# Patient Record
Sex: FEMALE | Race: BLACK OR AFRICAN AMERICAN | NOT HISPANIC OR LATINO | Employment: FULL TIME | URBAN - METROPOLITAN AREA
[De-identification: names, ages, dates, MRNs, and addresses within clinical notes are randomized per-mention and may not be internally consistent; named-entity substitution may affect disease eponyms.]

---

## 2017-01-04 ENCOUNTER — PATIENT MESSAGE (OUTPATIENT)
Dept: FAMILY MEDICINE | Facility: CLINIC | Age: 47
End: 2017-01-04

## 2017-01-09 ENCOUNTER — OFFICE VISIT (OUTPATIENT)
Dept: FAMILY MEDICINE | Facility: CLINIC | Age: 47
End: 2017-01-09
Payer: MEDICAID

## 2017-01-09 VITALS
HEART RATE: 73 BPM | WEIGHT: 192 LBS | SYSTOLIC BLOOD PRESSURE: 126 MMHG | HEIGHT: 63 IN | BODY MASS INDEX: 34.02 KG/M2 | DIASTOLIC BLOOD PRESSURE: 77 MMHG

## 2017-01-09 DIAGNOSIS — Z23 FLU VACCINE NEED: ICD-10-CM

## 2017-01-09 DIAGNOSIS — I10 GOOD HYPERTENSION CONTROL: ICD-10-CM

## 2017-01-09 DIAGNOSIS — E78.5 HYPERLIPIDEMIA, UNSPECIFIED HYPERLIPIDEMIA TYPE: ICD-10-CM

## 2017-01-09 DIAGNOSIS — Z76.89 ESTABLISHING CARE WITH NEW DOCTOR, ENCOUNTER FOR: Primary | ICD-10-CM

## 2017-01-09 PROCEDURE — 99213 OFFICE O/P EST LOW 20 MIN: CPT | Mod: PBBFAC,PO,25 | Performed by: FAMILY MEDICINE

## 2017-01-09 PROCEDURE — 99214 OFFICE O/P EST MOD 30 MIN: CPT | Mod: S$PBB,,, | Performed by: FAMILY MEDICINE

## 2017-01-09 PROCEDURE — 90471 IMMUNIZATION ADMIN: CPT | Mod: PBBFAC,PO | Performed by: FAMILY MEDICINE

## 2017-01-09 PROCEDURE — 99999 PR PBB SHADOW E&M-EST. PATIENT-LVL III: CPT | Mod: PBBFAC,,, | Performed by: FAMILY MEDICINE

## 2017-01-09 NOTE — PROGRESS NOTES
Subjective:       Patient ID: Kylah Mohan is a 46 y.o. female.    Chief Complaint: Establish Care (discuss meds)    HPI     Establish Care  Pt reports to the clinic to establish care.   The pt has a medical history which includes Prediabetes, HTN, depression and anxiety.   As far as smoking is concerned, the pt denies.   The pt has not attempted to maintain a healthy diet.   Consistent seatbelt usage reported.   Health maintenance addressed and updated in chart.    Review of Systems   Constitutional: Negative for unexpected weight change.   HENT: Negative for hearing loss, rhinorrhea and trouble swallowing.    Eyes: Negative for discharge and visual disturbance.   Respiratory: Negative for chest tightness and wheezing.    Cardiovascular: Negative for chest pain and palpitations.   Gastrointestinal: Negative for blood in stool, constipation, diarrhea and vomiting.   Endocrine: Negative for polydipsia and polyuria.   Genitourinary: Negative for difficulty urinating, dysuria, hematuria and menstrual problem.   Musculoskeletal: Positive for arthralgias and joint swelling.   Neurological: Negative for headaches.       Objective:      Physical Exam   Constitutional: She appears well-developed and well-nourished. No distress.   HENT:   Head: Normocephalic and atraumatic.   Mouth/Throat: Oropharynx is clear and moist. No oropharyngeal exudate.   Eyes: EOM are normal. Pupils are equal, round, and reactive to light.   Neck: Normal range of motion. Neck supple. No thyromegaly present.   Cardiovascular: Normal rate, regular rhythm, normal heart sounds and intact distal pulses.    Pulmonary/Chest: Effort normal and breath sounds normal. No respiratory distress. She has no wheezes.   Abdominal: Soft. Bowel sounds are normal. She exhibits no distension and no mass. There is no tenderness.   Musculoskeletal: She exhibits no edema.   Lymphadenopathy:     She has no cervical adenopathy.   Neurological: She is alert.   Skin:  Skin is warm. No rash noted. No erythema.   Psychiatric: She has a normal mood and affect. Her behavior is normal.   Vitals reviewed.      Assessment:       1. Establishing care with new doctor, encounter for    2. Good hypertension control    3. Hyperlipidemia, unspecified hyperlipidemia type    4. Obesity, Class II, BMI 35-39.9, with comorbidity        Plan:         1. Establishing care with new doctor, encounter for    2. Good hypertension control  Patient's blood pressure 126/77 on today.  No changes to medication regimen at this time.    3. Hyperlipidemia, unspecified hyperlipidemia type  Patient will like to employee dietary and lifestyle measures in order to help with this.    4. Obesity, Class II, BMI 35-39.9, with comorbidity  Patient has been advised to continue to maintain a healthy lifestyle, including regular exercise and consuming a well balanced diet.     5. Flu vaccine need  - Influenza - Quadrivalent (3 years & older) (PF)    Patient readiness: acceptance and barriers:none    During the course of the visit the patient was educated and counseled about the following:     Hypertension:   Dietary sodium restriction.  Regular aerobic exercise.  Obesity:   Informal exercise measures discussed, e.g. taking stairs instead of elevator.  Regular aerobic exercise program discussed.    Goals: Hypertension: Reduce Blood Pressure and Obesity: Reduce calorie intake and BMI    Did patient meet goals/outcomes: No    The following self management tools provided: blood pressure log  excercise log    Patient Instructions (the written plan) was given to the patient/family.     Time spent with patient: 15 minutes    Portions of this note were created using Dragon voice recognition software. There may be voice recognition errors found in the text, and attempts were made to correct these errors prior to signature    Enmanuel Nowak MD    Family Medicine  1/9/2017

## 2017-01-09 NOTE — PATIENT INSTRUCTIONS
Diabetes (General Information)  Diabetes is a long-term health problem. It means your body does not make enough insulin. Or it may mean that your body cannot use the insulin it makes. Insulin is a hormone in your body. It lets blood sugar (glucose) reach the cells in your body. All of your cells need glucose for fuel.  When you have diabetes, the glucose in your blood builds up because it cannot get into the cells. This buildup is called high blood sugar (hyperglycemia).  Your blood sugar level depends on several things. It depends on what kind of food you eat and how much of it you eat. It also depends on how much exercise you get, and how much insulin you have in your body. Eating too much of the wrong kinds of food or not taking diabetes medicine on time can cause high blood sugar. Infections can cause high blood sugar even if you are taking medicines correctly.  These things can also cause low blood sugar:  · Missing meals  · Not eating enough food  · Taking too much diabetes medicine  Diabetes can cause serious problems over time if you do not get treated. These problems include heart disease, stroke, kidney failure, and blindness. They also include nerve pain or loss of feeling in your legs and feet, and gangrene of the feet. By keeping your blood sugar under control you can prevent or delay these problems.  Normal blood sugar levels are 80 to 100 before a meal and less than 180 in the 1 to 2 hours after a meal.  Home care  Follow these guidelines when caring for yourself at home:  · Follow the diet your healthcare provider gives you. Take insulin or other diabetes medicine exactly as told to.  · Watch your blood sugar as you are told to. Keep a log of your results. This will help your provider change your medicines to keep your blood sugar under control.  · Try to reach your ideal weight. You may be able to cut back on or not have to take diabetes medicine if you eat the right foods and get exercise.  · Do  not smoke. Smoking worsens the effects of diabetes on your circulation. You are much more likely to have a heart attack if you have diabetes and you smoke.  · Take good care of your feet. If you have lost feeling in your feet, you may not see an injury or infection. Check your feet and between your toes at least once a week.  · Wear a medical alert bracelet or necklace, or carry a card in your wallet that says you have diabetes. This will help healthcare providers give you the right care if you get very ill and cannot tell them that you have diabetes.  Sick day plan  If you get a cold, the flu, or a bacterial or viral infection, take these steps:  · Look at your diabetes sick plan and call your healthcare provider as you were told to. You may need to call your provider right away if:  ¨ Your blood sugar is above 240 while taking your diabetes medicine  ¨ Your urine ketone levels are above normal or high  ¨ You have been vomiting more than 6 hours  ¨ You have trouble breathing or your breath ha s a fruity smell  ¨ You have a high fever  ¨ You have a fever for several days and you are not getting better  ¨ You get light-headed and are sleepier than usual  · Keep taking your diabetes pills (oral medicine) even if you have been vomiting and are feeling sick. Call your provider right away because you may need insulin to lower your blood sugar until you recover from your illness.  · Keep taking your insulin even if you have been vomiting and are feeling sick. Call your provider right away to ask if you need to change your insulin dose. This will depend on your blood sugar results.  · Check your blood sugar every 2 to 4 hours, or at least 4 times a day.  · Check your ketones often. If you are vomiting and having diarrhea, watch them more often.  · Do not skip meals. Try to eat small meals on a regular schedule. Do this even if you do not feel like eating.  · Drink water or other liquids that do not have caffeine or  calories. This will keep you from getting dehydrated. If you are nauseated or vomiting, takes small sips every 5 minutes. To prevent dehydration try to drink a cup (8 ounces) of fluids every hour while you are awake.  General care  Always bring a source of fast-acting sugar with you in case you have symptoms of low blood sugar (below 70). At the first sign of low blood sugar, eat or drink 15 to 20 grams of fast-acting sugar to raise your blood sugar. Examples are:  · 3 to 4 glucose tablets. You can buy these at most [employer].  · 4 ounces (1/2 cup) of regular (not diet) soft drinks  · 4 ounces (1/2 cup) of any fruit juice  · 8 ounces (1 cup) of milk  · 5 to 6 pieces of hard candy  · 1 tablespoon of honey  Check your blood sugar 15 minutes after treating yourself. If it is still below 70, take 15 to 20 more grams of fast-acting sugar. Test again in 15 minutes. If it returns to normal (70 or above), eat a snack or meal to keep your blood sugar in a safe range. If it stays low, call your doctor or go to an emergency room.  Follow-up care  Follow-up with your healthcare provider, or as advised. For more information about diabetes, visit the American Diabetes Association website at www.diabetes.org or call 279-471-9967.  When to seek medical advice  Call your healthcare provider right away if you have any of these symptoms of high blood sugar:  · Frequent urination  · Dizziness  · Drowsiness  · Thirst  · Headache  · Nausea or vomiting  · Abdominal pain  · Eyesight changes  · Fast breathing  · Confusion or loss of consciousness  Also call your provider right away if you have any of these signs of low blood sugar:  · Fatigue  · Headache  · Shakes  · Excess sweating  · Hunger  · Feeling anxious or restless  · Eyesight changes  · Drowsiness  · Weakness  · Confusion or loss of consciousness  Call 911  Call for emergency help right away if any of these occur:  · Chest pain or shortness of breath  · Dizziness or  fainting  · Weakness of an arm or leg or one side of the face  · Trouble speaking or seeing   © 6511-4381 [employer] [address] [place] [address], [place] [address]. All rights reserved. This information is not intended as a substitute for professional medical care. Always follow your healthcare professional's instructions.        Weight Management: Getting Started  Healthy bodies come in all shapes and sizes. Not all bodies are made to be thin. For some people, a healthy weight is higher than the average weight listed on weight charts. Your healthcare provider can help you decide on a healthy weight for you.    Reasons to lose weight  Losing weight can help with some health problems, such as high blood pressure, heart disease, diabetes, sleep apnea, and arthritis. You may also feel more energy.  Set your long-term goal  Your goal doesn't even have to be a specific weight. You may decide on a fitness goal (such as being able to walk 10 miles a week), or a health goal (such as lowering your blood pressure). Choose a goal that is measurable and reasonable, so you know when you've reached it. A goal of reaching a BMI of less than 25 is not always reasonable (or possible).   Make an action plan  Habits dont change overnight. Setting your goals too high can leave you feeling discouraged if you cant reach them. Be realistic. Choose one or two small changes you can make now. Set an action plan for how you are going to make these changes. When you can stick to this plan, keep making a few more small changes. Taking small steps will help you stay on the path to success.  Track your progress  Write down your goals. Then, keep a daily record of your progress. Write down what you eat and how active you are. This record lets you look back on how much youve done. It may also help when youre feeling frustrated. Reward yourself for success. Even if you dont reach every goal, give yourself credit for what you do get  done.  Get support  Encouragement from others can help make losing weight easier. Ask your family members and friends for support. They may even want to join you. Also look to your healthcare provider, registered dietitian, and  for help. Your local hospital can give you more information about nutrition, exercise, and weight loss.  © 0036-0120 [employer]. [address] [place] [address], [place] [address]. All rights reserved. This information is not intended as a substitute for professional medical care. Always follow your healthcare professional's instructions.

## 2017-01-11 RX ORDER — OXYBUTYNIN CHLORIDE 5 MG/1
TABLET ORAL
Qty: 60 TABLET | Refills: 0 | Status: SHIPPED | OUTPATIENT
Start: 2017-01-11 | End: 2017-01-19 | Stop reason: SDUPTHER

## 2017-01-17 ENCOUNTER — PATIENT MESSAGE (OUTPATIENT)
Dept: ENDOCRINOLOGY | Facility: CLINIC | Age: 47
End: 2017-01-17

## 2017-01-18 ENCOUNTER — PATIENT MESSAGE (OUTPATIENT)
Dept: ENDOCRINOLOGY | Facility: CLINIC | Age: 47
End: 2017-01-18

## 2017-01-19 RX ORDER — OXYBUTYNIN CHLORIDE 5 MG/1
5 TABLET ORAL 2 TIMES DAILY
Qty: 60 TABLET | Refills: 0 | Status: SHIPPED | OUTPATIENT
Start: 2017-01-19 | End: 2017-02-17 | Stop reason: SDUPTHER

## 2017-02-15 ENCOUNTER — PATIENT MESSAGE (OUTPATIENT)
Dept: FAMILY MEDICINE | Facility: CLINIC | Age: 47
End: 2017-02-15

## 2017-02-17 RX ORDER — OXYBUTYNIN CHLORIDE 5 MG/1
5 TABLET ORAL 2 TIMES DAILY
Qty: 60 TABLET | Refills: 0 | Status: SHIPPED | OUTPATIENT
Start: 2017-02-17 | End: 2017-04-12 | Stop reason: SDUPTHER

## 2017-02-23 DIAGNOSIS — I10 ESSENTIAL HYPERTENSION: ICD-10-CM

## 2017-02-23 RX ORDER — LOSARTAN POTASSIUM 25 MG/1
25 TABLET ORAL DAILY
Qty: 30 TABLET | Refills: 11 | Status: SHIPPED | OUTPATIENT
Start: 2017-02-23 | End: 2017-02-23 | Stop reason: SDUPTHER

## 2017-02-23 RX ORDER — LOSARTAN POTASSIUM 25 MG/1
TABLET ORAL
Qty: 30 TABLET | Refills: 11 | Status: SHIPPED | OUTPATIENT
Start: 2017-02-23 | End: 2018-03-13 | Stop reason: SDUPTHER

## 2017-03-01 ENCOUNTER — LAB VISIT (OUTPATIENT)
Dept: LAB | Facility: HOSPITAL | Age: 47
End: 2017-03-01
Attending: FAMILY MEDICINE
Payer: MEDICAID

## 2017-03-01 ENCOUNTER — OFFICE VISIT (OUTPATIENT)
Dept: FAMILY MEDICINE | Facility: CLINIC | Age: 47
End: 2017-03-01
Payer: MEDICAID

## 2017-03-01 VITALS
BODY MASS INDEX: 34.68 KG/M2 | WEIGHT: 195.75 LBS | DIASTOLIC BLOOD PRESSURE: 81 MMHG | SYSTOLIC BLOOD PRESSURE: 128 MMHG | HEART RATE: 68 BPM | HEIGHT: 63 IN

## 2017-03-01 DIAGNOSIS — R53.83 FATIGUE, UNSPECIFIED TYPE: Primary | ICD-10-CM

## 2017-03-01 DIAGNOSIS — R53.83 FATIGUE, UNSPECIFIED TYPE: ICD-10-CM

## 2017-03-01 LAB
CRP SERPL-MCNC: 8.9 MG/L
ERYTHROCYTE [SEDIMENTATION RATE] IN BLOOD BY WESTERGREN METHOD: 32 MM/HR

## 2017-03-01 PROCEDURE — 86225 DNA ANTIBODY NATIVE: CPT

## 2017-03-01 PROCEDURE — 36415 COLL VENOUS BLD VENIPUNCTURE: CPT | Mod: PO

## 2017-03-01 PROCEDURE — 99213 OFFICE O/P EST LOW 20 MIN: CPT | Mod: S$PBB,,, | Performed by: FAMILY MEDICINE

## 2017-03-01 PROCEDURE — 86038 ANTINUCLEAR ANTIBODIES: CPT

## 2017-03-01 PROCEDURE — 85651 RBC SED RATE NONAUTOMATED: CPT | Mod: PO

## 2017-03-01 PROCEDURE — 86140 C-REACTIVE PROTEIN: CPT

## 2017-03-01 PROCEDURE — 99999 PR PBB SHADOW E&M-EST. PATIENT-LVL III: CPT | Mod: PBBFAC,,, | Performed by: FAMILY MEDICINE

## 2017-03-01 RX ORDER — GABAPENTIN 300 MG/1
300 CAPSULE ORAL 3 TIMES DAILY
Qty: 90 CAPSULE | Refills: 11 | Status: SHIPPED | OUTPATIENT
Start: 2017-03-01 | End: 2018-03-13 | Stop reason: SDUPTHER

## 2017-03-01 NOTE — MR AVS SNAPSHOT
[facility]  [address] [place] [address]  Phone: [phone]  Fax: [phone]                  Kylah Mohan   3/1/2017 10:40 AM   Office Visit    Description:  Female : 1970   Provider:  Enmanuel Nowak MD   Department:  [facility]           Reason for Visit     Numbness           Diagnoses this Visit        Comments    Fatigue, unspecified type    -  Primary            To Do List           Future Appointments        Provider Department Dept Phone    2017 8:00 AM Barrington Suarez MD [facility] Endo/Diabetes [phone]      Goals (5 Years of Data)     None       These Medications        Disp Refills Start End    gabapentin (NEURONTIN) 300 MG capsule 90 capsule 11 3/1/2017 3/1/2018    Take 1 capsule (300 mg total) by mouth 3 (three) times daily. - Oral    Pharmacy: [facility] [address] [place] [facility] [address] #: [phone]         [facility] On Call     [facility] On Call Nurse Care Line - 7 Assistance  Registered nurses in the [facility] provide clinical advisement, health education, appointment booking, and other advisory services.  Call for this free service at [phone].             Medications           Message regarding Medications     Verify the changes and/or additions to your medication regime listed below are the same as discussed with your clinician today.  If any of these changes or additions are incorrect, please notify your healthcare provider.        START taking these NEW medications        Refills    gabapentin (NEURONTIN) 300 MG capsule 11    Sig: Take 1 capsule (300 mg total) by mouth 3 (three) times daily.    Class: Normal    Route: Oral           Verify that the below list of medications is an accurate representation of the medications you are currently taking.  If none reported, the list may be blank. If incorrect, please contact your healthcare provider. Carry this list with you in case  of emergency.           Current Medications     atenolol (TENORMIN) 25 MG tablet Take 1 tablet (25 mg total) by mouth once daily.    hydrochlorothiazide (HYDRODIURIL) 25 MG tablet Take 1 tablet (25 mg total) by mouth once daily.    losartan (COZAAR) 25 MG tablet TAKE ONE TABLET BY MOUTH ONE TIME DAILY    metformin (GLUCOPHAGE) 500 MG tablet Take 1 tablet (500 mg total) by mouth 2 (two) times daily with meals.    methocarbamol (ROBAXIN) 500 MG Tab Take 1 tablet (500 mg total) by mouth 4 (four) times daily.    multivitamin (THERAGRAN) per tablet Every day    naproxen (NAPROSYN) 500 MG tablet Take 1 tablet (500 mg total) by mouth 2 (two) times daily with meals.    oxybutynin (DITROPAN) 5 MG Tab Take 1 tablet (5 mg total) by mouth 2 (two) times daily.    topiramate (TOPAMAX) 50 MG tablet Take 1 tablet (50 mg total) by mouth once daily.    trazodone (DESYREL) 50 MG tablet TAKE ONE TABLET BY MOUTH ONCE EVERY EVENING    gabapentin (NEURONTIN) 300 MG capsule Take 1 capsule (300 mg total) by mouth 3 (three) times daily.           Clinical Reference Information           Your Vitals Were     BP                   128/81           Blood Pressure          Most Recent Value    BP  128/81      Allergies as of 3/1/2017     Latex    Shellfish Containing Products      Immunizations Administered on Date of Encounter - 3/1/2017     None      Orders Placed During Today's Visit     Future Labs/Procedures Expected by Expires    RAINER  3/1/2017 4/30/2018    ANTI-DNA ANTIBODY, DOUBLE-STRANDED  3/1/2017 4/30/2018    C-REACTIVE PROTEIN  3/1/2017 4/30/2018    SEDIMENTATION RATE, MANUAL  3/1/2017 4/30/2018      Language Assistance Services     ATTENTION: Language assistance services are available, free of charge. Please call [phone].      ATENCIÓN: Si habla chriss, tiene a lange disposición servicios gratuitos de asistencia lingüística. Llame al [phone].     CHÚ Ý: N?u b?n nói Ti?ng Vi?t, có các d?ch v? h? tr? ngôn ng? mi?n [facility]  donn forde?nKelsie ARNDT?i s? [phone].         [facility] [facility] complies with applicable Federal civil rights laws and does not discriminate on the basis of race, color, national origin, age, disability, or sex.

## 2017-03-01 NOTE — PROGRESS NOTES
Subjective:       Patient ID: Kylah Mohan is a 46 y.o. female.    Chief Complaint: Numbness (in hand and feet, fatigue, sensitivity in mouth)    HPI     Numbness and tingling  Pt has had symptoms for the past year.   She has a diagnosis of DM2.   She has also had some issues with leg pain that has been associated.   Pt has had some dry mouth and has been told that this was due to her DM.   She reports that she has some occasional rash to her face that last approx 1 week at a time, recurring after about 3 months.   Pt states that she has a sister that was diagnosed with Lupus and is concerned that she may.   Pt reports cold intolerance.     Review of Systems   Constitutional: Positive for activity change. Negative for unexpected weight change.   HENT: Negative for hearing loss, rhinorrhea and trouble swallowing.    Eyes: Positive for visual disturbance. Negative for discharge.   Respiratory: Negative for chest tightness and wheezing.    Cardiovascular: Negative for chest pain and palpitations.   Gastrointestinal: Negative for blood in stool, constipation, diarrhea and vomiting.   Endocrine: Negative for polydipsia and polyuria.   Genitourinary: Negative for difficulty urinating, dysuria, hematuria and menstrual problem.   Musculoskeletal: Positive for arthralgias and joint swelling.   Neurological: Positive for weakness. Negative for headaches.   Psychiatric/Behavioral: Negative for confusion and dysphoric mood.       Objective:      Physical Exam   Constitutional: She appears well-developed and well-nourished. No distress.   HENT:   Head: Normocephalic and atraumatic.   Mouth/Throat: Oropharynx is clear and moist. No oropharyngeal exudate.   Eyes: EOM are normal. Pupils are equal, round, and reactive to light.   Neck: Normal range of motion. Neck supple. No thyromegaly present.   Cardiovascular: Normal rate, regular rhythm, normal heart sounds and intact distal pulses.    Pulmonary/Chest: Effort normal and  breath sounds normal. No respiratory distress. She has no wheezes.   Abdominal: Soft. Bowel sounds are normal. She exhibits no distension and no mass. There is no tenderness.   Musculoskeletal: She exhibits no edema.   Lymphadenopathy:     She has no cervical adenopathy.   Neurological: She is alert.   Skin: Skin is warm. No rash noted. No erythema.   Psychiatric: She has a normal mood and affect. Her behavior is normal.   Vitals reviewed.      Assessment:       1. Fatigue, unspecified type        Plan:       1. Fatigue, unspecified type  Due to patient's family history of lupus, we will evaluate for this.  She has been advised that her symptoms are most likely secondary to diabetes.  Will give Neurontin for her neuropathy.  Otherwise exam findings were without concerns.  - ANTI-DNA ANTIBODY, DOUBLE-STRANDED; Future  - RAINER; Future  - SEDIMENTATION RATE, MANUAL; Future  - C-REACTIVE PROTEIN; Future    Portions of this note were created using Dragon voice recognition software. There may be voice recognition errors found in the text, and attempts were made to correct these errors prior to signature    Enmanuel Nowak MD    Family Medicine  3/1/2017

## 2017-03-02 LAB
ANA SER QL IF: NORMAL
DSDNA AB SER-ACNC: NORMAL [IU]/ML

## 2017-03-07 ENCOUNTER — PATIENT MESSAGE (OUTPATIENT)
Dept: FAMILY MEDICINE | Facility: CLINIC | Age: 47
End: 2017-03-07

## 2017-03-07 ENCOUNTER — TELEPHONE (OUTPATIENT)
Dept: FAMILY MEDICINE | Facility: CLINIC | Age: 47
End: 2017-03-07

## 2017-03-07 NOTE — TELEPHONE ENCOUNTER
Patient informed her recent lab results.    Portions of this note were created using Dragon voice recognition software. There may be voice recognition errors found in the text, and attempts were made to correct these errors prior to signature    Enmanuel Nowak MD    Family Medicine  3/7/2017

## 2017-03-08 NOTE — TELEPHONE ENCOUNTER
Patient had lab done 3/1/17.  RAINER and Anti-DNA antibody were negative but the CRP and Sed Rate were elevated. Patient inquiring about what this means.'Please advise.

## 2017-03-21 DIAGNOSIS — E88.810 DYSMETABOLIC SYNDROME: ICD-10-CM

## 2017-03-21 DIAGNOSIS — R73.03 PREDIABETES: ICD-10-CM

## 2017-03-21 RX ORDER — METFORMIN HYDROCHLORIDE 500 MG/1
TABLET ORAL
Qty: 180 TABLET | Refills: 3 | Status: SHIPPED | OUTPATIENT
Start: 2017-03-21 | End: 2018-04-10 | Stop reason: SDUPTHER

## 2017-03-27 RX ORDER — TRAZODONE HYDROCHLORIDE 50 MG/1
50 TABLET ORAL NIGHTLY
Qty: 30 TABLET | Refills: 6 | Status: SHIPPED | OUTPATIENT
Start: 2017-03-27 | End: 2017-10-21 | Stop reason: SDUPTHER

## 2017-04-12 RX ORDER — OXYBUTYNIN CHLORIDE 5 MG/1
TABLET ORAL
Qty: 60 TABLET | Refills: 0 | Status: SHIPPED | OUTPATIENT
Start: 2017-04-12 | End: 2017-05-29 | Stop reason: SDUPTHER

## 2017-05-25 RX ORDER — HYDROCHLOROTHIAZIDE 25 MG/1
25 TABLET ORAL DAILY
Qty: 90 TABLET | Refills: 6 | Status: SHIPPED | OUTPATIENT
Start: 2017-05-25 | End: 2018-06-13 | Stop reason: SDUPTHER

## 2017-05-29 ENCOUNTER — LAB VISIT (OUTPATIENT)
Dept: LAB | Facility: HOSPITAL | Age: 47
End: 2017-05-29
Attending: INTERNAL MEDICINE
Payer: MEDICAID

## 2017-05-29 ENCOUNTER — OFFICE VISIT (OUTPATIENT)
Dept: ENDOCRINOLOGY | Facility: CLINIC | Age: 47
End: 2017-05-29
Payer: MEDICAID

## 2017-05-29 VITALS
RESPIRATION RATE: 18 BRPM | HEART RATE: 68 BPM | DIASTOLIC BLOOD PRESSURE: 71 MMHG | WEIGHT: 197.75 LBS | HEIGHT: 63 IN | SYSTOLIC BLOOD PRESSURE: 123 MMHG | BODY MASS INDEX: 35.04 KG/M2

## 2017-05-29 DIAGNOSIS — R73.03 PREDIABETES: ICD-10-CM

## 2017-05-29 DIAGNOSIS — E05.90 THYROTOXICOSIS WITHOUT THYROID STORM, UNSPECIFIED THYROTOXICOSIS TYPE: ICD-10-CM

## 2017-05-29 DIAGNOSIS — E05.90 HYPERTHYROIDISM: Primary | ICD-10-CM

## 2017-05-29 DIAGNOSIS — E04.9 GOITER: ICD-10-CM

## 2017-05-29 DIAGNOSIS — E04.1 NODULAR THYROID DISEASE: ICD-10-CM

## 2017-05-29 DIAGNOSIS — E66.9 NON MORBID OBESITY, UNSPECIFIED OBESITY TYPE: ICD-10-CM

## 2017-05-29 DIAGNOSIS — E78.5 HYPERLIPIDEMIA, UNSPECIFIED HYPERLIPIDEMIA TYPE: ICD-10-CM

## 2017-05-29 DIAGNOSIS — E05.90 HYPERTHYROIDISM: ICD-10-CM

## 2017-05-29 LAB
CA-I BLDV-SCNC: 1.26 MMOL/L
CHOLEST/HDLC SERPL: 5.7 {RATIO}
HDL/CHOLESTEROL RATIO: 17.5 %
HDLC SERPL-MCNC: 194 MG/DL
HDLC SERPL-MCNC: 34 MG/DL
LDLC SERPL CALC-MCNC: 135.2 MG/DL
NONHDLC SERPL-MCNC: 160 MG/DL
PTH-INTACT SERPL-MCNC: 28 PG/ML
T3 SERPL-MCNC: 134 NG/DL
T4 FREE SERPL-MCNC: 1.18 NG/DL
TRIGL SERPL-MCNC: 124 MG/DL
TSH SERPL DL<=0.005 MIU/L-ACNC: 0.52 UIU/ML

## 2017-05-29 PROCEDURE — 99999 PR PBB SHADOW E&M-EST. PATIENT-LVL IV: CPT | Mod: PBBFAC,,, | Performed by: INTERNAL MEDICINE

## 2017-05-29 PROCEDURE — 84443 ASSAY THYROID STIM HORMONE: CPT

## 2017-05-29 PROCEDURE — 82330 ASSAY OF CALCIUM: CPT

## 2017-05-29 PROCEDURE — 84439 ASSAY OF FREE THYROXINE: CPT

## 2017-05-29 PROCEDURE — 99214 OFFICE O/P EST MOD 30 MIN: CPT | Mod: S$PBB,,, | Performed by: INTERNAL MEDICINE

## 2017-05-29 PROCEDURE — 36415 COLL VENOUS BLD VENIPUNCTURE: CPT | Mod: PO

## 2017-05-29 PROCEDURE — 80061 LIPID PANEL: CPT

## 2017-05-29 PROCEDURE — 86800 THYROGLOBULIN ANTIBODY: CPT

## 2017-05-29 PROCEDURE — 86316 IMMUNOASSAY TUMOR OTHER: CPT

## 2017-05-29 PROCEDURE — 82308 ASSAY OF CALCITONIN: CPT

## 2017-05-29 PROCEDURE — 83036 HEMOGLOBIN GLYCOSYLATED A1C: CPT

## 2017-05-29 PROCEDURE — 84480 ASSAY TRIIODOTHYRONINE (T3): CPT

## 2017-05-29 PROCEDURE — 83970 ASSAY OF PARATHORMONE: CPT

## 2017-05-29 RX ORDER — TOPIRAMATE 50 MG/1
50 TABLET, FILM COATED ORAL DAILY
Qty: 90 TABLET | Refills: 3 | Status: SHIPPED | OUTPATIENT
Start: 2017-05-29 | End: 2017-11-14 | Stop reason: SDUPTHER

## 2017-05-29 RX ORDER — OXYBUTYNIN CHLORIDE 5 MG/1
5 TABLET ORAL 2 TIMES DAILY
Qty: 60 TABLET | Refills: 0 | Status: SHIPPED | OUTPATIENT
Start: 2017-05-29 | End: 2017-06-26 | Stop reason: SDUPTHER

## 2017-05-29 NOTE — PROGRESS NOTES
Subjective:      Patient ID: Kylah Mohan is a 46 y.o. female.    Chief Complaint:     46 yr old lady with subclinical hyperthyroidism, thyroid nodular disease and hypertension seen in [facility] today.    History of Present Illness    46 yr old lady with thyroid functional anomalies consistent with subclinical hyperthyroidism and thyroid nodular disease seen in [facility] today. She in addition has essential hypertension on treatment, prediabetes, hyperlipidemia, depression and obesity with hypermenorrhea.  Her initial work showed her TFTs then were consistent with subclinical hyperthyroidism with no evidence of Graves disease based on ab profile.  Patient does have intermittent palpitations which appear related to caffiene use.  She sleeps well when she uses trazadone.  She continues to have lower neck tension and stiffness  She has no dysphagia, dysphonia. She has activity related shortness of breath but not at rest.  Her screening thyroid USS does show evidence of thyroid nodular disease of which there is a dominant right upper lobe complex lesion for which she had  a thyroid USS guided FNAB done (12/15) which shows features consistent with benign colloid nodule. Her next scheduled thyroid ultrasound from 12/16 showed stable thyroid nodular disease and her next scheduled test should be for ~ 12/17..  Patient has been taking low dose metformin 500mg BID for prediabetes.   Her thyroid nuclear medicine scan was normal  She has no new complaints today but continues to complain of weakness and fatigue.  Her weight has remained essentially static since last visit despite all her lifestyle and behaivoral changes.  Patient has been having recurrent chronic headaches. These appear to tension in nature.  Patient is s/p GENESIS and unilateral oophorectomy with uterine ablation     Review of Systems   Constitutional: Negative for fatigue.   HENT: Negative for facial swelling, sore throat and trouble swallowing.    Eyes: Negative  "for visual disturbance.   Respiratory: Negative for cough and shortness of breath.    Cardiovascular: Positive for palpitations (intermittent). Negative for chest pain.   Gastrointestinal: Negative for abdominal distention, nausea and vomiting.   Endocrine: Negative for polyuria.   Genitourinary: Negative for dysuria and frequency.   Musculoskeletal: Negative for arthralgias, back pain and gait problem.   Skin: Negative for color change, pallor and rash.   Neurological: Positive for dizziness (occasional) and headaches (These are much better now on low dose Topiramate).   Psychiatric/Behavioral: Negative for decreased concentration (But has been having mental fogginess and short term memory deficits over the last ~ 2mths) and sleep disturbance.       Objective:  /71   Pulse 68   Resp 18   Ht 5' 3" (1.6 m)   Wt 89.7 kg (197 lb 12 oz)   BMI 35.03 kg/m²      Physical Exam   Constitutional: She is oriented to person, place, and time. She appears well-developed and well-nourished. No distress.   Pleasant middle aged lady. Not pale, anicteric and afebrile. Well hydrated. Mood much improved. Not in any apparent acute distress.   HENT:   Head: Normocephalic and atraumatic.   Eyes: Conjunctivae and EOM are normal. Pupils are equal, round, and reactive to light. No scleral icterus.   Neck: Normal range of motion. Neck supple. No JVD present.   Mild nuchal AN   Cardiovascular: Normal rate, regular rhythm and normal heart sounds.    Pulmonary/Chest: Effort normal and breath sounds normal. No respiratory distress. She has no wheezes.   Abdominal: There is no tenderness.   Obese anterior abdominal wall.   Musculoskeletal: Normal range of motion.   Neurological: She is alert and oriented to person, place, and time. No cranial nerve deficit.   Skin: Skin is warm and dry. No rash noted. She is not diaphoretic. No erythema. No pallor.   Psychiatric: She has a normal mood and affect. Her behavior is normal. Judgment and " thought content normal.   Vitals reviewed.      Lab Review:     Results for CHARLY CASTILLO (MRN [id number]) as of 5/29/2017 11:32   Ref. Range 12/30/2016 08:52 12/30/2016 09:01 12/30/2016 14:18 3/1/2017 11:18   Sed Rate Latest Ref Range: 0 - 20 mm/Hr    32 (H)   Sodium Latest Ref Range: 136 - 145 mmol/L  142     Potassium Latest Ref Range: 3.5 - 5.1 mmol/L  3.4 (L)     Chloride Latest Ref Range: 95 - 110 mmol/L  105     CO2 Latest Ref Range: 23 - 29 mmol/L  29     Anion Gap Latest Ref Range: 8 - 16 mmol/L  8     BUN, Bld Latest Ref Range: 6 - 20 mg/dL  14     Creatinine Latest Ref Range: 0.5 - 1.4 mg/dL  0.7     eGFR if non African American Latest Ref Range: >60 mL/min/1.73 m^2  >60.0     eGFR if African American Latest Ref Range: >60 mL/min/1.73 m^2  >60.0     Glucose Latest Ref Range: 70 - 110 mg/dL  103     Calcium Latest Ref Range: 8.7 - 10.5 mg/dL  9.3     Alkaline Phosphatase Latest Ref Range: 55 - 135 U/L  77     Total Protein Latest Ref Range: 6.0 - 8.4 g/dL  7.2     Albumin Latest Ref Range: 3.5 - 5.2 g/dL  3.2 (L)     Uric Acid Latest Ref Range: 2.4 - 5.7 mg/dL  7.5 (H)     Total Bilirubin Latest Ref Range: 0.1 - 1.0 mg/dL  0.5     AST Latest Ref Range: 10 - 40 U/L  14     ALT Latest Ref Range: 10 - 44 U/L  12     CRP Latest Ref Range: 0.0 - 8.2 mg/L    8.9 (H)   Triglycerides Latest Ref Range: 30 - 150 mg/dL  117     Cholesterol Latest Ref Range: 120 - 199 mg/dL  215 (H)     HDL Latest Ref Range: 40 - 75 mg/dL  37 (L)     LDL Cholesterol Latest Ref Range: 63.0 - 159.0 mg/dL  154.6     Total Cholesterol/HDL Ratio Latest Ref Range: 2.0 - 5.0   5.8 (H)     Vit D, 25-Hydroxy Latest Ref Range: 30 - 96 ng/mL  76     Hemoglobin A1C Latest Ref Range: 4.5 - 6.2 %  5.9     Estimated Avg Glucose Latest Ref Range: 68 - 131 mg/dL  123     TSH Latest Ref Range: 0.400 - 4.000 uIU/mL  0.662     T3, Total Latest Ref Range: 60 - 180 ng/dL  134     Free T4 Latest Ref Range: 0.71 - 1.51 ng/dL  1.08     Thyroglobulin  Interpretation Unknown  SEE BELOW     Thyroglobulin Antibody Screen Latest Ref Range: <4.0 IU/mL  <1.8     Thyroglobulin, Tumor Marker Latest Units: ng/mL  70 (H)     ds DNA Ab Latest Ref Range: Negative 1:10     Negative 1:10   Specimen UA Unknown Urine, Clean Catch      Color, UA Latest Ref Range: Yellow, Straw, Marilyn  Yellow      pH, UA Latest Ref Range: 5.0 - 8.0  6.0      Specific Gravity, UA Latest Ref Range: 1.005 - 1.030  1.020      Appearance, UA Latest Ref Range: Clear  Hazy (A)      Protein, UA Latest Ref Range: Negative  Negative      Glucose, UA Latest Ref Range: Negative  Negative      Ketones, UA Latest Ref Range: Negative  Negative      Occult Blood UA Latest Ref Range: Negative  Negative      Nitrite, UA Latest Ref Range: Negative  Negative      Urobilinogen, UA Latest Ref Range: <2.0 EU/dL Negative      Bilirubin (UA) Latest Ref Range: Negative  Negative      Leukocytes, UA Latest Ref Range: Negative  1+ (A)      RBC, UA Latest Ref Range: 0 - 4 /hpf 3      WBC, UA Latest Ref Range: 0 - 5 /hpf 10 (H)      Bacteria, UA Latest Ref Range: None-Occ /hpf Few (A)      Squam Epithel, UA Latest Units: /hpf 12      Ca Oxalate Kylah, UA Latest Ref Range: None-Moderate  Occasional      Non-Squam Epith Latest Ref Range: <1/hpf /hpf <1      Microscopic Comment Unknown SEE COMMENT      Microalbum.,U,Random Latest Units: ug/mL 12.0      Microalb Creat Ratio Latest Ref Range: 0.0 - 30.0 ug/mg 5.1      US SOFT TISSUE HEAD NECK THYROID Unknown   Rpt    RAINER Screen Latest Ref Range: Negative <1:160     Negative <1:160   Creatinine, Random Ur Latest Ref Range: 15.0 - 325.0 mg/dL 237.0      HDL/Chol Ratio Latest Ref Range: 20.0 - 50.0 %  17.2 (L)     Non-HDL Cholesterol Latest Units: mg/dL  178         Assessment:     1. Hyperthyroidism  T3    T4, free    Thyroglobulin    TSH   2. Nodular thyroid disease  Chromogranin A    Calcitonin    Calcium, ionized    PTH, intact   3. Thyrotoxicosis without thyroid storm, unspecified  thyrotoxicosis type     4. Goiter     5. Non morbid obesity, unspecified obesity type     6. Prediabetes  Hemoglobin A1c   7. Hyperlipidemia, unspecified hyperlipidemia type  Lipid panel        Regarding hyperthyroidism; To continue serial surviellance. Will repeat TFTs today. To continue low dose beta blockade as before.  Regarding thyroid nodular disease; for ffup thyroid USS   Regarding hypertension;Well controlled. To continue present antihypertensive regimen. To continue serial BP tracking.  Regarding prediabetes; to continue efforts at weight loss and reduced caloric intake. To recheck HBA1c today.  Regarding hypercholesterolemia; Most recent lipid profile at goal even without antilipidemic use  Regarding possible perimenopause;stable. To continue clinical survielance  Regarding chronic headaches to continue topiramate 50mg QD.      Plan:     FFup in ~ 6mths.

## 2017-05-30 LAB
ESTIMATED AVG GLUCOSE: 120 MG/DL
HBA1C MFR BLD HPLC: 5.8 %

## 2017-05-31 LAB
CALCIT SERPL-MCNC: <5 PG/ML
THRYOGLOBULIN INTERPRETATION: ABNORMAL
THYROGLOB AB SERPL-ACNC: <1.8 IU/ML
THYROGLOB SERPL-MCNC: 34 NG/ML

## 2017-06-02 LAB — CGA SERPL-MCNC: 6 NG/ML

## 2017-06-26 RX ORDER — OXYBUTYNIN CHLORIDE 5 MG/1
5 TABLET ORAL 2 TIMES DAILY
Qty: 60 TABLET | Refills: 0 | Status: SHIPPED | OUTPATIENT
Start: 2017-06-26 | End: 2017-12-22 | Stop reason: SDUPTHER

## 2017-10-23 RX ORDER — TRAZODONE HYDROCHLORIDE 50 MG/1
50 TABLET ORAL NIGHTLY
Qty: 30 TABLET | Refills: 6 | Status: SHIPPED | OUTPATIENT
Start: 2017-10-23 | End: 2018-04-30

## 2017-11-14 ENCOUNTER — OFFICE VISIT (OUTPATIENT)
Dept: ENDOCRINOLOGY | Facility: CLINIC | Age: 47
End: 2017-11-14
Payer: MEDICAID

## 2017-11-14 ENCOUNTER — LAB VISIT (OUTPATIENT)
Dept: LAB | Facility: HOSPITAL | Age: 47
End: 2017-11-14
Attending: INTERNAL MEDICINE
Payer: MEDICAID

## 2017-11-14 VITALS
HEIGHT: 69 IN | HEART RATE: 65 BPM | SYSTOLIC BLOOD PRESSURE: 103 MMHG | DIASTOLIC BLOOD PRESSURE: 65 MMHG | BODY MASS INDEX: 26.22 KG/M2 | WEIGHT: 177 LBS

## 2017-11-14 DIAGNOSIS — R73.03 PREDIABETES: ICD-10-CM

## 2017-11-14 DIAGNOSIS — R94.6 ABNORMAL THYROID FUNCTION TEST: ICD-10-CM

## 2017-11-14 DIAGNOSIS — R51.9 CHRONIC NONINTRACTABLE HEADACHE, UNSPECIFIED HEADACHE TYPE: ICD-10-CM

## 2017-11-14 DIAGNOSIS — N95.1 PERIMENOPAUSE: ICD-10-CM

## 2017-11-14 DIAGNOSIS — E06.9 THYROIDITIS: ICD-10-CM

## 2017-11-14 DIAGNOSIS — F41.8 DEPRESSION WITH ANXIETY: ICD-10-CM

## 2017-11-14 DIAGNOSIS — G89.29 CHRONIC NONINTRACTABLE HEADACHE, UNSPECIFIED HEADACHE TYPE: ICD-10-CM

## 2017-11-14 DIAGNOSIS — E04.1 NODULAR THYROID DISEASE: ICD-10-CM

## 2017-11-14 DIAGNOSIS — E05.90 HYPERTHYROIDISM: ICD-10-CM

## 2017-11-14 DIAGNOSIS — E78.5 HYPERLIPIDEMIA, UNSPECIFIED HYPERLIPIDEMIA TYPE: ICD-10-CM

## 2017-11-14 DIAGNOSIS — E06.9 THYROIDITIS: Primary | ICD-10-CM

## 2017-11-14 LAB
ALBUMIN SERPL BCP-MCNC: 3.4 G/DL
ALP SERPL-CCNC: 64 U/L
ALT SERPL W/O P-5'-P-CCNC: 9 U/L
ANION GAP SERPL CALC-SCNC: 10 MMOL/L
AST SERPL-CCNC: 15 U/L
BILIRUB SERPL-MCNC: 0.4 MG/DL
BUN SERPL-MCNC: 13 MG/DL
CALCIUM SERPL-MCNC: 9.5 MG/DL
CHLORIDE SERPL-SCNC: 105 MMOL/L
CO2 SERPL-SCNC: 25 MMOL/L
CREAT SERPL-MCNC: 0.7 MG/DL
EST. GFR  (AFRICAN AMERICAN): >60 ML/MIN/1.73 M^2
EST. GFR  (NON AFRICAN AMERICAN): >60 ML/MIN/1.73 M^2
ESTIMATED AVG GLUCOSE: 108 MG/DL
FSH SERPL-ACNC: 7.5 MIU/ML
GLUCOSE SERPL-MCNC: 85 MG/DL
HBA1C MFR BLD HPLC: 5.4 %
LH SERPL-ACNC: 7 MIU/ML
POTASSIUM SERPL-SCNC: 3.3 MMOL/L
PROT SERPL-MCNC: 7.5 G/DL
SODIUM SERPL-SCNC: 140 MMOL/L
T3 SERPL-MCNC: 123 NG/DL
T4 FREE SERPL-MCNC: 1.18 NG/DL
TSH SERPL DL<=0.005 MIU/L-ACNC: 0.42 UIU/ML

## 2017-11-14 PROCEDURE — 84439 ASSAY OF FREE THYROXINE: CPT

## 2017-11-14 PROCEDURE — 84432 ASSAY OF THYROGLOBULIN: CPT

## 2017-11-14 PROCEDURE — 99999 PR PBB SHADOW E&M-EST. PATIENT-LVL III: CPT | Mod: PBBFAC,,, | Performed by: INTERNAL MEDICINE

## 2017-11-14 PROCEDURE — 83018 HEAVY METAL QUAN EACH NES: CPT

## 2017-11-14 PROCEDURE — 99214 OFFICE O/P EST MOD 30 MIN: CPT | Mod: S$PBB,,, | Performed by: INTERNAL MEDICINE

## 2017-11-14 PROCEDURE — 83036 HEMOGLOBIN GLYCOSYLATED A1C: CPT

## 2017-11-14 PROCEDURE — 86316 IMMUNOASSAY TUMOR OTHER: CPT

## 2017-11-14 PROCEDURE — 84443 ASSAY THYROID STIM HORMONE: CPT

## 2017-11-14 PROCEDURE — 99213 OFFICE O/P EST LOW 20 MIN: CPT | Mod: PBBFAC,PO | Performed by: INTERNAL MEDICINE

## 2017-11-14 PROCEDURE — 80053 COMPREHEN METABOLIC PANEL: CPT

## 2017-11-14 PROCEDURE — 84480 ASSAY TRIIODOTHYRONINE (T3): CPT

## 2017-11-14 PROCEDURE — 83002 ASSAY OF GONADOTROPIN (LH): CPT

## 2017-11-14 PROCEDURE — 83001 ASSAY OF GONADOTROPIN (FSH): CPT

## 2017-11-14 RX ORDER — TOPIRAMATE 50 MG/1
50 TABLET, FILM COATED ORAL DAILY
Qty: 90 TABLET | Refills: 3 | Status: SHIPPED | OUTPATIENT
Start: 2017-11-14 | End: 2018-06-12 | Stop reason: SDUPTHER

## 2017-11-14 NOTE — PROGRESS NOTES
Subjective:      Patient ID: Kylah Mohan is a 47 y.o. female.    Chief Complaint:      46 yr old lady with subclinical hyperthyroidism, thyroid nodular disease and hypertension seen in [facility] today.    History of Present Illness    46 yr old lady with thyroid functional anomalies consistent with subclinical hyperthyroidism and thyroid nodular disease seen in [facility] today. She in addition has essential hypertension on treatment, prediabetes, hyperlipidemia, depression and obesity with hypermenorrhea.  Her initial work showed her TFTs then were consistent with subclinical hyperthyroidism with no evidence of Graves disease based on ab profile.  Her screening thyroid USS does show evidence of thyroid nodular disease of which there is a dominant right upper lobe complex lesion for which she had  a thyroid USS guided FNAB done (12/15) which shows features consistent with benign colloid nodule. Her next scheduled thyroid ultrasound should be for ~ 11/06.  Patient has been taking low dose metformin 500mg BID but she feels she may have been having low blood sugar reactions though she has measured her values mainly in the 80s.  Her thyroid nuclear medicine scan was normal.  She has only occasional palpitations but this is not excessive. She does drink some caffienated coffeee.  Patient has stopped taking trazadone. She has been of the topamax for ~ 3 months now.  She has no new complaints today but continues to complain of weakness and fatigue.  Her weight has remained essentially static since last visit despite all her lifestyle and behaivoral changes.  Patient has been having recurrent chronic headaches. These appear to tension in nature.  Patient is s/p GENESIS and unilateral oophorectomy with uterine ablation. She has been having some vasomotor symptoms with episodic flushing and excess diaphoresis.    Vitals - 1 value per visit 1/9/2017 3/1/2017 5/29/2017 11/14/2017   SYSTOLIC 126 128 123 103   DIASTOLIC 77 81 71 65  "  PULSE 73 68 68 65   TEMPERATURE       RESPIRATIONS   18    SPO2       Weight (lb) 192.02 195.77 197.75 177.03   Weight (kg) 87.1 88.8 89.7 80.3   HEIGHT 5' 3" 5' 3" 5' 3" 5' 9"   BODY MASS INDEX 34.01 34.68 35.03 26.14     Patient has lost ~ 15lbs since the beginning of the year.   Review of Systems   Constitutional: Negative for fatigue.   HENT: Negative for facial swelling, sore throat and trouble swallowing.    Eyes: Negative for visual disturbance.   Respiratory: Negative for cough and shortness of breath.    Cardiovascular: Positive for palpitations (intermittent). Negative for chest pain.   Gastrointestinal: Negative for abdominal distention, nausea and vomiting.   Endocrine: Negative for polyuria.   Genitourinary: Negative for dysuria and frequency.   Musculoskeletal: Negative for arthralgias, back pain and gait problem.   Skin: Negative for color change, pallor and rash.   Neurological: Positive for headaches (These are much better now on low dose Topiramate). Negative for dizziness and numbness.   Hematological: Does not bruise/bleed easily.   Psychiatric/Behavioral: Positive for dysphoric mood (mild; much improved). Negative for decreased concentration and sleep disturbance.       Objective:   /65   Pulse 65   Ht 5' 9" (1.753 m)   Wt 80.3 kg (177 lb 0.5 oz)   BMI 26.14 kg/m²          Physical Exam   Constitutional: She is oriented to person, place, and time. She appears well-developed and well-nourished. No distress.   Pleasant middle aged lady. Not pale, anicteric and afebrile. Well hydrated. Mood much improved. Not in any apparent acute distress.   HENT:   Head: Normocephalic and atraumatic.   Eyes: Conjunctivae and EOM are normal. Pupils are equal, round, and reactive to light. No scleral icterus.   Neck: Normal range of motion. Neck supple. No JVD present.   Mild nuchal AN   Cardiovascular: Normal rate, regular rhythm and normal heart sounds.    No murmur heard.  Pulmonary/Chest: Effort " normal and breath sounds normal. No respiratory distress. She has no wheezes. She has no rales.   Abdominal: She exhibits no distension. There is no tenderness.   Obese anterior abdominal wall.   Musculoskeletal: Normal range of motion. She exhibits no edema.   Neurological: She is alert and oriented to person, place, and time. No cranial nerve deficit.   Skin: Skin is warm and dry. No rash noted. She is not diaphoretic. No erythema. No pallor.   Psychiatric: She has a normal mood and affect. Her behavior is normal. Judgment and thought content normal.   Vitals reviewed.      Lab Review:     Results for CHARLY CASTILLO (MRN [id number]) as of 11/14/2017 10:26   Ref. Range 3/1/2017 11:18 5/29/2017 12:01   Sed Rate Latest Ref Range: 0 - 20 mm/Hr 32 (H)    Calcium, Ion Latest Ref Range: 1.06 - 1.42 mmol/L  1.26   CRP Latest Ref Range: 0.0 - 8.2 mg/L 8.9 (H)    Triglycerides Latest Ref Range: 30 - 150 mg/dL  124   Cholesterol Latest Ref Range: 120 - 199 mg/dL  194   HDL Latest Ref Range: 40 - 75 mg/dL  34 (L)   LDL Cholesterol Latest Ref Range: 63.0 - 159.0 mg/dL  135.2   Total Cholesterol/HDL Ratio Latest Ref Range: 2.0 - 5.0   5.7 (H)   Hemoglobin A1C Latest Ref Range: 4.5 - 6.2 %  5.8   Estimated Avg Glucose Latest Ref Range: 68 - 131 mg/dL  120   TSH Latest Ref Range: 0.400 - 4.000 uIU/mL  0.515   T3, Total Latest Ref Range: 60 - 180 ng/dL  134   Free T4 Latest Ref Range: 0.71 - 1.51 ng/dL  1.18   Thyroglobulin Interpretation Unknown  SEE BELOW   Thyroglobulin Antibody Screen Latest Ref Range: <4.0 IU/mL  <1.8   Thyroglobulin, Tumor Marker Latest Units: ng/mL  34 (H)   PTH Latest Ref Range: 9.0 - 77.0 pg/mL  28.0   Calcitonin Latest Ref Range: <=7.6 pg/mL  <5.0   Chromogranin A Latest Ref Range: < OR = 15 ng/mL  6   ds DNA Ab Latest Ref Range: Negative 1:10  Negative 1:10    RAINER Screen Latest Ref Range: Negative <1:160  Negative <1:160    HDL/Chol Ratio Latest Ref Range: 20.0 - 50.0 %  17.5 (L)   Non-HDL  Cholesterol Latest Units: mg/dL  160       Assessment:     1. Thyroiditis  Iodine, Serum   2. Hyperthyroidism  US Soft Tissue Head Neck Thyroid    T4, free    T3    TSH    Iodine, Serum   3. Nodular thyroid disease  US Soft Tissue Head Neck Thyroid    Thyroglobulin    Chromogranin A   4. Abnormal thyroid function test     5. Hyperlipidemia, unspecified hyperlipidemia type  Comprehensive metabolic panel   6. Obesity, Class II, BMI 35-39.9, with comorbidity     7. Prediabetes  Hemoglobin A1c    topiramate (TOPAMAX) 50 MG tablet   8. Depression with anxiety     9. Chronic nonintractable headache, unspecified headache type  topiramate (TOPAMAX) 50 MG tablet   10. Perimenopause  Luteinizing hormone    Follicle stimulating hormone        Regarding hyperthyroidism; To continue serial surviellance. Will repeat TFTs today. To continue low dose beta blockade as before.  Regarding thyroid nodular disease; for ffup thyroid USS   Regarding hypertension;Well controlled. To continue present antihypertensive regimen. To continue serial BP tracking.  Regarding prediabetes; to continue efforts at weight loss and reduced caloric intake. To recheck HBA1c today.  Regarding hypercholesterolemia; Most recent lipid profile at goal even without antilipidemic use  Regarding possible perimenopause; to check FSH and LH  Regarding chronic headaches to continue topiramate 50mg QD.  Regarding recent mental fogginesss; Much better now since stopping trazadone. Will also consider weaning of neurontin. To discuss with PCP in this regard.    Plan:     FFup in ~ 4mths

## 2017-11-15 DIAGNOSIS — E87.6 HYPOKALEMIA: Primary | ICD-10-CM

## 2017-11-15 RX ORDER — POTASSIUM CHLORIDE 20 MEQ/1
20 TABLET, EXTENDED RELEASE ORAL DAILY
Qty: 90 TABLET | Refills: 3 | Status: SHIPPED | OUTPATIENT
Start: 2017-11-15 | End: 2018-05-08

## 2017-11-16 LAB
IODINE SERPL-MCNC: 96 NG/ML (ref 40–92)
THRYOGLOBULIN INTERPRETATION: ABNORMAL
THYROGLOB AB SERPL-ACNC: <1.8 IU/ML
THYROGLOB SERPL-MCNC: 29 NG/ML

## 2017-11-20 LAB — CGA SERPL-MCNC: 100 NG/ML (ref 0–95)

## 2017-12-13 ENCOUNTER — HOSPITAL ENCOUNTER (OUTPATIENT)
Dept: RADIOLOGY | Facility: HOSPITAL | Age: 47
Discharge: HOME | End: 2017-12-13
Attending: SPECIALIST
Payer: MEDICAID

## 2017-12-13 ENCOUNTER — OFFICE VISIT (OUTPATIENT)
Dept: OBSTETRICS AND GYNECOLOGY | Facility: CLINIC | Age: 47
End: 2017-12-13
Payer: MEDICAID

## 2017-12-13 ENCOUNTER — TELEPHONE (OUTPATIENT)
Dept: OBSTETRICS AND GYNECOLOGY | Facility: CLINIC | Age: 47
End: 2017-12-13

## 2017-12-13 VITALS — WEIGHT: 176 LBS | HEIGHT: 61 IN | BODY MASS INDEX: 33.23 KG/M2

## 2017-12-13 VITALS
BODY MASS INDEX: 33.25 KG/M2 | SYSTOLIC BLOOD PRESSURE: 124 MMHG | DIASTOLIC BLOOD PRESSURE: 74 MMHG | WEIGHT: 176.13 LBS | HEIGHT: 61 IN

## 2017-12-13 DIAGNOSIS — Z12.31 VISIT FOR SCREENING MAMMOGRAM: ICD-10-CM

## 2017-12-13 DIAGNOSIS — Z01.419 GYNECOLOGIC EXAM NORMAL: Primary | ICD-10-CM

## 2017-12-13 DIAGNOSIS — Z12.31 VISIT FOR SCREENING MAMMOGRAM: Primary | ICD-10-CM

## 2017-12-13 PROCEDURE — 99396 PREV VISIT EST AGE 40-64: CPT | Mod: S$PBB,,, | Performed by: SPECIALIST

## 2017-12-13 PROCEDURE — 88175 CYTOPATH C/V AUTO FLUID REDO: CPT

## 2017-12-13 PROCEDURE — 99214 OFFICE O/P EST MOD 30 MIN: CPT | Mod: PBBFAC,PN | Performed by: SPECIALIST

## 2017-12-13 PROCEDURE — 87624 HPV HI-RISK TYP POOLED RSLT: CPT

## 2017-12-13 PROCEDURE — 77067 SCR MAMMO BI INCL CAD: CPT | Mod: TC,PN

## 2017-12-13 PROCEDURE — 77067 SCR MAMMO BI INCL CAD: CPT | Mod: 26,,, | Performed by: RADIOLOGY

## 2017-12-13 PROCEDURE — 99999 PR PBB SHADOW E&M-EST. PATIENT-LVL IV: CPT | Mod: PBBFAC,,, | Performed by: SPECIALIST

## 2017-12-13 PROCEDURE — 77063 BREAST TOMOSYNTHESIS BI: CPT | Mod: 26,,, | Performed by: RADIOLOGY

## 2017-12-13 NOTE — PROGRESS NOTES
48 yo female presents for annual gyn evaluation.  Past Medical History:   Diagnosis Date    Benign hypertension     Bladder instability     Depression     DJD (degenerative joint disease), lumbar     History of viral meningitis 1996    Hyperlipidemia     Insomnia     Microscopic hematuria     negative work-up    Pulmonary nodule     minute    Tendonitis        Past Surgical History:   Procedure Laterality Date    ANTERIOR VAGINAL REPAIR      BREAST BIOPSY  6/5/2007    left breast benign    ENDOMETRIAL ABLATION  5/28/2009    HYSTERECTOMY      LAPAROSCOPIC HYSTERECTOMY  2/2013    TUBAL LIGATION  2002       Family History   Problem Relation Age of Onset    Osteoarthritis Mother     Hyperlipidemia Mother     Hypertension Mother     Hyperlipidemia Father     Diabetes Father     Hypertension Father     Cancer Paternal Grandmother      Breast    Diabetes Paternal Grandmother     Breast cancer Paternal Grandmother     Heart disease Sister      Congential       Social History     Social History    Marital status:      Spouse name: N/A    Number of children: N/A    Years of education: N/A     Occupational History     [employer] [address]     Social History Main Topics    Smoking status: Former Smoker     Packs/day: 0.25     Years: 10.00     Types: Cigarettes     Quit date: 10/20/2013    Smokeless tobacco: Never Used    Alcohol use 0.6 oz/week     1 Glasses of wine per week    Drug use: No    Sexual activity: Not Currently     Partners: Male     Birth control/ protection: Surgical     Other Topics Concern    None     Social History Narrative    The patient does exercise regularly (walking daily, 35-40min).Rates diet as fair.She is not satisfied with weight.       Current Outpatient Prescriptions   Medication Sig Dispense Refill    atenolol (TENORMIN) 25 MG tablet Take 1 tablet (25 mg total) by mouth once daily. 30 tablet 11    FLUARIX QUAD [id number], PF, 60 mcg (15 mcg x  4)/0.5 mL vaccine TO BE ADMINISTERED BY PHARMACIST FOR IMMUNIZATION  0    hydrochlorothiazide (HYDRODIURIL) 25 MG tablet Take 1 tablet (25 mg total) by mouth once daily. 90 tablet 6    losartan (COZAAR) 25 MG tablet TAKE ONE TABLET BY MOUTH ONE TIME DAILY 30 tablet 11    metformin (GLUCOPHAGE) 500 MG tablet TAKE ONE TABLET BY MOUTH TWICE DAILY WITH MEALS 180 tablet 3    methocarbamol (ROBAXIN) 500 MG Tab Take 1 tablet (500 mg total) by mouth 4 (four) times daily. 60 tablet 11    multivitamin (THERAGRAN) per tablet Every day      naproxen (NAPROSYN) 500 MG tablet Take 1 tablet (500 mg total) by mouth 2 (two) times daily with meals. 60 tablet 11    oxybutynin (DITROPAN) 5 MG Tab TAKE 1 TABLET (5 MG TOTAL) BY MOUTH 2 (TWO) TIMES DAILY. 60 tablet 0    potassium chloride SA (K-DUR,KLOR-CON) 20 MEQ tablet Take 1 tablet (20 mEq total) by mouth once daily. 90 tablet 3    topiramate (TOPAMAX) 50 MG tablet Take 1 tablet (50 mg total) by mouth once daily. 90 tablet 3    gabapentin (NEURONTIN) 300 MG capsule Take 1 capsule (300 mg total) by mouth 3 (three) times daily. 90 capsule 11    trazodone (DESYREL) 50 MG tablet TAKE 1 TABLET (50 MG TOTAL) BY MOUTH EVERY EVENING. 30 tablet 6     No current facility-administered medications for this visit.        Review of patient's allergies indicates:   Allergen Reactions    Latex      Other reaction(s): Rash    Shellfish containing products      Other reaction(s): Rash       Review of System:   General: no chills, fever, night sweats, weight gain or weight loss  Psychological: no depression or suicidal ideation  Breasts: no new or changing breast lumps, nipple discharge or masses.  Respiratory: no cough, shortness of breath, or wheezing  Cardiovascular: no chest pain or dyspnea on exertion  Gastrointestinal: no abdominal pain, change in bowel habits, or black or bloody stools  Genito-Urinary: no incontinence, urinary frequency/urgency or vulvar/vaginal symptoms, pelvic pain  or abnormal vaginal bleeding.  Musculoskeletal: no gait disturbance or muscular weakness    PE:   APPEARANCE: Well nourished, well developed, in no acute distress.  NECK: Neck symmetric without masses or thyromegaly.  NODES: No inguinal lymph node enlargement.  ABDOMEN: Soft. No tenderness or masses. No hepatosplenomegaly. No hernias.  BREASTS: Symmetrical, no skin changes or visible lesions. No palpable masses, nipple discharge or adenopathy bilaterally.  PELVIC: Normal external female genitalia without lesions. Normal hair distribution. Adequate perineal body, normal urethral meatus. Vagina moist and well rugated without lesions or discharge. No significant cystocele or rectocele. Uterus and cervix surgically absent. Bimanual exam revealed no masses, tenderness or abnormality.    COUNSELING:  The patient was counseled today on osteoporosis prevention, calcium supplementation, and regular weight bearing exercise. The patient was also counseled today on ACS PAP guidelines, with recommendations for yearly pelvic exams unless their uterus, cervix, and ovaries were removed for benign reasons; in that case, examinations every 3-5 years are recommended. The patient was also counseled regarding monthly breast self-examination, routine STD screening for at-risk populations, prophylactic immunizations for transmitted infections such as HPV, Pertussis, or Influenza as appropriate, and yearly mammograms when indicated by ACS guidelines. She was advised to see her primary care physician for all other health maintenance.   FOLLOW-UP with me for next routine visit.

## 2017-12-13 NOTE — TELEPHONE ENCOUNTER
----- Message from Tricia Wu sent at 12/13/2017 10:13 AM CST -----  Contact: patient  Patient returning a missed call to reschedule her wwe/mammogram appt today. She wants to come in at 3 pm. Please advise. Call to pod. Call connected to pod. Warm transferred.  Thanks!

## 2017-12-18 ENCOUNTER — HOSPITAL ENCOUNTER (OUTPATIENT)
Dept: RADIOLOGY | Facility: CLINIC | Age: 47
Discharge: HOME | End: 2017-12-18
Attending: INTERNAL MEDICINE
Payer: MEDICAID

## 2017-12-18 DIAGNOSIS — E04.1 NODULAR THYROID DISEASE: ICD-10-CM

## 2017-12-18 DIAGNOSIS — E05.90 HYPERTHYROIDISM: ICD-10-CM

## 2017-12-18 PROCEDURE — 76536 US EXAM OF HEAD AND NECK: CPT | Mod: 26,,, | Performed by: RADIOLOGY

## 2017-12-18 PROCEDURE — 76536 US EXAM OF HEAD AND NECK: CPT | Mod: TC,PO

## 2017-12-19 ENCOUNTER — TELEPHONE (OUTPATIENT)
Dept: OBSTETRICS AND GYNECOLOGY | Facility: CLINIC | Age: 47
End: 2017-12-19

## 2017-12-19 ENCOUNTER — PATIENT MESSAGE (OUTPATIENT)
Dept: OBSTETRICS AND GYNECOLOGY | Facility: CLINIC | Age: 47
End: 2017-12-19

## 2017-12-19 LAB
HPV16 AG SPEC QL: NEGATIVE
HPV16+18+H RISK 12 DNA CVX-IMP: NEGATIVE
HPV18 DNA SPEC QL NAA+PROBE: NEGATIVE

## 2017-12-19 NOTE — TELEPHONE ENCOUNTER
----- Message from Carolann Luna sent at 12/19/2017 11:21 AM CST -----  Contact: Patient  Patient is calling to request a doctors note for her employer for her appointment that she had last week on 12/13.  Please fax it to .  Call Back#[phone]  Thanks

## 2017-12-22 DIAGNOSIS — E05.90 HYPERTHYROIDISM: ICD-10-CM

## 2017-12-22 DIAGNOSIS — E05.90 THYROTOXICOSIS WITHOUT THYROID STORM, UNSPECIFIED THYROTOXICOSIS TYPE: ICD-10-CM

## 2017-12-22 RX ORDER — OXYBUTYNIN CHLORIDE 5 MG/1
5 TABLET ORAL 2 TIMES DAILY
Qty: 60 TABLET | Refills: 0 | Status: SHIPPED | OUTPATIENT
Start: 2017-12-22 | End: 2020-09-11

## 2017-12-22 RX ORDER — ATENOLOL 25 MG/1
25 TABLET ORAL DAILY
Qty: 90 TABLET | Refills: 3 | Status: SHIPPED | OUTPATIENT
Start: 2017-12-22 | End: 2018-01-12 | Stop reason: SDUPTHER

## 2018-01-12 DIAGNOSIS — E05.90 HYPERTHYROIDISM: ICD-10-CM

## 2018-01-12 DIAGNOSIS — E05.90 THYROTOXICOSIS WITHOUT THYROID STORM, UNSPECIFIED THYROTOXICOSIS TYPE: ICD-10-CM

## 2018-01-12 RX ORDER — ATENOLOL 25 MG/1
25 TABLET ORAL DAILY
Qty: 90 TABLET | Refills: 3 | Status: SHIPPED | OUTPATIENT
Start: 2018-01-12 | End: 2018-01-26 | Stop reason: SDUPTHER

## 2018-01-26 DIAGNOSIS — E05.90 THYROTOXICOSIS WITHOUT THYROID STORM, UNSPECIFIED THYROTOXICOSIS TYPE: ICD-10-CM

## 2018-01-26 DIAGNOSIS — E05.90 HYPERTHYROIDISM: ICD-10-CM

## 2018-01-26 RX ORDER — ATENOLOL 25 MG/1
25 TABLET ORAL DAILY
Qty: 90 TABLET | Refills: 3 | Status: SHIPPED | OUTPATIENT
Start: 2018-01-26 | End: 2018-01-29 | Stop reason: SDUPTHER

## 2018-01-29 DIAGNOSIS — E05.90 HYPERTHYROIDISM: ICD-10-CM

## 2018-01-29 DIAGNOSIS — E05.90 THYROTOXICOSIS WITHOUT THYROID STORM, UNSPECIFIED THYROTOXICOSIS TYPE: ICD-10-CM

## 2018-01-29 RX ORDER — ATENOLOL 25 MG/1
25 TABLET ORAL DAILY
Qty: 90 TABLET | Refills: 3 | Status: SHIPPED | OUTPATIENT
Start: 2018-01-29 | End: 2018-04-29

## 2018-03-13 DIAGNOSIS — I10 ESSENTIAL HYPERTENSION: ICD-10-CM

## 2018-03-13 RX ORDER — LOSARTAN POTASSIUM 25 MG/1
TABLET ORAL
Qty: 30 TABLET | Refills: 1 | Status: SHIPPED | OUTPATIENT
Start: 2018-03-13 | End: 2018-05-12 | Stop reason: SDUPTHER

## 2018-03-13 RX ORDER — GABAPENTIN 300 MG/1
CAPSULE ORAL
Qty: 90 CAPSULE | Refills: 1 | Status: SHIPPED | OUTPATIENT
Start: 2018-03-13 | End: 2018-05-12 | Stop reason: SDUPTHER

## 2018-04-10 DIAGNOSIS — E88.810 DYSMETABOLIC SYNDROME: ICD-10-CM

## 2018-04-10 DIAGNOSIS — R73.03 PREDIABETES: ICD-10-CM

## 2018-04-10 RX ORDER — METFORMIN HYDROCHLORIDE 500 MG/1
TABLET ORAL
Qty: 180 TABLET | Refills: 3 | Status: SHIPPED | OUTPATIENT
Start: 2018-04-10 | End: 2020-01-02 | Stop reason: SDUPTHER

## 2018-04-12 ENCOUNTER — TELEPHONE (OUTPATIENT)
Dept: FAMILY MEDICINE | Facility: CLINIC | Age: 48
End: 2018-04-12

## 2018-04-12 NOTE — TELEPHONE ENCOUNTER
----- Message from Concepcion Martinez sent at 4/12/2018 11:25 AM CDT -----  Contact: Pt  Pt is calling states upcoming appointment had to be rescheduled due to non approval from work. Pt states she still needs to be seen. If possible on 04/27, or 04/30. Pt can be reached at [phone] ([email or web address])

## 2018-04-30 ENCOUNTER — OFFICE VISIT (OUTPATIENT)
Dept: ENDOCRINOLOGY | Facility: CLINIC | Age: 48
End: 2018-04-30
Payer: MEDICAID

## 2018-04-30 VITALS
WEIGHT: 179 LBS | HEART RATE: 69 BPM | SYSTOLIC BLOOD PRESSURE: 121 MMHG | HEIGHT: 61 IN | DIASTOLIC BLOOD PRESSURE: 64 MMHG | RESPIRATION RATE: 18 BRPM | BODY MASS INDEX: 33.79 KG/M2

## 2018-04-30 DIAGNOSIS — E88.810 DYSMETABOLIC SYNDROME: ICD-10-CM

## 2018-04-30 DIAGNOSIS — R51.9 CHRONIC NONINTRACTABLE HEADACHE, UNSPECIFIED HEADACHE TYPE: ICD-10-CM

## 2018-04-30 DIAGNOSIS — E04.1 NODULAR THYROID DISEASE: ICD-10-CM

## 2018-04-30 DIAGNOSIS — R73.03 PREDIABETES: Primary | ICD-10-CM

## 2018-04-30 DIAGNOSIS — E78.5 HYPERLIPIDEMIA, UNSPECIFIED HYPERLIPIDEMIA TYPE: ICD-10-CM

## 2018-04-30 DIAGNOSIS — G89.29 CHRONIC NONINTRACTABLE HEADACHE, UNSPECIFIED HEADACHE TYPE: ICD-10-CM

## 2018-04-30 DIAGNOSIS — F41.8 DEPRESSION WITH ANXIETY: ICD-10-CM

## 2018-04-30 DIAGNOSIS — E66.9 CLASS 1 OBESITY WITH BODY MASS INDEX (BMI) OF 33.0 TO 33.9 IN ADULT, UNSPECIFIED OBESITY TYPE, UNSPECIFIED WHETHER SERIOUS COMORBIDITY PRESENT: ICD-10-CM

## 2018-04-30 PROCEDURE — 99499 UNLISTED E&M SERVICE: CPT | Mod: S$PBB,,, | Performed by: INTERNAL MEDICINE

## 2018-04-30 PROCEDURE — 99999 PR PBB SHADOW E&M-EST. PATIENT-LVL III: CPT | Mod: PBBFAC,,, | Performed by: INTERNAL MEDICINE

## 2018-04-30 PROCEDURE — 99213 OFFICE O/P EST LOW 20 MIN: CPT | Mod: PBBFAC,PO | Performed by: INTERNAL MEDICINE

## 2018-04-30 NOTE — PROGRESS NOTES
Subjective:      Patient ID: Kylah Mohan is a 47 y.o. female.    Chief Complaint:      47 yr old lady with subclinical hyperthyroidism, thyroid nodular disease and hypertension seen in [facility] today.    History of Present Illness    46 yr old lady with thyroid functional anomalies consistent with subclinical hyperthyroidism and thyroid nodular disease seen in [facility] today. She in addition has essential hypertension on treatment, prediabetes, hyperlipidemia, depression and obesity with hypermenorrhea.  Her initial work showed her TFTs then were consistent with subclinical hyperthyroidism with no evidence of Graves disease based on ab profile.  Her screening thyroid USS does show evidence of thyroid nodular disease of which there is a dominant right upper lobe complex lesion for which she had  a thyroid USS guided FNAB done (12/15) which shows features consistent with benign colloid nodule. Her next scheduled thyroid ultrasound  Was from 12/17 and showed sonogrpahic stability. Her next scheduled thyroid USS thus should be for ~ 12/18.  Patient has been taking low dose metformin 500mg BID but she feels she may have been having low blood sugar reactions though she has measured her values mainly in the 80s.  Her thyroid nuclear medicine scan was normal.  She has only occasional palpitations but this is not excessive. She does drink some caffienated coffeee.  Patient has stopped taking trazadone. She has been of the topamax for ~ 3 months now.  She has no new complaints today but continues to complain of weakness and fatigue.  Her weight has remained essentially static since last visit despite all her lifestyle and behaivoral changes.  Patient has been having recurrent chronic headaches. These appear to tension in nature.  Patient is s/p GENESIS and unilateral oophorectomy with uterine ablation.    Patient had to leave prior to being seen to get back to work.     Review of Systems    Objective: /64   Pulse 69    "Resp 18   Ht 5' 1" (1.549 m)   Wt 81.2 kg (179 lb 0.2 oz)   BMI 33.82 kg/m²  Body surface area is 1.87 meters squared.         Physical Exam    Lab Review:     Results for CHARLY CASTILLO (MRN [id number]) as of 4/30/2018 11:50   Ref. Range 11/14/2017 12:05   Sodium Latest Ref Range: 136 - 145 mmol/L 140   Potassium Latest Ref Range: 3.5 - 5.1 mmol/L 3.3 (L)   Chloride Latest Ref Range: 95 - 110 mmol/L 105   CO2 Latest Ref Range: 23 - 29 mmol/L 25   Anion Gap Latest Ref Range: 8 - 16 mmol/L 10   BUN, Bld Latest Ref Range: 6 - 20 mg/dL 13   Creatinine Latest Ref Range: 0.5 - 1.4 mg/dL 0.7   eGFR if non African American Latest Ref Range: >60 mL/min/1.73 m^2 >60.0   eGFR if African American Latest Ref Range: >60 mL/min/1.73 m^2 >60.0   Glucose Latest Ref Range: 70 - 110 mg/dL 85   Calcium Latest Ref Range: 8.7 - 10.5 mg/dL 9.5   Alkaline Phosphatase Latest Ref Range: 55 - 135 U/L 64   Total Protein Latest Ref Range: 6.0 - 8.4 g/dL 7.5   Albumin Latest Ref Range: 3.5 - 5.2 g/dL 3.4 (L)   Total Bilirubin Latest Ref Range: 0.1 - 1.0 mg/dL 0.4   AST Latest Ref Range: 10 - 40 U/L 15   ALT Latest Ref Range: 10 - 44 U/L 9 (L)   Hemoglobin A1C Latest Ref Range: 4.0 - 5.6 % 5.4   Estimated Avg Glucose Latest Ref Range: 68 - 131 mg/dL 108   TSH Latest Ref Range: 0.400 - 4.000 uIU/mL 0.423   T3, Total Latest Ref Range: 60 - 180 ng/dL 123   Free T4 Latest Ref Range: 0.71 - 1.51 ng/dL 1.18   Thyroglobulin Interpretation Unknown SEE BELOW   Thyroglobulin Antibody Screen Latest Ref Range: <4.0 IU/mL <1.8   Thyroglobulin, Tumor Marker Latest Units: ng/mL 29 (H)   Chromogranin A Latest Ref Range: 0 - 95 ng/mL 100 (H)   FSH Latest Ref Range: See Text mIU/mL 7.50   LH Latest Ref Range: See Text mIU/mL 7.0   Iodine, Serum Latest Ref Range: 40 - 92 ng/mL 96 (H)       Assessment:     1. Prediabetes  Hemoglobin A1c   2. Hyperlipidemia, unspecified hyperlipidemia type  Lipid panel    Comprehensive metabolic panel   3. Depression with " anxiety     4. Chronic nonintractable headache, unspecified headache type     5. Class 1 obesity with body mass index (BMI) of 33.0 to 33.9 in adult, unspecified obesity type, unspecified whether serious comorbidity present     6. Nodular thyroid disease  T4, free    T3    Thyroglobulin    TSH    Calcitonin    Chromogranin A    Iodine, Serum    CBC auto differential   7. Dysmetabolic syndrome  Comprehensive metabolic panel    CBC auto differential        Regarding hyperthyroidism; To continue serial surviellance. Will repeat TFTs today. To continue low dose beta blockade as before.  Regarding thyroid nodular disease; for ffup thyroid USS   Regarding hypertension;Well controlled. To continue present antihypertensive regimen. To continue serial BP tracking.  Regarding prediabetes; to continue efforts at weight loss and reduced caloric intake. To recheck HBA1c today.  Regarding hypercholesterolemia; Most recent lipid profile at goal even without antilipidemic use  Regarding possible perimenopause; to check FSH and LH  Regarding chronic headaches to continue topiramate 50mg QD.  Regarding recent mental fogginesss; Much better now since stopping trazadone. Will also consider weaning of neurontin. To discuss with PCP in this regard.    Plan:     Will reschedule to have planned formal visit.

## 2018-05-03 ENCOUNTER — PATIENT MESSAGE (OUTPATIENT)
Dept: ENDOCRINOLOGY | Facility: CLINIC | Age: 48
End: 2018-05-03

## 2018-05-08 ENCOUNTER — LAB VISIT (OUTPATIENT)
Dept: LAB | Facility: HOSPITAL | Age: 48
End: 2018-05-08
Attending: INTERNAL MEDICINE
Payer: MEDICAID

## 2018-05-08 ENCOUNTER — OFFICE VISIT (OUTPATIENT)
Dept: FAMILY MEDICINE | Facility: CLINIC | Age: 48
End: 2018-05-08
Payer: MEDICAID

## 2018-05-08 ENCOUNTER — PATIENT MESSAGE (OUTPATIENT)
Dept: FAMILY MEDICINE | Facility: CLINIC | Age: 48
End: 2018-05-08

## 2018-05-08 ENCOUNTER — PATIENT MESSAGE (OUTPATIENT)
Dept: ENDOCRINOLOGY | Facility: CLINIC | Age: 48
End: 2018-05-08

## 2018-05-08 ENCOUNTER — OFFICE VISIT (OUTPATIENT)
Dept: ENDOCRINOLOGY | Facility: CLINIC | Age: 48
End: 2018-05-08
Payer: MEDICAID

## 2018-05-08 VITALS
BODY MASS INDEX: 33.34 KG/M2 | SYSTOLIC BLOOD PRESSURE: 120 MMHG | DIASTOLIC BLOOD PRESSURE: 68 MMHG | WEIGHT: 176.56 LBS | HEIGHT: 61 IN

## 2018-05-08 VITALS
WEIGHT: 176.81 LBS | BODY MASS INDEX: 33.38 KG/M2 | DIASTOLIC BLOOD PRESSURE: 72 MMHG | SYSTOLIC BLOOD PRESSURE: 118 MMHG | HEART RATE: 59 BPM | HEIGHT: 61 IN

## 2018-05-08 DIAGNOSIS — I10 GOOD HYPERTENSION CONTROL: ICD-10-CM

## 2018-05-08 DIAGNOSIS — M62.838 TRAPEZIUS MUSCLE SPASM: ICD-10-CM

## 2018-05-08 DIAGNOSIS — E88.810 DYSMETABOLIC SYNDROME: ICD-10-CM

## 2018-05-08 DIAGNOSIS — R73.03 PREDIABETES: ICD-10-CM

## 2018-05-08 DIAGNOSIS — J30.9 ALLERGIC RHINITIS, UNSPECIFIED SEASONALITY, UNSPECIFIED TRIGGER: Primary | ICD-10-CM

## 2018-05-08 DIAGNOSIS — F32.A DEPRESSION, UNSPECIFIED DEPRESSION TYPE: ICD-10-CM

## 2018-05-08 DIAGNOSIS — R41.3 MEMORY PROBLEM: ICD-10-CM

## 2018-05-08 DIAGNOSIS — R51.9 CHRONIC NONINTRACTABLE HEADACHE, UNSPECIFIED HEADACHE TYPE: ICD-10-CM

## 2018-05-08 DIAGNOSIS — G89.29 CHRONIC NONINTRACTABLE HEADACHE, UNSPECIFIED HEADACHE TYPE: ICD-10-CM

## 2018-05-08 DIAGNOSIS — E78.5 HYPERLIPIDEMIA, UNSPECIFIED HYPERLIPIDEMIA TYPE: ICD-10-CM

## 2018-05-08 DIAGNOSIS — E04.1 NODULAR THYROID DISEASE: ICD-10-CM

## 2018-05-08 DIAGNOSIS — E05.90 HYPERTHYROIDISM: Primary | ICD-10-CM

## 2018-05-08 LAB
ALBUMIN SERPL BCP-MCNC: 3.5 G/DL
ALP SERPL-CCNC: 67 U/L
ALT SERPL W/O P-5'-P-CCNC: 11 U/L
ANION GAP SERPL CALC-SCNC: 11 MMOL/L
AST SERPL-CCNC: 15 U/L
BASOPHILS # BLD AUTO: 0.07 K/UL
BASOPHILS NFR BLD: 0.9 %
BILIRUB SERPL-MCNC: 0.7 MG/DL
BUN SERPL-MCNC: 12 MG/DL
CALCIUM SERPL-MCNC: 9.7 MG/DL
CHLORIDE SERPL-SCNC: 104 MMOL/L
CHOLEST SERPL-MCNC: 200 MG/DL
CHOLEST/HDLC SERPL: 4.9 {RATIO}
CO2 SERPL-SCNC: 27 MMOL/L
CREAT SERPL-MCNC: 0.7 MG/DL
DIFFERENTIAL METHOD: ABNORMAL
EOSINOPHIL # BLD AUTO: 0.2 K/UL
EOSINOPHIL NFR BLD: 2.3 %
ERYTHROCYTE [DISTWIDTH] IN BLOOD BY AUTOMATED COUNT: 12.9 %
EST. GFR  (AFRICAN AMERICAN): >60 ML/MIN/1.73 M^2
EST. GFR  (NON AFRICAN AMERICAN): >60 ML/MIN/1.73 M^2
ESTIMATED AVG GLUCOSE: 108 MG/DL
GLUCOSE SERPL-MCNC: 97 MG/DL
HBA1C MFR BLD HPLC: 5.4 %
HCT VFR BLD AUTO: 41.8 %
HDLC SERPL-MCNC: 41 MG/DL
HDLC SERPL: 20.5 %
HGB BLD-MCNC: 13.2 G/DL
IMM GRANULOCYTES # BLD AUTO: 0.02 K/UL
IMM GRANULOCYTES NFR BLD AUTO: 0.3 %
LDLC SERPL CALC-MCNC: 144.2 MG/DL
LYMPHOCYTES # BLD AUTO: 2.6 K/UL
LYMPHOCYTES NFR BLD: 32.8 %
MCH RBC QN AUTO: 29.5 PG
MCHC RBC AUTO-ENTMCNC: 31.6 G/DL
MCV RBC AUTO: 94 FL
MONOCYTES # BLD AUTO: 0.6 K/UL
MONOCYTES NFR BLD: 7.2 %
NEUTROPHILS # BLD AUTO: 4.5 K/UL
NEUTROPHILS NFR BLD: 56.5 %
NONHDLC SERPL-MCNC: 159 MG/DL
NRBC BLD-RTO: 0 /100 WBC
PLATELET # BLD AUTO: 342 K/UL
PMV BLD AUTO: 10.2 FL
POTASSIUM SERPL-SCNC: 3.6 MMOL/L
PROT SERPL-MCNC: 7.5 G/DL
RBC # BLD AUTO: 4.47 M/UL
SODIUM SERPL-SCNC: 142 MMOL/L
T3 SERPL-MCNC: 134 NG/DL
T4 FREE SERPL-MCNC: 1.06 NG/DL
TRIGL SERPL-MCNC: 74 MG/DL
TSH SERPL DL<=0.005 MIU/L-ACNC: 0.3 UIU/ML
WBC # BLD AUTO: 7.95 K/UL

## 2018-05-08 PROCEDURE — 84480 ASSAY TRIIODOTHYRONINE (T3): CPT

## 2018-05-08 PROCEDURE — 80053 COMPREHEN METABOLIC PANEL: CPT

## 2018-05-08 PROCEDURE — 99213 OFFICE O/P EST LOW 20 MIN: CPT | Mod: PBBFAC,27,PO | Performed by: INTERNAL MEDICINE

## 2018-05-08 PROCEDURE — 85025 COMPLETE CBC W/AUTO DIFF WBC: CPT

## 2018-05-08 PROCEDURE — 99214 OFFICE O/P EST MOD 30 MIN: CPT | Mod: S$PBB,,, | Performed by: FAMILY MEDICINE

## 2018-05-08 PROCEDURE — 86316 IMMUNOASSAY TUMOR OTHER: CPT

## 2018-05-08 PROCEDURE — 99213 OFFICE O/P EST LOW 20 MIN: CPT | Mod: PBBFAC,PO | Performed by: FAMILY MEDICINE

## 2018-05-08 PROCEDURE — 83018 HEAVY METAL QUAN EACH NES: CPT

## 2018-05-08 PROCEDURE — 84443 ASSAY THYROID STIM HORMONE: CPT

## 2018-05-08 PROCEDURE — 80061 LIPID PANEL: CPT

## 2018-05-08 PROCEDURE — 99214 OFFICE O/P EST MOD 30 MIN: CPT | Mod: S$PBB,,, | Performed by: INTERNAL MEDICINE

## 2018-05-08 PROCEDURE — 82308 ASSAY OF CALCITONIN: CPT

## 2018-05-08 PROCEDURE — 99999 PR PBB SHADOW E&M-EST. PATIENT-LVL III: CPT | Mod: PBBFAC,,, | Performed by: FAMILY MEDICINE

## 2018-05-08 PROCEDURE — 83036 HEMOGLOBIN GLYCOSYLATED A1C: CPT

## 2018-05-08 PROCEDURE — 84432 ASSAY OF THYROGLOBULIN: CPT

## 2018-05-08 PROCEDURE — 99999 PR PBB SHADOW E&M-EST. PATIENT-LVL III: CPT | Mod: PBBFAC,,, | Performed by: INTERNAL MEDICINE

## 2018-05-08 PROCEDURE — 84439 ASSAY OF FREE THYROXINE: CPT

## 2018-05-08 RX ORDER — METHOCARBAMOL 500 MG/1
500 TABLET, FILM COATED ORAL 4 TIMES DAILY
Qty: 60 TABLET | Refills: 11 | Status: SHIPPED | OUTPATIENT
Start: 2018-05-08 | End: 2018-05-08

## 2018-05-08 RX ORDER — ATENOLOL 25 MG/1
TABLET ORAL
COMMUNITY
Start: 2018-05-01 | End: 2019-02-21 | Stop reason: SDUPTHER

## 2018-05-08 RX ORDER — PREDNISONE 10 MG/1
10 TABLET ORAL DAILY
Qty: 10 TABLET | Refills: 0 | Status: SHIPPED | OUTPATIENT
Start: 2018-05-08 | End: 2018-05-18

## 2018-05-08 RX ORDER — CYCLOBENZAPRINE HCL 5 MG
5 TABLET ORAL 3 TIMES DAILY PRN
Qty: 30 TABLET | Refills: 5 | Status: SHIPPED | OUTPATIENT
Start: 2018-05-08 | End: 2018-05-18

## 2018-05-08 RX ORDER — BUPROPION HYDROCHLORIDE 75 MG/1
75 TABLET ORAL DAILY
Qty: 90 TABLET | Refills: 3 | Status: SHIPPED | OUTPATIENT
Start: 2018-05-08 | End: 2018-08-06

## 2018-05-08 NOTE — PROGRESS NOTES
Subjective:      Patient ID: Kylah Mohan is a 47 y.o. female.    Chief Complaint:      47 y.o.  lady with subclinical hyperthyroidism, thyroid nodular disease and hypertension seen in [facility] today.    History of Present Illness    Kylah Mohan is a 47 y.o. female here for  thyroid functional anomalies consistent with subclinical hyperthyroidism and thyroid nodular disease seen in [facility] today. She in addition has essential hypertension on treatment, prediabetes, hyperlipidemia, depression and obesity with hypermenorrhea. Her parents have type 2 DM. Her aunt has thyroid disease.    PMHx, PSHx: reviewed in epic.    Social: no ETOH use. She did smoke 0.5 packs per day for ten years but has quit for ten years.    Her initial work showed her TFTs then were consistent with subclinical hyperthyroidism with no evidence of Graves disease based on ab profile.    Her thyroid USS did show evidence of thyroid nodular disease of which there is a dominant right upper lobe complex lesion for which she had  a thyroid USS guided FNAB done (12/15) which shows features consistent with benign colloid nodule. Her last thyroid u/s was from 12/17 and showed sonogrpahic stability. Next scheduled thyroid USS thus should be for ~ 12/18.    NM uptake and scan was normal.    She is taking metformin 500 mg BID but she feels she may have been having low blood sugar reactions though she has measured her values mainly in the 80s.    She has only occasional palpitations but this is not excessive. Reports brittle nails. No diarrhea, sweating, hair loss. She does drink some caffienated coffeee. Patient has stopped taking trazadone. She has been of the topamax for ~ 3 months now.    Her weight has remained essentially static since last visit despite all her lifestyle and behaivoral changes.    Patient has been having recurrent chronic headaches. These appear to tension in nature.    Patient is s/p GENESIS and unilateral oophorectomy with  "uterine ablation.    Review of Systems   Constitutional: Positive for fatigue. Negative for unexpected weight change.   HENT: Positive for congestion. Negative for trouble swallowing and voice change.    Eyes: Negative for visual disturbance.   Respiratory: Negative for cough and shortness of breath.    Cardiovascular: Positive for leg swelling. Negative for chest pain (Occasionally on the left side of her chest) and palpitations.   Gastrointestinal: Negative for diarrhea, nausea and vomiting.   Endocrine: Negative for polydipsia, polyphagia and polyuria.   Genitourinary: Negative for difficulty urinating and flank pain.   Musculoskeletal: Positive for arthralgias and myalgias (Toes hurt when bending).   Skin: Positive for pallor. Negative for color change and rash.   Neurological: Positive for numbness (Tingling in feet and arms) and headaches.   Psychiatric/Behavioral: Positive for dysphoric mood. The patient is nervous/anxious.      Objective: /72   Pulse (!) 59   Ht 5' 1" (1.549 m)   Wt 80.2 kg (176 lb 12.9 oz)   BMI 33.41 kg/m²         Physical Exam   Constitutional: She is oriented to person, place, and time. She appears well-developed and well-nourished. She appears distressed.   Middle aged female, slightly tearful. Well hydrated.   HENT:   Head: Normocephalic and atraumatic.   Nose: Nose normal.   Eyes: EOM are normal. Pupils are equal, round, and reactive to light. Right eye exhibits no discharge.   Neck: Normal range of motion. Neck supple. No JVD present. No thyromegaly present.   Cardiovascular: Normal rate, regular rhythm, normal heart sounds and intact distal pulses.  Exam reveals no friction rub.    No murmur heard.  Pulmonary/Chest: Effort normal and breath sounds normal. No respiratory distress. She has no wheezes.   Abdominal: Soft. Bowel sounds are normal. She exhibits no distension.   Musculoskeletal: Normal range of motion. She exhibits no edema.   Neurological: She is alert and " oriented to person, place, and time. She displays normal reflexes.   Skin: Skin is warm and dry. No rash noted. She is not diaphoretic. No erythema.   Psychiatric: Her behavior is normal. Judgment and thought content normal.        Lab Review:   Results for CHARLY CASTILLO (MRN [id number]) as of 5/8/2018 11:04   Ref. Range 11/14/2017 12:05   Sodium Latest Ref Range: 136 - 145 mmol/L 140   Potassium Latest Ref Range: 3.5 - 5.1 mmol/L 3.3 (L)   Chloride Latest Ref Range: 95 - 110 mmol/L 105   CO2 Latest Ref Range: 23 - 29 mmol/L 25   Anion Gap Latest Ref Range: 8 - 16 mmol/L 10   BUN, Bld Latest Ref Range: 6 - 20 mg/dL 13   Creatinine Latest Ref Range: 0.5 - 1.4 mg/dL 0.7   eGFR if non African American Latest Ref Range: >60 mL/min/1.73 m^2 >60.0   eGFR if African American Latest Ref Range: >60 mL/min/1.73 m^2 >60.0   Glucose Latest Ref Range: 70 - 110 mg/dL 85   Calcium Latest Ref Range: 8.7 - 10.5 mg/dL 9.5   Alkaline Phosphatase Latest Ref Range: 55 - 135 U/L 64   Total Protein Latest Ref Range: 6.0 - 8.4 g/dL 7.5   Albumin Latest Ref Range: 3.5 - 5.2 g/dL 3.4 (L)   Total Bilirubin Latest Ref Range: 0.1 - 1.0 mg/dL 0.4   AST Latest Ref Range: 10 - 40 U/L 15   ALT Latest Ref Range: 10 - 44 U/L 9 (L)   Hemoglobin A1C Latest Ref Range: 4.0 - 5.6 % 5.4   Estimated Avg Glucose Latest Ref Range: 68 - 131 mg/dL 108   TSH Latest Ref Range: 0.400 - 4.000 uIU/mL 0.423   T3, Total Latest Ref Range: 60 - 180 ng/dL 123   Free T4 Latest Ref Range: 0.71 - 1.51 ng/dL 1.18   Thyroglobulin Interpretation Unknown SEE BELOW   Thyroglobulin Antibody Screen Latest Ref Range: <4.0 IU/mL <1.8   Thyroglobulin, Tumor Marker Latest Units: ng/mL 29 (H)   Chromogranin A Latest Ref Range: 0 - 95 ng/mL 100 (H)   FSH Latest Ref Range: See Text mIU/mL 7.50   LH Latest Ref Range: See Text mIU/mL 7.0     Thyroid u/s 12/17  Comparison: 12/30/16    Technique: Transverse and longitudinal gray scale ultrasound images of the thyroid gland with limited  color Doppler analysis.    Findings:The right thyroid lobe measures approximately 5 x 2.3 x 1.9 cm. It contains an approximately 2.5 cm dominant, slightly hypovascular hypoechoic solid nodule with small echogenic foci consistent with colloid or microcalcifications. This appears stable in size relative to the prior study. There is also an approximately 7 mm cystic nodule in the lower right thyroid lobe. Small, reactive appearing lymph nodes in the right side of the neck are also noted.    The thyroid isthmus measures 0.3 cm in AP dimension. There is a 4 mm, hypoechoic nodule within the isthmus.    The left thyroid lobe measures approximately 4.5 x 1.1 x 1.5 cm. A hypoechoic nodule within the mid left thyroid lobe measuring 7 mm is noted. No additional left lobe nodules are noted.   Impression       1. Stable examination when compared to the 12/30/16 study. Dominant solid nodule in the right upper thyroid lobe appears stable and has reportedly undergone prior biopsy.      Electronically signed by: Srini Garcia MD  Date: 12/18/17  Time: 08:17      Assessment:     1. Hyperthyroidism     2. Nodular thyroid disease     3. Good hypertension control     4. Prediabetes     5. Chronic nonintractable headache, unspecified headache type     6. Memory problem     7. Depression, unspecified depression type  buPROPion (WELLBUTRIN) 75 MG tablet      Hyperthyroidism-check TFTs today. Continue atenolol.    Thyroid nodular disease-repeat thyroid u/s 12/18    Hypertension-controlled, continue meds and serial tracking of blood pressure readings daily.    Prediabetes-continue diet and exercise. Check a1c.    Hypercholesterolemia-chronic-stable-monitor    Chronic headaches-continue topiramate 50 mg QD.    Mental fogginesss-better now since stopping trazadone. Consider weaning of neurontin. To discuss with PCP in this regard.    Depression-Start Wellbutrin 75 mg daily.  Plan:   F/u in 4 mths

## 2018-05-08 NOTE — PROGRESS NOTES
Subjective:       Patient ID: Kylah Mohan is a 47 y.o. female.    Chief Complaint: Sinusitis (neck and shoulder pain)    HPI     Sinus issues   Pt reports that she continues to have sinus related issues.   Zytec and claritin have not been helpful.   She has associated HA and shoulder/neck pain.   Pt denies. Fever/chills.   Pt has increased symptoms in the evenings.   She drinks a couple cups of coffee, daily and feels that this may cause worsening of her symptoms.   Pt also has had issues related to sinus infections in the past.     Review of Systems   Constitutional: Negative for chills and fever.   HENT: Positive for congestion and rhinorrhea. Negative for hearing loss.    Respiratory: Negative for cough and wheezing.    Cardiovascular: Positive for palpitations. Negative for leg swelling.   Gastrointestinal: Negative for blood in stool and vomiting.   Endocrine: Negative for polyuria.   Genitourinary: Positive for dysuria. Negative for difficulty urinating, hematuria and menstrual problem.   Musculoskeletal: Positive for arthralgias, joint swelling and neck pain.   Neurological: Positive for weakness and headaches.       Objective:      Physical Exam   Constitutional: She appears well-developed and well-nourished. No distress.   Obese female in no acute distress     HENT:   Head: Normocephalic and atraumatic.   Mouth/Throat: Oropharynx is clear and moist. No oropharyngeal exudate.   Mild erythema to posterior oropharynx.  No exudates.  Bilateral nasal turbinates with mild edema/erythema.     Eyes: Conjunctivae and EOM are normal.   Neck: Normal range of motion. Neck supple. No thyromegaly present.   Cardiovascular: Normal rate, regular rhythm and intact distal pulses.    Pulmonary/Chest: Effort normal. No respiratory distress.   Abdominal: Soft. She exhibits no distension and no mass. There is no tenderness.   Musculoskeletal: She exhibits no edema.   Lymphadenopathy:     She has no cervical adenopathy.    Neurological: She is alert.   Skin: Skin is warm. No rash noted. No erythema.   Psychiatric: She has a normal mood and affect. Her behavior is normal.   Vitals reviewed.      Assessment:       1. Allergic rhinitis, unspecified seasonality, unspecified trigger        Plan:       1. Allergic rhinitis, unspecified seasonality, unspecified trigger  - Ambulatory referral to Allergy  - sodium chloride-aloe vera (AYR SALINE) Gel; 1 application by Nasal route every 3 (three) hours as needed.  Dispense: 14.1 g; Refill: 6  - predniSONE (DELTASONE) 10 MG tablet; Take 1 tablet (10 mg total) by mouth once daily.  Dispense: 10 tablet; Refill: 0    Portions of this note were created using Dragon voice recognition software. There may be voice recognition errors found in the text, and attempts were made to correct these errors prior to signature    Enmanuel Nowak MD    Family Medicine  5/8/2018

## 2018-05-10 LAB
CALCIT SERPL-MCNC: <5 PG/ML
IODINE SERPL-MCNC: 97 NG/ML (ref 40–92)
THRYOGLOBULIN INTERPRETATION: ABNORMAL
THYROGLOB AB SERPL-ACNC: <1.8 IU/ML
THYROGLOB SERPL-MCNC: 44 NG/ML

## 2018-05-11 LAB — CGA SERPL-MCNC: 64 NG/ML (ref 0–95)

## 2018-05-12 DIAGNOSIS — I10 ESSENTIAL HYPERTENSION: ICD-10-CM

## 2018-05-14 RX ORDER — LOSARTAN POTASSIUM 25 MG/1
TABLET ORAL
Qty: 30 TABLET | Refills: 1 | Status: SHIPPED | OUTPATIENT
Start: 2018-05-14 | End: 2018-07-10 | Stop reason: SDUPTHER

## 2018-05-14 RX ORDER — GABAPENTIN 300 MG/1
CAPSULE ORAL
Qty: 90 CAPSULE | Refills: 1 | Status: SHIPPED | OUTPATIENT
Start: 2018-05-14 | End: 2018-05-22 | Stop reason: SINTOL

## 2018-05-22 ENCOUNTER — OFFICE VISIT (OUTPATIENT)
Dept: ALLERGY | Facility: CLINIC | Age: 48
End: 2018-05-22
Payer: MEDICAID

## 2018-05-22 VITALS — BODY MASS INDEX: 33.59 KG/M2 | OXYGEN SATURATION: 99 % | HEIGHT: 61 IN | HEART RATE: 77 BPM | WEIGHT: 177.94 LBS

## 2018-05-22 DIAGNOSIS — H10.423 SIMPLE CHRONIC CONJUNCTIVITIS OF BOTH EYES: ICD-10-CM

## 2018-05-22 DIAGNOSIS — J31.0 CHRONIC RHINITIS: Primary | ICD-10-CM

## 2018-05-22 PROCEDURE — 99204 OFFICE O/P NEW MOD 45 MIN: CPT | Mod: S$PBB,,, | Performed by: ALLERGY & IMMUNOLOGY

## 2018-05-22 PROCEDURE — 99999 PR PBB SHADOW E&M-EST. PATIENT-LVL III: CPT | Mod: PBBFAC,,, | Performed by: ALLERGY & IMMUNOLOGY

## 2018-05-22 PROCEDURE — 99213 OFFICE O/P EST LOW 20 MIN: CPT | Mod: PBBFAC,PO | Performed by: ALLERGY & IMMUNOLOGY

## 2018-05-22 RX ORDER — AZELASTINE HYDROCHLORIDE 0.5 MG/ML
1 SOLUTION/ DROPS OPHTHALMIC 2 TIMES DAILY PRN
Qty: 6 ML | Refills: 12 | Status: SHIPPED | OUTPATIENT
Start: 2018-05-22 | End: 2019-05-22

## 2018-05-22 RX ORDER — METHOCARBAMOL 500 MG/1
500 TABLET, FILM COATED ORAL 4 TIMES DAILY
COMMUNITY
End: 2020-03-04

## 2018-05-22 RX ORDER — LORATADINE 10 MG/1
10 TABLET ORAL DAILY
COMMUNITY
End: 2020-03-04

## 2018-05-22 RX ORDER — CYCLOBENZAPRINE HCL 10 MG
10 TABLET ORAL 3 TIMES DAILY PRN
COMMUNITY
End: 2020-03-04

## 2018-05-22 RX ORDER — FLUTICASONE PROPIONATE 50 MCG
1 SPRAY, SUSPENSION (ML) NASAL DAILY
COMMUNITY

## 2018-05-22 RX ORDER — AZELASTINE 1 MG/ML
2 SPRAY, METERED NASAL 2 TIMES DAILY
Qty: 30 ML | Refills: 12 | Status: SHIPPED | OUTPATIENT
Start: 2018-05-22 | End: 2020-09-11 | Stop reason: CLARIF

## 2018-05-22 NOTE — PROGRESS NOTES
Subjective:       Patient ID: Kylah Mohan is a 47 y.o. female.    Chief Complaint:  Allergies (sinus issues, allergies, prednisone did nothing for her)      46 yo woman presents for new patient evaluation of congestion. She states her head feels like a balloon. She has pressure around eyes and cheeks. She also has neck pain from it. She has decreased concentration and decreased sleep. She rarely has sneeze but has lots of runny nose and PND> she has itchy watery eyes and itchy nose. No cough but does get SOB at times. She uses Neti pot BID and gets a lot out and feels drainage after. She has had this for about 5 years not prior. Has all year but worse February to June. Night is worse. No difference inside or out. Cut grass is trigger. she hd allergy test 10 years ago with positives to cat, grass, cockroach and dust mites. She thinks was told shellfish allergic but she eats anyway and test reports shows negative shellfish. She has no dust mite covers. She has a dog but not cat. She his on Flonase daily, loratadine daily and not much help. Uses saline gel. She is on beta blocker and not sure could fit shots in schedule. She has no asthma or eczema.         Environmental History: see history section for home environment  Review of Systems   Constitutional: Positive for fatigue. Negative for appetite change, chills and fever.   HENT: Positive for congestion, postnasal drip, rhinorrhea, sinus pain and sinus pressure. Negative for ear discharge, ear pain, facial swelling, nosebleeds, sneezing, sore throat, trouble swallowing and voice change.    Eyes: Positive for discharge, redness and itching. Negative for visual disturbance.   Respiratory: Positive for shortness of breath. Negative for cough, choking, chest tightness and wheezing.    Cardiovascular: Negative for chest pain, palpitations and leg swelling.   Gastrointestinal: Negative for abdominal distention, abdominal pain, constipation, diarrhea, nausea and  vomiting.   Genitourinary: Negative for difficulty urinating.   Musculoskeletal: Positive for arthralgias and myalgias. Negative for gait problem and joint swelling.   Skin: Negative for color change and rash.   Neurological: Negative for dizziness, syncope, weakness, light-headedness and headaches.   Hematological: Negative for adenopathy. Does not bruise/bleed easily.   Psychiatric/Behavioral: Positive for decreased concentration and sleep disturbance. Negative for agitation, behavioral problems and confusion. The patient is not nervous/anxious.         Objective:    Physical Exam   Constitutional: She is oriented to person, place, and time. She appears well-developed and well-nourished. No distress.   HENT:   Head: Normocephalic and atraumatic.   Right Ear: Hearing, tympanic membrane, external ear and ear canal normal.   Left Ear: Hearing, tympanic membrane, external ear and ear canal normal.   Nose: No mucosal edema, rhinorrhea, sinus tenderness or septal deviation. No epistaxis. Right sinus exhibits no maxillary sinus tenderness and no frontal sinus tenderness. Left sinus exhibits no maxillary sinus tenderness and no frontal sinus tenderness.   Mouth/Throat: Uvula is midline, oropharynx is clear and moist and mucous membranes are normal. No uvula swelling.   Eyes: Conjunctivae are normal. Right eye exhibits no discharge. Left eye exhibits no discharge.   Neck: Normal range of motion. No thyromegaly present.   Cardiovascular: Normal rate, regular rhythm and normal heart sounds.    No murmur heard.  Pulmonary/Chest: Effort normal and breath sounds normal. No respiratory distress. She has no wheezes.   Abdominal: Soft. She exhibits no distension. There is no tenderness.   Musculoskeletal: Normal range of motion. She exhibits no edema or tenderness.   Lymphadenopathy:     She has no cervical adenopathy.   Neurological: She is alert and oriented to person, place, and time.   Skin: Skin is warm and dry. No rash  noted. No erythema.   Psychiatric: She has a normal mood and affect. Her behavior is normal. Judgment and thought content normal.   Nursing note and vitals reviewed.      Laboratory:   none performed   Assessment:       1. Chronic rhinitis    2. Simple chronic conjunctivitis of both eyes         Plan:       1. Dust mite avoidance, measures discussed and handout provided.  2. fluticasone 1 SEN BID and add azelastine 2 SEN BID  3. azelastine eye drop 1 drop each eye BID as needed  4. loratadine daily  5. Advised allergy shots are an option if she can be changed from beta blocker to another blood pressure medicine. If can then would do skin test and consider IT

## 2018-05-22 NOTE — LETTER
May 22, 2018      Enmanuel Nowak MD  [address]  [address]  [place] [address]           [place] [facility]  [address]  [place] [address]  Phone: [phone]          Patient: Kylah Mohan   MR Number: [id number]   YOB: 1970   Date of Visit: 5/22/2018       Dear Dr. Enmanuel Nowak:    Thank you for referring Kylah Mohan to me for evaluation. Attached you will find relevant portions of my assessment and plan of care.    If you have questions, please do not hesitate to call me. I look forward to following Kylah Mohan along with you.    Sincerely,    Radhika Rizo MD    Enclosure  CC:  No Recipients    If you would like to receive this communication electronically, please contact [email or web address] or [phone] to request more information on [employer] access.    For providers and/or their staff who would like to refer a patient to [facility], please contact us through our one-stop-shop provider referral line, [facility], at [phone].    If you feel you have received this communication in error or would no longer like to receive these types of communications, please e-mail [email or web address]

## 2018-06-12 DIAGNOSIS — R73.03 PREDIABETES: ICD-10-CM

## 2018-06-12 DIAGNOSIS — G89.29 CHRONIC NONINTRACTABLE HEADACHE, UNSPECIFIED HEADACHE TYPE: ICD-10-CM

## 2018-06-12 DIAGNOSIS — R51.9 CHRONIC NONINTRACTABLE HEADACHE, UNSPECIFIED HEADACHE TYPE: ICD-10-CM

## 2018-06-12 RX ORDER — TOPIRAMATE 50 MG/1
50 TABLET, FILM COATED ORAL DAILY
Qty: 90 TABLET | Refills: 3 | Status: SHIPPED | OUTPATIENT
Start: 2018-06-12 | End: 2020-03-04

## 2018-06-14 RX ORDER — HYDROCHLOROTHIAZIDE 25 MG/1
25 TABLET ORAL DAILY
Qty: 90 TABLET | Refills: 0 | Status: SHIPPED | OUTPATIENT
Start: 2018-06-14 | End: 2019-01-26 | Stop reason: SDUPTHER

## 2018-07-10 DIAGNOSIS — I10 ESSENTIAL HYPERTENSION: ICD-10-CM

## 2018-07-10 RX ORDER — LOSARTAN POTASSIUM 25 MG/1
TABLET ORAL
Qty: 30 TABLET | Refills: 1 | Status: SHIPPED | OUTPATIENT
Start: 2018-07-10 | End: 2018-09-06 | Stop reason: SDUPTHER

## 2018-07-10 RX ORDER — GABAPENTIN 300 MG/1
CAPSULE ORAL
Qty: 90 CAPSULE | Refills: 1 | Status: SHIPPED | OUTPATIENT
Start: 2018-07-10 | End: 2018-09-06 | Stop reason: SDUPTHER

## 2018-07-10 RX ORDER — GABAPENTIN 300 MG/1
300 CAPSULE ORAL 3 TIMES DAILY
Refills: 1 | COMMUNITY
Start: 2018-06-12 | End: 2018-07-10 | Stop reason: SDUPTHER

## 2018-07-17 NOTE — TELEPHONE ENCOUNTER
----- Message from Ally Mijares sent at 1/29/2018 11:58 AM CST -----  Patient states she is having an issue with her medication atenolol (TENORMIN) 25 MG tablet, she states she previously got this filled at [facility] in [place] and it was not transferred over to [facility] on [address], contact patient at [phone] to advise if she should continue to take of what the issue is with the refill on this medication.         [facility]/pharmacy #[id number] - [place] - [address]  [address] [place] [address]  Phone: [phone] Fax: [phone]    Thank you      Yes

## 2018-09-06 DIAGNOSIS — I10 ESSENTIAL HYPERTENSION: ICD-10-CM

## 2018-09-06 RX ORDER — GABAPENTIN 300 MG/1
CAPSULE ORAL
Qty: 90 CAPSULE | Refills: 1 | Status: SHIPPED | OUTPATIENT
Start: 2018-09-06 | End: 2020-03-04

## 2018-09-06 RX ORDER — LOSARTAN POTASSIUM 25 MG/1
TABLET ORAL
Qty: 30 TABLET | Refills: 1 | Status: SHIPPED | OUTPATIENT
Start: 2018-09-06 | End: 2019-03-25 | Stop reason: SDUPTHER

## 2019-01-26 ENCOUNTER — HOSPITAL ENCOUNTER (EMERGENCY)
Facility: HOSPITAL | Age: 49
Discharge: HOME | End: 2019-01-26
Attending: EMERGENCY MEDICINE

## 2019-01-26 VITALS
DIASTOLIC BLOOD PRESSURE: 77 MMHG | SYSTOLIC BLOOD PRESSURE: 161 MMHG | RESPIRATION RATE: 18 BRPM | TEMPERATURE: 98 F | WEIGHT: 177.94 LBS | BODY MASS INDEX: 33.59 KG/M2 | HEIGHT: 61 IN | HEART RATE: 77 BPM | OXYGEN SATURATION: 98 %

## 2019-01-26 DIAGNOSIS — R51.9 NONINTRACTABLE HEADACHE, UNSPECIFIED CHRONICITY PATTERN, UNSPECIFIED HEADACHE TYPE: Primary | ICD-10-CM

## 2019-01-26 DIAGNOSIS — M62.838 TRAPEZIUS MUSCLE SPASM: ICD-10-CM

## 2019-01-26 DIAGNOSIS — I10 HYPERTENSION, UNSPECIFIED TYPE: ICD-10-CM

## 2019-01-26 PROCEDURE — 96361 HYDRATE IV INFUSION ADD-ON: CPT

## 2019-01-26 PROCEDURE — 25000003 PHARM REV CODE 250: Performed by: PHYSICIAN ASSISTANT

## 2019-01-26 PROCEDURE — 63600175 PHARM REV CODE 636 W HCPCS: Performed by: PHYSICIAN ASSISTANT

## 2019-01-26 PROCEDURE — 96374 THER/PROPH/DIAG INJ IV PUSH: CPT

## 2019-01-26 PROCEDURE — 99284 EMERGENCY DEPT VISIT MOD MDM: CPT | Mod: 25

## 2019-01-26 RX ORDER — POTASSIUM CHLORIDE 750 MG/1
20 TABLET, EXTENDED RELEASE ORAL 2 TIMES DAILY
COMMUNITY
End: 2020-09-11

## 2019-01-26 RX ORDER — BUPROPION HYDROCHLORIDE 75 MG/1
75 TABLET ORAL 2 TIMES DAILY
COMMUNITY
End: 2020-09-11

## 2019-01-26 RX ORDER — HYDROCHLOROTHIAZIDE 25 MG/1
25 TABLET ORAL DAILY
Qty: 30 TABLET | Refills: 0 | Status: SHIPPED | OUTPATIENT
Start: 2019-01-26 | End: 2019-02-26 | Stop reason: SDUPTHER

## 2019-01-26 RX ORDER — BUTALBITAL, ACETAMINOPHEN AND CAFFEINE 50; 325; 40 MG/1; MG/1; MG/1
1 TABLET ORAL EVERY 6 HOURS PRN
Qty: 20 TABLET | Refills: 0 | Status: SHIPPED | OUTPATIENT
Start: 2019-01-26 | End: 2019-02-25

## 2019-01-26 RX ORDER — METOCLOPRAMIDE 10 MG/1
10 TABLET ORAL
Status: COMPLETED | OUTPATIENT
Start: 2019-01-26 | End: 2019-01-26

## 2019-01-26 RX ORDER — KETOROLAC TROMETHAMINE 30 MG/ML
30 INJECTION, SOLUTION INTRAMUSCULAR; INTRAVENOUS
Status: COMPLETED | OUTPATIENT
Start: 2019-01-26 | End: 2019-01-26

## 2019-01-26 RX ORDER — IBUPROFEN 100 MG/5ML
1000 SUSPENSION, ORAL (FINAL DOSE FORM) ORAL DAILY
COMMUNITY

## 2019-01-26 RX ORDER — IBUPROFEN 600 MG/1
600 TABLET ORAL EVERY 8 HOURS PRN
Qty: 20 TABLET | Refills: 0 | Status: SHIPPED | OUTPATIENT
Start: 2019-01-26 | End: 2020-09-11 | Stop reason: CLARIF

## 2019-01-26 RX ORDER — BUTALBITAL, ACETAMINOPHEN AND CAFFEINE 50; 325; 40 MG/1; MG/1; MG/1
1 TABLET ORAL
Status: COMPLETED | OUTPATIENT
Start: 2019-01-26 | End: 2019-01-26

## 2019-01-26 RX ORDER — DIPHENHYDRAMINE HCL 25 MG
25 CAPSULE ORAL
Status: COMPLETED | OUTPATIENT
Start: 2019-01-26 | End: 2019-01-26

## 2019-01-26 RX ADMIN — METOCLOPRAMIDE 10 MG: 10 TABLET ORAL at 11:01

## 2019-01-26 RX ADMIN — SODIUM CHLORIDE 1000 ML: 0.9 INJECTION, SOLUTION INTRAVENOUS at 10:01

## 2019-01-26 RX ADMIN — DIPHENHYDRAMINE HYDROCHLORIDE 25 MG: 25 CAPSULE ORAL at 10:01

## 2019-01-26 RX ADMIN — KETOROLAC TROMETHAMINE 30 MG: 30 INJECTION, SOLUTION INTRAMUSCULAR at 10:01

## 2019-01-26 RX ADMIN — BUTALBITAL, ACETAMINOPHEN, AND CAFFEINE 1 TABLET: 50; 325; 40 TABLET ORAL at 12:01

## 2019-01-26 NOTE — ED PROVIDER NOTES
Encounter Date: 1/26/2019    SCRIBE #1 NOTE: I, Meng Matson, am scribing for, and in the presence of, Dennise Lira PA-C.       History     Chief Complaint   Patient presents with    Headache     x 3 days       Time seen by provider: 10:24 AM on 01/26/2019    Kylah Mohan is a 48 y.o. female with HTN, DJD, and depression who presents to the ED with an onset of a HA that began 3 days ago and has been worsening in severity since onset. The pt states that it had a gradual onset and feels like someone is hitting her in the head with a hammer. She describes it as the worst headache ever. Associated symptoms include neck pain that extends to her shoulders on both sides, nausea, and numbness in her fingers and feet. She admits to being stressed out with work. She adds that she took 2 Tylenol this morning which provided no relief. The pt explains that she had to stop her high blood pressure medicine due to being in between insurance companies. She denies having any abdominal pain. The patient denies any other symptoms at this time. Past surgical hx includes a hysterectomy. The pt has allergies to latex.       The history is provided by the patient.     Review of patient's allergies indicates:   Allergen Reactions    Latex      Other reaction(s): Rash     Past Medical History:   Diagnosis Date    Benign hypertension     Bladder instability     Depression     DJD (degenerative joint disease), lumbar     History of viral meningitis 1996    Hyperlipidemia     Insomnia     Microscopic hematuria     negative work-up    Pulmonary nodule     minute    Tendonitis      Past Surgical History:   Procedure Laterality Date    ANTERIOR VAGINAL REPAIR      BREAST BIOPSY  6/5/2007    left breast benign    ENDOMETRIAL ABLATION  5/28/2009    HYSTERECTOMY      LAPAROSCOPIC HYSTERECTOMY  2/2013    TUBAL LIGATION  2002     Family History   Problem Relation Age of Onset    Osteoarthritis Mother     Hyperlipidemia  Mother     Hypertension Mother     Allergies Mother     Hyperlipidemia Father     Diabetes Father     Hypertension Father     Cancer Paternal Grandmother         Breast    Diabetes Paternal Grandmother     Breast cancer Paternal Grandmother     Allergies Sister     Heart disease Sister         Congential    Breast cancer Maternal Aunt      Social History     Tobacco Use    Smoking status: Former Smoker     Packs/day: 0.25     Years: 10.00     Pack years: 2.50     Types: Cigarettes     Last attempt to quit: 10/20/2013     Years since quittin.2    Smokeless tobacco: Never Used   Substance Use Topics    Alcohol use: Yes     Alcohol/week: 0.6 oz     Types: 1 Glasses of wine per week    Drug use: No     Review of Systems   Constitutional: Negative for chills and fever.   HENT: Negative for facial swelling and trouble swallowing.    Eyes: Negative for discharge.   Respiratory: Negative for cough, chest tightness, shortness of breath and wheezing.    Cardiovascular: Negative for chest pain and palpitations.   Gastrointestinal: Negative for abdominal pain, diarrhea, nausea and vomiting.   Genitourinary: Negative for dysuria and hematuria.   Musculoskeletal: Positive for neck pain. Negative for arthralgias, back pain, joint swelling, myalgias and neck stiffness.   Skin: Negative for color change, pallor, rash and wound.   Neurological: Positive for numbness and headaches. Negative for dizziness, syncope, weakness and light-headedness.   Hematological: Does not bruise/bleed easily.   Psychiatric/Behavioral: The patient is not nervous/anxious.    All other systems reviewed and are negative.      Physical Exam     Initial Vitals [19 1000]   BP Pulse Resp Temp SpO2   (!) 196/84 88 14 98.2 °F (36.8 °C) 97 %      MAP       --         Physical Exam    Nursing note and vitals reviewed.  Constitutional: She appears well-developed and well-nourished. She is not diaphoretic. No distress.   HENT:   Head:  Normocephalic and atraumatic.   Right Ear: External ear normal.   Left Ear: External ear normal.   Nose: Nose normal.   Mouth/Throat: Oropharynx is clear and moist.   Eyes: Conjunctivae and EOM are normal. Pupils are equal, round, and reactive to light.   Neck: Normal range of motion. Neck supple.   Cardiovascular: Normal rate, regular rhythm, normal heart sounds and intact distal pulses. Exam reveals no gallop and no friction rub.    No murmur heard.  Pulmonary/Chest: Breath sounds normal. No respiratory distress. She has no wheezes. She has no rhonchi. She has no rales.   Abdominal: Soft. She exhibits no distension and no mass. There is no tenderness.   Musculoskeletal: Normal range of motion. She exhibits no edema or tenderness.   Lymphadenopathy:     She has no cervical adenopathy.   Neurological: She is alert and oriented to person, place, and time. She has normal strength. No cranial nerve deficit or sensory deficit.   No focal neurological deficits noted.  Cranial nerves III-XII grossly intact.  Equal, rapid alternating movements noted to bilateral upper and lower extremities.    Skin: Skin is warm and dry. No rash and no abscess noted. No erythema.   Psychiatric: Her mood appears anxious.   Tearful.         ED Course   Procedures  Labs Reviewed - No data to display       Imaging Results    None          Medical Decision Making:   History:   Old Medical Records: I decided to obtain old medical records.  Differential Diagnosis:   Migraine  Acute intracranial process   Meningitis         APC / Resident Notes:   Pt is well appearing, but anxious on exam.  No focal neurological deficits noted. No need for further imaging or testing, given the low clinical suspicion for meningitis or acute intracranial process.  It she is feeling better after IV fluids, Toradol, Reglan, Benadryl and Fioricet here in the emergency department.  We feel comfortable discharging her home to follow up with her primary care provider for  re-evaluation and further treatment options.  She voices understanding is agreeable to the plan.  She is given specific return precautions.       Scribe Attestation:   Scribe #1: I performed the above scribed service and the documentation accurately describes the services I performed. I attest to the accuracy of the note.    I, Dennise Lira PA-C, personally performed the services described in this documentation. All medical record entries made by the scribe were at my direction and in my presence.  I have reviewed the chart and agree that the record reflects my personal performance and is accurate and complete. Dennise Lira PA-C.  9:42 PM 01/26/2019             Clinical Impression:   The primary encounter diagnosis was Nonintractable headache, unspecified chronicity pattern, unspecified headache type. Diagnoses of Hypertension, unspecified type and Trapezius muscle spasm were also pertinent to this visit.      Disposition:   Disposition: Discharged  Condition: Stable                        Dennise Lira PA-C  01/26/19 214

## 2019-01-26 NOTE — ED NOTES
Pt ready for discharge per PA. Pt is AAOx4. Skin warm, dry to touch. Respirations even, nonlabored. NAD noted. Ambulatory with steady gait. Pt waiting for ride to arrive to transport her home.

## 2019-01-26 NOTE — ED NOTES
Pt presents to ED POV from home with c/o headache x3 days. She reports that he BP has been elevated at home. She has not taken her BP meds in three months due to insurance reasons. Pt is AAOx4. Skin warm, dry to touch. Respirations even, nonlabored. NAD noted. Pt is calm, cooperative.

## 2019-02-21 RX ORDER — ATENOLOL 25 MG/1
TABLET ORAL
Qty: 90 TABLET | Refills: 3 | Status: SHIPPED | OUTPATIENT
Start: 2019-02-21 | End: 2020-01-02 | Stop reason: SDUPTHER

## 2019-02-26 RX ORDER — HYDROCHLOROTHIAZIDE 25 MG/1
TABLET ORAL
Qty: 90 TABLET | Refills: 3 | Status: SHIPPED | OUTPATIENT
Start: 2019-02-26 | End: 2020-01-02 | Stop reason: SDUPTHER

## 2019-03-25 DIAGNOSIS — E78.2 MIXED HYPERLIPIDEMIA: Primary | ICD-10-CM

## 2019-03-25 DIAGNOSIS — R73.03 PREDIABETES: ICD-10-CM

## 2019-03-25 DIAGNOSIS — I10 ESSENTIAL HYPERTENSION: ICD-10-CM

## 2019-03-26 DIAGNOSIS — I10 ESSENTIAL HYPERTENSION: ICD-10-CM

## 2019-03-26 RX ORDER — LOSARTAN POTASSIUM 25 MG/1
25 TABLET ORAL DAILY
Qty: 90 TABLET | Refills: 0 | Status: SHIPPED | OUTPATIENT
Start: 2019-03-26 | End: 2019-07-01 | Stop reason: SDUPTHER

## 2019-03-26 NOTE — TELEPHONE ENCOUNTER
Spoke with pt, informed her of her medication approval and I stated she needed to establish with a PCP and labs. I offered pt to schedule with a provider, pt said she could not make appointment right now and that she will call back to make one.

## 2019-03-26 NOTE — PROGRESS NOTES
Refill Authorization Note     is requesting a refill authorization.    Brief assessment and rationale for refill: APPROVE: Needs Appt/ Needs Labs   Name and strength of medication: losartan (COZAAR) 25 MG tablet  Medication-related problems identified:   Requires appointment  Medication affordability  Non-adherence - intentional  Requires labs    Medication Therapy Plan: PT admitted to not taking HTN medication that resulted in ED visit 01/2019; pt stated she was in between insurances at the time and hadn't taken in past 3 months;  Needs Appt (est care);   NTBO(CMP/LIP); Approve 3 months     Medication reconciliation completed: No   Pharmacist Review Requested: Yes          How patient will take medication: tud          Comments:   Blood Pressure Readings: <139/89mmHg (12 months)  BP Readings from Last 3 Encounters:   01/26/19 (!) 161/77   05/08/18 118/72   05/08/18 120/68        Last Creatinine (Sodium/Potassium) (6 months: Diuretics or 12 months: non-diuretics)  Lab Results   Component Value Date    CREATININE 0.7 05/08/2018    CREATININE 0.7 11/14/2017    CREATININE 0.7 12/30/2016        Lab Results   Component Value Date    K 3.6 05/08/2018    K 3.3 (L) 11/14/2017    K 3.4 (L) 12/30/2016    Lab Results   Component Value Date     05/08/2018     11/14/2017     12/30/2016          APPOINTMENTS (past 12 m or future 3m authorizing provider)  LAST VISIT DATE  Enmanuel Nowak MD 5/8/2018         NEXT VISIT DATE  Enmanuel Nowak MD Visit date not found        No future appointments.

## 2019-03-27 RX ORDER — LOSARTAN POTASSIUM 25 MG/1
TABLET ORAL
Qty: 30 TABLET | Refills: 1 | OUTPATIENT
Start: 2019-03-27

## 2019-03-27 NOTE — PROGRESS NOTES
Refill Authorization Note     is requesting a refill authorization.    Brief assessment and rationale for refill: Quick DC: duplicate request  Name and strength of medication: losartan (COZAAR) 25 MG tablet       Medication Therapy Plan: last escripted to [facility] on 03/26/2019 for 3 month supply; duplicate request; quick DC    Medication reconciliation completed: No                         Comments:

## 2019-07-01 DIAGNOSIS — I10 ESSENTIAL HYPERTENSION: ICD-10-CM

## 2019-07-01 RX ORDER — LOSARTAN POTASSIUM 25 MG/1
TABLET ORAL
Qty: 90 TABLET | Refills: 0 | Status: SHIPPED | OUTPATIENT
Start: 2019-07-01 | End: 2020-01-02 | Stop reason: SDUPTHER

## 2019-12-23 DIAGNOSIS — I10 ESSENTIAL HYPERTENSION: ICD-10-CM

## 2019-12-23 RX ORDER — LOSARTAN POTASSIUM 25 MG/1
TABLET ORAL
Qty: 90 TABLET | Refills: 1 | OUTPATIENT
Start: 2019-12-23

## 2020-01-02 ENCOUNTER — HOSPITAL ENCOUNTER (OUTPATIENT)
Dept: RADIOLOGY | Facility: CLINIC | Age: 50
Discharge: HOME | End: 2020-01-02
Attending: INTERNAL MEDICINE
Payer: COMMERCIAL

## 2020-01-02 ENCOUNTER — OFFICE VISIT (OUTPATIENT)
Dept: ENDOCRINOLOGY | Facility: CLINIC | Age: 50
End: 2020-01-02
Payer: COMMERCIAL

## 2020-01-02 VITALS
DIASTOLIC BLOOD PRESSURE: 82 MMHG | HEART RATE: 75 BPM | WEIGHT: 177.25 LBS | HEIGHT: 61 IN | TEMPERATURE: 99 F | SYSTOLIC BLOOD PRESSURE: 128 MMHG | BODY MASS INDEX: 33.47 KG/M2

## 2020-01-02 DIAGNOSIS — E04.1 NODULAR THYROID DISEASE: ICD-10-CM

## 2020-01-02 DIAGNOSIS — R73.03 PREDIABETES: ICD-10-CM

## 2020-01-02 DIAGNOSIS — E88.810 DYSMETABOLIC SYNDROME: ICD-10-CM

## 2020-01-02 DIAGNOSIS — E66.09 CLASS 1 OBESITY DUE TO EXCESS CALORIES WITH SERIOUS COMORBIDITY AND BODY MASS INDEX (BMI) OF 33.0 TO 33.9 IN ADULT: ICD-10-CM

## 2020-01-02 DIAGNOSIS — I10 ESSENTIAL HYPERTENSION: ICD-10-CM

## 2020-01-02 DIAGNOSIS — E05.90 HYPERTHYROIDISM: Primary | ICD-10-CM

## 2020-01-02 PROCEDURE — 99214 PR OFFICE/OUTPT VISIT, EST, LEVL IV, 30-39 MIN: ICD-10-PCS | Mod: GW,S$GLB,, | Performed by: INTERNAL MEDICINE

## 2020-01-02 PROCEDURE — 99999 PR PBB SHADOW E&M-EST. PATIENT-LVL IV: ICD-10-PCS | Mod: PBBFAC,,, | Performed by: INTERNAL MEDICINE

## 2020-01-02 PROCEDURE — 76536 US EXAM OF HEAD AND NECK: CPT | Mod: 26,,, | Performed by: RADIOLOGY

## 2020-01-02 PROCEDURE — 76536 US EXAM OF HEAD AND NECK: CPT | Mod: TC,PO

## 2020-01-02 PROCEDURE — 76536 US SOFT TISSUE HEAD NECK THYROID: ICD-10-PCS | Mod: 26,,, | Performed by: RADIOLOGY

## 2020-01-02 PROCEDURE — 99999 PR PBB SHADOW E&M-EST. PATIENT-LVL IV: CPT | Mod: PBBFAC,,, | Performed by: INTERNAL MEDICINE

## 2020-01-02 PROCEDURE — 99214 OFFICE O/P EST MOD 30 MIN: CPT | Mod: GW,S$GLB,, | Performed by: INTERNAL MEDICINE

## 2020-01-02 RX ORDER — METFORMIN HYDROCHLORIDE 500 MG/1
500 TABLET ORAL 2 TIMES DAILY WITH MEALS
Qty: 180 TABLET | Refills: 3 | Status: SHIPPED | OUTPATIENT
Start: 2020-01-02 | End: 2020-03-04 | Stop reason: ALTCHOICE

## 2020-01-02 RX ORDER — LOSARTAN POTASSIUM 25 MG/1
25 TABLET ORAL DAILY
Qty: 90 TABLET | Refills: 3 | Status: SHIPPED | OUTPATIENT
Start: 2020-01-02 | End: 2020-12-16

## 2020-01-02 RX ORDER — HYDROCHLOROTHIAZIDE 25 MG/1
25 TABLET ORAL DAILY
Qty: 90 TABLET | Refills: 3 | Status: SHIPPED | OUTPATIENT
Start: 2020-01-02 | End: 2020-12-16

## 2020-01-02 RX ORDER — ATENOLOL 25 MG/1
25 TABLET ORAL DAILY
Qty: 90 TABLET | Refills: 3 | Status: SHIPPED | OUTPATIENT
Start: 2020-01-02 | End: 2020-12-16

## 2020-01-02 NOTE — ASSESSMENT & PLAN NOTE
Pt with prediabetes in the past   - on metformin, A1C improved back to normal range   - continue for now   - repeat labs

## 2020-01-02 NOTE — PATIENT INSTRUCTIONS
"Try and get thyroid ultrasound soon to ensure nodules are stable.    Eating Healthy on the Run     Many grocery stores stock pre-made salads for eating on the run.     Need to eat on the run? This often means grabbing "junk" or fast food full of fat, salt, sugar, and cholesterol. But being in a rush doesn't mean that you can't eat healthy.  "Fast" food made healthy  Try these ways to get good nutrition, fast.  · Go to a grocery or convenience market instead of a fast-food restaurant. Look for choices like sandwiches, yogurt, fresh fruit, and juices.  · Buy precut, prepackaged fresh or frozen fruits and vegetables. You can open the package for a snack, salad, smoothie, or stir-gibbs.  · Microwave a frozen dinner that has less than 15 grams (g) of fat and less than 800 milligrams (mg) of sodium. Complete the meal with a wholegrain roll, vegetables, and fresh fruit.  · If you must have fast food, consider your options. Go for veggie burgers, broiled and skinless chicken breast sandwiches, or dinner salads with low-fat dressing. Avoid large (super-sized) portions.  · Blot the extra oil from food with a napkin before you eat it.  · Instead of french fries, choose a baked potato with salsa.  Tip  Fast-food restaurants often have printed nutrition information available. Ask for this information and look up your favorite items before you order.   Date Last Reviewed: 6/2/2015  © 7205-4905 [employer]. [address] [place] [address], [place] [address]. All rights reserved. This information is not intended as a substitute for professional medical care. Always follow your healthcare professional's instructions.        "

## 2020-01-02 NOTE — ASSESSMENT & PLAN NOTE
BMI 33.5   - has prediabetes, htn   - encourage pt to continue exercise, try and work on diet   - if she were to move into diabetes range would try for GLP-1 agonist   - monitor weight for now

## 2020-01-02 NOTE — ASSESSMENT & PLAN NOTE
Subclinical hyperthyroidism    - TSI, TPO negative in the past   - on atenolol   - not having many symptoms right now   - recheck labs

## 2020-01-02 NOTE — PROGRESS NOTES
Subjective:      Chief Complaint: Hyperthyroidism and Thyroid Nodule    HPI: Kylah Mohan is a 49 y.o. female who is here for a follow-up evaluation for thyroid. Last seen 5/2018, though this is her first visit with me.    Lab Results   Component Value Date    TSH 0.298 (L) 05/08/2018    R4IKZMX 134 05/08/2018    K2EGJED 7.9 11/27/2015    FREET4 1.06 05/08/2018     Prior labs:   TSI, TPO negative 11/2015  Had some low TSH off and on down ti 0.196 was the lowest, a few in the normal range.    Also has nodules. Last US 11/2017   right side 2.5 cm nodule, stable from prior. Had benign FNA in the past (Benign 12/2015)   Left side 7 mm nodule    Exercise: Yes. 30 min in AM, 45 min in PM. Not as much lately.  On welbutrin    Today, pt reports having some neck pain. In the back of the neck, for the last 2 years or so.  Weight stable.  No trouble swallowing, no trouble breathing.  Occasional heart racing with exercise.    Reviewed past medical, family, social history and updated as appropriate.    Review of Systems   Constitutional: Positive for fatigue. Negative for unexpected weight change.   HENT: Negative for trouble swallowing.    Eyes: Negative for visual disturbance.   Respiratory: Negative for shortness of breath.    Cardiovascular: Positive for palpitations (sometimes).   Gastrointestinal: Negative for diarrhea.   Endocrine: Negative for polydipsia.   Genitourinary: Negative for frequency.   Musculoskeletal: Positive for neck pain.   Neurological: Positive for tremors (sometimes with stress/anxiety). Negative for light-headedness.   Psychiatric/Behavioral: Positive for sleep disturbance (falls asleep okay, trouble staying asleep). The patient is nervous/anxious (had some anxiety, panic attacks last year).      Objective:     Vitals:    01/02/20 1059   BP: 128/82   Pulse: 75   Temp: 98.7 °F (37.1 °C)     BP Readings from Last 5 Encounters:   01/02/20 128/82   01/26/19 (!) 161/77   05/08/18 118/72   05/08/18  120/68   04/30/18 121/64     Physical Exam   Constitutional: No distress.   obese   Neck: No thyromegaly (right sided nodule) present.   Cardiovascular: Normal heart sounds.   Pulmonary/Chest: Effort normal.   Musculoskeletal: She exhibits no edema.   Vitals reviewed.    Wt Readings from Last 10 Encounters:   01/02/20 1059 80.4 kg (177 lb 4 oz)   01/26/19 1000 80.7 kg (177 lb 14.6 oz)   05/22/18 0905 80.7 kg (177 lb 14.6 oz)   05/08/18 1110 80.2 kg (176 lb 12.9 oz)   05/08/18 1022 80.1 kg (176 lb 9.4 oz)   04/30/18 1131 81.2 kg (179 lb 0.2 oz)   12/13/17 1650 79.8 kg (176 lb)   12/13/17 1551 79.9 kg (176 lb 2.4 oz)   11/14/17 1106 80.3 kg (177 lb 0.5 oz)   05/29/17 1128 89.7 kg (197 lb 12 oz)       Lab Results   Component Value Date    HGBA1C 5.4 05/08/2018     Lab Results   Component Value Date    CHOL 200 (H) 05/08/2018    HDL 41 05/08/2018    LDLCALC 144.2 05/08/2018    TRIG 74 05/08/2018    CHOLHDL 20.5 05/08/2018     Lab Results   Component Value Date     05/08/2018    K 3.6 05/08/2018     05/08/2018    CO2 27 05/08/2018    GLU 97 05/08/2018    BUN 12 05/08/2018    CREATININE 0.7 05/08/2018    CALCIUM 9.7 05/08/2018    PROT 7.5 05/08/2018    ALBUMIN 3.5 05/08/2018    BILITOT 0.7 05/08/2018    ALKPHOS 67 05/08/2018    AST 15 05/08/2018    ALT 11 05/08/2018    ANIONGAP 11 05/08/2018    ESTGFRAFRICA >60.0 05/08/2018    EGFRNONAA >60.0 05/08/2018    TSH 0.298 (L) 05/08/2018      Lab Results   Component Value Date    MICALBCREAT 5.1 12/30/2016     Assessment/Plan:     Nodular thyroid disease  Nodules for a while   - largest on right, FNA benign in 2015   - last US 2017   - repeat US to re-evaluate for changes in size/characteristics   - consider repeat FNA if needed      Prediabetes  Pt with prediabetes in the past   - on metformin, A1C improved back to normal range   - continue for now   - repeat labs    Obesity  BMI 33.5   - has prediabetes, htn   - encourage pt to continue exercise, try and work on  diet   - if she were to move into diabetes range would try for GLP-1 agonist   - monitor weight for now      Hyperthyroidism  Subclinical hyperthyroidism    - TSI, TPO negative in the past   - on atenolol   - not having many symptoms right now   - recheck labs        Follow up in about 1 year (around 1/2/2021).

## 2020-01-02 NOTE — ASSESSMENT & PLAN NOTE
Nodules for a while   - largest on right, FNA benign in 2015   - last US 2017   - repeat US to re-evaluate for changes in size/characteristics   - consider repeat FNA if needed

## 2020-01-06 ENCOUNTER — HOSPITAL ENCOUNTER (OUTPATIENT)
Dept: RADIOLOGY | Facility: HOSPITAL | Age: 50
Discharge: HOME | End: 2020-01-06
Attending: SPECIALIST
Payer: COMMERCIAL

## 2020-01-06 ENCOUNTER — OFFICE VISIT (OUTPATIENT)
Dept: FAMILY MEDICINE | Facility: CLINIC | Age: 50
End: 2020-01-06
Payer: COMMERCIAL

## 2020-01-06 ENCOUNTER — PATIENT MESSAGE (OUTPATIENT)
Dept: ENDOCRINOLOGY | Facility: CLINIC | Age: 50
End: 2020-01-06

## 2020-01-06 ENCOUNTER — OFFICE VISIT (OUTPATIENT)
Dept: OBSTETRICS AND GYNECOLOGY | Facility: CLINIC | Age: 50
End: 2020-01-06
Payer: COMMERCIAL

## 2020-01-06 VITALS
WEIGHT: 178.13 LBS | OXYGEN SATURATION: 98 % | BODY MASS INDEX: 33.63 KG/M2 | HEIGHT: 61 IN | DIASTOLIC BLOOD PRESSURE: 86 MMHG | SYSTOLIC BLOOD PRESSURE: 132 MMHG | HEART RATE: 80 BPM

## 2020-01-06 VITALS
HEIGHT: 61 IN | BODY MASS INDEX: 33.71 KG/M2 | WEIGHT: 178.56 LBS | RESPIRATION RATE: 18 BRPM | DIASTOLIC BLOOD PRESSURE: 80 MMHG | SYSTOLIC BLOOD PRESSURE: 118 MMHG

## 2020-01-06 VITALS — BODY MASS INDEX: 33.71 KG/M2 | HEIGHT: 61 IN | WEIGHT: 178.56 LBS

## 2020-01-06 DIAGNOSIS — N02.9 IDIOPATHIC HEMATURIA, UNSPECIFIED WHETHER GLOMERULAR MORPHOLOGIC CHANGES PRESENT: ICD-10-CM

## 2020-01-06 DIAGNOSIS — Z12.31 SCREENING MAMMOGRAM, ENCOUNTER FOR: ICD-10-CM

## 2020-01-06 DIAGNOSIS — Z23 INFLUENZA VACCINE ADMINISTERED: ICD-10-CM

## 2020-01-06 DIAGNOSIS — B96.89 ACUTE BACTERIAL SINUSITIS: ICD-10-CM

## 2020-01-06 DIAGNOSIS — Z87.09: Primary | ICD-10-CM

## 2020-01-06 DIAGNOSIS — N39.3 SUI (STRESS URINARY INCONTINENCE, FEMALE): ICD-10-CM

## 2020-01-06 DIAGNOSIS — J01.90 ACUTE BACTERIAL SINUSITIS: ICD-10-CM

## 2020-01-06 DIAGNOSIS — Z20.2 EXPOSURE TO STD: ICD-10-CM

## 2020-01-06 DIAGNOSIS — Z01.419 ENCOUNTER FOR ANNUAL ROUTINE GYNECOLOGICAL EXAMINATION: Primary | ICD-10-CM

## 2020-01-06 DIAGNOSIS — R00.2 PALPITATION: ICD-10-CM

## 2020-01-06 DIAGNOSIS — E04.1 NODULAR THYROID DISEASE: Primary | ICD-10-CM

## 2020-01-06 DIAGNOSIS — Z00.00 ENCOUNTER FOR WELLNESS EXAMINATION IN ADULT: ICD-10-CM

## 2020-01-06 LAB
BILIRUB SERPL-MCNC: NEGATIVE MG/DL
BLOOD URINE, POC: NORMAL
COLOR, POC UA: NORMAL
GLUCOSE UR QL STRIP: NEGATIVE
KETONES UR QL STRIP: NEGATIVE
LEUKOCYTE ESTERASE URINE, POC: NEGATIVE
NITRITE, POC UA: NEGATIVE
PH, POC UA: 8
PROTEIN, POC: NORMAL
SPECIFIC GRAVITY, POC UA: NORMAL
UROBILINOGEN, POC UA: NEGATIVE

## 2020-01-06 PROCEDURE — 96372 PR INJECTION,THERAP/PROPH/DIAG2ST, IM OR SUBCUT: ICD-10-PCS | Mod: GW,S$GLB,, | Performed by: NURSE PRACTITIONER

## 2020-01-06 PROCEDURE — 90471 IMMUNIZATION ADMIN: CPT | Mod: GW,S$GLB,, | Performed by: NURSE PRACTITIONER

## 2020-01-06 PROCEDURE — 99999 PR PBB SHADOW E&M-EST. PATIENT-LVL V: CPT | Mod: PBBFAC,,, | Performed by: SPECIALIST

## 2020-01-06 PROCEDURE — 99396 PREV VISIT EST AGE 40-64: CPT | Mod: 25,S$GLB,, | Performed by: SPECIALIST

## 2020-01-06 PROCEDURE — 87491 CHLMYD TRACH DNA AMP PROBE: CPT

## 2020-01-06 PROCEDURE — 93005 EKG 12-LEAD: ICD-10-PCS | Mod: GW,S$GLB,, | Performed by: NURSE PRACTITIONER

## 2020-01-06 PROCEDURE — 77067 SCR MAMMO BI INCL CAD: CPT | Mod: TC,PN

## 2020-01-06 PROCEDURE — 99204 PR OFFICE/OUTPT VISIT, NEW, LEVL IV, 45-59 MIN: ICD-10-PCS | Mod: 25,GW,S$GLB, | Performed by: NURSE PRACTITIONER

## 2020-01-06 PROCEDURE — 90686 FLU VACCINE (QUAD) GREATER THAN OR EQUAL TO 3YO PRESERVATIVE FREE IM: ICD-10-PCS | Mod: GW,S$GLB,, | Performed by: NURSE PRACTITIONER

## 2020-01-06 PROCEDURE — 99999 PR PBB SHADOW E&M-EST. PATIENT-LVL IV: ICD-10-PCS | Mod: PBBFAC,,, | Performed by: NURSE PRACTITIONER

## 2020-01-06 PROCEDURE — 96372 THER/PROPH/DIAG INJ SC/IM: CPT | Mod: GW,S$GLB,, | Performed by: NURSE PRACTITIONER

## 2020-01-06 PROCEDURE — 77067 SCR MAMMO BI INCL CAD: CPT | Mod: 26,,, | Performed by: RADIOLOGY

## 2020-01-06 PROCEDURE — 90686 IIV4 VACC NO PRSV 0.5 ML IM: CPT | Mod: GW,S$GLB,, | Performed by: NURSE PRACTITIONER

## 2020-01-06 PROCEDURE — 77063 MAMMO DIGITAL SCREENING BILAT WITH TOMOSYNTHESIS_CAD: ICD-10-PCS | Mod: 26,,, | Performed by: RADIOLOGY

## 2020-01-06 PROCEDURE — 93010 ELECTROCARDIOGRAM REPORT: CPT | Mod: S$GLB,,, | Performed by: INTERNAL MEDICINE

## 2020-01-06 PROCEDURE — 77063 BREAST TOMOSYNTHESIS BI: CPT | Mod: 26,,, | Performed by: RADIOLOGY

## 2020-01-06 PROCEDURE — 99204 OFFICE O/P NEW MOD 45 MIN: CPT | Mod: 25,GW,S$GLB, | Performed by: NURSE PRACTITIONER

## 2020-01-06 PROCEDURE — 90471 FLU VACCINE (QUAD) GREATER THAN OR EQUAL TO 3YO PRESERVATIVE FREE IM: ICD-10-PCS | Mod: GW,S$GLB,, | Performed by: NURSE PRACTITIONER

## 2020-01-06 PROCEDURE — 81002 POCT URINE DIPSTICK WITHOUT MICROSCOPE: ICD-10-PCS | Mod: S$GLB,,, | Performed by: SPECIALIST

## 2020-01-06 PROCEDURE — 99999 PR PBB SHADOW E&M-EST. PATIENT-LVL IV: CPT | Mod: PBBFAC,,, | Performed by: NURSE PRACTITIONER

## 2020-01-06 PROCEDURE — 77067 MAMMO DIGITAL SCREENING BILAT WITH TOMOSYNTHESIS_CAD: ICD-10-PCS | Mod: 26,,, | Performed by: RADIOLOGY

## 2020-01-06 PROCEDURE — 81002 URINALYSIS NONAUTO W/O SCOPE: CPT | Mod: S$GLB,,, | Performed by: SPECIALIST

## 2020-01-06 PROCEDURE — 93010 EKG 12-LEAD: ICD-10-PCS | Mod: S$GLB,,, | Performed by: INTERNAL MEDICINE

## 2020-01-06 PROCEDURE — 99999 PR PBB SHADOW E&M-EST. PATIENT-LVL V: ICD-10-PCS | Mod: PBBFAC,,, | Performed by: SPECIALIST

## 2020-01-06 PROCEDURE — 93005 ELECTROCARDIOGRAM TRACING: CPT | Mod: GW,S$GLB,, | Performed by: NURSE PRACTITIONER

## 2020-01-06 PROCEDURE — 99396 PR PREVENTIVE VISIT,EST,40-64: ICD-10-PCS | Mod: 25,S$GLB,, | Performed by: SPECIALIST

## 2020-01-06 RX ORDER — DOXYCYCLINE 100 MG/1
100 CAPSULE ORAL 2 TIMES DAILY
Qty: 14 CAPSULE | Refills: 0 | Status: SHIPPED | OUTPATIENT
Start: 2020-01-06 | End: 2020-01-13

## 2020-01-06 RX ORDER — BETAMETHASONE SODIUM PHOSPHATE AND BETAMETHASONE ACETATE 3; 3 MG/ML; MG/ML
6 INJECTION, SUSPENSION INTRA-ARTICULAR; INTRALESIONAL; INTRAMUSCULAR; SOFT TISSUE
Status: COMPLETED | OUTPATIENT
Start: 2020-01-06 | End: 2020-01-06

## 2020-01-06 RX ADMIN — BETAMETHASONE SODIUM PHOSPHATE AND BETAMETHASONE ACETATE 6 MG: 3; 3 INJECTION, SUSPENSION INTRA-ARTICULAR; INTRALESIONAL; INTRAMUSCULAR; SOFT TISSUE at 09:01

## 2020-01-06 NOTE — PROGRESS NOTES
50 yo BF present sfor annual gyn evaluation. In addition, c/o JENNIFER. Denies associated dysuria, hematuria, f/c, PP,nocturia.  Past Medical History:   Diagnosis Date    Benign hypertension     Bladder instability     Depression     DJD (degenerative joint disease), lumbar     History of viral meningitis     Hyperlipidemia     Insomnia     Microscopic hematuria     negative work-up    Pulmonary nodule     minute    Tendonitis        Past Surgical History:   Procedure Laterality Date    ANTERIOR VAGINAL REPAIR      BREAST BIOPSY  2007    left breast benign    ENDOMETRIAL ABLATION  2009    HYSTERECTOMY      LAPAROSCOPIC HYSTERECTOMY  2013    TUBAL LIGATION         Family History   Problem Relation Age of Onset    Osteoarthritis Mother     Hyperlipidemia Mother     Hypertension Mother     Allergies Mother     Hyperlipidemia Father     Diabetes Father     Hypertension Father     Cancer Paternal Grandmother         Breast    Diabetes Paternal Grandmother     Breast cancer Paternal Grandmother     Allergies Sister     Heart disease Sister         Congential    Breast cancer Maternal Aunt        Social History     Socioeconomic History    Marital status:      Spouse name: Not on file    Number of children: Not on file    Years of education: Not on file    Highest education level: Not on file   Occupational History     Employer: [employer]   Social Needs    Financial resource strain: Somewhat hard    Food insecurity:     Worry: Sometimes true     Inability: Sometimes true    Transportation needs:     Medical: No     Non-medical: No   Tobacco Use    Smoking status: Former Smoker     Packs/day: 0.25     Years: 10.00     Pack years: 2.50     Types: Cigarettes     Last attempt to quit: 10/20/2013     Years since quittin.2    Smokeless tobacco: Never Used   Substance and Sexual Activity    Alcohol use: Yes     Alcohol/week: 1.0 standard drinks      Types: 1 Glasses of wine per week     Frequency: 2-3 times a week     Drinks per session: 3 or 4     Binge frequency: Less than monthly    Drug use: No    Sexual activity: Not Currently     Partners: Male     Birth control/protection: Surgical   Lifestyle    Physical activity:     Days per week: 5 days     Minutes per session: 40 min    Stress: Very much   Relationships    Social connections:     Talks on phone: More than three times a week     Gets together: Once a week     Attends Cheondo[employer] service: Not on file     Active member of club or organization: Yes     Attends meetings of clubs or organizations: More than 4 times per year     Relationship status:    Other Topics Concern    Not on file   Social History Narrative    The patient does exercise regularly (walking daily, 35-40min).Rates diet as fair.She is not satisfied with weight.       Current Outpatient Medications   Medication Sig Dispense Refill    ascorbic acid, vitamin C, (VITAMIN C) 1000 MG tablet Take 1,000 mg by mouth once daily.      atenolol (TENORMIN) 25 MG tablet Take 1 tablet (25 mg total) by mouth once daily. 90 tablet 3    azelastine (ASTELIN) 137 mcg (0.1 %) nasal spray 2 sprays (274 mcg total) by Nasal route 2 (two) times daily. 30 mL 12    buPROPion (WELLBUTRIN) 75 MG tablet Take 75 mg by mouth 2 (two) times daily.      cyclobenzaprine (FLEXERIL) 10 MG tablet Take 10 mg by mouth 3 (three) times daily as needed for Muscle spasms.      doxycycline (VIBRAMYCIN) 100 MG Cap Take 1 capsule (100 mg total) by mouth 2 (two) times daily. for 7 days 14 capsule 0    fluticasone (FLONASE) 50 mcg/actuation nasal spray 1 spray by Each Nare route once daily.      gabapentin (NEURONTIN) 300 MG capsule TAKE 1 CAPSULE BY MOUTH THREE TIMES A DAY 90 capsule 1    hydroCHLOROthiazide (HYDRODIURIL) 25 MG tablet Take 1 tablet (25 mg total) by mouth once daily. 90 tablet 3    ibuprofen (ADVIL,MOTRIN) 600 MG tablet Take 1 tablet (600 mg total)  by mouth every 8 (eight) hours as needed for Pain. 20 tablet 0    loratadine (CLARITIN) 10 mg tablet Take 10 mg by mouth once daily.      losartan (COZAAR) 25 MG tablet Take 1 tablet (25 mg total) by mouth once daily. 90 tablet 3    metFORMIN (GLUCOPHAGE) 500 MG tablet Take 1 tablet (500 mg total) by mouth 2 (two) times daily with meals. 180 tablet 3    methocarbamol (ROBAXIN) 500 MG Tab Take 500 mg by mouth 4 (four) times daily.      multivitamin (THERAGRAN) per tablet Every day      naproxen (NAPROSYN) 500 MG tablet Take 1 tablet (500 mg total) by mouth 2 (two) times daily with meals. 60 tablet 11    oxybutynin (DITROPAN) 5 MG Tab Take 1 tablet (5 mg total) by mouth 2 (two) times daily. 60 tablet 0    potassium chloride SA (K-DUR,KLOR-CON) 10 MEQ tablet Take 20 mEq by mouth 2 (two) times daily.      sodium chloride-aloe vera (AYR SALINE) Gel 1 application by Nasal route every 3 (three) hours as needed. 14.1 g 6    topiramate (TOPAMAX) 50 MG tablet TAKE 1 TABLET (50 MG TOTAL) BY MOUTH ONCE DAILY. (Patient not taking: Reported on 1/6/2020) 90 tablet 3     No current facility-administered medications for this visit.        Review of patient's allergies indicates:   Allergen Reactions    Latex      Other reaction(s): Rash       Review of System:   General: no chills, fever, night sweats, weight gain or weight loss  Psychological: no depression or suicidal ideation  Breasts: no new or changing breast lumps, nipple discharge or masses.  Respiratory: no cough, shortness of breath, or wheezing  Cardiovascular: no chest pain or dyspnea on exertion  Gastrointestinal: no abdominal pain, change in bowel habits, or black or bloody stools  Genito-Urinary: Denies urinary frequency/urgency or vulvar/vaginal symptoms, pelvic pain or abnormal vaginal bleeding. POSITIVE JENNIFER  Musculoskeletal: no gait disturbance or muscular weakness    PE:   APPEARANCE: Well nourished, well developed, in no acute distress.  NECK: Neck  symmetric without masses or thyromegaly.  NODES: No inguinal lymph node enlargement.  ABDOMEN: Soft. No tenderness or masses. No hepatosplenomegaly. No hernias.  BREASTS: Symmetrical, no skin changes or visible lesions. No palpable masses, nipple discharge or adenopathy bilaterally.  PELVIC: Normal external female genitalia without lesions. Normal hair distribution. Adequate perineal body, normal urethral meatus. Vagina moist and well rugated without lesions or discharge. GRADE 1 cystocele NO rectocele.POSITIVE URETERHAL HYPERMOBILITY Uterus and cervix surgically absent. Bimanual exam revealed no masses, tenderness or abnormality.      I discussed JENNIFER and pt is a good TVTO candidate  I rec continued weight loss then would be agreeable to procedure  Mammo screening  BSE monthly  F/U prn

## 2020-01-06 NOTE — TELEPHONE ENCOUNTER
US reviewed.    Right side nodule 2.9x2x2.4 cm (13.92 cm^3)   2017: 2.5x2.2x1.9 cm (10.45 cm^3)   2016: 2.4 cm   2015 (FNA benign): 2.3x1.8x1.9cm (7.866 cm^3)    Hypoechoic. Has been increasing steadily over the last 4-5 years or so. Each time appearing relatively similar to prior, but it has nearly doubled in volume since the last FNA. Therefore, will recommend repeat FNA for that nodule. If benign results again, then okay to space out US more, consider q2-3 years or so.    Other <1cm nodules, don't need anything for those.

## 2020-01-06 NOTE — PATIENT INSTRUCTIONS
Educated on supportive care and return precautions to the clinic.  Follow up for further evaluation if S/S worsen or fail to improve.  Will need an appt with ENT to evaluate chronic bacterial sinusitis.  If chest palpitations continue will need a new patient appt with Cardiology  Will meet with PCP once a year, will need to schedule yearly appt with Dr. Ferrara

## 2020-01-06 NOTE — PROGRESS NOTES
Subjective:       Patient ID: Kylah Mohan is a 49 y.o. female.    Chief Complaint: Establish Care    Ms. Mohan is a new patient to me.  She is here today to establish care with our clinic.   She has a new complaint of heart palpitations at rest.  She has an established obgyn, Dr. DARIELA Cheung and will be seeing him today for her annual well woman exam and to discuss ongoing hematuria, s/p bladder prolapse surgery.  She would like a flu vaccine today.  She has complaints of severe sinus tenderness, a productive cough with green mucus and nasal congestion.  She said she gets this type of infection annually.  She has not seen ENT for this chronic problem nor had a CT scan of her sinuses.    Vitals:    01/06/20 0806   BP: 132/86   Pulse: 80     Review of Systems   Constitutional: Negative for activity change, appetite change, chills, fatigue and fever.   HENT: Positive for congestion, postnasal drip, rhinorrhea, sinus pressure, sinus pain, sneezing and sore throat. Negative for ear pain and trouble swallowing.    Eyes: Negative for pain, redness and visual disturbance.   Respiratory: Negative for cough, choking, chest tightness, shortness of breath and wheezing.    Cardiovascular: Positive for palpitations. Negative for chest pain and leg swelling.   Gastrointestinal: Negative for abdominal pain, blood in stool, constipation, diarrhea, nausea and vomiting.   Endocrine: Negative.    Genitourinary: Positive for hematuria. Negative for decreased urine volume, difficulty urinating, dysuria, flank pain and urgency.   Musculoskeletal: Positive for neck pain. Negative for back pain, gait problem and myalgias.   Skin: Negative for color change, pallor and rash.   Allergic/Immunologic: Negative.    Neurological: Positive for headaches. Negative for dizziness, speech difficulty, weakness, light-headedness and numbness.   Hematological: Does not bruise/bleed easily.   Psychiatric/Behavioral: Negative for self-injury and  suicidal ideas. The patient is not nervous/anxious.        Past Medical History:   Diagnosis Date    Benign hypertension     Bladder instability     Depression     DJD (degenerative joint disease), lumbar     History of viral meningitis 1996    Hyperlipidemia     Insomnia     Microscopic hematuria     negative work-up    Pulmonary nodule     minute    Tendonitis        Objective:      Physical Exam   Constitutional: She is oriented to person, place, and time. Vital signs are normal. She appears well-developed and well-nourished. No distress.   HENT:   Head: Normocephalic and atraumatic. Head is without right periorbital erythema and without left periorbital erythema.   Right Ear: External ear normal. There is tenderness. A middle ear effusion is present.   Left Ear: Hearing and external ear normal.   Nose: Mucosal edema, rhinorrhea and sinus tenderness present. Right sinus exhibits maxillary sinus tenderness. Right sinus exhibits no frontal sinus tenderness. Left sinus exhibits maxillary sinus tenderness. Left sinus exhibits no frontal sinus tenderness.   Mouth/Throat: Uvula is midline, oropharynx is clear and moist and mucous membranes are normal. No posterior oropharyngeal edema or posterior oropharyngeal erythema. Tonsils are 3+ on the right. Tonsils are 3+ on the left.   Eyes: Pupils are equal, round, and reactive to light. Conjunctivae and EOM are normal.   Neck: Trachea normal and normal range of motion. Neck supple.   Cardiovascular: Normal rate, regular rhythm, S1 normal, normal heart sounds, intact distal pulses and normal pulses.   No murmur heard.  Pulmonary/Chest: Effort normal and breath sounds normal. No tachypnea and no bradypnea. No respiratory distress. She has no decreased breath sounds.   Abdominal: Soft. Bowel sounds are normal. There is no tenderness.   Genitourinary:   Genitourinary Comments: Has constant idiopathic hematuria, had previous bladder prolapse surgery, is seen by   "Jonatan to address this problem.   Musculoskeletal: Normal range of motion.   Neurological: She is alert and oriented to person, place, and time.   Skin: Skin is warm and dry. Capillary refill takes less than 2 seconds. No lesion, no petechiae and no rash noted. She is not diaphoretic.   Psychiatric: She has a normal mood and affect. Her speech is normal and behavior is normal. Judgment and thought content normal.   Nursing note and vitals reviewed.      Assessment:       1. H/O bacterial sinusitis    2. Palpitation    3. Encounter for wellness examination in adult    4. Influenza vaccine administered    5. Idiopathic hematuria, unspecified whether glomerular morphologic changes present    6. Acute bacterial sinusitis        Plan:       Encounter for wellness examination in adult    -     CBC auto differential; Future; Expected date: 01/06/2020  -     Comprehensive metabolic panel; Future; Expected date: 01/06/2020  -     Lipid panel; Future; Expected date: 01/06/2020    Palpitation  -     IN OFFICE EKG 12-LEAD (to [place]), will establish a new patient appt with cardiology if chest palpitations continue at rest after today.      Influenza vaccine administered    Idiopathic hematuria, unspecified whether glomerular morphologic changes present  Sees Dr. Nice OBMARTINEZ for this ongoing issues, next appt is 1/6/2020 with Dr. Cheung, patient states "he will complete a urine culture in office today."    Acute bacterial sinusitis  History of bacterial sinusitis- Ambulatory consult to ENT    -     doxycycline (VIBRAMYCIN) 100 MG Cap; Take 1 capsule (100 mg total) by mouth 2 (two) times daily. for 7 days  Dispense: 14 capsule; Refill: 0  -     betamethasone acetate-betamethasone sodium phosphate injection 6 mg  -     Influenza - Quadrivalent (PF)            No follow-ups on file.              "

## 2020-01-07 LAB
C TRACH DNA SPEC QL NAA+PROBE: NOT DETECTED
N GONORRHOEA DNA SPEC QL NAA+PROBE: NOT DETECTED

## 2020-01-09 ENCOUNTER — OFFICE VISIT (OUTPATIENT)
Dept: OTOLARYNGOLOGY | Facility: CLINIC | Age: 50
End: 2020-01-09
Payer: COMMERCIAL

## 2020-01-09 VITALS
HEIGHT: 61 IN | RESPIRATION RATE: 18 BRPM | HEART RATE: 76 BPM | WEIGHT: 178.56 LBS | DIASTOLIC BLOOD PRESSURE: 80 MMHG | BODY MASS INDEX: 33.71 KG/M2 | SYSTOLIC BLOOD PRESSURE: 130 MMHG

## 2020-01-09 DIAGNOSIS — J32.9 CHRONIC SINUSITIS, UNSPECIFIED LOCATION: Primary | ICD-10-CM

## 2020-01-09 PROCEDURE — 99999 PR PBB SHADOW E&M-EST. PATIENT-LVL III: CPT | Mod: PBBFAC,,, | Performed by: OTOLARYNGOLOGY

## 2020-01-09 PROCEDURE — 99243 PR OFFICE CONSULTATION,LEVEL III: ICD-10-PCS | Mod: S$GLB,,, | Performed by: OTOLARYNGOLOGY

## 2020-01-09 PROCEDURE — 99243 OFF/OP CNSLTJ NEW/EST LOW 30: CPT | Mod: S$GLB,,, | Performed by: OTOLARYNGOLOGY

## 2020-01-09 PROCEDURE — 99999 PR PBB SHADOW E&M-EST. PATIENT-LVL III: ICD-10-PCS | Mod: PBBFAC,,, | Performed by: OTOLARYNGOLOGY

## 2020-01-09 NOTE — PROGRESS NOTES
Subjective:       Patient ID: Kylah Vergaraimonenickq Kimberlee is a 49 y.o. female.    Chief Complaint: Sinus Problem (acute bacterial sinusitis)    Kylah is here for sinus/nasal complaints. Symptoms have been present for years (since [place]). Her main complaint is facial pain and pressure over the temple which extends down along the neck.  She has received antibiotics and steroids in the past which improve temporarily. She has intermittent nasal congestion which alternates. She has intermittent PND.  Decreased sense of smell: possibly  Therapies tried: saline  She is under a lot of stress and recently started a new job, thinking it was related to this. She reports good hydration.     Allergy testing: no  History of asthma: no  Anticoagulation: no   Pertinent meds: HCTZ, oxybutynin  Pertinent surgical history: no head or neck surgery  Pertinent medical issues: she has headaches everyday, over the eyes.     SNOT-22=88    Social History     Tobacco Use   Smoking Status Former Smoker    Packs/day: 0.25    Years: 10.00    Pack years: 2.50    Types: Cigarettes    Last attempt to quit: 10/20/2013    Years since quittin.2   Smokeless Tobacco Never Used     Social History     Substance and Sexual Activity   Alcohol Use Yes    Alcohol/week: 1.0 standard drinks    Types: 1 Glasses of wine per week    Frequency: 2-3 times a week    Drinks per session: 3 or 4    Binge frequency: Less than monthly        Review of Systems   Constitutional: Negative for activity change and appetite change.   Eyes: Negative for discharge.   Respiratory: Negative for difficulty breathing and wheezing   Cardiovascular: Negative for chest pain.   Gastrointestinal: Negative for abdominal distention and abdominal pain.   Endocrine: Negative for cold intolerance and heat intolerance.   Genitourinary: Negative for dysuria.   Musculoskeletal: Negative for gait problem and joint swelling.   Skin: Negative for color change and pallor.   Neurological:  Negative for syncope and weakness.   Psychiatric/Behavioral: Negative for agitation and confusion.     Objective:        Constitutional:   She is oriented to person, place, and time. She appears well-developed and well-nourished. She appears alert. She is active. Normal speech.      Head:  Normocephalic and atraumatic. Head is without TMJ tenderness. No scars. Salivary glands normal.  Facial strength is normal.      Ears:    Right Ear: No drainage or swelling. No middle ear effusion.   Left Ear: No drainage or swelling.  No middle ear effusion.     Nose:  Septal deviation present. No mucosal edema, rhinorrhea or sinus tenderness.     Mouth/Throat  Oropharynx clear and moist without lesions or asymmetry, normal uvula midline and mirror exam normal. Normal dentition. No uvula swelling, lacerations or trismus. No oropharyngeal exudate. Tonsillar erythema, tonsillar exudate.      Neck:  Full range of motion with neck supple and no adenopathy. Thyroid tenderness is present. No tracheal deviation, no edema, no erythema, normal range of motion, no stridor, no crepitus and no neck rigidity present. No thyroid mass present.     Cardiovascular:   Intact distal pulses and normal pulses.      Pulmonary/Chest:   Effort normal and breath sounds normal. No stridor.     Psychiatric:   Her speech is normal and behavior is normal. Her mood appears not anxious. Her affect is not labile.     Neurological:   She is alert and oriented to person, place, and time. No sensory deficit.     Skin:   No abrasions, lacerations, lesions, or rashes. No abrasion and no bruising noted.         Tests / Results:  none    Assessment:       1. Chronic sinusitis, unspecified location          Plan:         CT Sinus    Discussed location of headache can be related to stress/tension HA, if not sinus  Will call with results  Pending response and new job change, will consider HA treatment if persistent.

## 2020-01-09 NOTE — LETTER
January 13, 2020      Sarah Montiel NP  [address] [place] [address] Dr Valentin THAKKAR [id number]           [facility]  [address] [place] [address]  Phone: [phone]  Fax: [phone]          Patient: Kylah Mohan   MR Number: [id number]   YOB: 1970   Date of Visit: 1/9/2020       Dear Sarah Montiel:    Thank you for referring Kylah Mohan to me for evaluation. Attached you will find relevant portions of my assessment and plan of care.    If you have questions, please do not hesitate to call me. I look forward to following Kylah Mohan along with you.    Sincerely,    Magdy Cohen MD    Enclosure  CC:  No Recipients    If you would like to receive this communication electronically, please contact [email or web address] or [phone] to request more information on [employer] access.    For providers and/or their staff who would like to refer a patient to [facility], please contact us through our one-stop-shop provider referral line, [facility], at [phone].    If you feel you have received this communication in error or would no longer like to receive these types of communications, please e-mail [email or web address]

## 2020-01-10 ENCOUNTER — TELEPHONE (OUTPATIENT)
Dept: ENDOCRINOLOGY | Facility: CLINIC | Age: 50
End: 2020-01-10

## 2020-01-10 NOTE — TELEPHONE ENCOUNTER
Arminda from [facility] called to inform us that Mrs. Mohan cannot get FNA because of insurance, she has been sent to [facility] for FNA.

## 2020-01-16 ENCOUNTER — TELEPHONE (OUTPATIENT)
Dept: RADIOLOGY | Facility: HOSPITAL | Age: 50
End: 2020-01-16

## 2020-02-03 ENCOUNTER — TELEPHONE (OUTPATIENT)
Dept: RADIOLOGY | Facility: HOSPITAL | Age: 50
End: 2020-02-03

## 2020-02-07 ENCOUNTER — TELEPHONE (OUTPATIENT)
Dept: RADIOLOGY | Facility: HOSPITAL | Age: 50
End: 2020-02-07

## 2020-02-07 ENCOUNTER — HOSPITAL ENCOUNTER (OUTPATIENT)
Dept: RADIOLOGY | Facility: HOSPITAL | Age: 50
Discharge: HOME | End: 2020-02-07
Attending: OTOLARYNGOLOGY
Payer: COMMERCIAL

## 2020-02-07 DIAGNOSIS — J32.9 CHRONIC SINUSITIS, UNSPECIFIED LOCATION: ICD-10-CM

## 2020-02-07 PROCEDURE — 70486 CT MAXILLOFACIAL W/O DYE: CPT | Mod: 26,,, | Performed by: RADIOLOGY

## 2020-02-07 PROCEDURE — 70486 CT SINUSES WITHOUT CONTRAST: ICD-10-PCS | Mod: 26,,, | Performed by: RADIOLOGY

## 2020-02-07 PROCEDURE — 70486 CT MAXILLOFACIAL W/O DYE: CPT | Mod: TC

## 2020-02-10 ENCOUNTER — PATIENT MESSAGE (OUTPATIENT)
Dept: OTOLARYNGOLOGY | Facility: CLINIC | Age: 50
End: 2020-02-10

## 2020-02-12 ENCOUNTER — OFFICE VISIT (OUTPATIENT)
Dept: PAIN MEDICINE | Facility: CLINIC | Age: 50
End: 2020-02-12
Payer: COMMERCIAL

## 2020-02-12 VITALS
HEART RATE: 68 BPM | DIASTOLIC BLOOD PRESSURE: 72 MMHG | HEIGHT: 62 IN | SYSTOLIC BLOOD PRESSURE: 117 MMHG | BODY MASS INDEX: 32.2 KG/M2 | WEIGHT: 175 LBS

## 2020-02-12 DIAGNOSIS — M54.2 CERVICALGIA: Primary | ICD-10-CM

## 2020-02-12 DIAGNOSIS — M79.10 MYALGIA: ICD-10-CM

## 2020-02-12 PROCEDURE — 3078F PR MOST RECENT DIASTOLIC BLOOD PRESSURE < 80 MM HG: ICD-10-PCS | Mod: CPTII,S$GLB,, | Performed by: ANESTHESIOLOGY

## 2020-02-12 PROCEDURE — 99999 PR PBB SHADOW E&M-EST. PATIENT-LVL V: CPT | Mod: PBBFAC,,, | Performed by: ANESTHESIOLOGY

## 2020-02-12 PROCEDURE — 99999 PR PBB SHADOW E&M-EST. PATIENT-LVL V: ICD-10-PCS | Mod: PBBFAC,,, | Performed by: ANESTHESIOLOGY

## 2020-02-12 PROCEDURE — 3074F SYST BP LT 130 MM HG: CPT | Mod: CPTII,S$GLB,, | Performed by: ANESTHESIOLOGY

## 2020-02-12 PROCEDURE — 3074F PR MOST RECENT SYSTOLIC BLOOD PRESSURE < 130 MM HG: ICD-10-PCS | Mod: CPTII,S$GLB,, | Performed by: ANESTHESIOLOGY

## 2020-02-12 PROCEDURE — 3008F BODY MASS INDEX DOCD: CPT | Mod: CPTII,S$GLB,, | Performed by: ANESTHESIOLOGY

## 2020-02-12 PROCEDURE — 3008F PR BODY MASS INDEX (BMI) DOCUMENTED: ICD-10-PCS | Mod: CPTII,S$GLB,, | Performed by: ANESTHESIOLOGY

## 2020-02-12 PROCEDURE — 99204 PR OFFICE/OUTPT VISIT, NEW, LEVL IV, 45-59 MIN: ICD-10-PCS | Mod: 25,S$GLB,, | Performed by: ANESTHESIOLOGY

## 2020-02-12 PROCEDURE — 20553 NJX 1/MLT TRIGGER POINTS 3/>: CPT | Mod: S$GLB,,, | Performed by: ANESTHESIOLOGY

## 2020-02-12 PROCEDURE — 3078F DIAST BP <80 MM HG: CPT | Mod: CPTII,S$GLB,, | Performed by: ANESTHESIOLOGY

## 2020-02-12 PROCEDURE — 20553 PR INJECT TRIGGER POINTS, > 3: ICD-10-PCS | Mod: S$GLB,,, | Performed by: ANESTHESIOLOGY

## 2020-02-12 PROCEDURE — 99204 OFFICE O/P NEW MOD 45 MIN: CPT | Mod: 25,S$GLB,, | Performed by: ANESTHESIOLOGY

## 2020-02-12 RX ORDER — BUPIVACAINE HYDROCHLORIDE 2.5 MG/ML
10 INJECTION, SOLUTION EPIDURAL; INFILTRATION; INTRACAUDAL ONCE
Status: COMPLETED | OUTPATIENT
Start: 2020-02-12 | End: 2020-02-12

## 2020-02-12 RX ORDER — METHYLPREDNISOLONE ACETATE 40 MG/ML
80 INJECTION, SUSPENSION INTRA-ARTICULAR; INTRALESIONAL; INTRAMUSCULAR; SOFT TISSUE ONCE
Status: COMPLETED | OUTPATIENT
Start: 2020-02-12 | End: 2020-02-12

## 2020-02-12 RX ADMIN — METHYLPREDNISOLONE ACETATE 80 MG: 40 INJECTION, SUSPENSION INTRA-ARTICULAR; INTRALESIONAL; INTRAMUSCULAR; SOFT TISSUE at 08:02

## 2020-02-12 RX ADMIN — BUPIVACAINE HYDROCHLORIDE 25 MG: 2.5 INJECTION, SOLUTION EPIDURAL; INFILTRATION; INTRACAUDAL at 08:02

## 2020-02-12 NOTE — PROGRESS NOTES
Patient is overdue for fasting labs and WELLNESS EXAM - she needs to establish care with new provider - if she would like to establish care with me - set up lab appt and WELLNESS EXAM and send me back a message so I can input lab orders and link to lab appt.  If she would like to establish care with one of the new providers - that is fine as well - schedule for patient.   This note was completed with dictation software and grammatical errors may exist.    Referring Physician: Kalyn Cormier    PCP: John Ferrara MD      CC:  Neck pain    HPI:   Kylah Mohan is a 49 y.o. female presents to us with neck pain. Pain has been present for over 6 months.  No recent traumatic incident.  She presents to us with constant aching, throbbing, deep, sharp pain in her neck.  Pain radiates to her bilateral shoulders.  Pain worsens with sitting, lateral rotation, extension.  Nothing seems to help her pain.  She had x-rays performed 2016.  She has not had any formal physical therapy.  She takes NSAIDs with mild benefits.  She denies any worsening weakness.  No bowel bladder changes.    ROS:  CONSTITUTIONAL: No fevers, chills, night sweats, wt. loss, appetite changes  SKIN: no rashes or itching  ENT: No headaches, head trauma, vision changes, or eye pain  LYMPH NODES: None noticed   CV: No chest pain, palpitations.   RESP: No shortness of breath, dyspnea on exertion, cough, wheezing, or hemoptysis  GI: No nausea, emesis, diarrhea, constipation, melena, hematochezia, pain.    : No dysuria, hematuria, urgency, or frequency   HEME: No easy bruising, bleeding problems  PSYCHIATRIC: No depression, anxiety, psychosis, hallucinations.  NEURO: No seizures, memory loss, dizziness or difficulty sleeping  MSK:  Positive HPI      Past Medical History:   Diagnosis Date    Benign hypertension     Bladder instability     Depression     DJD (degenerative joint disease), lumbar     History of viral meningitis 1996    Hyperlipidemia     Insomnia     Microscopic hematuria     negative work-up    Pulmonary nodule     minute    Tendonitis      Past Surgical History:   Procedure Laterality Date    ANTERIOR VAGINAL REPAIR      BREAST BIOPSY  6/5/2007    left breast benign    ENDOMETRIAL ABLATION  5/28/2009    HYSTERECTOMY      LAPAROSCOPIC HYSTERECTOMY  2/2013    TUBAL LIGATION  2002      Family History   Problem Relation Age of Onset    Osteoarthritis Mother     Hyperlipidemia Mother     Hypertension Mother     Allergies Mother     Hyperlipidemia Father     Diabetes Father     Hypertension Father     Cancer Paternal Grandmother         Breast    Diabetes Paternal Grandmother     Breast cancer Paternal Grandmother 56    Allergies Sister     Heart disease Sister         Congential    Breast cancer Maternal Aunt 61    Ovarian cancer Maternal Cousin 50     Social History     Socioeconomic History    Marital status:      Spouse name: Not on file    Number of children: Not on file    Years of education: Not on file    Highest education level: Not on file   Occupational History     Employer: [employer]   Social Needs    Financial resource strain: Somewhat hard    Food insecurity:     Worry: Sometimes true     Inability: Sometimes true    Transportation needs:     Medical: No     Non-medical: No   Tobacco Use    Smoking status: Former Smoker     Packs/day: 0.25     Years: 10.00     Pack years: 2.50     Types: Cigarettes     Last attempt to quit: 10/20/2013     Years since quittin.3    Smokeless tobacco: Never Used   Substance and Sexual Activity    Alcohol use: Yes     Alcohol/week: 1.0 standard drinks     Types: 1 Glasses of wine per week     Frequency: 2-3 times a week     Drinks per session: 3 or 4     Binge frequency: Less than monthly    Drug use: No    Sexual activity: Not Currently     Partners: Male     Birth control/protection: Surgical   Lifestyle    Physical activity:     Days per week: 5 days     Minutes per session: 40 min    Stress: Very much   Relationships    Social connections:     Talks on phone: More than three times a week     Gets together: Once a week     Attends Denominational service: Not on file     Active member of club or organization: Yes     Attends meetings of clubs or organizations: More than 4 times per year      "Relationship status:    Other Topics Concern    Not on file   Social History Narrative    The patient does exercise regularly (walking daily, 35-40min).Rates diet as fair.She is not satisfied with weight.         Medications/Allergies: See med card    Vitals:    02/12/20 0818   BP: 117/72   Pulse: 68   Weight: 79.4 kg (175 lb)   Height: 5' 2" (1.575 m)   PainSc: 10-Worst pain ever   PainLoc: Back         Physical exam:    GENERAL: A and O x3, the patient appears well groomed and is in no acute distress.  Skin: No rashes or obvious lesions  HEENT: normocephalic, atraumatic  CARDIOVASCULAR:  Palpable peripheral pulses  LUNGS: easy work of breathing  ABDOMEN: soft, nontender   UPPER EXTREMITIES: Normal alignment, normal range of motion, no atrophy, no skin changes,  hair growth and nail growth normal and equal bilaterally. No swelling, no tenderness.    LOWER EXTREMITIES:  Normal alignment, normal range of motion, no atrophy, no skin changes,  hair growth and nail growth normal and equal bilaterally. No swelling, no tenderness.  CERVICAL SPINE:  Cervical spine: ROM is full in flexion, extension and lateral rotation without increased pain.  Spurling's maneuver causes no neck pain to either side.  Myofascial exam: ModerateTenderness to palpation across cervical paraspinous region bilaterally.    LUMBAR SPINE  Lumbar spine: ROM is full with flexion extension and oblique extension with mild to moderate increased pain.    Daniel's test causes no increased pain on either side.    Supine straight leg raise is negative bilaterally.    Internal and external rotation of the hip causes no increased pain on either side.  Myofascial exam: No tenderness to palpation across lumbar paraspinous muscles.      MENTAL STATUS: normal orientation, speech, language, and fund of knowledge for social situation.  Emotional state appropriate.    CRANIAL NERVES:  II:  PERRL bilaterally,   III,IV,VI: EOMI.    V:  Facial sensation equal " bilaterally  VII:  Facial motor function normal.  VIII:  Hearing equal to finger rub bilaterally  IX/X: Gag normal, palate symmetric  XI:  Shoulder shrug equal, head turn equal  XII:  Tongue midline without fasciculations      MOTOR: Tone and bulk: normal bilateral upper and lower Strength: normal   Delt Bi Tri WE WF     R 5 5 5 5 5 5   L 5 5 5 5 5 5     IP ADD ABD Quad TA Gas HAM  R 5 5 5 5 5 5 5  L 5 5 5 5 5 5 5    SENSATION: Light touch and pinprick intact bilaterally  REFLEXES: normal, symmetric, nonbrisk.  Toes down, no clonus. No hoffmans.  GAIT: normal rise, base, steps, and arm swing.        Imaging:  Xray C-spine 2016  There is mild reversal of usual cervical lordosis.  There are degenerative changes with large osteophytes and vertebral bodies.  There is moderate disc space narrowing C4-C5 and C5-C6.  Vertebral body heights and alignment are maintained without acute compression or subluxation.  There is not abnormal prevertebral soft tissue swelling.  The odontoid is intact    Assessment:  Patient referred for neck pain  1. Cervicalgia    2. Myalgia          Plan:  1. I have stressed the importance of physical activity and exercise to improve overall health  2. Reviewed pertinent imaging and records with patient  3.  I believe the patient has a good component myofascial pain given her loss of lordosis as well as her physical exam findings of exquisite tenderness over her paraspinal cervical muscles as well as her trapezius muscles.  Recommend formal physical therapy.  We will provide cervical paraspinal and trapezius trigger point injections today.  4.  She will follow up after trial of physical therapy      Trigger point injection   Pre-procedure diagnosis: Myofascial trigger points in bilateral cervical and trapezius region  Post-procedure diagnosis: Same    Timeout performed.  Upon examination, 8 trigger points were noted in the above noted regions.   After the procedure was described and informed  consent obtained, the skin over the trigger points was cleaned with isopropyl alcohol. Using a 27-gauge needle, injection of 1ml of 0.25%bupvicaine and 10mg of methylprednisone was injected into each trigger point. The patient noted concordant radiating pain upon injection of her trigger points. The skin was then cleaned and bandages were applied to sites as necessary. Blood loss was <2ml. We observed the patient for 10 minutes after the procedure for any complications, and as there were none noted, the patient was then discharged home with instructions. We advised the patient that during the duration of the local anesthetic effect, this would be the optimal time to perform stretching exercises for the muscles which contain the trigger points. We also advised the patient to continue these exercises daily as this would likely give the best chance of resolution of the trigger points.    Thank you for referring this interesting patient, and I look forward to continuing to collaborate in her care.

## 2020-02-17 ENCOUNTER — PATIENT MESSAGE (OUTPATIENT)
Dept: ENDOCRINOLOGY | Facility: CLINIC | Age: 50
End: 2020-02-17

## 2020-02-17 ENCOUNTER — PATIENT MESSAGE (OUTPATIENT)
Dept: FAMILY MEDICINE | Facility: CLINIC | Age: 50
End: 2020-02-17

## 2020-02-17 NOTE — TELEPHONE ENCOUNTER
Patient with a lot of symptoms/complaints.    I saw her hyperthyroidism and for nodule. Nodule is growing, want FNA. Due for that soon.    Lab Results   Component Value Date    TSH 0.320 (L) 01/02/2020    B1DDISF 124 01/02/2020    T8ZHPJT 7.9 11/27/2015    FREET4 1.06 01/02/2020     Thyroid function suggestive of subclinical hyperthyroidism, won't cause all her symptoms.    But patient is still experiencing the symptoms and looking for help somewhere.   - Weight gain, fatigue, shoulder pain, sinus issues.  Low back pain, neck, shoulder pain  Gaining weight  Mood changes.    LFTs, RPR, hepatitis panel normal.    These symptoms won't be from the nodule, or from the TSH. Would recommend f/u with PCP to investigate anemia, B12 deficiency, depression, other things like that.

## 2020-02-26 ENCOUNTER — HOSPITAL ENCOUNTER (OUTPATIENT)
Dept: RADIOLOGY | Facility: HOSPITAL | Age: 50
Discharge: HOME | End: 2020-02-26
Attending: INTERNAL MEDICINE
Payer: COMMERCIAL

## 2020-02-26 DIAGNOSIS — E04.1 NODULAR THYROID DISEASE: ICD-10-CM

## 2020-02-26 PROCEDURE — 30000890 LABCORP MISCELLANEOUS TEST

## 2020-02-26 PROCEDURE — 88173 CYTOPATH EVAL FNA REPORT: CPT

## 2020-02-26 PROCEDURE — 27000342 US FINE NEEDLE ASPIRATION THYROID, FIRST LESION

## 2020-02-26 PROCEDURE — 10005 FNA BX W/US GDN 1ST LES: CPT

## 2020-02-26 PROCEDURE — 36415 COLL VENOUS BLD VENIPUNCTURE: CPT

## 2020-02-26 NOTE — PLAN OF CARE
Tolerated well.  bandaid to site-right neck.  Verbal and written discharge instructions given.  Patient verbalized understanding.  Discharged to home via private vehicle.

## 2020-02-27 ENCOUNTER — PATIENT MESSAGE (OUTPATIENT)
Dept: ENDOCRINOLOGY | Facility: CLINIC | Age: 50
End: 2020-02-27

## 2020-02-28 ENCOUNTER — PATIENT MESSAGE (OUTPATIENT)
Dept: ENDOCRINOLOGY | Facility: CLINIC | Age: 50
End: 2020-02-28

## 2020-02-28 LAB
LABCORP MISC TEST CODE: 9001
LABCORP MISC TEST NAME: NORMAL
LABCORP MISCELLANEOUS TEST: NORMAL

## 2020-03-03 ENCOUNTER — PATIENT MESSAGE (OUTPATIENT)
Dept: ENDOCRINOLOGY | Facility: CLINIC | Age: 50
End: 2020-03-03

## 2020-03-03 NOTE — TELEPHONE ENCOUNTER
Pt with concerns about fatigue, mood swings, shoulder pain, back pain, trouble sleeping.   Had recommended she have evaluation by PCP. Consider investigation into B12, DARIAN, anemia.

## 2020-03-04 ENCOUNTER — LAB VISIT (OUTPATIENT)
Dept: LAB | Facility: HOSPITAL | Age: 50
End: 2020-03-04
Attending: FAMILY MEDICINE
Payer: COMMERCIAL

## 2020-03-04 ENCOUNTER — OFFICE VISIT (OUTPATIENT)
Dept: FAMILY MEDICINE | Facility: CLINIC | Age: 50
End: 2020-03-04
Payer: COMMERCIAL

## 2020-03-04 VITALS
HEIGHT: 62 IN | WEIGHT: 179.44 LBS | BODY MASS INDEX: 33.02 KG/M2 | OXYGEN SATURATION: 97 % | SYSTOLIC BLOOD PRESSURE: 122 MMHG | HEART RATE: 70 BPM | TEMPERATURE: 98 F | DIASTOLIC BLOOD PRESSURE: 82 MMHG

## 2020-03-04 DIAGNOSIS — E04.1 NODULAR THYROID DISEASE: ICD-10-CM

## 2020-03-04 DIAGNOSIS — F41.8 DEPRESSION WITH ANXIETY: ICD-10-CM

## 2020-03-04 DIAGNOSIS — R94.6 ABNORMAL THYROID FUNCTION TEST: ICD-10-CM

## 2020-03-04 DIAGNOSIS — E66.09 CLASS 1 OBESITY DUE TO EXCESS CALORIES WITH SERIOUS COMORBIDITY AND BODY MASS INDEX (BMI) OF 33.0 TO 33.9 IN ADULT: ICD-10-CM

## 2020-03-04 DIAGNOSIS — I10 GOOD HYPERTENSION CONTROL: ICD-10-CM

## 2020-03-04 DIAGNOSIS — G47.00 INSOMNIA, UNSPECIFIED TYPE: ICD-10-CM

## 2020-03-04 DIAGNOSIS — E05.90 HYPERTHYROIDISM: ICD-10-CM

## 2020-03-04 DIAGNOSIS — E88.810 DYSMETABOLIC SYNDROME: ICD-10-CM

## 2020-03-04 DIAGNOSIS — R94.6 ABNORMAL THYROID FUNCTION TEST: Primary | ICD-10-CM

## 2020-03-04 DIAGNOSIS — E06.9 THYROIDITIS: ICD-10-CM

## 2020-03-04 LAB
ESTRADIOL SERPL-MCNC: 23 PG/ML
LH SERPL-ACNC: 44.3 MIU/ML
PROLACTIN SERPL IA-MCNC: 9.2 NG/ML (ref 5.2–26.5)
T4 FREE SERPL-MCNC: 1.02 NG/DL (ref 0.71–1.51)
TSH SERPL DL<=0.005 MIU/L-ACNC: 0.3 UIU/ML (ref 0.4–4)

## 2020-03-04 PROCEDURE — 3074F SYST BP LT 130 MM HG: CPT | Mod: CPTII,S$GLB,, | Performed by: FAMILY MEDICINE

## 2020-03-04 PROCEDURE — 3074F PR MOST RECENT SYSTOLIC BLOOD PRESSURE < 130 MM HG: ICD-10-PCS | Mod: CPTII,S$GLB,, | Performed by: FAMILY MEDICINE

## 2020-03-04 PROCEDURE — 3079F PR MOST RECENT DIASTOLIC BLOOD PRESSURE 80-89 MM HG: ICD-10-PCS | Mod: CPTII,S$GLB,, | Performed by: FAMILY MEDICINE

## 2020-03-04 PROCEDURE — 84443 ASSAY THYROID STIM HORMONE: CPT

## 2020-03-04 PROCEDURE — 82670 ASSAY OF TOTAL ESTRADIOL: CPT

## 2020-03-04 PROCEDURE — 99999 PR PBB SHADOW E&M-EST. PATIENT-LVL IV: CPT | Mod: PBBFAC,,, | Performed by: FAMILY MEDICINE

## 2020-03-04 PROCEDURE — 83001 ASSAY OF GONADOTROPIN (FSH): CPT

## 2020-03-04 PROCEDURE — 3008F PR BODY MASS INDEX (BMI) DOCUMENTED: ICD-10-PCS | Mod: CPTII,S$GLB,, | Performed by: FAMILY MEDICINE

## 2020-03-04 PROCEDURE — 84146 ASSAY OF PROLACTIN: CPT

## 2020-03-04 PROCEDURE — 99214 PR OFFICE/OUTPT VISIT, EST, LEVL IV, 30-39 MIN: ICD-10-PCS | Mod: S$GLB,,, | Performed by: FAMILY MEDICINE

## 2020-03-04 PROCEDURE — 99214 OFFICE O/P EST MOD 30 MIN: CPT | Mod: S$GLB,,, | Performed by: FAMILY MEDICINE

## 2020-03-04 PROCEDURE — 3079F DIAST BP 80-89 MM HG: CPT | Mod: CPTII,S$GLB,, | Performed by: FAMILY MEDICINE

## 2020-03-04 PROCEDURE — 3008F BODY MASS INDEX DOCD: CPT | Mod: CPTII,S$GLB,, | Performed by: FAMILY MEDICINE

## 2020-03-04 PROCEDURE — 83002 ASSAY OF GONADOTROPIN (LH): CPT

## 2020-03-04 PROCEDURE — 84439 ASSAY OF FREE THYROXINE: CPT

## 2020-03-04 PROCEDURE — 36415 COLL VENOUS BLD VENIPUNCTURE: CPT | Mod: PO

## 2020-03-04 PROCEDURE — 99999 PR PBB SHADOW E&M-EST. PATIENT-LVL IV: ICD-10-PCS | Mod: PBBFAC,,, | Performed by: FAMILY MEDICINE

## 2020-03-04 NOTE — PROGRESS NOTES
Subjective:       Patient ID: Kylah Mohan is a 49 y.o. female.    Chief Complaint: Establish Care (thyroid issues)    Patient here today with complaints of strange symptoms occurring over the last 6 months.  She states she has a history of thyroid issues but she is not sure if that is hyperthyroid or hypothyroid.  She is currently not on any thyroid therapy.  States that for the last 6 months she has had decreased energy, stalls in weight loss, changes in skin tone, and decreased quality of sleep.  States that she is tired and will sleep 7-8 hours a night but always wakes up feeling like she is barely slept. She is also noted a decreased tolerance to temperature changes and random palpitations of her heart.  Prior to the symptoms she had lost a significant amount of weight and had reduced her hemoglobin A1c to a normal range after being prediabetic.    She had thyroid function checked about a month ago when it looked to be subclinical hypothyroid.  She believes this is a thyroid issue and she does not trust the labs.    Of note she states she is no longer experiencing her usual menses.    Review of Systems   Constitutional: Positive for activity change. Negative for unexpected weight change.   HENT: Positive for rhinorrhea. Negative for hearing loss and trouble swallowing.    Eyes: Positive for visual disturbance. Negative for discharge.   Respiratory: Negative for chest tightness and wheezing.    Cardiovascular: Positive for palpitations. Negative for chest pain.   Gastrointestinal: Negative for blood in stool, constipation, diarrhea and vomiting.   Endocrine: Positive for polyuria. Negative for polydipsia.   Genitourinary: Negative for difficulty urinating, dysuria, hematuria and menstrual problem.   Musculoskeletal: Positive for arthralgias, joint swelling and neck pain.   Neurological: Positive for headaches. Negative for weakness.   Psychiatric/Behavioral: Negative for confusion and dysphoric mood.        Objective:      Physical Exam   Constitutional: She is oriented to person, place, and time. She appears well-developed and well-nourished.   HENT:   Head: Normocephalic and atraumatic.   Eyes: Pupils are equal, round, and reactive to light. EOM are normal.   Neck: Normal range of motion. Neck supple.   Cardiovascular: Normal rate and regular rhythm.   No murmur heard.  Pulmonary/Chest: Effort normal and breath sounds normal. No respiratory distress. She has no wheezes. She has no rales.   Abdominal: Soft. She exhibits no distension. There is no tenderness. There is no guarding.   Musculoskeletal: Normal range of motion.   Neurological: She is alert and oriented to person, place, and time. She displays normal reflexes.   Skin: Skin is warm and dry.   Psychiatric: She has a normal mood and affect. Her behavior is normal. Judgment and thought content normal.   Nursing note and vitals reviewed.      Assessment:       1. Abnormal thyroid function test    2. Hyperthyroidism    3. Class 1 obesity due to excess calories with serious comorbidity and body mass index (BMI) of 33.0 to 33.9 in adult    4. Insomnia, unspecified type    5. Nodular thyroid disease    6. Dysmetabolic syndrome    7. Thyroiditis    8. Good hypertension control    9. Depression with anxiety        Plan:       Orders Placed This Encounter   Procedures    TSH     Standing Status:   Future     Number of Occurrences:   1     Standing Expiration Date:   4/4/2020    T4, free     Standing Status:   Future     Number of Occurrences:   1     Standing Expiration Date:   4/4/2020    Follicle stimulating hormone     Standing Status:   Future     Number of Occurrences:   1     Standing Expiration Date:   5/4/2021    Estradiol     Standing Status:   Future     Number of Occurrences:   1     Standing Expiration Date:   5/4/2021    Prolactin     Standing Status:   Future     Number of Occurrences:   1     Standing Expiration Date:   5/4/2021    Luteinizing  hormone     Standing Status:   Future     Number of Occurrences:   1     Standing Expiration Date:   5/4/2021     -will recheck thyroid function will also check hormone levels as patient may be entering early stages of menopause.  - discontinue patient's metformin as she is no longer prediabetic  -will follow up closely with labs results

## 2020-03-04 NOTE — PATIENT INSTRUCTIONS
Hormone Changes During Menopause  Menopause is not a sudden change. During the months or years before menopause (perimenopause), your ovaries begin to run out of eggs. Your body makes less estrogen and progesterone. This may bring on symptoms such as hot flashes. Youve reached menopause when you have not had a period for 1 year. From that point on, you are in postmenopause.  Perimenopause  In the years leading up to menopause, your ovaries make less estrogen. You release fewer eggs and your periods become less regular.  Symptoms you may have:  · Heavier or lighter periods  · Longer or shorter time between periods  · Hot flashes  · Mood swings  · Night sweats  · Insomnia  · Vaginal dryness  · Urinary changes including incontinence and frequency  Postmenopause  After menopause, you make very little estrogen. As a result, the uterine lining does not thicken and your periods have ended.  Symptoms you may have:  · No periods  · Vaginal dryness  · Hot flashes  · Mood swings  · Night sweats  · Insomnia  Surgical menopause  Menopause can occur after a surgical removal of the uterus (hysterectomy) if the ovaries are also removed. Estrogen and progesterone levels decrease quickly. This may cause sudden and severe symptoms.   Date Last Reviewed: 5/13/2015  © 2376-4310 [employer]. [address] [place] [address], [place] [address]. All rights reserved. This information is not intended as a substitute for professional medical care. Always follow your healthcare professional's instructions.

## 2020-03-05 LAB — FSH SERPL-ACNC: 103.1 MIU/ML

## 2020-03-09 ENCOUNTER — PATIENT MESSAGE (OUTPATIENT)
Dept: OBSTETRICS AND GYNECOLOGY | Facility: CLINIC | Age: 50
End: 2020-03-09

## 2020-03-09 ENCOUNTER — PATIENT MESSAGE (OUTPATIENT)
Dept: FAMILY MEDICINE | Facility: CLINIC | Age: 50
End: 2020-03-09

## 2020-03-09 NOTE — TELEPHONE ENCOUNTER
No I am not concerned by any of them. Her pituitary hormones are at the level of menopause which likely means that is the cause of all of her symptoms. This is probably a problem that's best addressed by her OBGYN.

## 2020-03-16 ENCOUNTER — PATIENT MESSAGE (OUTPATIENT)
Dept: OBSTETRICS AND GYNECOLOGY | Facility: CLINIC | Age: 50
End: 2020-03-16

## 2020-03-16 ENCOUNTER — CLINICAL SUPPORT (OUTPATIENT)
Dept: REHABILITATION | Facility: HOSPITAL | Age: 50
End: 2020-03-16
Attending: ANESTHESIOLOGY
Payer: COMMERCIAL

## 2020-03-16 ENCOUNTER — OFFICE VISIT (OUTPATIENT)
Dept: OBSTETRICS AND GYNECOLOGY | Facility: CLINIC | Age: 50
End: 2020-03-16
Payer: COMMERCIAL

## 2020-03-16 VITALS
RESPIRATION RATE: 20 BRPM | DIASTOLIC BLOOD PRESSURE: 70 MMHG | WEIGHT: 181 LBS | BODY MASS INDEX: 33.1 KG/M2 | SYSTOLIC BLOOD PRESSURE: 120 MMHG

## 2020-03-16 DIAGNOSIS — N95.1 SYMPTOMS, SUCH AS FLUSHING, SLEEPLESSNESS, HEADACHE, LACK OF CONCENTRATION, ASSOCIATED WITH THE MENOPAUSE: Primary | ICD-10-CM

## 2020-03-16 DIAGNOSIS — M79.10 MYALGIA: ICD-10-CM

## 2020-03-16 DIAGNOSIS — M54.2 CERVICALGIA: ICD-10-CM

## 2020-03-16 DIAGNOSIS — M54.2 CERVICAL MUSCLE PAIN: ICD-10-CM

## 2020-03-16 PROCEDURE — 99999 PR PBB SHADOW E&M-EST. PATIENT-LVL III: CPT | Mod: PBBFAC,,, | Performed by: SPECIALIST

## 2020-03-16 PROCEDURE — 3008F BODY MASS INDEX DOCD: CPT | Mod: CPTII,S$GLB,, | Performed by: SPECIALIST

## 2020-03-16 PROCEDURE — 97110 THERAPEUTIC EXERCISES: CPT | Mod: PN

## 2020-03-16 PROCEDURE — 3078F PR MOST RECENT DIASTOLIC BLOOD PRESSURE < 80 MM HG: ICD-10-PCS | Mod: CPTII,S$GLB,, | Performed by: SPECIALIST

## 2020-03-16 PROCEDURE — 97535 SELF CARE MNGMENT TRAINING: CPT | Mod: PN

## 2020-03-16 PROCEDURE — 3078F DIAST BP <80 MM HG: CPT | Mod: CPTII,S$GLB,, | Performed by: SPECIALIST

## 2020-03-16 PROCEDURE — 3074F PR MOST RECENT SYSTOLIC BLOOD PRESSURE < 130 MM HG: ICD-10-PCS | Mod: CPTII,S$GLB,, | Performed by: SPECIALIST

## 2020-03-16 PROCEDURE — 3008F PR BODY MASS INDEX (BMI) DOCUMENTED: ICD-10-PCS | Mod: CPTII,S$GLB,, | Performed by: SPECIALIST

## 2020-03-16 PROCEDURE — 3074F SYST BP LT 130 MM HG: CPT | Mod: CPTII,S$GLB,, | Performed by: SPECIALIST

## 2020-03-16 PROCEDURE — 97161 PT EVAL LOW COMPLEX 20 MIN: CPT | Mod: PN

## 2020-03-16 PROCEDURE — 99213 PR OFFICE/OUTPT VISIT, EST, LEVL III, 20-29 MIN: ICD-10-PCS | Mod: S$GLB,,, | Performed by: SPECIALIST

## 2020-03-16 PROCEDURE — 99999 PR PBB SHADOW E&M-EST. PATIENT-LVL III: ICD-10-PCS | Mod: PBBFAC,,, | Performed by: SPECIALIST

## 2020-03-16 PROCEDURE — 99213 OFFICE O/P EST LOW 20 MIN: CPT | Mod: S$GLB,,, | Performed by: SPECIALIST

## 2020-03-16 RX ORDER — ESTRADIOL 1 MG/1
1 TABLET ORAL DAILY
Qty: 60 TABLET | Refills: 0 | Status: SHIPPED | OUTPATIENT
Start: 2020-03-16 | End: 2020-04-27 | Stop reason: SDUPTHER

## 2020-03-16 NOTE — PLAN OF CARE
[facility]  Physical Therapy Initial Evaluation    Name: Kylah Mohan  Clinic Number: [id number]    Therapy Diagnosis:   Encounter Diagnoses   Name Primary?    Cervicalgia     Myalgia     Cervical muscle pain      Physician: Ayden Chaparro MD    Physician Orders: PT Eval and Treat   Medical Diagnosis from Referral: Cervicalgia; Myalgia  Evaluation Date: 3/16/2020  Authorization Period Expiration: 4/4/20  Plan of Care Expiration: 5/1/20 (request 10)  Visit # / Visits authorized: 1/ 1    Time In: 300  Time Out: 350  Total Billable Time: 50 minutes    Precautions: HTN    Subjective   Date of onset: 6 months ago  History of current condition - Kylah reports: she had a new exercise regimen that was going really well. Then she stopped exercising and pain started in both upper trapezius; mostly when she turns her head. Received steroid injections 4 weeks ago which decreased the pain but it is starting to return to the original pain levels. Notices pain increases with stress.     Medical History:   Past Medical History:   Diagnosis Date    Benign hypertension     Bladder instability     Depression     DJD (degenerative joint disease), lumbar     History of viral meningitis 1996    Hyperlipidemia     Insomnia     Microscopic hematuria     negative work-up    Pulmonary nodule     minute    Tendonitis        Surgical History:   Kylah Mohan  has a past surgical history that includes Breast biopsy (6/5/2007); Tubal ligation (2002); Endometrial ablation (5/28/2009); Laparoscopic hysterectomy (2/2013); Anterior vaginal repair; and Hysterectomy.    Medications:   Kylah has a current medication list which includes the following prescription(s): ascorbic acid (vitamin c), atenolol, azelastine, bupropion, estradiol, fluticasone propionate, hydrochlorothiazide, ibuprofen, losartan, multivitamin, oxybutynin, and potassium chloride sa.    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[employer]; works at a desk for majority of day  Prior Level of Function: independent  Current Level of Function: pain and difficulty with dressing, chores, and driving    Pain:  Current 7/10, worst 9/10, best 6/10   Location: bilateral trapezius into occipital ridge, L>R   Description: Grabbing, Deep and Electric  Aggravating Factors: turning head (driving), prolong sitting, exercising  Easing Factors: heating pad and biofreeze, melo carey    Pts goals: to get rid of pain    Objective     Posture: FHP; mod t/s kyphosis; mod l/s lordosis; R trunk shift  Palpation: TTP to B occipital ridge; L post-lateral neck; B upper trapezius    Range of Motion/Strength:      CERVICAL AROM Pain/Dysfunction with Movement   Flexion 30 pn EOR   Extension 50 pn EOR   Right side bending 50    Left side bending 50 pn L-side   Right rotation 80 No pn   Left rotation 60 No pn     Shoulder AROM: B WNL; pn with L shoulder flexion and abduction    U/E MMT Left Right Pain/Dysfunction with Movement   Shoulder Flexion 3/5 3+/5 pn L         Shoulder Abduction 3/5 3+/5 pn L         Shoulder IR 3+/5 3+/5    Shoulder ER  @ 0* Abduction 3+/5 3+/5    Elbow Flexion  3+/5 3+/5    Elbow Extension 3+/5 3+/5    Rhomboids 3-/5 3-/5    Mid Traps 3-/5 3-/5    Low Traps 3-/5 3-/5       Strength Left Right   Lupillo @ #2 position 42# 47#     Other:   NDI 46.7 Intake  Flexibility: severe restriction of B upper trapezius    CMS Impairment/Limitation/Restriction for FOTO Survey    Therapist reviewed FOTO scores for Kylah Rosalesnickrandall Mohan on 3/16/2020.   FOTO documents entered into [facility] - see Media section.    Limitation Score: 56%  Category: Carrying    Current : CK = at least 40% but < 60% impaired, limited or  restricted  Goal: CJ = at least 20% but < 40% impaired, limited or restricted  Discharge:          TREATMENT   Treatment Time In: 300  Treatment Time Out: 350  Total Treatment time separate from Evaluation: 30 minutes    Kylah received therapeutic exercises to develop ROM, flexibility and posture for 30 minutes including:    **1ST, HP on B upper trapezius with diaphragm breathing, (10 min next)    SEATED:  Shoulder shrugs, 20x, focus on relaxing down  Shoulder rolls, 20x  Trapezius stretch hold onto chair, 3x15; L / R / L  C/S AROM: rotation, 20x            SB, 20x    Add NEXT visit:  **Manual tx: B occipital ridge, post-lat neck, upper trapezius  Doorway stretch  1/2 FR stretch  FUTURE visit: strengthening once pn decreases  Shoulder squeezes, 20x5s  RTB B ER, 20x5s  Prone extension  Prone abduction  Self massage TB      Home Exercises and Patient Education Provided    Education provided: utilize heat EOD, 20 min    Written Home Exercises Provided: yes.  Exercises were reviewed and Kylah was able to demonstrate them prior to the end of the session.  Kylah demonstrated good  understanding of the education provided.     See EMR under Patient Instructions for exercises provided 3/16/2020.    Assessment   Kylah is a 49 y.o. female referred to outpatient Physical Therapy with a medical diagnosis of cervicalgia. Pt presents with pain, decreased ROM, flexibility, and function, weakness, and poor posture.    Pt prognosis is Excellent.   Pt will benefit from skilled outpatient Physical Therapy to address the deficits stated above and in the chart below, provide pt/family education, and to maximize pt's level of independence.     Plan of care discussed with patient: Yes  Pt's spiritual, cultural and educational needs considered and patient is agreeable to the plan of care and goals as stated below:     Anticipated Barriers for therapy: none    Medical Necessity is demonstrated by the following  History  Co-morbidities and  personal factors that may impact the plan of care Co-morbidities:   coping style/mechanism, depression, difficulty sleeping, high BMI and HTN    Personal Factors:   coping style     low   Examination  Body Structures and Functions, activity limitations and participation restrictions that may impact the plan of care Body Regions:   head  neck  upper extremities    Body Systems:    ROM  strength    Participation Restrictions:   Dressing; chores; driving; work    Activity limitations:   Learning and applying knowledge  no deficits    General Tasks and Commands  no deficits    Communication  no deficits    Mobility  lifting and carrying objects    Self care  dressing    Domestic Life  doing house work (cleaning house, washing dishes, laundry)    Interactions/Relationships  no deficits    Life Areas  employment    Community and Social Life  community life  recreation and leisure         low   Clinical Presentation stable and uncomplicated low   Decision Making/ Complexity Score: low     Short Term GOALS: 3 weeks. Pt agrees with goals set.  1. Patient demonstrates compliance with HEP.   2. Patient demonstrates independence with Postural Awareness while sitting and standing.   3. Patient demonstrates decreased pain level of 3/10 while performing daily activities.     Long Term GOALS: 5 weeks. Pt agrees with goals set.  1. Patient demonstrates increased C/S AROM to improve tolerance to driving.   2. Patient demonstrates decrease of pn to 1/10 while working for half hour.   3. Patient demonstrates independence with HEP.     Plan   Plan of care Certification: 3/16/2020 to 5/1/20.    Outpatient Physical Therapy 2 times weekly for 5 weeks to include the following interventions: Electrical Stimulation ,, Manual Therapy, Moist Heat/ Ice, Neuromuscular Re-ed, Patient Education, Self Care, Therapeutic Activites, Therapeutic Exercise and Ultrasound.     Lizet Rosen, PT

## 2020-03-16 NOTE — PROGRESS NOTES
50 yo female returns for menopause management. Menopause panel reveals depressed E2 and . Pt c/o VMFS, night sweats, mood lability, insomnia.  I had a 20 min discussion regarding risks and benefits of ERT  Pt with H/O hyst and thus Progestin not necessary  I discussed alternatives and subjective indications  Answered all questions  Past Medical History:   Diagnosis Date    Benign hypertension     Bladder instability     Depression     DJD (degenerative joint disease), lumbar     History of viral meningitis 1996    Hyperlipidemia     Insomnia     Microscopic hematuria     negative work-up    Pulmonary nodule     minute    Tendonitis        Past Surgical History:   Procedure Laterality Date    ANTERIOR VAGINAL REPAIR      BREAST BIOPSY  6/5/2007    left breast benign    ENDOMETRIAL ABLATION  5/28/2009    HYSTERECTOMY      LAPAROSCOPIC HYSTERECTOMY  2/2013    TUBAL LIGATION  2002       Family History   Problem Relation Age of Onset    Osteoarthritis Mother     Hyperlipidemia Mother     Hypertension Mother     Allergies Mother     Hyperlipidemia Father     Diabetes Father     Hypertension Father     Cancer Paternal Grandmother         Breast    Diabetes Paternal Grandmother     Breast cancer Paternal Grandmother 56    Allergies Sister     Heart disease Sister         Congential    Breast cancer Maternal Aunt 61    Ovarian cancer Maternal Cousin 50       Social History     Socioeconomic History    Marital status:      Spouse name: Not on file    Number of children: Not on file    Years of education: Not on file    Highest education level: Not on file   Occupational History     Employer: [employer]   Social Needs    Financial resource strain: Somewhat hard    Food insecurity:     Worry: Sometimes true     Inability: Sometimes true    Transportation needs:     Medical: No     Non-medical: No   Tobacco Use    Smoking status: Former Smoker     Packs/day:  0.25     Years: 10.00     Pack years: 2.50     Types: Cigarettes     Last attempt to quit: 10/20/2013     Years since quittin.4    Smokeless tobacco: Never Used   Substance and Sexual Activity    Alcohol use: Yes     Alcohol/week: 1.0 standard drinks     Types: 1 Glasses of wine per week     Frequency: 2-3 times a week     Drinks per session: 3 or 4     Binge frequency: Less than monthly    Drug use: No    Sexual activity: Not Currently     Partners: Male     Birth control/protection: Surgical   Lifestyle    Physical activity:     Days per week: 5 days     Minutes per session: 40 min    Stress: Very much   Relationships    Social connections:     Talks on phone: More than three times a week     Gets together: Once a week     Attends [employer] service: Not on file     Active member of club or organization: Yes     Attends meetings of clubs or organizations: More than 4 times per year     Relationship status:    Other Topics Concern    Not on file   Social History Narrative    The patient does exercise regularly (walking daily, 35-40min).Rates diet as fair.She is not satisfied with weight.       Current Outpatient Medications   Medication Sig Dispense Refill    ascorbic acid, vitamin C, (VITAMIN C) 1000 MG tablet Take 1,000 mg by mouth once daily.      atenolol (TENORMIN) 25 MG tablet Take 1 tablet (25 mg total) by mouth once daily. 90 tablet 3    azelastine (ASTELIN) 137 mcg (0.1 %) nasal spray 2 sprays (274 mcg total) by Nasal route 2 (two) times daily. 30 mL 12    buPROPion (WELLBUTRIN) 75 MG tablet Take 75 mg by mouth 2 (two) times daily.      fluticasone (FLONASE) 50 mcg/actuation nasal spray 1 spray by Each Nare route once daily.      hydroCHLOROthiazide (HYDRODIURIL) 25 MG tablet Take 1 tablet (25 mg total) by mouth once daily. 90 tablet 3    ibuprofen (ADVIL,MOTRIN) 600 MG tablet Take 1 tablet (600 mg total) by mouth every 8 (eight) hours as needed for Pain. 20 tablet 0    losartan  (COZAAR) 25 MG tablet Take 1 tablet (25 mg total) by mouth once daily. 90 tablet 3    multivitamin (THERAGRAN) per tablet Every day      oxybutynin (DITROPAN) 5 MG Tab Take 1 tablet (5 mg total) by mouth 2 (two) times daily. 60 tablet 0    potassium chloride SA (K-DUR,KLOR-CON) 10 MEQ tablet Take 20 mEq by mouth 2 (two) times daily.      estradioL (ESTRACE) 1 MG tablet Take 1 tablet (1 mg total) by mouth once daily. 60 tablet 0     No current facility-administered medications for this visit.        Review of patient's allergies indicates:   Allergen Reactions    Latex      Other reaction(s): Rash       Review of System:   General: VMFS, NIGHT SWEATS, MOOD LABILITY  Psychological: no depression or suicidal ideation  Breasts: no new or changing breast lumps, nipple discharge or masses.  Respiratory: no cough, shortness of breath, or wheezing  Cardiovascular: no chest pain or dyspnea on exertion  Gastrointestinal: no abdominal pain, change in bowel habits, or black or bloody stools  Genito-Urinary: no incontinence, urinary frequency/urgency or vulvar/vaginal symptoms, pelvic pain or abnormal vaginal bleeding.  Musculoskeletal: no gait disturbance or muscular weakness    Plan Pt to prepare list of s/s  Will begin Estarce at 1mg/d  Pt will RTO 6 weeks for follow up and review list  At the end of patient visit, nurse and MD both asked pt if any improvements needed in visit experience and asked whether any further pt questions or concerns.

## 2020-03-16 NOTE — PATIENT INSTRUCTIONS
HEP handouts with pictures:  Shoulder shrugs  Shoulder rolls  Trapezius stretch holding onto chair  C/S AROM: Rotation / SB  Heat EOD for 20 min

## 2020-03-17 RX ORDER — FLUCONAZOLE 200 MG/1
200 TABLET ORAL ONCE
Qty: 1 TABLET | Refills: 0 | Status: SHIPPED | OUTPATIENT
Start: 2020-03-17 | End: 2020-03-17

## 2020-03-18 ENCOUNTER — CLINICAL SUPPORT (OUTPATIENT)
Dept: REHABILITATION | Facility: HOSPITAL | Age: 50
End: 2020-03-18
Attending: ANESTHESIOLOGY
Payer: COMMERCIAL

## 2020-03-18 DIAGNOSIS — M54.2 CERVICAL MUSCLE PAIN: ICD-10-CM

## 2020-03-18 PROCEDURE — 97110 THERAPEUTIC EXERCISES: CPT | Mod: PN

## 2020-03-18 PROCEDURE — 97140 MANUAL THERAPY 1/> REGIONS: CPT | Mod: PN

## 2020-03-18 NOTE — PROGRESS NOTES
Physical Therapy Daily Treatment Note     Name: Kylah Gunter [facility]  Clinic Number: [id number]    Therapy Diagnosis:   Encounter Diagnosis   Name Primary?    Cervical muscle pain      Physician: Ayden Chaparro MD    Visit Date: 3/18/2020    Physician Orders: PT Eval and Treat   Medical Diagnosis from Referral: Cervicalgia; Myalgia  Evaluation Date: 3/16/2020  Authorization Period Expiration: 4/4/20  Plan of Care Expiration: 5/1/20 (request 10)  Visit # / Visits authorized: 1/ 1     Time In: 300  Time Out: 350  Total Billable Time: 50 minutes     Precautions: HTN    Subjective     Pt reports: her pain levels are about the same. Has been doing her HEP.    She was compliant with home exercise program.  Response to previous treatment: none  Functional change: none    Pain: 7/10  Location: bilateral upper trapezius and occipital ridge    Objective     Kylah received therapeutic exercises to develop strength, ROM, flexibility and posture for 45 minutes including:    **1ST, supine HP on B upper trapezius with diaphragm breathing, (10 min next)     TABLE:  Open books, 10x5s B  1/2 FR stretch, 3 min    **Manual tx: B occipital ridge, post-lat neck, upper trapezius, 15 min    SEATED:  Shoulder shrugs, 20x, focus on relaxing down  Shoulder rolls, 20x  Trapezius stretch hold onto chair, 3x15; L / R / L  C/S AROM: rotation, 20x                       SB, 20x    STAND:  Doorway stretch, 3x15s (HEP 3/18)  1/2 FR stretch, 3 min    FUTURE visit: strengthening once pn decreases  Shoulder squeezes, 20x5s  RTB B ER, 20x5s  Prone extension  Prone abduction  Self massage TB     Home Exercises Provided and Patient Education Provided     Education provided: Continue HEP    Written Home Exercises Provided: yes.  Exercises were reviewed and Kylah was able to demonstrate them prior to the end of the session.  Kylah demonstrated good  understanding of the education provided.     See EMR under Patient Instructions for exercises provided  3/18/2020.    Assessment     Pt demonstrates same pain levels and severe restriction in her upper trapezius. Utilized heat and manual tx to decrease stiffness in UT. Reviewed work and sleep ergonomics.    Kylah is progressing well towards her goals.   Pt prognosis is Excellent.     Pt will continue to benefit from skilled outpatient physical therapy to address the deficits listed in the problem list box on initial evaluation, provide pt/family education and to maximize pt's level of independence in the home and community environment.     Pt's spiritual, cultural and educational needs considered and pt agreeable to plan of care and goals.    Anticipated barriers to physical therapy: none    Short Term GOALS: 3 weeks. Pt agrees with goals set.  1. Patient demonstrates compliance with HEP.   2. Patient demonstrates independence with Postural Awareness while sitting and standing.   3. Patient demonstrates decreased pain level of 3/10 while performing daily activities.     Long Term GOALS: 5 weeks. Pt agrees with goals set.  1. Patient demonstrates increased C/S AROM to improve tolerance to driving.   2. Patient demonstrates decrease of pn to 1/10 while working for half hour.   3. Patient demonstrates independence with HEP.     Plan     Continue PT as deemed per POC.    Lizet Rosen, PT

## 2020-04-06 ENCOUNTER — TELEPHONE (OUTPATIENT)
Dept: REHABILITATION | Facility: HOSPITAL | Age: 50
End: 2020-04-06

## 2020-04-23 ENCOUNTER — TELEPHONE (OUTPATIENT)
Dept: OBSTETRICS AND GYNECOLOGY | Facility: CLINIC | Age: 50
End: 2020-04-23

## 2020-04-23 NOTE — TELEPHONE ENCOUNTER
----- Message from Cheyenne Dumont sent at 4/23/2020  3:37 PM CDT -----  Contact: pt  Pt calling wanting to know if her appointment 4/27 she comes in or do virtual visit....[phone] (home)

## 2020-04-27 ENCOUNTER — OFFICE VISIT (OUTPATIENT)
Dept: OBSTETRICS AND GYNECOLOGY | Facility: CLINIC | Age: 50
End: 2020-04-27
Payer: COMMERCIAL

## 2020-04-27 DIAGNOSIS — N95.1 SYMPTOMS, SUCH AS FLUSHING, SLEEPLESSNESS, HEADACHE, LACK OF CONCENTRATION, ASSOCIATED WITH THE MENOPAUSE: Primary | ICD-10-CM

## 2020-04-27 PROCEDURE — 99213 PR OFFICE/OUTPT VISIT, EST, LEVL III, 20-29 MIN: ICD-10-PCS | Mod: 95,,, | Performed by: SPECIALIST

## 2020-04-27 PROCEDURE — 99213 OFFICE O/P EST LOW 20 MIN: CPT | Mod: 95,,, | Performed by: SPECIALIST

## 2020-04-27 RX ORDER — ESTRADIOL 1 MG/1
1 TABLET ORAL DAILY
Qty: 60 TABLET | Refills: 2 | Status: SHIPPED | OUTPATIENT
Start: 2020-04-27 | End: 2020-07-06

## 2020-04-27 NOTE — PROGRESS NOTES
The patient location is: Home  The chief complaint leading to consultation is: ERT  Visit type: Televisit  Total time spent with patient: 15  Each patient to whom he or she provides medical services by telemedicine is:  (1) informed of the relationship between the physician and patient and the respective role of any other health care provider with respect to management of the patient; and (2) notified that he or she may decline to receive medical services by telemedicine and may withdraw from such care at any time.    Notes: 50 yo BF placed on trial ERT 6 weeks ago for symptomatic menopause. Pt placed on Estrace 1mg/d.  Follow up today reveals improvement in insomnia and night sweats and VMFs.  Pt states difficulty losing truncal weigt but admits to dietary noncompliance.  Denies overt unwanted effects.  I discussed and rec continue current therapy and will revisit 6-8 weeks after current COVID issue.  Will follow

## 2020-04-30 ENCOUNTER — TELEPHONE (OUTPATIENT)
Dept: REHABILITATION | Facility: HOSPITAL | Age: 50
End: 2020-04-30

## 2020-05-18 ENCOUNTER — TELEPHONE (OUTPATIENT)
Dept: REHABILITATION | Facility: HOSPITAL | Age: 50
End: 2020-05-18

## 2020-06-23 PROBLEM — M54.2 CERVICAL MUSCLE PAIN: Status: RESOLVED | Noted: 2020-03-16 | Resolved: 2020-06-23

## 2020-06-23 NOTE — PROGRESS NOTES
Outpatient Therapy Discharge Summary     Name: Kylah Gunter [facility] Number: [id number]    Therapy Diagnosis:   Encounter Diagnosis   Name Primary?    Cervical muscle pain      Physician: Ayden Chaparro MD    Physician Orders: PT eval and treat  Medical Diagnosis: Cervical pain  Evaluation Date: 3/16/20      Date of Last visit: 3/18/20  Total Visits Received: 2   Cancelled Visits: see EPIC  No Show Visits: see EPIC    Assessment    Short Term GOALS: 3 weeks. Pt agrees with goals set. UNMET  1. Patient demonstrates compliance with HEP.   2. Patient demonstrates independence with Postural Awareness while sitting and standing.   3. Patient demonstrates decreased pain level of 3/10 while performing daily activities.     Long Term GOALS: 5 weeks. Pt agrees with goals set.UNMET  1. Patient demonstrates increased C/S AROM to improve tolerance to driving.   2. Patient demonstrates decrease of pn to 1/10 while working for half hour.   3. Patient demonstrates independence with HEP.        Discharge reason: Patient has not attended therapy since 3/18/20. PT was held due to Covid clinic restrictions. Stayed in contact with pt and once we re-opened I attempted to schedule pt for a re-assessment. Pt no showed and never returned our calls.    Plan   This patient is discharged from Physical Therapy

## 2020-06-23 NOTE — PLAN OF CARE
Outpatient Therapy Discharge Summary     Name: Kylah Gunter [facility] Number: [id number]    Therapy Diagnosis:   Encounter Diagnosis   Name Primary?    Cervical muscle pain      Physician: Ayden Chaparro MD    Physician Orders: PT eval and treat  Medical Diagnosis: Cervical pain  Evaluation Date: 3/16/20      Date of Last visit: 3/18/20  Total Visits Received: 2   Cancelled Visits: see EPIC  No Show Visits: see EPIC    Assessment    Short Term GOALS: 3 weeks. Pt agrees with goals set. UNMET  1. Patient demonstrates compliance with HEP.   2. Patient demonstrates independence with Postural Awareness while sitting and standing.   3. Patient demonstrates decreased pain level of 3/10 while performing daily activities.     Long Term GOALS: 5 weeks. Pt agrees with goals set.UNMET  1. Patient demonstrates increased C/S AROM to improve tolerance to driving.   2. Patient demonstrates decrease of pn to 1/10 while working for half hour.   3. Patient demonstrates independence with HEP.        Discharge reason: Patient has not attended therapy since 3/18/20. PT was held due to Covid clinic restrictions. Stayed in contact with pt and once we re-opened I attempted to schedule pt for a re-assessment. Pt no showed and never returned our calls.    Plan   This patient is discharged from Physical Therapy

## 2020-08-07 ENCOUNTER — LAB VISIT (OUTPATIENT)
Dept: LAB | Facility: HOSPITAL | Age: 50
End: 2020-08-07
Attending: INTERNAL MEDICINE
Payer: COMMERCIAL

## 2020-08-07 ENCOUNTER — OFFICE VISIT (OUTPATIENT)
Dept: ENDOCRINOLOGY | Facility: CLINIC | Age: 50
End: 2020-08-07
Payer: COMMERCIAL

## 2020-08-07 VITALS
WEIGHT: 182.31 LBS | TEMPERATURE: 98 F | BODY MASS INDEX: 33.55 KG/M2 | SYSTOLIC BLOOD PRESSURE: 110 MMHG | HEART RATE: 82 BPM | DIASTOLIC BLOOD PRESSURE: 60 MMHG | HEIGHT: 62 IN

## 2020-08-07 DIAGNOSIS — E55.9 HYPOVITAMINOSIS D: ICD-10-CM

## 2020-08-07 DIAGNOSIS — R73.03 PREDIABETES: ICD-10-CM

## 2020-08-07 DIAGNOSIS — E04.1 NODULAR THYROID DISEASE: ICD-10-CM

## 2020-08-07 DIAGNOSIS — E05.90 SUBCLINICAL HYPERTHYROIDISM: ICD-10-CM

## 2020-08-07 DIAGNOSIS — Z78.0 MENOPAUSE: ICD-10-CM

## 2020-08-07 DIAGNOSIS — Z79.890 HORMONE REPLACEMENT THERAPY: ICD-10-CM

## 2020-08-07 DIAGNOSIS — R94.6 ABNORMAL THYROID FUNCTION TEST: ICD-10-CM

## 2020-08-07 DIAGNOSIS — Z78.0 POSTMENOPAUSAL: ICD-10-CM

## 2020-08-07 DIAGNOSIS — E04.1 NODULAR THYROID DISEASE: Primary | ICD-10-CM

## 2020-08-07 DIAGNOSIS — E78.5 HYPERLIPIDEMIA, UNSPECIFIED HYPERLIPIDEMIA TYPE: ICD-10-CM

## 2020-08-07 DIAGNOSIS — F41.8 DEPRESSION WITH ANXIETY: ICD-10-CM

## 2020-08-07 DIAGNOSIS — E88.810 DYSMETABOLIC SYNDROME: ICD-10-CM

## 2020-08-07 DIAGNOSIS — R51.9 CHRONIC NONINTRACTABLE HEADACHE, UNSPECIFIED HEADACHE TYPE: ICD-10-CM

## 2020-08-07 DIAGNOSIS — E06.9 THYROIDITIS: ICD-10-CM

## 2020-08-07 DIAGNOSIS — I10 ESSENTIAL HYPERTENSION: ICD-10-CM

## 2020-08-07 DIAGNOSIS — G89.29 CHRONIC NONINTRACTABLE HEADACHE, UNSPECIFIED HEADACHE TYPE: ICD-10-CM

## 2020-08-07 PROCEDURE — 86316 IMMUNOASSAY TUMOR OTHER: CPT

## 2020-08-07 PROCEDURE — 3008F BODY MASS INDEX DOCD: CPT | Mod: CPTII,S$GLB,, | Performed by: INTERNAL MEDICINE

## 2020-08-07 PROCEDURE — 99214 OFFICE O/P EST MOD 30 MIN: CPT | Mod: S$GLB,,, | Performed by: INTERNAL MEDICINE

## 2020-08-07 PROCEDURE — 3078F PR MOST RECENT DIASTOLIC BLOOD PRESSURE < 80 MM HG: ICD-10-PCS | Mod: CPTII,S$GLB,, | Performed by: INTERNAL MEDICINE

## 2020-08-07 PROCEDURE — 83018 HEAVY METAL QUAN EACH NES: CPT

## 2020-08-07 PROCEDURE — 82642 DIHYDROTESTOSTERONE: CPT

## 2020-08-07 PROCEDURE — 84550 ASSAY OF BLOOD/URIC ACID: CPT

## 2020-08-07 PROCEDURE — 3008F PR BODY MASS INDEX (BMI) DOCUMENTED: ICD-10-PCS | Mod: CPTII,S$GLB,, | Performed by: INTERNAL MEDICINE

## 2020-08-07 PROCEDURE — 3078F DIAST BP <80 MM HG: CPT | Mod: CPTII,S$GLB,, | Performed by: INTERNAL MEDICINE

## 2020-08-07 PROCEDURE — 84439 ASSAY OF FREE THYROXINE: CPT

## 2020-08-07 PROCEDURE — 3074F SYST BP LT 130 MM HG: CPT | Mod: CPTII,S$GLB,, | Performed by: INTERNAL MEDICINE

## 2020-08-07 PROCEDURE — 82330 ASSAY OF CALCIUM: CPT

## 2020-08-07 PROCEDURE — 3074F PR MOST RECENT SYSTOLIC BLOOD PRESSURE < 130 MM HG: ICD-10-PCS | Mod: CPTII,S$GLB,, | Performed by: INTERNAL MEDICINE

## 2020-08-07 PROCEDURE — 82672 ASSAY OF ESTROGEN: CPT

## 2020-08-07 PROCEDURE — 84146 ASSAY OF PROLACTIN: CPT

## 2020-08-07 PROCEDURE — 82670 ASSAY OF TOTAL ESTRADIOL: CPT

## 2020-08-07 PROCEDURE — 84443 ASSAY THYROID STIM HORMONE: CPT

## 2020-08-07 PROCEDURE — 82040 ASSAY OF SERUM ALBUMIN: CPT

## 2020-08-07 PROCEDURE — 99214 PR OFFICE/OUTPT VISIT, EST, LEVL IV, 30-39 MIN: ICD-10-PCS | Mod: S$GLB,,, | Performed by: INTERNAL MEDICINE

## 2020-08-07 PROCEDURE — 99999 PR PBB SHADOW E&M-EST. PATIENT-LVL IV: ICD-10-PCS | Mod: PBBFAC,,, | Performed by: INTERNAL MEDICINE

## 2020-08-07 PROCEDURE — 82308 ASSAY OF CALCITONIN: CPT

## 2020-08-07 PROCEDURE — 82306 VITAMIN D 25 HYDROXY: CPT

## 2020-08-07 PROCEDURE — 36415 COLL VENOUS BLD VENIPUNCTURE: CPT | Mod: PO

## 2020-08-07 PROCEDURE — 83002 ASSAY OF GONADOTROPIN (LH): CPT

## 2020-08-07 PROCEDURE — 83970 ASSAY OF PARATHORMONE: CPT

## 2020-08-07 PROCEDURE — 84144 ASSAY OF PROGESTERONE: CPT

## 2020-08-07 PROCEDURE — 83001 ASSAY OF GONADOTROPIN (FSH): CPT

## 2020-08-07 PROCEDURE — 99999 PR PBB SHADOW E&M-EST. PATIENT-LVL IV: CPT | Mod: PBBFAC,,, | Performed by: INTERNAL MEDICINE

## 2020-08-07 PROCEDURE — 84480 ASSAY TRIIODOTHYRONINE (T3): CPT

## 2020-08-07 NOTE — PROGRESS NOTES
Subjective:      Patient ID: Kylah Mohan is a 50 y.o. female.    Chief Complaint:      Patient is a 50 yr old lady with subclinical hyperthyroidism, thyroid nodular disease and hypertension seen in [facility] today.    History of Present Illness    Patient is a 50 yr old lady with thyroid functional anomalies consistent with subclinical hyperthyroidism and thyroid nodular disease seen in [facility] today. She in addition has essential hypertension on treatment, prediabetes, hyperlipidemia, depression and obesity with hypermenorrhea.  Her initial work showed her TFTs then were consistent with subclinical hyperthyroidism with no evidence of Graves disease based on ab profile.  Her screening thyroid USS does show evidence of thyroid nodular disease of which there is a dominant right upper lobe complex lesion for which she had  a thyroid USS guided FNAB done (12/15) which shows features consistent with benign colloid nodule. Her next scheduled thyroid ultrasound  Was from 12/17 and showed sonogrpahic stability. Her next scheduled thyroid USS thus should be for ~ 12/18.  Patient has been taking low dose metformin 500mg BID but she feels she may have been having low blood sugar reactions though she has measured her values mainly in the 80s.  Her thyroid nuclear medicine scan was normal.  She has only occasional palpitations but this is not excessive. She does drink some caffienated coffeee.  Patient has stopped taking trazadone. She has been of the topamax for ~ 3 months now.  She has no new complaints today but continues to complain of weakness and fatigue.  Her weight has remained essentially static since last visit despite all her lifestyle and behaivoral changes.  Patient has been having recurrent chronic headaches. These appear to tension in nature.  Patient is s/p GENESIS and unilateral oophorectomy with uterine ablation.  She is presently on estrace 1mg presently.     Patients most recent thyroid USS was from 01/20 and  "she had a recent USS guided FNAB from 02/20 which showed benign adenomatoid nodule.    Review of Systems   Constitutional: Positive for fatigue (chronic). Negative for unexpected weight change.   HENT: Negative for facial swelling and trouble swallowing.    Eyes: Negative for visual disturbance.   Respiratory: Negative for shortness of breath.    Cardiovascular: Negative for chest pain, palpitations and leg swelling.   Gastrointestinal: Negative for diarrhea, nausea and vomiting.   Endocrine: Negative for polydipsia.   Genitourinary: Negative for frequency and menstrual problem (postmenopausal).   Musculoskeletal: Positive for neck pain.   Skin: Negative for color change, pallor and rash.   Neurological: Positive for tremors (sometimes with stress/anxiety). Negative for light-headedness.   Psychiatric/Behavioral: Positive for sleep disturbance (falls asleep okay, trouble staying asleep). The patient is nervous/anxious (had some anxiety, panic attacks last year).        Objective: /60 (BP Location: Right arm, Patient Position: Sitting, BP Method: Large (Manual))   Pulse 82   Temp 97.7 °F (36.5 °C) (Temporal)   Ht 5' 2" (1.575 m)   Wt 82.7 kg (182 lb 5.1 oz)   BMI 33.35 kg/m²  Body surface area is 1.9 meters squared.         Physical Exam  Vitals signs reviewed.   Constitutional:       General: She is not in acute distress.     Appearance: She is obese.      Comments: obese   HENT:      Head: Normocephalic and atraumatic.      Nose: Nose normal.      Mouth/Throat:      Mouth: Mucous membranes are dry.   Eyes:      General: No scleral icterus.     Extraocular Movements: Extraocular movements intact.      Conjunctiva/sclera: Conjunctivae normal.      Pupils: Pupils are equal, round, and reactive to light.   Neck:      Musculoskeletal: Normal range of motion. No muscular tenderness.      Thyroid: No thyromegaly (right sided nodule).   Cardiovascular:      Rate and Rhythm: Normal rate and regular rhythm.      " Heart sounds: Normal heart sounds.   Pulmonary:      Effort: Pulmonary effort is normal. No respiratory distress.      Breath sounds: Normal breath sounds. No stridor. No wheezing or rhonchi.   Abdominal:      General: Bowel sounds are normal.      Comments: Obese anterior abdominal wall   Musculoskeletal: Normal range of motion.         General: No swelling.   Skin:     General: Skin is warm and dry.      Coloration: Skin is not jaundiced or pale.   Neurological:      General: No focal deficit present.      Mental Status: She is alert.      Cranial Nerves: No cranial nerve deficit.   Psychiatric:         Mood and Affect: Mood normal.         Behavior: Behavior normal.         Thought Content: Thought content normal.         Lab Review:     Results for CHARLY CASTILLO (MRN [id number]) as of 8/7/2020 14:25   Ref. Range 2/26/2020 11:02 3/4/2020 11:04 7/7/2020 08:34   TSH Latest Ref Range: 0.400 - 4.000 uIU/mL  0.302 (L)    Free T4 Latest Ref Range: 0.71 - 1.51 ng/dL  1.02    Estradiol Latest Ref Range: See Text pg/mL  23    FSH Latest Ref Range: See Text mIU/mL  103.10    LH Latest Ref Range: See Text mIU/mL  44.3    Prolactin Latest Ref Range: 5.2 - 26.5 ng/mL  9.2    SARS-CoV2 (COVID-19) Qualitative PCR Latest Ref Range: Not detected    Not detected ((NONE))       Assessment:     1. Nodular thyroid disease  T4, free    T3    TSH    Iodine, Serum    Calcitonin    Chromogranin A    Uric acid   2. Prediabetes     3. Depression with anxiety     4. Hyperlipidemia, unspecified hyperlipidemia type     5. Essential hypertension     6. Thyroiditis     7. Dysmetabolic syndrome     8. Abnormal thyroid function test     9. Subclinical hyperthyroidism     10. Chronic nonintractable headache, unspecified headache type     11. Hypovitaminosis D  Vitamin D    PTH, intact    Calcium, Ionized   12. Postmenopausal  DXA Bone Density Spine And Hip   13. Menopause  Luteinizing hormone    Testosterone Panel    Estradiol    Estrogens,  Total    Follicle stimulating hormone    Progesterone    Prolactin    Dihydrotestosterone   14. Hormone replacement therapy  Luteinizing hormone    Testosterone Panel    Estradiol    Estrogens, Total    Follicle stimulating hormone    Progesterone    Prolactin    Dihydrotestosterone        Regarding hyperthyroidism; To continue serial surviellance. Will repeat TFTs today. To continue low dose beta blockade as before.  Regarding thyroid nodular disease; for ffup thyroid USS   Regarding hypertension;Well controlled. To continue present antihypertensive regimen. To continue serial BP tracking.  Regarding prediabetes; to continue efforts at weight loss and reduced caloric intake. To recheck HBA1c today.  Regarding hypercholesterolemia; Most recent lipid profile at goal even without antilipidemic use  Regarding possible perimenopause; to check FSH and LH  Regarding chronic headaches to continue topiramate 50mg QD.  Regarding recent mental fogginesss; Much better now since stopping trazadone. Will also consider weaning of neurontin. To discuss with PCP in this regard.  Regarding postmenopausal state; to obtain baseline DEXA and advised to d/w with her GYN regarding adjusting her current HRT regimen.    Plan:     FFup in ~ 4mths

## 2020-08-08 DIAGNOSIS — R79.89 ABNORMAL THYROID BLOOD TEST: Primary | ICD-10-CM

## 2020-08-08 LAB
25(OH)D3+25(OH)D2 SERPL-MCNC: 92 NG/ML (ref 30–96)
CA-I BLDV-SCNC: 1.25 MMOL/L (ref 1.06–1.42)
ESTRADIOL SERPL-MCNC: 63 PG/ML
FSH SERPL-ACNC: 12.8 MIU/ML
LH SERPL-ACNC: 3.8 MIU/ML
PROGEST SERPL-MCNC: 0.5 NG/ML
PROLACTIN SERPL IA-MCNC: 28.5 NG/ML (ref 5.2–26.5)
PTH-INTACT SERPL-MCNC: 53 PG/ML (ref 9–77)
T3 SERPL-MCNC: 129 NG/DL (ref 60–180)
T4 FREE SERPL-MCNC: 1.07 NG/DL (ref 0.71–1.51)
TSH SERPL DL<=0.005 MIU/L-ACNC: 0.23 UIU/ML (ref 0.4–4)
URATE SERPL-MCNC: 7.4 MG/DL (ref 2.4–5.7)

## 2020-08-10 LAB — ESTROGEN SERPL-MCNC: 667 PG/ML

## 2020-08-11 LAB
CALCIT SERPL-MCNC: <5 PG/ML
IODINE SERPL-MCNC: 91 NG/ML (ref 40–92)

## 2020-08-12 LAB
ALBUMIN SERPL-MCNC: 4.1 G/DL (ref 3.6–5.1)
SHBG SERPL-SCNC: 96 NMOL/L (ref 17–124)
TESTOST FREE SERPL-MCNC: 1.1 PG/ML (ref 0.2–5)
TESTOST SERPL-MCNC: 24 NG/DL (ref 2–45)
TESTOSTERONE.FREE+WB SERPL-MCNC: 2.2 NG/DL (ref 0.5–8.5)

## 2020-08-13 LAB — ANDROSTANOLONE SERPL-MCNC: <50 PG/ML

## 2020-08-14 LAB — CGA SERPL-MCNC: 88 NG/ML (ref 0–160)

## 2020-09-08 ENCOUNTER — PATIENT MESSAGE (OUTPATIENT)
Dept: OBSTETRICS AND GYNECOLOGY | Facility: CLINIC | Age: 50
End: 2020-09-08

## 2020-09-08 RX ORDER — ESTRADIOL 2 MG/1
2 TABLET ORAL DAILY
Qty: 30 TABLET | Refills: 11 | Status: SHIPPED | OUTPATIENT
Start: 2020-09-08 | End: 2020-10-01

## 2020-09-11 ENCOUNTER — HOSPITAL ENCOUNTER (OUTPATIENT)
Dept: RADIOLOGY | Facility: HOSPITAL | Age: 50
Discharge: HOME | End: 2020-09-11
Attending: PHYSICIAN ASSISTANT
Payer: COMMERCIAL

## 2020-09-11 ENCOUNTER — OFFICE VISIT (OUTPATIENT)
Dept: FAMILY MEDICINE | Facility: CLINIC | Age: 50
End: 2020-09-11
Payer: COMMERCIAL

## 2020-09-11 ENCOUNTER — HOSPITAL ENCOUNTER (INPATIENT)
Facility: HOSPITAL | Age: 50
LOS: 1 days | Discharge: HOME | DRG: 103 | End: 2020-09-12
Attending: EMERGENCY MEDICINE | Admitting: FAMILY MEDICINE
Payer: COMMERCIAL

## 2020-09-11 VITALS
SYSTOLIC BLOOD PRESSURE: 122 MMHG | HEIGHT: 62 IN | OXYGEN SATURATION: 97 % | HEART RATE: 67 BPM | TEMPERATURE: 98 F | WEIGHT: 189.38 LBS | BODY MASS INDEX: 34.85 KG/M2 | DIASTOLIC BLOOD PRESSURE: 78 MMHG

## 2020-09-11 DIAGNOSIS — I67.1 CEREBRAL ANEURYSM WITHOUT RUPTURE: ICD-10-CM

## 2020-09-11 DIAGNOSIS — R51.9 ACUTE NONINTRACTABLE HEADACHE, UNSPECIFIED HEADACHE TYPE: Primary | ICD-10-CM

## 2020-09-11 DIAGNOSIS — R51.9 ACUTE NONINTRACTABLE HEADACHE, UNSPECIFIED HEADACHE TYPE: ICD-10-CM

## 2020-09-11 DIAGNOSIS — M54.12 CERVICAL RADICULOPATHY: ICD-10-CM

## 2020-09-11 DIAGNOSIS — I60.9 SUBARACHNOID BLEED: ICD-10-CM

## 2020-09-11 DIAGNOSIS — G45.9 TIA (TRANSIENT ISCHEMIC ATTACK): ICD-10-CM

## 2020-09-11 DIAGNOSIS — G43.809 HEADACHE, VARIANT MIGRAINE: Primary | ICD-10-CM

## 2020-09-11 DIAGNOSIS — I10 ESSENTIAL HYPERTENSION: ICD-10-CM

## 2020-09-11 LAB
ABO + RH BLD: NORMAL
ALBUMIN SERPL BCP-MCNC: 3.5 G/DL (ref 3.5–5.2)
ALP SERPL-CCNC: 61 U/L (ref 55–135)
ALT SERPL W/O P-5'-P-CCNC: 20 U/L (ref 10–44)
ANION GAP SERPL CALC-SCNC: 9 MMOL/L (ref 8–16)
AST SERPL-CCNC: 23 U/L (ref 10–40)
BASOPHILS # BLD AUTO: 0.08 K/UL (ref 0–0.2)
BASOPHILS NFR BLD: 0.8 % (ref 0–1.9)
BILIRUB SERPL-MCNC: 0.5 MG/DL (ref 0.1–1)
BLD GP AB SCN CELLS X3 SERPL QL: NORMAL
BUN SERPL-MCNC: 14 MG/DL (ref 6–20)
CALCIUM SERPL-MCNC: 9.1 MG/DL (ref 8.7–10.5)
CHLORIDE SERPL-SCNC: 101 MMOL/L (ref 95–110)
CK MB SERPL-MCNC: 1.1 NG/ML (ref 0.1–6.5)
CK SERPL-CCNC: 79 U/L (ref 20–180)
CO2 SERPL-SCNC: 28 MMOL/L (ref 23–29)
CREAT SERPL-MCNC: 0.6 MG/DL (ref 0.5–1.4)
CREAT SERPL-MCNC: 0.8 MG/DL (ref 0.5–1.4)
DIFFERENTIAL METHOD: ABNORMAL
EOSINOPHIL # BLD AUTO: 0.3 K/UL (ref 0–0.5)
EOSINOPHIL NFR BLD: 3.4 % (ref 0–8)
ERYTHROCYTE [DISTWIDTH] IN BLOOD BY AUTOMATED COUNT: 12.4 % (ref 11.5–14.5)
EST. GFR  (AFRICAN AMERICAN): >60 ML/MIN/1.73 M^2
EST. GFR  (NON AFRICAN AMERICAN): >60 ML/MIN/1.73 M^2
GLUCOSE SERPL-MCNC: 100 MG/DL (ref 70–110)
HCT VFR BLD AUTO: 36.8 % (ref 37–48.5)
HGB BLD-MCNC: 12.2 G/DL (ref 12–16)
IMM GRANULOCYTES # BLD AUTO: 0.03 K/UL (ref 0–0.04)
IMM GRANULOCYTES NFR BLD AUTO: 0.3 % (ref 0–0.5)
LACTATE SERPL-SCNC: 0.8 MMOL/L (ref 0.5–1.9)
LYMPHOCYTES # BLD AUTO: 2.2 K/UL (ref 1–4.8)
LYMPHOCYTES NFR BLD: 23.1 % (ref 18–48)
MCH RBC QN AUTO: 30 PG (ref 27–31)
MCHC RBC AUTO-ENTMCNC: 33.2 G/DL (ref 32–36)
MCV RBC AUTO: 90 FL (ref 82–98)
MONOCYTES # BLD AUTO: 0.9 K/UL (ref 0.3–1)
MONOCYTES NFR BLD: 9.6 % (ref 4–15)
NEUTROPHILS # BLD AUTO: 5.9 K/UL (ref 1.8–7.7)
NEUTROPHILS NFR BLD: 62.8 % (ref 38–73)
NRBC BLD-RTO: 0 /100 WBC
PLATELET # BLD AUTO: 314 K/UL (ref 150–350)
PMV BLD AUTO: 10 FL (ref 9.2–12.9)
POTASSIUM SERPL-SCNC: 3.3 MMOL/L (ref 3.5–5.1)
PROT SERPL-MCNC: 6.7 G/DL (ref 6–8.4)
RBC # BLD AUTO: 4.07 M/UL (ref 4–5.4)
SAMPLE: NORMAL
SARS-COV-2 RDRP RESP QL NAA+PROBE: NEGATIVE
SODIUM SERPL-SCNC: 138 MMOL/L (ref 136–145)
TROPONIN I SERPL DL<=0.01 NG/ML-MCNC: <0.03 NG/ML
WBC # BLD AUTO: 9.45 K/UL (ref 3.9–12.7)

## 2020-09-11 PROCEDURE — 99900035 HC TECH TIME PER 15 MIN (STAT)

## 2020-09-11 PROCEDURE — 99214 OFFICE O/P EST MOD 30 MIN: CPT | Mod: S$GLB,,, | Performed by: PHYSICIAN ASSISTANT

## 2020-09-11 PROCEDURE — 70470 CT HEAD/BRAIN W/O & W/DYE: CPT | Mod: 26,,, | Performed by: RADIOLOGY

## 2020-09-11 PROCEDURE — 25000003 PHARM REV CODE 250: Performed by: FAMILY MEDICINE

## 2020-09-11 PROCEDURE — 96374 THER/PROPH/DIAG INJ IV PUSH: CPT

## 2020-09-11 PROCEDURE — 3078F PR MOST RECENT DIASTOLIC BLOOD PRESSURE < 80 MM HG: ICD-10-PCS | Mod: CPTII,S$GLB,, | Performed by: PHYSICIAN ASSISTANT

## 2020-09-11 PROCEDURE — 3008F PR BODY MASS INDEX (BMI) DOCUMENTED: ICD-10-PCS | Mod: CPTII,S$GLB,, | Performed by: PHYSICIAN ASSISTANT

## 2020-09-11 PROCEDURE — 87040 BLOOD CULTURE FOR BACTERIA: CPT | Mod: 59

## 2020-09-11 PROCEDURE — 82553 CREATINE MB FRACTION: CPT

## 2020-09-11 PROCEDURE — 3078F DIAST BP <80 MM HG: CPT | Mod: CPTII,S$GLB,, | Performed by: PHYSICIAN ASSISTANT

## 2020-09-11 PROCEDURE — 84484 ASSAY OF TROPONIN QUANT: CPT

## 2020-09-11 PROCEDURE — 80053 COMPREHEN METABOLIC PANEL: CPT

## 2020-09-11 PROCEDURE — 70470 CT HEAD WITH AND WITHOUT: ICD-10-PCS | Mod: 26,,, | Performed by: RADIOLOGY

## 2020-09-11 PROCEDURE — 82550 ASSAY OF CK (CPK): CPT

## 2020-09-11 PROCEDURE — 3008F BODY MASS INDEX DOCD: CPT | Mod: CPTII,S$GLB,, | Performed by: PHYSICIAN ASSISTANT

## 2020-09-11 PROCEDURE — 85025 COMPLETE CBC W/AUTO DIFF WBC: CPT

## 2020-09-11 PROCEDURE — 99285 EMERGENCY DEPT VISIT HI MDM: CPT | Mod: 25

## 2020-09-11 PROCEDURE — 63600175 PHARM REV CODE 636 W HCPCS: Performed by: EMERGENCY MEDICINE

## 2020-09-11 PROCEDURE — 93010 EKG 12-LEAD: ICD-10-PCS | Mod: ,,, | Performed by: INTERNAL MEDICINE

## 2020-09-11 PROCEDURE — 99214 PR OFFICE/OUTPT VISIT, EST, LEVL IV, 30-39 MIN: ICD-10-PCS | Mod: S$GLB,,, | Performed by: PHYSICIAN ASSISTANT

## 2020-09-11 PROCEDURE — 93010 ELECTROCARDIOGRAM REPORT: CPT | Mod: ,,, | Performed by: INTERNAL MEDICINE

## 2020-09-11 PROCEDURE — 36415 COLL VENOUS BLD VENIPUNCTURE: CPT

## 2020-09-11 PROCEDURE — 99999 PR PBB SHADOW E&M-EST. PATIENT-LVL V: CPT | Mod: PBBFAC,,, | Performed by: PHYSICIAN ASSISTANT

## 2020-09-11 PROCEDURE — 63600175 PHARM REV CODE 636 W HCPCS: Performed by: FAMILY MEDICINE

## 2020-09-11 PROCEDURE — 94761 N-INVAS EAR/PLS OXIMETRY MLT: CPT

## 2020-09-11 PROCEDURE — 25000003 PHARM REV CODE 250: Performed by: INTERNAL MEDICINE

## 2020-09-11 PROCEDURE — 3074F SYST BP LT 130 MM HG: CPT | Mod: CPTII,S$GLB,, | Performed by: PHYSICIAN ASSISTANT

## 2020-09-11 PROCEDURE — 70470 CT HEAD/BRAIN W/O & W/DYE: CPT | Mod: TC

## 2020-09-11 PROCEDURE — 93005 ELECTROCARDIOGRAM TRACING: CPT | Performed by: INTERNAL MEDICINE

## 2020-09-11 PROCEDURE — 3074F PR MOST RECENT SYSTOLIC BLOOD PRESSURE < 130 MM HG: ICD-10-PCS | Mod: CPTII,S$GLB,, | Performed by: PHYSICIAN ASSISTANT

## 2020-09-11 PROCEDURE — U0002 COVID-19 LAB TEST NON-CDC: HCPCS

## 2020-09-11 PROCEDURE — 99999 PR PBB SHADOW E&M-EST. PATIENT-LVL V: ICD-10-PCS | Mod: PBBFAC,,, | Performed by: PHYSICIAN ASSISTANT

## 2020-09-11 PROCEDURE — 83036 HEMOGLOBIN GLYCOSYLATED A1C: CPT

## 2020-09-11 PROCEDURE — 86850 RBC ANTIBODY SCREEN: CPT

## 2020-09-11 PROCEDURE — 25500020 PHARM REV CODE 255

## 2020-09-11 PROCEDURE — 96375 TX/PRO/DX INJ NEW DRUG ADDON: CPT

## 2020-09-11 PROCEDURE — 83605 ASSAY OF LACTIC ACID: CPT

## 2020-09-11 PROCEDURE — 20000000 HC ICU ROOM

## 2020-09-11 RX ORDER — PROCHLORPERAZINE EDISYLATE 5 MG/ML
10 INJECTION INTRAMUSCULAR; INTRAVENOUS EVERY 6 HOURS PRN
Status: DISCONTINUED | OUTPATIENT
Start: 2020-09-11 | End: 2020-09-11

## 2020-09-11 RX ORDER — DIPHENHYDRAMINE HYDROCHLORIDE 50 MG/ML
25 INJECTION INTRAMUSCULAR; INTRAVENOUS EVERY 6 HOURS PRN
Status: DISCONTINUED | OUTPATIENT
Start: 2020-09-11 | End: 2020-09-11

## 2020-09-11 RX ORDER — TALC
6 POWDER (GRAM) TOPICAL NIGHTLY PRN
Status: DISCONTINUED | OUTPATIENT
Start: 2020-09-11 | End: 2020-09-12 | Stop reason: HOSPADM

## 2020-09-11 RX ORDER — LORAZEPAM 2 MG/ML
1 INJECTION INTRAMUSCULAR
Status: COMPLETED | OUTPATIENT
Start: 2020-09-12 | End: 2020-09-12

## 2020-09-11 RX ORDER — HYDRALAZINE HYDROCHLORIDE 20 MG/ML
5 INJECTION INTRAMUSCULAR; INTRAVENOUS EVERY 4 HOURS PRN
Status: DISCONTINUED | OUTPATIENT
Start: 2020-09-11 | End: 2020-09-12 | Stop reason: HOSPADM

## 2020-09-11 RX ORDER — GLUCAGON 1 MG
1 KIT INJECTION
Status: DISCONTINUED | OUTPATIENT
Start: 2020-09-11 | End: 2020-09-12 | Stop reason: HOSPADM

## 2020-09-11 RX ORDER — IBUPROFEN 200 MG
16 TABLET ORAL
Status: DISCONTINUED | OUTPATIENT
Start: 2020-09-11 | End: 2020-09-12 | Stop reason: HOSPADM

## 2020-09-11 RX ORDER — BUTALBITAL, ACETAMINOPHEN AND CAFFEINE 50; 325; 40 MG/1; MG/1; MG/1
1 TABLET ORAL EVERY 6 HOURS PRN
Qty: 10 TABLET | Refills: 0 | Status: ON HOLD | OUTPATIENT
Start: 2020-09-11 | End: 2020-09-12 | Stop reason: SDUPTHER

## 2020-09-11 RX ORDER — DIPHENHYDRAMINE HYDROCHLORIDE 50 MG/ML
12.5 INJECTION INTRAMUSCULAR; INTRAVENOUS
Status: COMPLETED | OUTPATIENT
Start: 2020-09-11 | End: 2020-09-11

## 2020-09-11 RX ORDER — IBUPROFEN 200 MG
24 TABLET ORAL
Status: DISCONTINUED | OUTPATIENT
Start: 2020-09-11 | End: 2020-09-12 | Stop reason: HOSPADM

## 2020-09-11 RX ORDER — PROCHLORPERAZINE EDISYLATE 5 MG/ML
5 INJECTION INTRAMUSCULAR; INTRAVENOUS EVERY 6 HOURS PRN
Status: DISCONTINUED | OUTPATIENT
Start: 2020-09-11 | End: 2020-09-11

## 2020-09-11 RX ORDER — ASPIRIN 81 MG/1
81 TABLET ORAL DAILY
COMMUNITY
End: 2020-10-07 | Stop reason: DRUGHIGH

## 2020-09-11 RX ORDER — POTASSIUM CHLORIDE 20 MEQ/1
40 TABLET, EXTENDED RELEASE ORAL ONCE
Status: COMPLETED | OUTPATIENT
Start: 2020-09-11 | End: 2020-09-11

## 2020-09-11 RX ORDER — PROCHLORPERAZINE EDISYLATE 5 MG/ML
10 INJECTION INTRAMUSCULAR; INTRAVENOUS
Status: COMPLETED | OUTPATIENT
Start: 2020-09-12 | End: 2020-09-12

## 2020-09-11 RX ORDER — CYCLOBENZAPRINE HCL 10 MG
10 TABLET ORAL NIGHTLY
Status: ON HOLD | COMMUNITY
Start: 2020-09-10 | End: 2020-11-10 | Stop reason: HOSPADM

## 2020-09-11 RX ORDER — DIPHENHYDRAMINE HYDROCHLORIDE 50 MG/ML
25 INJECTION INTRAMUSCULAR; INTRAVENOUS
Status: COMPLETED | OUTPATIENT
Start: 2020-09-12 | End: 2020-09-12

## 2020-09-11 RX ORDER — POLYETHYLENE GLYCOL 3350 17 G/17G
17 POWDER, FOR SOLUTION ORAL 2 TIMES DAILY PRN
Status: DISCONTINUED | OUTPATIENT
Start: 2020-09-11 | End: 2020-09-12 | Stop reason: HOSPADM

## 2020-09-11 RX ORDER — METOCLOPRAMIDE HYDROCHLORIDE 5 MG/ML
10 INJECTION INTRAMUSCULAR; INTRAVENOUS
Status: COMPLETED | OUTPATIENT
Start: 2020-09-11 | End: 2020-09-11

## 2020-09-11 RX ORDER — MORPHINE SULFATE 4 MG/ML
4 INJECTION, SOLUTION INTRAMUSCULAR; INTRAVENOUS EVERY 4 HOURS PRN
Status: DISCONTINUED | OUTPATIENT
Start: 2020-09-11 | End: 2020-09-12 | Stop reason: HOSPADM

## 2020-09-11 RX ORDER — MELOXICAM 15 MG/1
15 TABLET ORAL DAILY
Status: ON HOLD | COMMUNITY
Start: 2020-09-10 | End: 2020-09-12 | Stop reason: HOSPADM

## 2020-09-11 RX ORDER — IPRATROPIUM BROMIDE AND ALBUTEROL SULFATE 2.5; .5 MG/3ML; MG/3ML
3 SOLUTION RESPIRATORY (INHALATION) EVERY 4 HOURS PRN
Status: DISCONTINUED | OUTPATIENT
Start: 2020-09-11 | End: 2020-09-12 | Stop reason: HOSPADM

## 2020-09-11 RX ORDER — ACETAMINOPHEN 325 MG/1
650 TABLET ORAL EVERY 8 HOURS PRN
Status: DISCONTINUED | OUTPATIENT
Start: 2020-09-11 | End: 2020-09-11

## 2020-09-11 RX ORDER — SODIUM CHLORIDE 9 MG/ML
INJECTION, SOLUTION INTRAVENOUS CONTINUOUS
Status: DISCONTINUED | OUTPATIENT
Start: 2020-09-11 | End: 2020-09-12

## 2020-09-11 RX ORDER — ONDANSETRON 2 MG/ML
4 INJECTION INTRAMUSCULAR; INTRAVENOUS EVERY 8 HOURS PRN
Status: DISCONTINUED | OUTPATIENT
Start: 2020-09-11 | End: 2020-09-12 | Stop reason: HOSPADM

## 2020-09-11 RX ORDER — HYDRALAZINE HYDROCHLORIDE 20 MG/ML
10 INJECTION INTRAMUSCULAR; INTRAVENOUS EVERY 4 HOURS PRN
Status: DISCONTINUED | OUTPATIENT
Start: 2020-09-11 | End: 2020-09-12 | Stop reason: HOSPADM

## 2020-09-11 RX ORDER — SODIUM CHLORIDE 0.9 % (FLUSH) 0.9 %
10 SYRINGE (ML) INJECTION
Status: DISCONTINUED | OUTPATIENT
Start: 2020-09-11 | End: 2020-09-12 | Stop reason: HOSPADM

## 2020-09-11 RX ORDER — LORAZEPAM 2 MG/ML
1 INJECTION INTRAMUSCULAR
Status: COMPLETED | OUTPATIENT
Start: 2020-09-11 | End: 2020-09-11

## 2020-09-11 RX ADMIN — IOHEXOL 75 ML: 350 INJECTION, SOLUTION INTRAVENOUS at 11:09

## 2020-09-11 RX ADMIN — ACYCLOVIR SODIUM 480 MG: 500 INJECTION, SOLUTION INTRAVENOUS at 11:09

## 2020-09-11 RX ADMIN — METOCLOPRAMIDE 10 MG: 5 INJECTION, SOLUTION INTRAMUSCULAR; INTRAVENOUS at 04:09

## 2020-09-11 RX ADMIN — DIPHENHYDRAMINE HYDROCHLORIDE 12.5 MG: 50 INJECTION INTRAMUSCULAR; INTRAVENOUS at 04:09

## 2020-09-11 RX ADMIN — SODIUM CHLORIDE: 0.9 INJECTION, SOLUTION INTRAVENOUS at 11:09

## 2020-09-11 RX ADMIN — LORAZEPAM 1 MG: 2 INJECTION INTRAMUSCULAR; INTRAVENOUS at 07:09

## 2020-09-11 RX ADMIN — POTASSIUM CHLORIDE 40 MEQ: 20 TABLET, EXTENDED RELEASE ORAL at 11:09

## 2020-09-11 RX ADMIN — CEFTRIAXONE 2 G: 2 INJECTION, SOLUTION INTRAVENOUS at 11:09

## 2020-09-11 NOTE — ED PROVIDER NOTES
Encounter Date: 9/11/2020       History     Chief Complaint   Patient presents with    Headache     RT SIDED    Nausea    Abnormal Ct Scan     DONE THIS AM AT [facility],      50-year-old female with history of hypertension, hyperlipidemia, degenerative joint disease, depression is.  His patient presents to the emergency department with 5 day history of bitemporal frontal headache which she states has been waxing and waning throughout this time course.  Patient states yesterday she noted that she was having difficulty with finding words since as well as notice numbness to her right face and right arm.  She also failed to the right arm was weak.  Patient did obtain outpatient CT with and without contrast and findings was consistent with a 4 mm aneurysm at the MILI, A2 region.  Patient states that her symptoms have improved since yesterday he has which she was told to come to the emergency department for further evaluation treatment.        Review of patient's allergies indicates:   Allergen Reactions    Latex      Other reaction(s): Rash     Past Medical History:   Diagnosis Date    Benign hypertension     Bladder instability     Depression     DJD (degenerative joint disease), lumbar     History of viral meningitis 1996    Hyperlipidemia     Insomnia     Microscopic hematuria     negative work-up    Pulmonary nodule     minute    Tendonitis      Past Surgical History:   Procedure Laterality Date    ANTERIOR VAGINAL REPAIR      BREAST BIOPSY  6/5/2007    left breast benign    ENDOMETRIAL ABLATION  5/28/2009    HYSTERECTOMY      LAPAROSCOPIC HYSTERECTOMY  2/2013    TUBAL LIGATION  2002     Family History   Problem Relation Age of Onset    Osteoarthritis Mother     Hyperlipidemia Mother     Hypertension Mother     Allergies Mother     Hyperlipidemia Father     Diabetes Father     Hypertension Father     Cancer Paternal Grandmother         Breast    Diabetes Paternal Grandmother     Breast  cancer Paternal Grandmother 56    Allergies Sister     Heart disease Sister         Congential    Breast cancer Maternal Aunt 61    Ovarian cancer Maternal Cousin 50     Social History     Tobacco Use    Smoking status: Former Smoker     Packs/day: 0.25     Years: 10.00     Pack years: 2.50     Types: Cigarettes     Quit date: 10/20/2013     Years since quittin.8    Smokeless tobacco: Never Used   Substance Use Topics    Alcohol use: Yes     Alcohol/week: 1.0 standard drinks     Types: 1 Glasses of wine per week     Frequency: 2-3 times a week     Drinks per session: 3 or 4     Binge frequency: Less than monthly    Drug use: No     Review of Systems   Constitutional: Negative for fever.   HENT: Negative for sore throat.    Respiratory: Negative for shortness of breath.    Cardiovascular: Negative for chest pain.   Gastrointestinal: Negative for nausea.   Genitourinary: Negative for dysuria.   Musculoskeletal: Negative for back pain.   Skin: Negative for rash.   Neurological: Positive for numbness and headaches. Negative for tremors, seizures, speech difficulty and weakness.   Hematological: Does not bruise/bleed easily.   Psychiatric/Behavioral: Negative for suicidal ideas. The patient is not nervous/anxious.        Physical Exam     Initial Vitals [20 1448]   BP Pulse Resp Temp SpO2   (!) 160/74 74 16 98.6 °F (37 °C) 97 %      MAP       --         Physical Exam    Nursing note and vitals reviewed.  Constitutional: She appears well-developed and well-nourished.   HENT:   Head: Normocephalic and atraumatic.   Nose: Nose normal.   Mouth/Throat: Oropharynx is clear and moist.   Eyes: Conjunctivae and EOM are normal. Pupils are equal, round, and reactive to light.   Neck: Normal range of motion. Neck supple. No thyromegaly present. No tracheal deviation present.   Cardiovascular: Normal rate, regular rhythm, normal heart sounds and intact distal pulses. Exam reveals no gallop and no friction rub.    No  murmur heard.  Pulmonary/Chest: Breath sounds normal. No stridor. No respiratory distress.   Course bilateral no adventitious sounds   Abdominal: Soft. Bowel sounds are normal. She exhibits no mass. There is no rebound and no guarding.   Musculoskeletal: Normal range of motion. No edema.   Lymphadenopathy:     She has no cervical adenopathy.   Neurological: She is alert and oriented to person, place, and time. She has normal strength and normal reflexes. GCS score is 15. GCS eye subscore is 4. GCS verbal subscore is 5. GCS motor subscore is 6.   Skin: Skin is warm and dry. Capillary refill takes less than 2 seconds.   Psychiatric: She has a normal mood and affect.         ED Course   Procedures  Labs Reviewed   CBC W/ AUTO DIFFERENTIAL - Abnormal; Notable for the following components:       Result Value    Hematocrit 36.8 (*)     All other components within normal limits   COMPREHENSIVE METABOLIC PANEL - Abnormal; Notable for the following components:    Potassium 3.3 (*)     All other components within normal limits   CULTURE, CSF  (INCLUDES STAIN)   TROPONIN I   CK   CK-MB   SARS-COV-2 RNA AMPLIFICATION, QUAL   GLUCOSE, CSF   PROTEIN, CSF   CSF CELL COUNT WITH DIFFERENTIAL   HEMOGLOBIN A1C   TYPE & SCREEN          Imaging Results          MRI Brain Without Contrast (Final result)  Result time 09/11/20 19:16:00    Final result by Danny Gilliam MD (09/11/20 19:16:00)                 Narrative:    History: 50-year-old female with abnormal neural exam, acute headache. Follow-up abnormal contrast enhanced CT scan head.    PROCEDURE: Noncontrast MRI brain September 11, 2020. Noncontrast MRA brain, 3-D time-of-flight imaging September 11, 2020.    COMPARISON: CT scan with/without contrast September 11, 2020.    FINDINGS:  Noncontrast MRI brain: No areas of restricted diffusion identified. Prominent flow void noted level the anterior communicating artery, [place]. Flow voids maintained within the intracranial  vasculature to level of [place]. The ventricular system is of normal size shape and configuration. No T2 or FLAIR white matter signal hyperintensities identified. Gradient images unremarkable. The corpus callosum and craniocervical junction normal in appearance. No suprasellar masses appreciated. No intraorbital abnormalities appreciated. No opacification of the paranasal sinuses, mastoid air cells, middle ear cavities are external ear canals.        MRA BRAIN: There is a 4.3 mm aneurysm of the anterior communicating artery. No additional vascular anomalies to the [place] identified. The posterior communicating arteries are patent. No major branch vessel cut off evident.    IMPRESSION:  1. 4.3 mm aneurysm, level the anterior communicating artery.  2. Otherwise unremarkable MRI brain.    Electronically signed by:  Danny Gilliam MD  9/11/2020 8:59 PM CDT Workstation: [id number]                             MRA Brain without contrast (Final result)  Result time 09/11/20 19:16:00    Final result by Danny Gilliam MD (09/11/20 19:16:00)                 Narrative:    History: 50-year-old female with abnormal neural exam, acute headache. Follow-up abnormal contrast enhanced CT scan head.    PROCEDURE: Noncontrast MRI brain September 11, 2020. Noncontrast MRA brain, 3-D time-of-flight imaging September 11, 2020.    COMPARISON: CT scan with/without contrast September 11, 2020.    FINDINGS:  Noncontrast MRI brain: No areas of restricted diffusion identified. Prominent flow void noted level the anterior communicating artery, [place]. Flow voids maintained within the intracranial vasculature to level of [place]. The ventricular system is of normal size shape and configuration. No T2 or FLAIR white matter signal hyperintensities identified. Gradient images unremarkable. The corpus callosum and craniocervical junction normal in appearance. No suprasellar masses appreciated. No intraorbital abnormalities appreciated. No  opacification of the paranasal sinuses, mastoid air cells, middle ear cavities are external ear canals.        MRA BRAIN: There is a 4.3 mm aneurysm of the anterior communicating artery. No additional vascular anomalies to the [place] identified. The posterior communicating arteries are patent. No major branch vessel cut off evident.    IMPRESSION:  1. 4.3 mm aneurysm, level the anterior communicating artery.  2. Otherwise unremarkable MRI brain.    Electronically signed by:  Danny Gilliam MD  9/11/2020 8:59 PM CDT Workstation: [id number]                             X-Ray Chest AP Portable (Final result)  Result time 09/11/20 15:46:39    Final result by Cornelio Mix MD (09/11/20 15:46:39)                 Narrative:    XR CHEST AP PORTABLE    CLINICAL HISTORY:  50 years Female Chest Pain    COMPARISON: None    FINDINGS: Cardiac silhouette size is within normal limits. No  confluent airspace disease. Small calcified granulomata within the  lateral aspect of the right lung base. No large pleural effusion or  pneumothorax. No acute osseous abnormality.    IMPRESSION: No acute pulmonary process.    Electronically Signed by Cornelio FLORES on 9/11/2020 3:48 PM                               Medical Decision Making:   Initial Assessment:   51 yo female with history of hypertension, here with complaint of headache x5 days.  Patient found with anterior cerebral aneurysm noncontrast CT at 4 mm.  Differential Diagnosis:   Subarachnoid hemorrhage, subacute stroke, complex migraine, cerebral aneurysm  Clinical Tests:   Lab Tests: Ordered and Reviewed  Radiological Study: Ordered and Reviewed  Medical Tests: Ordered and Reviewed  ED Management:  Patient's case discussed with neurosurgery Dr. Caputo.  At this time states that patient's can be followed up as outpatient to the vascular clinic.  It was instructed however that patient did have symptoms consistent with possible stroke versus TIA and she would need to have  inpatient workup 1st.  Case was discussed with Dr. Ponce Lopez in Neurology.  Per discussion patient will undergo lumbar puncture to evaluate for possibility of subarachnoid hemorrhage.  If this patient does not have any significant xanthochromia will be admitted for further stroke workup here.    Lumbar puncture attempted without success.  At this time case was discussed with Neurosurgery as well as Neurology and Radiology.  The decision was made that the patient will be admitted for complete stroke rule out for as well as undergo interventional radiology fluoroscopy guided lumbar puncture in the morning.                             Clinical Impression:       ICD-10-CM ICD-9-CM   1. Headache, variant migraine  G43.809 346.20   2. TIA (transient ischemic attack)  G45.9 435.9   3. Subarachnoid bleed  I60.9 430             ED Disposition Condition    Admit                             Landry Cifuentes MD  09/11/20 2406

## 2020-09-11 NOTE — CONSULTS
[facility]  Neurology  Consult Note    Patient Name: Kylah Mohan  MRN: [id number]  Admission Date: 9/11/2020  Hospital Length of Stay: 0 days  Code Status: No Order   Attending Provider: Lamar Colin MD   Consulting Provider: Ksenia Torres NP  Primary Care Physician: Kyle Padilla MD  Principal Problem:<principal problem not specified>    Consults  Subjective:     Chief Complaint:  Headache    HPI Per EMR: 50-year-old female with history of hypertension, hyperlipidemia, degenerative joint disease, depression is.  His patient presents to the emergency department with 5 day history of bitemporal frontal headache which she states has been waxing and waning throughout this time course.  Patient states yesterday she noted that she was having difficulty with finding words since as well as notice numbness to her right face and right arm.  She also failed to the right arm was weak.  Patient did obtain outpatient CT with and without contrast and findings was consistent with a 4 mm aneurysm at the MILI, A2 region.  Patient states that her symptoms have improved since yesterday he has which she was told to come to the emergency department for further evaluation treatment.      Neurology consult Note: Pt seen and examined in the ER. Pt is awake and alert and oriented. No focal deficit noted. Pt states that she has been having  A headache since Monday.She also states she was having a right hand numbness and tingling sensation since Monday. She states her arm started to feel heavy starting on Thursday. The headache is described as headache  that the pt had never had before. The pain is all around the head going down to the cervical area. Pt states seeing black dots and having ringing in her ear with the headache. Pt denies any loss of consciousness.  At the time of my visit, pt states her headache pain was tolerable. She states she was administered meds just prior. CT of head with contrast was done and it  revealed sub centimeter  Aneurysm along the anterior cerebral artery. LP is planned for tomorrow to rule out SAH. Will Obtain MRI and MRA of brain.     Past Medical History:   Diagnosis Date    Benign hypertension     Bladder instability     Depression     DJD (degenerative joint disease), lumbar     History of viral meningitis 1996    Hyperlipidemia     Insomnia     Microscopic hematuria     negative work-up    Pulmonary nodule     minute    Tendonitis        Past Surgical History:   Procedure Laterality Date    ANTERIOR VAGINAL REPAIR      BREAST BIOPSY  6/5/2007    left breast benign    ENDOMETRIAL ABLATION  5/28/2009    HYSTERECTOMY      LAPAROSCOPIC HYSTERECTOMY  2/2013    TUBAL LIGATION  2002       Review of patient's allergies indicates:   Allergen Reactions    Latex      Other reaction(s): Rash       Current Neurological Medications:    Current Facility-Administered Medications on File Prior to Encounter   Medication    [COMPLETED] iohexoL (OMNIPAQUE 350) 350 mg iodine/mL injection     Current Outpatient Medications on File Prior to Encounter   Medication Sig    ascorbic acid, vitamin C, (VITAMIN C) 1000 MG tablet Take 1,000 mg by mouth once daily.    aspirin (ADULT LOW DOSE ASPIRIN) 81 MG EC tablet Take 81 mg by mouth once daily.    atenolol (TENORMIN) 25 MG tablet Take 1 tablet (25 mg total) by mouth once daily.    cyclobenzaprine (FLEXERIL) 10 MG tablet Take 10 mg by mouth every evening.     estradioL (ESTRACE) 2 MG tablet Take 1 tablet (2 mg total) by mouth once daily.    fluticasone (FLONASE) 50 mcg/actuation nasal spray 1 spray by Each Nare route once daily.    hydroCHLOROthiazide (HYDRODIURIL) 25 MG tablet Take 1 tablet (25 mg total) by mouth once daily.    losartan (COZAAR) 25 MG tablet Take 1 tablet (25 mg total) by mouth once daily.    meloxicam (MOBIC) 15 MG tablet Take 15 mg by mouth once daily.     multivitamin (THERAGRAN) per tablet Take 1 tablet by mouth once daily.  Every day    butalbital-acetaminophen-caffeine -40 mg (FIORICET, ESGIC) -40 mg per tablet Take 1 tablet by mouth every 6 (six) hours as needed for Headaches.    [DISCONTINUED] azelastine (ASTELIN) 137 mcg (0.1 %) nasal spray 2 sprays (274 mcg total) by Nasal route 2 (two) times daily.    [DISCONTINUED] buPROPion (WELLBUTRIN) 75 MG tablet Take 75 mg by mouth 2 (two) times daily.    [DISCONTINUED] ibuprofen (ADVIL,MOTRIN) 600 MG tablet Take 1 tablet (600 mg total) by mouth every 8 (eight) hours as needed for Pain. (Patient not taking: Reported on 2020)    [DISCONTINUED] oxybutynin (DITROPAN) 5 MG Tab Take 1 tablet (5 mg total) by mouth 2 (two) times daily. (Patient not taking: Reported on 2020)    [DISCONTINUED] potassium chloride SA (K-DUR,KLOR-CON) 10 MEQ tablet Take 20 mEq by mouth 2 (two) times daily.      Family History     Problem Relation (Age of Onset)    Allergies Mother, Sister    Breast cancer Paternal Grandmother (56), Maternal Aunt (61)    Cancer Paternal Grandmother    Diabetes Father, Paternal Grandmother    Heart disease Sister    Hyperlipidemia Mother, Father    Hypertension Mother, Father    Osteoarthritis Mother    Ovarian cancer Maternal Cousin (50)        Tobacco Use    Smoking status: Former Smoker     Packs/day: 0.25     Years: 10.00     Pack years: 2.50     Types: Cigarettes     Quit date: 10/20/2013     Years since quittin.8    Smokeless tobacco: Never Used   Substance and Sexual Activity    Alcohol use: Yes     Alcohol/week: 1.0 standard drinks     Types: 1 Glasses of wine per week     Frequency: 2-3 times a week     Drinks per session: 3 or 4     Binge frequency: Less than monthly    Drug use: No    Sexual activity: Not Currently     Partners: Male     Birth control/protection: Surgical     Review of Systems   Constitutional: Negative.    HENT: Positive for tinnitus.    Eyes: Positive for visual disturbance.   Respiratory: Negative.    Gastrointestinal:  Positive for nausea.   Genitourinary: Negative.    Musculoskeletal: Negative.    Skin: Negative.    Allergic/Immunologic: Negative.    Neurological: Negative.    Psychiatric/Behavioral: Negative.      Objective:     Vital Signs (Most Recent):  Temp: 98.6 °F (37 °C) (09/11/20 1448)  Pulse: 72 (09/11/20 1830)  Resp: 16 (09/11/20 1448)  BP: 121/70 (09/11/20 1830)  SpO2: 98 % (09/11/20 1830) Vital Signs (24h Range):  Temp:  [97.6 °F (36.4 °C)-98.6 °F (37 °C)] 98.6 °F (37 °C)  Pulse:  [67-74] 72  Resp:  [16] 16  SpO2:  [95 %-98 %] 98 %  BP: (121-160)/(59-78) 121/70     Weight: 84.4 kg (186 lb)  Body mass index is 35.14 kg/m².    Physical Exam  Vitals signs and nursing note reviewed.   Constitutional:       Appearance: Normal appearance.   HENT:      Head: Normocephalic and atraumatic.      Mouth/Throat:      Mouth: Mucous membranes are dry.   Eyes:      General: Lids are normal.      Extraocular Movements: Extraocular movements intact.      Pupils: Pupils are equal, round, and reactive to light.   Cardiovascular:      Rate and Rhythm: Normal rate and regular rhythm.   Pulmonary:      Effort: Pulmonary effort is normal.      Breath sounds: Normal breath sounds.   Abdominal:      Palpations: Abdomen is soft.   Musculoskeletal: Normal range of motion.   Skin:     General: Skin is warm and dry.      Capillary Refill: Capillary refill takes 2 to 3 seconds.   Neurological:      General: No focal deficit present.      Mental Status: She is alert.      GCS: GCS eye subscore is 4. GCS verbal subscore is 5.      Cranial Nerves: Cranial nerves are intact.      Sensory: Sensation is intact.      Motor: Motor function is intact.      Coordination: Coordination is intact. Finger-Nose-Finger Test normal.   Psychiatric:         Attention and Perception: Attention and perception normal.         Mood and Affect: Mood normal.         Speech: Speech normal.         Behavior: Behavior normal. Behavior is cooperative.         Cognition and  Memory: Cognition and memory normal.         Judgment: Judgment normal.         NEUROLOGICAL EXAMINATION:     MENTAL STATUS   Disoriented to person.   Disoriented to place.   Attention: normal.   Speech: speech is normal   Level of consciousness: alert  Able to name object.     CRANIAL NERVES     CN III, IV, VI   Pupils are equal, round, and reactive to light.    SENSORY EXAM   Light touch normal.     GAIT AND COORDINATION      Coordination   Finger to nose coordination: normal    Tremor   Resting tremor: absent      Significant Labs:   CMP  Sodium   Date Value Ref Range Status   09/11/2020 138 136 - 145 mmol/L Final     Potassium   Date Value Ref Range Status   09/11/2020 3.3 (L) 3.5 - 5.1 mmol/L Final     Chloride   Date Value Ref Range Status   09/11/2020 101 95 - 110 mmol/L Final     CO2   Date Value Ref Range Status   09/11/2020 28 23 - 29 mmol/L Final     Glucose   Date Value Ref Range Status   09/11/2020 100 70 - 110 mg/dL Final     BUN, Bld   Date Value Ref Range Status   09/11/2020 14 6 - 20 mg/dL Final     Creatinine   Date Value Ref Range Status   09/11/2020 0.8 0.5 - 1.4 mg/dL Final     Calcium   Date Value Ref Range Status   09/11/2020 9.1 8.7 - 10.5 mg/dL Final     Total Protein   Date Value Ref Range Status   09/11/2020 6.7 6.0 - 8.4 g/dL Final     Albumin   Date Value Ref Range Status   09/11/2020 3.5 3.5 - 5.2 g/dL Final   08/07/2020 4.1 3.6 - 5.1 g/dL Final     Comment:     For additional information, please refer to   http://education.Chek[employer].com.[employer]/faq/BXG731 (This link is   being provided for informational/ educational purposes only.)  This test was developed and its analytical performance   characteristics have been determined by [employer] [facility]. It has not been cleared or approved by the   [place] Food and Drug Administration. This assay has been validated   pursuant to the CLIA regulations and is used for clinical   purposes.  @ Test Performed By:  [employer]  [facility]  Esa Miller M.D., Ph.D.,   [id number] [place] [id number]  [facility]  [id number]       Total Bilirubin   Date Value Ref Range Status   09/11/2020 0.5 0.1 - 1.0 mg/dL Final     Comment:     For infants and newborns, interpretation of results should be based  on gestational age, weight and in agreement with clinical  observations.  Premature Infant recommended reference ranges:  Up to 24 hours.............<8.0 mg/dL  Up to 48 hours............<12.0 mg/dL  3-5 days..................<15.0 mg/dL  6-29 days.................<15.0 mg/dL       Alkaline Phosphatase   Date Value Ref Range Status   09/11/2020 61 55 - 135 U/L Final     AST   Date Value Ref Range Status   09/11/2020 23 10 - 40 U/L Final     ALT   Date Value Ref Range Status   09/11/2020 20 10 - 44 U/L Final     Anion Gap   Date Value Ref Range Status   09/11/2020 9 8 - 16 mmol/L Final     eGFR if    Date Value Ref Range Status   09/11/2020 >60.0 >60 mL/min/1.73 m^2 Final     eGFR if non    Date Value Ref Range Status   09/11/2020 >60.0 >60 mL/min/1.73 m^2 Final     Comment:     Calculation used to obtain the estimated glomerular filtration  rate (eGFR) is the CKD-EPI equation.          Lab Results   Component Value Date    WBC 9.45 09/11/2020    HGB 12.2 09/11/2020    HCT 36.8 (L) 09/11/2020    MCV 90 09/11/2020     09/11/2020           Significant Imaging:     CT Head W Wo Contrast  Order: [id number]  Status:  Final result   Visible to patient:  Yes (Patient Portal) Next appt:  09/14/2020 at 09:30 AM in Neurosurgery (Christi Page MD) Dx:  Acute nonintractable headache, unspec...  Details    Reading Physician Reading Date Result Priority   Wisam York MD  [phone] 9/11/2020 STAT      Narrative & Impression     EXAMINATION:  CT HEAD WITH AND WITHOUT     CLINICAL HISTORY:  headache;  Headache     TECHNIQUE:  Transaxial images through  the head before and after the administration of 75 cc Omnipaque 350 IV contrast.  Multiplanar reformats.     COMPARISON:  None     FINDINGS:  Normal size of the ventricular system. No hemorrhage or major vascular distribution infarct. No intra or extra-axial mass. No mass effect or midline shift. Included orbits are unremarkable. Paranasal sinuses and mastoid air cells are clear. No acute osseous abnormality.  There is a 4 mm aneurysm arising along the proximal left MILI A2 segment measuring 4 mm.  This is near the expected region of the anterior communicating artery, if present.  Findings best seen axial series 5, image 9, coronal series 602, image 56, sagittal series 601, image 53.     Impression:     Subcentimeter aneurysm along the left anterior cerebral artery.  Conventional angiography or CTA could add further characterization.     This report was flagged in Epic as abnormal.           MRI Brain Without Contrast  Order: [id number]  Status:  Final result   Visible to patient:  No (not released) Next appt:  09/14/2020 at 09:30 AM in Neurosurgery (Christi Page MD)  Details    Reading Physician Reading Date Result Priority   Danny Gilliam MD  [phone] 9/11/2020       Narrative & Impression     History: 50-year-old female with abnormal neural exam, acute headache. Follow-up abnormal contrast enhanced CT scan head.     PROCEDURE: Noncontrast MRI brain September 11, 2020. Noncontrast MRA brain, 3-D time-of-flight imaging September 11, 2020.     COMPARISON: CT scan with/without contrast September 11, 2020.     FINDINGS:  Noncontrast MRI brain: No areas of restricted diffusion identified. Prominent flow void noted level the anterior communicating artery, [place]. Flow voids maintained within the intracranial vasculature to level of [place]. The ventricular system is of normal size shape and configuration. No T2 or FLAIR white matter signal hyperintensities identified. Gradient images unremarkable. The  corpus callosum and craniocervical junction normal in appearance. No suprasellar masses appreciated. No intraorbital abnormalities appreciated. No opacification of the paranasal sinuses, mastoid air cells, middle ear cavities are external ear canals.           MRA BRAIN: There is a 4.3 mm aneurysm of the anterior communicating artery. No additional vascular anomalies to the [place] identified. The posterior communicating arteries are patent. No major branch vessel cut off evident.     IMPRESSION:  1. 4.3 mm aneurysm, level the anterior communicating artery.  2. Otherwise unremarkable MRI brain.             Assessment and Plan:         Rule out SAH    - CT head W/WO contrast- Sub centimeter aneurysm along the left anterior cerebral artery.- Neurosurgery consulted by ED physician.    - MRA Brain- 4.3mm aneurysm, level the anterior communicating surgery.    - MRI Brain- No areas of  Restricted diffusion identified. Prominent flow void noted level the anterior communicating artery, [place]. Flow voids maintained within the intracranial vasculature to level of [place]. The ventricular system is of normal size shape and configuration. No T2 or FLAIR white  Matter  Signal hypertensities identified. No suprasellar masses masses appreciated.    - LP- To be done through IR.   - Frequent neuro checks.    - Optimal BP control to keep SBP < 160.            Patient to follow up with [facility] at [phone] within 3 days from discharge.     Stroke education was provided including stroke risk factors modification and any acute neurological changes including weakness, confusion, visual changes to come straight to the ER.     All questions were answered.                                                                There are no hospital problems to display for this patient.      VTE Risk Mitigation (From admission, onward)    None          Thank you for your consult. We will continue to follow the pt.  Glenda call us if you ave any additional questions.     Ksenia Torres NP  Neurology  [facility]

## 2020-09-11 NOTE — PROGRESS NOTES
Subjective:       Patient ID: Kylah Mohan is a 50 y.o. female.    Chief Complaint: Headache    Patient is new to me   She presents for evaluation of headache X 4 days. Was seen at  yesterday.  Had normal CBC, CMP , and sinus xray.  covid negative.  Was treated with iv fluids and im injection.  Discharged form  with mobic and flexaril.  No relief with RX.  States right arm heaviness and tingling to fingers X 4 days.  remainder HPI below   Patients patient medical/surgical, social and family histories have been reviewed       Headache   This is a new problem. The current episode started in the past 7 days (4). The problem occurs constantly. The problem has been gradually worsening. The pain is located in the bilateral, parietal, temporal and vertex region. Radiates to: occiptial region, neck and trapezius  The pain quality is not similar to prior headaches. The quality of the pain is described as aching. The pain is at a severity of 10/10. Associated symptoms include muscle aches, nausea, neck pain, numbness, scalp tenderness, sinus pressure, tingling and tinnitus (resolved ). Pertinent negatives include no abdominal pain, abnormal behavior, anorexia, back pain, blurred vision, coughing, dizziness, drainage, ear pain, eye pain, eye redness, facial sweating, fever, hearing loss, insomnia, loss of balance, phonophobia, photophobia, rhinorrhea, seizures, sore throat, visual change, vomiting or weakness. Nothing aggravates the symptoms. She has tried acetaminophen, darkened room, ketorolac injections and NSAIDs for the symptoms. The treatment provided no relief. Her past medical history is significant for hypertension and sinus disease. There is no history of migraine headaches or recent head traumas.     Review of Systems   Constitutional: Positive for activity change, appetite change and fatigue (severe). Negative for chills, diaphoresis and fever.   HENT: Positive for sinus pressure and tinnitus (resolved  ). Negative for congestion, dental problem, ear discharge, ear pain, facial swelling (facial pain ), hearing loss, rhinorrhea, sinus pain, sneezing, sore throat, trouble swallowing and voice change.    Eyes: Negative for blurred vision, photophobia, pain, redness and visual disturbance.   Respiratory: Negative for cough, chest tightness, shortness of breath and wheezing.    Cardiovascular: Negative for chest pain and palpitations.   Gastrointestinal: Positive for nausea. Negative for abdominal pain, anorexia and vomiting.   Musculoskeletal: Positive for neck pain. Negative for back pain and neck stiffness.   Skin: Negative for rash and wound.   Neurological: Positive for tingling, numbness and headaches. Negative for dizziness, tremors, seizures, syncope, facial asymmetry, speech difficulty, weakness, light-headedness and loss of balance.   Psychiatric/Behavioral: The patient does not have insomnia.        Objective:      Physical Exam  Constitutional:       General: She is not in acute distress.     Appearance: Normal appearance. She is well-developed and well-groomed. She is not ill-appearing, toxic-appearing or diaphoretic.   HENT:      Head: Normocephalic and atraumatic.      Right Ear: Tympanic membrane, ear canal and external ear normal.      Left Ear: Tympanic membrane, ear canal and external ear normal.   Cardiovascular:      Rate and Rhythm: Normal rate and regular rhythm.      Heart sounds: Normal heart sounds.   Pulmonary:      Effort: Pulmonary effort is normal.      Breath sounds: Normal breath sounds.   Musculoskeletal:      Right lower leg: No edema.      Left lower leg: No edema.   Skin:     General: Skin is warm and dry.   Neurological:      Mental Status: She is alert and oriented to person, place, and time.      Cranial Nerves: Cranial nerves are intact.      Sensory: Sensation is intact.      Motor: Motor function is intact.      Coordination: Coordination is intact.   Psychiatric:          Behavior: Behavior is cooperative.         Assessment:       1. Acute nonintractable headache, unspecified headache type    2. Cervical radiculopathy        Plan:       Kylah was seen today for headache.    Diagnoses and all orders for this visit:    Acute nonintractable headache, unspecified headache type  -     CT Head With Contrast; Future     butalbital-acetaminophen-caffeine -40 mg (FIORICET, ESGIC) -40 mg per tablet; Take 1 tablet by mouth every 6 (six) hours as needed for Headaches.  Will likely refer to neuro     Cervical radiculopathy  Consider referal to pm& R -       Further recommendations will be made based on results

## 2020-09-12 VITALS
TEMPERATURE: 98 F | RESPIRATION RATE: 20 BRPM | HEIGHT: 61 IN | HEART RATE: 88 BPM | SYSTOLIC BLOOD PRESSURE: 148 MMHG | OXYGEN SATURATION: 100 % | BODY MASS INDEX: 36.09 KG/M2 | WEIGHT: 191.13 LBS | DIASTOLIC BLOOD PRESSURE: 67 MMHG

## 2020-09-12 PROBLEM — E66.9 CLASS 2 OBESITY IN ADULT: Chronic | Status: ACTIVE | Noted: 2020-09-12

## 2020-09-12 PROBLEM — E66.812 CLASS 2 OBESITY IN ADULT: Chronic | Status: ACTIVE | Noted: 2020-09-12

## 2020-09-12 PROBLEM — I67.1 CEREBRAL ANEURYSM WITHOUT RUPTURE: Chronic | Status: ACTIVE | Noted: 2020-09-12

## 2020-09-12 PROBLEM — R51.9 HEADACHE: Status: ACTIVE | Noted: 2020-09-12

## 2020-09-12 PROBLEM — E87.6 HYPOKALEMIA: Status: ACTIVE | Noted: 2020-09-12

## 2020-09-12 LAB
ANION GAP SERPL CALC-SCNC: 9 MMOL/L (ref 8–16)
BASOPHILS # BLD AUTO: 0.06 K/UL (ref 0–0.2)
BASOPHILS NFR BLD: 0.6 % (ref 0–1.9)
BUN SERPL-MCNC: 10 MG/DL (ref 6–20)
CALCIUM SERPL-MCNC: 8.6 MG/DL (ref 8.7–10.5)
CHLORIDE SERPL-SCNC: 101 MMOL/L (ref 95–110)
CLARITY CSF: CLEAR
CO2 SERPL-SCNC: 27 MMOL/L (ref 23–29)
COLOR CSF: COLORLESS
CREAT SERPL-MCNC: 0.6 MG/DL (ref 0.5–1.4)
DIFFERENTIAL METHOD: ABNORMAL
EOSINOPHIL # BLD AUTO: 0.2 K/UL (ref 0–0.5)
EOSINOPHIL NFR BLD: 2.1 % (ref 0–8)
ERYTHROCYTE [DISTWIDTH] IN BLOOD BY AUTOMATED COUNT: 12.4 % (ref 11.5–14.5)
EST. GFR  (AFRICAN AMERICAN): >60 ML/MIN/1.73 M^2
EST. GFR  (NON AFRICAN AMERICAN): >60 ML/MIN/1.73 M^2
ESTIMATED AVG GLUCOSE: 114 MG/DL (ref 68–131)
GLUCOSE CSF-MCNC: 52 MG/DL (ref 40–70)
GLUCOSE SERPL-MCNC: 150 MG/DL (ref 70–110)
HBA1C MFR BLD HPLC: 5.6 % (ref 4.5–6.2)
HCT VFR BLD AUTO: 36.8 % (ref 37–48.5)
HGB BLD-MCNC: 12.3 G/DL (ref 12–16)
IMM GRANULOCYTES # BLD AUTO: 0.02 K/UL (ref 0–0.04)
IMM GRANULOCYTES NFR BLD AUTO: 0.2 % (ref 0–0.5)
LACTATE SERPL-SCNC: 1.3 MMOL/L (ref 0.5–1.9)
LYMPHOCYTES # BLD AUTO: 2.8 K/UL (ref 1–4.8)
LYMPHOCYTES NFR BLD: 26.9 % (ref 18–48)
MAGNESIUM SERPL-MCNC: 2 MG/DL (ref 1.6–2.6)
MCH RBC QN AUTO: 30 PG (ref 27–31)
MCHC RBC AUTO-ENTMCNC: 33.4 G/DL (ref 32–36)
MCV RBC AUTO: 90 FL (ref 82–98)
MONOCYTES # BLD AUTO: 0.9 K/UL (ref 0.3–1)
MONOCYTES NFR BLD: 9 % (ref 4–15)
NEUTROPHILS # BLD AUTO: 6.3 K/UL (ref 1.8–7.7)
NEUTROPHILS NFR BLD: 61.2 % (ref 38–73)
NRBC BLD-RTO: 0 /100 WBC
PHOSPHATE SERPL-MCNC: 3.1 MG/DL (ref 2.7–4.5)
PLATELET # BLD AUTO: 281 K/UL (ref 150–350)
PMV BLD AUTO: 9.7 FL (ref 9.2–12.9)
POTASSIUM SERPL-SCNC: 3.4 MMOL/L (ref 3.5–5.1)
PROT CSF-MCNC: 42 MG/DL (ref 15–40)
RBC # BLD AUTO: 4.1 M/UL (ref 4–5.4)
RBC # CSF: 0 /CU MM
SODIUM SERPL-SCNC: 137 MMOL/L (ref 136–145)
SPECIMEN VOL CSF: 2 ML
WBC # BLD AUTO: 10.35 K/UL (ref 3.9–12.7)
WBC # CSF: 0 /CU MM (ref 0–5)

## 2020-09-12 PROCEDURE — 84100 ASSAY OF PHOSPHORUS: CPT

## 2020-09-12 PROCEDURE — 25000003 PHARM REV CODE 250: Performed by: FAMILY MEDICINE

## 2020-09-12 PROCEDURE — 25000003 PHARM REV CODE 250: Performed by: INTERNAL MEDICINE

## 2020-09-12 PROCEDURE — 62328 DX LMBR SPI PNXR W/FLUOR/CT: CPT

## 2020-09-12 PROCEDURE — 89051 BODY FLUID CELL COUNT: CPT

## 2020-09-12 PROCEDURE — 84157 ASSAY OF PROTEIN OTHER: CPT

## 2020-09-12 PROCEDURE — 82945 GLUCOSE OTHER FLUID: CPT

## 2020-09-12 PROCEDURE — 62270 DX LMBR SPI PNXR: CPT

## 2020-09-12 PROCEDURE — 87070 CULTURE OTHR SPECIMN AEROBIC: CPT

## 2020-09-12 PROCEDURE — 85025 COMPLETE CBC W/AUTO DIFF WBC: CPT

## 2020-09-12 PROCEDURE — 77003 FLUOROGUIDE FOR SPINE INJECT: CPT

## 2020-09-12 PROCEDURE — 80048 BASIC METABOLIC PNL TOTAL CA: CPT

## 2020-09-12 PROCEDURE — 87205 SMEAR GRAM STAIN: CPT

## 2020-09-12 PROCEDURE — 94761 N-INVAS EAR/PLS OXIMETRY MLT: CPT

## 2020-09-12 PROCEDURE — 99900035 HC TECH TIME PER 15 MIN (STAT)

## 2020-09-12 PROCEDURE — 83735 ASSAY OF MAGNESIUM: CPT

## 2020-09-12 PROCEDURE — 36415 COLL VENOUS BLD VENIPUNCTURE: CPT

## 2020-09-12 PROCEDURE — 83605 ASSAY OF LACTIC ACID: CPT

## 2020-09-12 PROCEDURE — 99223 PR INITIAL HOSPITAL CARE,LEVL III: ICD-10-PCS | Mod: ,,, | Performed by: NEUROLOGICAL SURGERY

## 2020-09-12 PROCEDURE — 63600175 PHARM REV CODE 636 W HCPCS: Performed by: FAMILY MEDICINE

## 2020-09-12 PROCEDURE — 99223 1ST HOSP IP/OBS HIGH 75: CPT | Mod: ,,, | Performed by: NEUROLOGICAL SURGERY

## 2020-09-12 RX ORDER — POTASSIUM CHLORIDE 7.45 MG/ML
20 INJECTION INTRAVENOUS
Status: DISCONTINUED | OUTPATIENT
Start: 2020-09-12 | End: 2020-09-12 | Stop reason: HOSPADM

## 2020-09-12 RX ORDER — BUTALBITAL, ACETAMINOPHEN AND CAFFEINE 50; 325; 40 MG/1; MG/1; MG/1
1 TABLET ORAL EVERY 6 HOURS PRN
Qty: 15 TABLET | Refills: 0 | Status: SHIPPED | OUTPATIENT
Start: 2020-09-12 | End: 2020-09-17

## 2020-09-12 RX ORDER — MAGNESIUM SULFATE HEPTAHYDRATE 40 MG/ML
4 INJECTION, SOLUTION INTRAVENOUS
Status: DISCONTINUED | OUTPATIENT
Start: 2020-09-12 | End: 2020-09-12 | Stop reason: HOSPADM

## 2020-09-12 RX ORDER — HYDROCHLOROTHIAZIDE 25 MG/1
25 TABLET ORAL DAILY
Status: DISCONTINUED | OUTPATIENT
Start: 2020-09-12 | End: 2020-09-12 | Stop reason: HOSPADM

## 2020-09-12 RX ORDER — BUPROPION HYDROCHLORIDE 75 MG/1
75 TABLET ORAL 2 TIMES DAILY
Start: 2020-09-12 | End: 2020-10-07

## 2020-09-12 RX ORDER — MAGNESIUM SULFATE HEPTAHYDRATE 40 MG/ML
2 INJECTION, SOLUTION INTRAVENOUS
Status: DISCONTINUED | OUTPATIENT
Start: 2020-09-12 | End: 2020-09-12 | Stop reason: HOSPADM

## 2020-09-12 RX ORDER — POTASSIUM CHLORIDE 20 MEQ/1
40 TABLET, EXTENDED RELEASE ORAL
Status: DISCONTINUED | OUTPATIENT
Start: 2020-09-12 | End: 2020-09-12 | Stop reason: HOSPADM

## 2020-09-12 RX ORDER — LANOLIN ALCOHOL/MO/W.PET/CERES
800 CREAM (GRAM) TOPICAL
Status: DISCONTINUED | OUTPATIENT
Start: 2020-09-12 | End: 2020-09-12 | Stop reason: HOSPADM

## 2020-09-12 RX ORDER — LOSARTAN POTASSIUM 25 MG/1
25 TABLET ORAL DAILY
Status: DISCONTINUED | OUTPATIENT
Start: 2020-09-12 | End: 2020-09-12 | Stop reason: HOSPADM

## 2020-09-12 RX ORDER — POTASSIUM CHLORIDE 20 MEQ/1
20 TABLET, EXTENDED RELEASE ORAL
Status: DISCONTINUED | OUTPATIENT
Start: 2020-09-12 | End: 2020-09-12 | Stop reason: HOSPADM

## 2020-09-12 RX ORDER — POTASSIUM CHLORIDE 7.45 MG/ML
40 INJECTION INTRAVENOUS
Status: DISCONTINUED | OUTPATIENT
Start: 2020-09-12 | End: 2020-09-12 | Stop reason: HOSPADM

## 2020-09-12 RX ORDER — MAGNESIUM SULFATE 1 G/100ML
1 INJECTION INTRAVENOUS
Status: DISCONTINUED | OUTPATIENT
Start: 2020-09-12 | End: 2020-09-12 | Stop reason: HOSPADM

## 2020-09-12 RX ORDER — ATENOLOL 25 MG/1
25 TABLET ORAL DAILY
Status: DISCONTINUED | OUTPATIENT
Start: 2020-09-12 | End: 2020-09-12 | Stop reason: HOSPADM

## 2020-09-12 RX ADMIN — LORAZEPAM 1 MG: 2 INJECTION INTRAMUSCULAR; INTRAVENOUS at 07:09

## 2020-09-12 RX ADMIN — VANCOMYCIN HYDROCHLORIDE 1500 MG: 1 INJECTION, POWDER, LYOPHILIZED, FOR SOLUTION INTRAVENOUS at 12:09

## 2020-09-12 RX ADMIN — HYDROCHLOROTHIAZIDE 25 MG: 25 TABLET ORAL at 02:09

## 2020-09-12 RX ADMIN — ATENOLOL 25 MG: 25 TABLET ORAL at 01:09

## 2020-09-12 RX ADMIN — PROCHLORPERAZINE EDISYLATE 10 MG: 5 INJECTION INTRAMUSCULAR; INTRAVENOUS at 07:09

## 2020-09-12 RX ADMIN — ACYCLOVIR SODIUM 480 MG: 500 INJECTION, SOLUTION INTRAVENOUS at 07:09

## 2020-09-12 RX ADMIN — LOSARTAN POTASSIUM 25 MG: 25 TABLET, FILM COATED ORAL at 01:09

## 2020-09-12 RX ADMIN — THERA TABS 1 TABLET: TAB at 01:09

## 2020-09-12 RX ADMIN — HYDRALAZINE HYDROCHLORIDE 5 MG: 20 INJECTION INTRAMUSCULAR; INTRAVENOUS at 02:09

## 2020-09-12 RX ADMIN — LORAZEPAM 1 MG: 2 INJECTION INTRAMUSCULAR; INTRAVENOUS at 12:09

## 2020-09-12 RX ADMIN — DIPHENHYDRAMINE HYDROCHLORIDE 25 MG: 50 INJECTION INTRAMUSCULAR; INTRAVENOUS at 07:09

## 2020-09-12 RX ADMIN — POTASSIUM CHLORIDE 40 MEQ: 20 TABLET, EXTENDED RELEASE ORAL at 11:09

## 2020-09-12 RX ADMIN — DIPHENHYDRAMINE HYDROCHLORIDE 25 MG: 50 INJECTION INTRAMUSCULAR; INTRAVENOUS at 12:09

## 2020-09-12 RX ADMIN — CEFTRIAXONE 2 G: 2 INJECTION, SOLUTION INTRAVENOUS at 11:09

## 2020-09-12 RX ADMIN — PROCHLORPERAZINE EDISYLATE 10 MG: 5 INJECTION INTRAMUSCULAR; INTRAVENOUS at 12:09

## 2020-09-12 NOTE — SUBJECTIVE & OBJECTIVE
Past Medical History:   Diagnosis Date    Benign hypertension     Bladder instability     Depression     DJD (degenerative joint disease), lumbar     History of viral meningitis 1996    Hyperlipidemia     Insomnia     Microscopic hematuria     negative work-up    Pulmonary nodule     minute    Tendonitis        Past Surgical History:   Procedure Laterality Date    ANTERIOR VAGINAL REPAIR      BREAST BIOPSY  6/5/2007    left breast benign    ENDOMETRIAL ABLATION  5/28/2009    HYSTERECTOMY      LAPAROSCOPIC HYSTERECTOMY  2/2013    TUBAL LIGATION  2002       Review of patient's allergies indicates:   Allergen Reactions    Latex      Other reaction(s): Rash       Current Facility-Administered Medications on File Prior to Encounter   Medication    [COMPLETED] iohexoL (OMNIPAQUE 350) 350 mg iodine/mL injection     Current Outpatient Medications on File Prior to Encounter   Medication Sig    ascorbic acid, vitamin C, (VITAMIN C) 1000 MG tablet Take 1,000 mg by mouth once daily.    aspirin (ADULT LOW DOSE ASPIRIN) 81 MG EC tablet Take 81 mg by mouth once daily.    atenolol (TENORMIN) 25 MG tablet Take 1 tablet (25 mg total) by mouth once daily.    cyclobenzaprine (FLEXERIL) 10 MG tablet Take 10 mg by mouth every evening.     estradioL (ESTRACE) 2 MG tablet Take 1 tablet (2 mg total) by mouth once daily.    fluticasone (FLONASE) 50 mcg/actuation nasal spray 1 spray by Each Nare route once daily.    hydroCHLOROthiazide (HYDRODIURIL) 25 MG tablet Take 1 tablet (25 mg total) by mouth once daily.    losartan (COZAAR) 25 MG tablet Take 1 tablet (25 mg total) by mouth once daily.    meloxicam (MOBIC) 15 MG tablet Take 15 mg by mouth once daily.     multivitamin (THERAGRAN) per tablet Take 1 tablet by mouth once daily. Every day    butalbital-acetaminophen-caffeine -40 mg (FIORICET, ESGIC) -40 mg per tablet Take 1 tablet by mouth every 6 (six) hours as needed for Headaches.     [DISCONTINUED] azelastine (ASTELIN) 137 mcg (0.1 %) nasal spray 2 sprays (274 mcg total) by Nasal route 2 (two) times daily.    [DISCONTINUED] buPROPion (WELLBUTRIN) 75 MG tablet Take 75 mg by mouth 2 (two) times daily.    [DISCONTINUED] ibuprofen (ADVIL,MOTRIN) 600 MG tablet Take 1 tablet (600 mg total) by mouth every 8 (eight) hours as needed for Pain. (Patient not taking: Reported on 2020)    [DISCONTINUED] oxybutynin (DITROPAN) 5 MG Tab Take 1 tablet (5 mg total) by mouth 2 (two) times daily. (Patient not taking: Reported on 2020)    [DISCONTINUED] potassium chloride SA (K-DUR,KLOR-CON) 10 MEQ tablet Take 20 mEq by mouth 2 (two) times daily.     Family History     Problem Relation (Age of Onset)    Allergies Mother, Sister    Breast cancer Paternal Grandmother (56), Maternal Aunt (61)    Cancer Paternal Grandmother    Diabetes Father, Paternal Grandmother    Heart disease Sister    Hyperlipidemia Mother, Father    Hypertension Mother, Father    Osteoarthritis Mother    Ovarian cancer Maternal Cousin (50)        Tobacco Use    Smoking status: Former Smoker     Packs/day: 0.25     Years: 10.00     Pack years: 2.50     Types: Cigarettes     Quit date: 10/20/2013     Years since quittin.8    Smokeless tobacco: Never Used   Substance and Sexual Activity    Alcohol use: Yes     Alcohol/week: 1.0 standard drinks     Types: 1 Glasses of wine per week     Frequency: 2-3 times a week     Drinks per session: 3 or 4     Binge frequency: Less than monthly    Drug use: No    Sexual activity: Not Currently     Partners: Male     Birth control/protection: Surgical     Review of Systems   All systems reviewed and are negative except as noted per above.    Objective:     Vital Signs (Most Recent):  Temp: 98.6 °F (37 °C) (20)  Pulse: 76 (20)  Resp: 18 (20)  BP: (!) 141/70 (20)  SpO2: 100 % (20) Vital Signs (24h Range):  Temp:  [97.6 °F (36.4 °C)-98.6 °F  (37 °C)] 98.6 °F (37 °C)  Pulse:  [67-77] 76  Resp:  [16-18] 18  SpO2:  [95 %-100 %] 100 %  BP: (121-160)/(59-87) 141/70     Weight: 84.4 kg (186 lb)  Body mass index is 35.14 kg/m².    Physical Exam      Gen: alert, responsive  HEENT:  Eyes - no pallor  External ears with no lesions  Nares patent  Mouth - lips chapped  CV: RRR  Lungs: CTA B/L  Abd: +BS, soft, NT, ND  Ext: no atrophy or edema  Skin: warm, dry  Neuro: alert, responsive, oriented, motor 5/5, sensation intact, no dysarthria or dysphagia or word-finding difficulties; pt reports she still is feeling brain fog however  Psych: pleasant    Significant Labs:     BMP:   Recent Labs   Lab 09/11/20  1525         K 3.3*      CO2 28   BUN 14   CREATININE 0.8   CALCIUM 9.1     CBC:   Recent Labs   Lab 09/11/20  1525   WBC 9.45   HGB 12.2   HCT 36.8*        CMP:   Recent Labs   Lab 09/11/20  1525      K 3.3*      CO2 28      BUN 14   CREATININE 0.8   CALCIUM 9.1   PROT 6.7   ALBUMIN 3.5   BILITOT 0.5   ALKPHOS 61   AST 23   ALT 20   ANIONGAP 9   EGFRNONAA >60.0     Troponin:   Recent Labs   Lab 09/11/20  1525   TROPONINI <0.030     TSH:   Recent Labs   Lab 08/07/20  1509   TSH 0.233*       Significant Imaging:    Imaging Results          MRI Brain Without Contrast (Final result)  Result time 09/11/20 19:16:00    Final result by Danny Gilliam MD (09/11/20 19:16:00)                 Narrative:    History: 50-year-old female with abnormal neural exam, acute headache. Follow-up abnormal contrast enhanced CT scan head.    PROCEDURE: Noncontrast MRI brain September 11, 2020. Noncontrast MRA brain, 3-D time-of-flight imaging September 11, 2020.    COMPARISON: CT scan with/without contrast September 11, 2020.    FINDINGS:  Noncontrast MRI brain: No areas of restricted diffusion identified. Prominent flow void noted level the anterior communicating artery, [place]. Flow voids maintained within the intracranial vasculature to  level of [place]. The ventricular system is of normal size shape and configuration. No T2 or FLAIR white matter signal hyperintensities identified. Gradient images unremarkable. The corpus callosum and craniocervical junction normal in appearance. No suprasellar masses appreciated. No intraorbital abnormalities appreciated. No opacification of the paranasal sinuses, mastoid air cells, middle ear cavities are external ear canals.        MRA BRAIN: There is a 4.3 mm aneurysm of the anterior communicating artery. No additional vascular anomalies to the [place] identified. The posterior communicating arteries are patent. No major branch vessel cut off evident.    IMPRESSION:  1. 4.3 mm aneurysm, level the anterior communicating artery.  2. Otherwise unremarkable MRI brain.    Electronically signed by:  Danny Gilliam MD  9/11/2020 8:59 PM CDT Workstation: [id number]                             MRA Brain without contrast (Final result)  Result time 09/11/20 19:16:00    Final result by Danny Gilliam MD (09/11/20 19:16:00)                 Narrative:    History: 50-year-old female with abnormal neural exam, acute headache. Follow-up abnormal contrast enhanced CT scan head.    PROCEDURE: Noncontrast MRI brain September 11, 2020. Noncontrast MRA brain, 3-D time-of-flight imaging September 11, 2020.    COMPARISON: CT scan with/without contrast September 11, 2020.    FINDINGS:  Noncontrast MRI brain: No areas of restricted diffusion identified. Prominent flow void noted level the anterior communicating artery, [place]. Flow voids maintained within the intracranial vasculature to level of [place]. The ventricular system is of normal size shape and configuration. No T2 or FLAIR white matter signal hyperintensities identified. Gradient images unremarkable. The corpus callosum and craniocervical junction normal in appearance. No suprasellar masses appreciated. No intraorbital abnormalities appreciated. No opacification of  the paranasal sinuses, mastoid air cells, middle ear cavities are external ear canals.        MRA BRAIN: There is a 4.3 mm aneurysm of the anterior communicating artery. No additional vascular anomalies to the [place] identified. The posterior communicating arteries are patent. No major branch vessel cut off evident.    IMPRESSION:  1. 4.3 mm aneurysm, level the anterior communicating artery.  2. Otherwise unremarkable MRI brain.    Electronically signed by:  Danny Gilliam MD  9/11/2020 8:59 PM CDT Workstation: [id number]                             X-Ray Chest AP Portable (Final result)  Result time 09/11/20 15:46:39    Final result by Cornelio Mix MD (09/11/20 15:46:39)                 Narrative:    XR CHEST AP PORTABLE    CLINICAL HISTORY:  50 years Female Chest Pain    COMPARISON: None    FINDINGS: Cardiac silhouette size is within normal limits. No  confluent airspace disease. Small calcified granulomata within the  lateral aspect of the right lung base. No large pleural effusion or  pneumothorax. No acute osseous abnormality.    IMPRESSION: No acute pulmonary process.    Electronically Signed by Cornelio FLORES on 9/11/2020 3:48 PM

## 2020-09-12 NOTE — HPI
49 yo AAF with PMH of well-controlled HTN presents with 4.3 mm cerebral aneurysm of the anterior communicating artery.  Seen by [facility] ED earlier this AM  She was discharged home with pain medication before the CT was read  They called her back with results and instructed her to come to ED  She initially presented with headache of 10/10 severity  This is the worst headache of her life   Located b/l frontal  Onset Monday, woke her up from sleep at 4AM  She tried conservative measures to control pain  Including fluids, nsaids, tylenol, caffeine, and sleep  Nothing improved headache and it continued to worsen  Wednesday she experienced RUE weakness + L facial numbness + brain fog  Of note, her aunt  in her 20s of a hemorraghic cerebral aneurysm   LP was attempted and failed 2x in the ED (she has calcified tendons per report)  She previously had viral meningitis years ago (when her daughter was born)  Pt denies fever now  +nausea but no vomiting  BP always controlled at home  No CP, no SOB, no LE edema  She does report neck stiffness to me  However this was relieved by ativan before my exam  And she states she hasn't had fever--she is scanned at work  However she has been taking round-the-clock tylenol  She also reports chills but attributes this to hot flashes  Neurosurgery and neurology were both consulted in ED

## 2020-09-12 NOTE — PLAN OF CARE
09/12/20 0757   Patient Assessment/Suction   Level of Consciousness (AVPU) alert   Respiratory Effort Unlabored   All Lung Fields Breath Sounds   (fairly clear)   PRE-TX-O2   O2 Device (Oxygen Therapy) room air   SpO2 98 %   Pulse Oximetry Type Continuous   $ Pulse Oximetry - Multiple Charge Pulse Oximetry - Multiple   Pulse 86   Resp 20   Respiratory Evaluation   $ Care Plan Tech Time 15 min

## 2020-09-12 NOTE — NURSING
Pt resting comfortably, vs monitored this shift, care explained choices provided. Safety precautions maintained.

## 2020-09-12 NOTE — DISCHARGE SUMMARY
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  Seen by [facility] ED earlier this AM  She was discharged home with pain medication before the CT was read  They called her back with results and instructed her to come to ED  She initially presented with headache of 10/10 severity  This is the worst headache of her life   Located b/l frontal  Onset Monday, woke her up from sleep at 4AM  She tried conservative measures to control pain  Including fluids, nsaids, tylenol, caffeine, and sleep  Nothing improved headache and it continued to worsen  Wednesday she experienced RUE weakness + L facial numbness + brain fog  Of note, her aunt  in her 20s of a hemorraghic cerebral aneurysm   LP was attempted and failed 2x in the ED (she has calcified tendons per report)  She previously had viral meningitis years ago (when her daughter was born)  Pt denies fever now  +nausea but no vomiting  BP always controlled at home  No CP, no SOB, no LE edema  She does report neck stiffness to me  However this was relieved by ativan before my exam  And she states she hasn't had fever--she is scanned at work  However she has been taking round-the-clock tylenol  She also reports chills but attributes this to hot flashes  Neurosurgery and neurology were both consulted in ED        * No surgery found *      Hospital Course:   Patient reports pressure-like bifrontal headache, radiating down sides of her face and sides of her neck associated with intermittent tingling and weakness to the right upper extremity.   Denies any history of head trauma, similar headaches in the past, states she was previously seen by urgent care, diagnosed with dehydration, states due to insurance issue recommendation was CT head however was not able to be performed.  Subsequently saw an MD at [facility] who did order CT head, was called 4 hours later with instructions to present emergently to the ED.  She reports a paternal aunt had history cerebral aneurysm.  She had multiple imaging studies in the ED which demonstrated 4.3 left anterior communicating artery aneurysm.  Given the headache there was a concern for possible subarachnoid, ED physician did attempt LP however was unsuccessful, patient was ultimately admitted to the ICU with close neuro checks, Neurology and Neurosurgery was consulted.  On 09/12 patient underwent IR LP with 0 RBC, 0 WBC, clear, protein 42, glucose 52.  Headache has now improved.  Blood pressure is controlled.  No further focal weakness or numbness.  She was seen and cleared by consultants for discharge.  Medically stable for discharge.  Discharge plan including close monitoring of blood pressure and continuing antihypertensives, return precaution as well as follow-up was discussed with patient and daughter present at bedside.  No objection to discharge.  All questions/concerns were addressed.  Educational material was provided.    Discharge examination  Sitting in bed eating lunch, alert and oriented x3, nonfocal examination  Regular heart rhythm  Not on supplemental oxygen  Dickerson catheter in place    Discharge recommendations  Continue home antihypertensives and monitoring of BP  Has appointment with neurovascular Dr. Quintana on Monday  Follow-up neurology and primary care     Consults:   Consults (From admission, onward)        Status Ordering Provider     Inpatient consult to Hospitalist  Once     Provider:  Lamar Colin MD    Acknowledged LAMAR COLIN     Inpatient consult to neurology  Once     Provider:  Den  oPnce Lopez MD    Acknowledged AURE VELASQUEZ     Inpatient consult to Neurosurgery  Once     Provider:  Jasmeet Caputo MD    Completed AURE VELASQUEZ     Pharmacy to dose Vancomycin consult  Once     Provider:  (Not yet assigned)    Acknowledged AURE VELASQUEZ          No new Assessment & Plan notes have been filed under this hospital service since the last note was generated.  Service: Hospital Medicine    Final Active Diagnoses:    Diagnosis Date Noted POA    PRINCIPAL PROBLEM:  Headache [R51] 09/12/2020 Yes    Class 2 obesity in adult [E66.9] 09/12/2020 Yes     Chronic    Hypokalemia [E87.6] 09/12/2020 Yes    Cerebral aneurysm without rupture, left anterior communicating 4.3 [I67.1] 09/12/2020 Yes     Chronic    Subclinical hyperthyroidism [E05.90] 08/07/2020 Yes    Depression with anxiety [F41.8] 05/07/2015 Yes      Problems Resolved During this Admission:       Discharged Condition: good    Disposition: Home or Self Care    Follow Up:  Follow-up Information     Christi Page MD. Go on 9/14/2020.    Specialty: Neurosurgery  Why: Post hospital follow-up, aneurysm  Contact information:  [address] [facility] [address]  [phone]             Den Lopez MD In 1 week.    Specialties: Vascular Neurology, Neurology  Why: Post hospital follow-up  Contact information:  [address] [address]  [phone]                 Patient Instructions:      Diet Cardiac     Notify your health care provider if you experience any of the following:  temperature >100.4     Notify your health care provider if you experience any of the following:  persistent nausea and vomiting or diarrhea     Notify your health care provider if you experience any of the following:  severe uncontrolled pain     Notify your health care provider if you experience any of the following:  difficulty breathing or increased cough     Notify your health care provider if you experience any of the following:   redness, tenderness, or signs of infection (pain, swelling, redness, odor or green/yellow discharge around incision site)     Notify your health care provider if you experience any of the following:  severe persistent headache     Notify your health care provider if you experience any of the following:  persistent dizziness, light-headedness, or visual disturbances     Notify your health care provider if you experience any of the following:  increased confusion or weakness     Activity as tolerated       Significant Diagnostic Studies: Labs:   BMP:   Recent Labs   Lab 09/11/20  1525 09/12/20  0219    150*    137   K 3.3* 3.4*    101   CO2 28 27   BUN 14 10   CREATININE 0.8 0.6   CALCIUM 9.1 8.6*   MG  --  2.0   , CMP   Recent Labs   Lab 09/11/20  1525 09/12/20  0219    137   K 3.3* 3.4*    101   CO2 28 27    150*   BUN 14 10   CREATININE 0.8 0.6   CALCIUM 9.1 8.6*   PROT 6.7  --    ALBUMIN 3.5  --    BILITOT 0.5  --    ALKPHOS 61  --    AST 23  --    ALT 20  --    ANIONGAP 9 9   ESTGFRAFRICA >60.0 >60.0   EGFRNONAA >60.0 >60.0   , CBC   Recent Labs   Lab 09/11/20  1525 09/12/20 0219   WBC 9.45 10.35   HGB 12.2 12.3   HCT 36.8* 36.8*    281   , INR No results found for: INR, PROTIME, Lipid Panel   Lab Results   Component Value Date    CHOL 219 (H) 01/06/2020    HDL 51 01/06/2020    LDLCALC 150.8 01/06/2020    TRIG 86 01/06/2020    CHOLHDL 23.3 01/06/2020   , Troponin   Recent Labs   Lab 09/11/20  1525   TROPONINI <0.030   , A1C:   Recent Labs   Lab 09/11/20  2238   HGBA1C 5.6    and All labs within the past 24 hours have been reviewed    Pending Diagnostic Studies:     None       Ct Head W Wo Contrast    Result Date: 9/11/2020  EXAMINATION: CT HEAD WITH AND WITHOUT CLINICAL HISTORY: headache;  Headache TECHNIQUE: Transaxial images through the head before and after the administration of 75 cc Omnipaque 350 IV contrast.  Multiplanar reformats. COMPARISON: None FINDINGS:  Normal size of the ventricular system. No hemorrhage or major vascular distribution infarct. No intra or extra-axial mass. No mass effect or midline shift. Included orbits are unremarkable. Paranasal sinuses and mastoid air cells are clear. No acute osseous abnormality.  There is a 4 mm aneurysm arising along the proximal left MILI A2 segment measuring 4 mm.  This is near the expected region of the anterior communicating artery, if present.  Findings best seen axial series 5, image 9, coronal series 602, image 56, sagittal series 601, image 53.     Subcentimeter aneurysm along the left anterior cerebral artery.  Conventional angiography or CTA could add further characterization. This report was flagged in Epic as abnormal. Electronically signed by: Wisam York Date:    09/11/2020 Time:    12:21    Mra Brain Without Contrast    Result Date: 9/11/2020  History: 50-year-old female with abnormal neural exam, acute headache. Follow-up abnormal contrast enhanced CT scan head. PROCEDURE: Noncontrast MRI brain September 11, 2020. Noncontrast MRA brain, 3-D time-of-flight imaging September 11, 2020. COMPARISON: CT scan with/without contrast September 11, 2020. FINDINGS: Noncontrast MRI brain: No areas of restricted diffusion identified. Prominent flow void noted level the anterior communicating artery, [place]. Flow voids maintained within the intracranial vasculature to level of [place]. The ventricular system is of normal size shape and configuration. No T2 or FLAIR white matter signal hyperintensities identified. Gradient images unremarkable. The corpus callosum and craniocervical junction normal in appearance. No suprasellar masses appreciated. No intraorbital abnormalities appreciated. No opacification of the paranasal sinuses, mastoid air cells, middle ear cavities are external ear canals. MRA BRAIN: There is a 4.3 mm aneurysm of the anterior communicating artery. No additional vascular anomalies to the  [place] identified. The posterior communicating arteries are patent. No major branch vessel cut off evident. IMPRESSION: 1. 4.3 mm aneurysm, level the anterior communicating artery. 2. Otherwise unremarkable MRI brain. Electronically signed by:  Danny Gilliam MD  9/11/2020 8:59 PM CDT Workstation: [id number]    Mri Brain Without Contrast    Result Date: 9/11/2020  History: 50-year-old female with abnormal neural exam, acute headache. Follow-up abnormal contrast enhanced CT scan head. PROCEDURE: Noncontrast MRI brain September 11, 2020. Noncontrast MRA brain, 3-D time-of-flight imaging September 11, 2020. COMPARISON: CT scan with/without contrast September 11, 2020. FINDINGS: Noncontrast MRI brain: No areas of restricted diffusion identified. Prominent flow void noted level the anterior communicating artery, [place]. Flow voids maintained within the intracranial vasculature to level of [place]. The ventricular system is of normal size shape and configuration. No T2 or FLAIR white matter signal hyperintensities identified. Gradient images unremarkable. The corpus callosum and craniocervical junction normal in appearance. No suprasellar masses appreciated. No intraorbital abnormalities appreciated. No opacification of the paranasal sinuses, mastoid air cells, middle ear cavities are external ear canals. MRA BRAIN: There is a 4.3 mm aneurysm of the anterior communicating artery. No additional vascular anomalies to the [place] identified. The posterior communicating arteries are patent. No major branch vessel cut off evident. IMPRESSION: 1. 4.3 mm aneurysm, level the anterior communicating artery. 2. Otherwise unremarkable MRI brain. Electronically signed by:  Danny Gilliam MD  9/11/2020 8:59 PM CDT Workstation: [id number]    X-ray Chest Ap Portable    Result Date: 9/11/2020  XR CHEST AP PORTABLE CLINICAL HISTORY: 50 years Female Chest Pain COMPARISON: None FINDINGS: Cardiac silhouette size is within  normal limits. No confluent airspace disease. Small calcified granulomata within the lateral aspect of the right lung base. No large pleural effusion or pneumothorax. No acute osseous abnormality. IMPRESSION: No acute pulmonary process. Electronically Signed by Cornelio FLORES on 9/11/2020 3:48 PM    Ir Lumbar Puncture Diagnostic    Result Date: 9/12/2020  EXAMINATION: IR LUMBAR PUNCTURE DIAGNOSTIC (XPD) CLINICAL HISTORY: rule out SAH;    4 mm aneurysm at the level of the anterior communicating artery TECHNIQUE: CMS MANDATED QUALITY [id number] Total fluoroscopic time for the procedure was 4 minutes 2 seconds, with 5 images obtained. COMPARISON: None. FINDINGS: After obtaining written informed consent, which included a discussion of the risks and benefits of the procedure to include infection, bleeding, nerve injury, bleeding into the spine requiring surgery, paralysis, and postprocedural headache, and allowing the patient the opportunity to ask questions, the patient agreed to the procedure. The patient was placed prone on the fluoroscopy table, with a suitable skin entrance site overlying the L3 interspace localized. The skin was marked, and then prepped and draped in sterile fashion, with several milliliters of lidocaine 1% injected subcutaneously to achieve adequate local anesthesia. Following, a 24 gauge coaxial spinal needle was advanced under intermittent fluoroscopic guidance into the thecal sac, with intrathecal positioning confirmed with return of clear, colorless CSF. A total of 8 mL of cerebrospinal fluid was collected in 4 vials. The needle was removed and a bandage was applied to the skin. The patient tolerated the procedure well, with no immediate postprocedural complications observed.     Successful fluoroscopically guided lumbar puncture. Electronically signed by: Alyssa Herrera MD Date:    09/12/2020 Time:    10:46    Medications:  Reconciled Home Medications:      Medication List       CONTINUE taking these medications    ADULT LOW DOSE ASPIRIN 81 MG EC tablet  Generic drug: aspirin  Take 81 mg by mouth once daily.     atenoloL 25 MG tablet  Commonly known as: TENORMIN  Take 1 tablet (25 mg total) by mouth once daily.     butalbital-acetaminophen-caffeine -40 mg -40 mg per tablet  Commonly known as: FIORICET, ESGIC  Take 1 tablet by mouth every 6 (six) hours as needed for Headaches.     cyclobenzaprine 10 MG tablet  Commonly known as: FLEXERIL  Take 10 mg by mouth every evening.     estradioL 2 MG tablet  Commonly known as: ESTRACE  Take 1 tablet (2 mg total) by mouth once daily.     fluticasone propionate 50 mcg/actuation nasal spray  Commonly known as: FLONASE  1 spray by Each Nare route once daily.     hydroCHLOROthiazide 25 MG tablet  Commonly known as: HYDRODIURIL  Take 1 tablet (25 mg total) by mouth once daily.     losartan 25 MG tablet  Commonly known as: COZAAR  Take 1 tablet (25 mg total) by mouth once daily.     multivitamin per tablet  Commonly known as: THERAGRAN  Take 1 tablet by mouth once daily. Every day     VITAMIN C 1000 MG tablet  Generic drug: ascorbic acid (vitamin C)  Take 1,000 mg by mouth once daily.        STOP taking these medications    meloxicam 15 MG tablet  Commonly known as: MOBIC     WELLBUTRIN 75 MG tablet  Generic drug: buPROPion            Indwelling Lines/Drains at time of discharge:   Lines/Drains/Airways     Drain                 Urethral Catheter 09/11/20 7579 less than 1 day                Time spent on the discharge of patient: 33 minutes  Patient was seen and examined on the date of discharge and determined to be suitable for discharge.         Shawnee Bernal MD  Department of Hospital Medicine  [facility]

## 2020-09-12 NOTE — PROGRESS NOTES
[facility]  Neurology  Progress Note    Patient Name: Kylah Mohan  MRN: [id number]  Admission Date: 9/11/2020  Hospital Length of Stay: 1 days  Code Status: Full Code   Attending Provider: Shawnee Bernal MD  Primary Care Physician: Kyle Padilla MD   Principal Problem:Subarachnoid bleed    Subjective:     Interval History: Patient seen and examined in room this morning with Dr. Ponce Lopez, nurse and daughter at bedside. Patient states she still has mild HA but it is better than what it was.     Brain imaging   MRI brain w/o contrast: 1. 4.3 mm aneurysm, level the anterior communicating artery.    MRA brain w/o contrast: . 4.3 mm aneurysm, level the anterior communicating artery.    LP: no evidence of SAH                          Current Neurological Medications:     Current Facility-Administered Medications   Medication Dose Route Frequency Provider Last Rate Last Dose    0.9%  NaCl infusion   Intravenous Continuous Lamar Colin  mL/hr at 09/12/20 0800      acyclovir (ZOVIRAX) 480 mg in dextrose 5 % 100 mL IVPB  10 mg/kg (Ideal) Intravenous Q8H Lamar Colin  mL/hr at 09/12/20 0747 480 mg at 09/12/20 0747    albuterol-ipratropium 2.5 mg-0.5 mg/3 mL nebulizer solution 3 mL  3 mL Nebulization Q4H PRN Lamar Colin MD        calcium chloride 1 g in dextrose 5 % 100 mL IVPB  1 g Intravenous PRN Lamar Colin MD        calcium chloride 1 g in dextrose 5 % 100 mL IVPB  1 g Intravenous PRN Lamar Colin MD        calcium chloride 1 g in dextrose 5 % 100 mL IVPB  1 g Intravenous PRN Lamar Colin MD        cefTRIAXone (ROCEPHIN) 2 g/50 mL D5W IVPB  2 g Intravenous Q12H Lamar Colin MD   2 g at 09/11/20 2303    dextrose 50% injection 12.5 g  12.5 g Intravenous PRN Lamar Colin MD        dextrose 50% injection 25 g  25 g Intravenous PRN Lamar Colin MD        glucagon (human recombinant) injection 1 mg  1 mg Intramuscular PRN Lamar Colin MD         glucose chewable tablet 16 g  16 g Oral PRN Lamar Colin MD        glucose chewable tablet 24 g  24 g Oral PRN Lamar Colin MD        hydrALAZINE injection 10 mg  10 mg Intravenous Q4H PRN Lamar Colin MD        hydrALAZINE injection 5 mg  5 mg Intravenous Q4H PRN Lamar Colin MD   5 mg at 09/12/20 0254    magnesium oxide tablet 800 mg  800 mg Oral PRN Lamar Colin MD        magnesium sulfate 2g in water 50mL IVPB (premix)  2 g Intravenous PRN Lamar Colin MD        magnesium sulfate 2g in water 50mL IVPB (premix)  4 g Intravenous PRN Lamar Colin MD        magnesium sulfate 2g in water 50mL IVPB (premix)  2 g Intravenous PRN Lamar Colin MD        magnesium sulfate in dextrose IVPB (premix) 1 g  1 g Intravenous PRN Lamar Colin MD        melatonin tablet 6 mg  6 mg Oral Nightly PRN Lamar Colin MD        morphine injection 4 mg  4 mg Intravenous Q4H PRN Lamar Colin MD        ondansetron injection 4 mg  4 mg Intravenous Q8H PRN Lamar Colin MD        polyethylene glycol packet 17 g  17 g Oral BID PRN Lamar Colin MD        potassium chloride 10 mEq in 100 mL IVPB  20 mEq Intravenous PRN Lamar Colin MD        potassium chloride 10 mEq in 100 mL IVPB  40 mEq Intravenous PRN Lamar Colin MD        potassium chloride 10 mEq in 100 mL IVPB  20 mEq Intravenous PRN Lamar Colin MD        potassium chloride 10 mEq in 100 mL IVPB  40 mEq Intravenous PRN Lamar Colin MD        potassium chloride SA CR tablet 20 mEq  20 mEq Oral PRN Lamar Colin MD        potassium chloride SA CR tablet 20 mEq  20 mEq Oral PRN Lamar Colin MD        potassium chloride SA CR tablet 40 mEq  40 mEq Oral PRN Lamar Colin MD        potassium chloride SA CR tablet 40 mEq  40 mEq Oral PRN Lamar Colin MD        sodium chloride 0.9% flush 10 mL  10 mL Intravenous PRN Lamar Colin MD        sodium phosphate 15 mmol in dextrose 5 % 250 mL  IVPB  15 mmol Intravenous PRN Lamar Colin MD        sodium phosphate 15 mmol in dextrose 5 % 250 mL IVPB  15 mmol Intravenous PRN Lamar Colin MD        sodium phosphate 20.01 mmol in dextrose 5 % 250 mL IVPB  20.01 mmol Intravenous PRN Lamar Colin MD        sodium phosphate 20.01 mmol in dextrose 5 % 250 mL IVPB  20.01 mmol Intravenous PRN Lamar Colin MD        sodium phosphate 30 mmol in dextrose 5 % 250 mL IVPB  30 mmol Intravenous PRN Lamar Colin MD        vancomycin (VANCOCIN) 1,500 mg in dextrose 5 % 500 mL IVPB  1,500 mg Intravenous Q12H Lamar Colin MD   1,500 mg at 09/12/20 0026    vancomycin - pharmacy to dose   Intravenous pharmacy to manage frequency Lamar Colin MD           Review of Systems   As per HPI   Objective:     Vital Signs (Most Recent):  Temp: 98.2 °F (36.8 °C) (09/12/20 0800)  Pulse: 89 (09/12/20 0800)  Resp: (!) 25 (09/12/20 0800)  BP: 130/60 (09/12/20 0800)  SpO2: 96 % (09/12/20 0800) Vital Signs (24h Range):  Temp:  [97.6 °F (36.4 °C)-98.6 °F (37 °C)] 98.2 °F (36.8 °C)  Pulse:  [67-89] 89  Resp:  [16-25] 25  SpO2:  [95 %-100 %] 96 %  BP: (121-161)/(59-87) 130/60     Weight: 86.7 kg (191 lb 2.2 oz)  Body mass index is 36.12 kg/m².    Physical Exam  Vitals signs and nursing note reviewed.   Constitutional:       Appearance: Normal appearance.   HENT:      Head: Normocephalic and atraumatic.      Eyes:      General: Lids are normal.      Extraocular Movements: Extraocular movements intact.      Pupils: Pupils are equal, round, and reactive to light.   Cardiovascular:      Rate and Rhythm: Normal rate and regular rhythm.   Pulmonary:      Effort: Pulmonary effort is normal.      Breath sounds: Normal breath sounds.   Abdominal:      Palpations: Abdomen is soft.   Musculoskeletal: Normal range of motion.   Skin:     General: Skin is warm and dry.      Capillary Refill: Capillary refill takes 2 to 3 seconds.   Neurological:      General: No focal deficit  present.      Mental Status: She is alert.         Cranial Nerves: Cranial nerves are intact.      Sensory: Sensation is intact.      Motor: Motor function is intact.      Coordination: Coordination is intact. Finger-Nose-Finger Test normal.   Psychiatric:         Attention and Perception: Attention and perception normal.         Mood and Affect: Mood normal.         Speech: Speech normal.         Behavior: Behavior normal. Behavior is cooperative.         Cognition and Memory: Cognition and memory normal.         Judgment: Judgment normal.        Significant Labs:   Hemoglobin A1c:   Recent Labs   Lab 09/11/20  2238   HGBA1C 5.6     CBC:   Recent Labs   Lab 09/11/20  1525 09/12/20  0219   WBC 9.45 10.35   HGB 12.2 12.3   HCT 36.8* 36.8*    281     CMP:   Recent Labs   Lab 09/11/20  1525 09/12/20 0219    150*    137   K 3.3* 3.4*    101   CO2 28 27   BUN 14 10   CREATININE 0.8 0.6   CALCIUM 9.1 8.6*   MG  --  2.0   PROT 6.7  --    ALBUMIN 3.5  --    BILITOT 0.5  --    ALKPHOS 61  --    AST 23  --    ALT 20  --    ANIONGAP 9 9   EGFRNONAA >60.0 >60.0     All pertinent lab results from the past 24 hours have been reviewed.    Significant Imaging: I have reviewed all pertinent imaging results/findings within the past 24 hours.    Assessment and Plan:  Rule out SAH  Hypertensive urgency      - CT head W/WO contrast- Sub centimeter aneurysm along the left anterior cerebral artery.- Neurosurgery consulted by ED physician.    - MRA Brain- 4.3mm aneurysm, level the anterior communicating surgery.    - MRI Brain- No areas of  Restricted diffusion identified. Prominent flow void noted level the anterior communicating artery, [place]. Flow voids maintained within the intracranial vasculature to level of [place]. The ventricular system is of normal size shape and configuration. No T2 or FLAIR white  Matter  Signal hypertensities identified. No suprasellar masses masses appreciated.    - LP-  results noted  - Frequent neuro checks.    - Optimal BP control to keep SBP < 160.  -HA may be result of hypertensive urgency   -Follow NS recs for aneurysm             [facility] at [phone] within 3 days from discharge.     Stroke education was provided including stroke risk factors modification and any acute neurological changes including weakness, confusion, visual changes to come straight to the ER.     All questions were answered.                                    Active Diagnoses:    Diagnosis Date Noted POA    PRINCIPAL PROBLEM:  Subarachnoid bleed [I60.9] 09/11/2020 Yes    Subclinical hyperthyroidism [E05.90] 08/07/2020 Yes    Depression with anxiety [F41.8] 05/07/2015 Yes    Good hypertension control [I10] 09/19/2014 Yes      Problems Resolved During this Admission:       VTE Risk Mitigation (From admission, onward)         Ordered     IP VTE HIGH RISK PATIENT  Once      09/11/20 2048     Place sequential compression device  Until discontinued      09/11/20 2048     Reason for No Pharmacological VTE Prophylaxis  Once     Question:  Reasons:  Answer:  Risk of Bleeding    09/11/20 2048                Mica Donohue DNP  Neurology  [facility]

## 2020-09-12 NOTE — NURSING
To room per stretcher. Ambulated to bed without difficulty. Connected to cardiac monitor. Oriented to environment and POC. Voiced understanding. Denies pain or discomfort at this time. V/S stable.

## 2020-09-12 NOTE — HOSPITAL COURSE
Patient reports pressure-like bifrontal headache, radiating down sides of her face and sides of her neck associated with intermittent tingling and weakness to the right upper extremity.  Denies any history of head trauma, similar headaches in the past, states she was previously seen by urgent care, diagnosed with dehydration, states due to insurance issue recommendation was CT head however was not able to be performed.  Subsequently saw an MD at [facility] who did order CT head, was called 4 hours later with instructions to present emergently to the ED.  She reports a paternal aunt had history cerebral aneurysm.  She had multiple imaging studies in the ED which demonstrated 4.3 left anterior communicating artery aneurysm.  Given the headache there was a concern for possible subarachnoid, ED physician did attempt LP however was unsuccessful, patient was ultimately admitted to the ICU with close neuro checks, Neurology and Neurosurgery was consulted.  On 09/12 patient underwent IR LP with 0 RBC, 0 WBC, clear, protein 42, glucose 52.  Headache has now improved.  Blood pressure is controlled.  No further focal weakness or numbness.  She was seen and cleared by consultants for discharge.  Medically stable for discharge.  Discharge plan including close monitoring of blood pressure and continuing antihypertensives, return precaution as well as follow-up was discussed with patient and daughter present at bedside.  No objection to discharge.  All questions/concerns were addressed.  Educational material was provided.    Discharge examination  Sitting in bed eating lunch, alert and oriented x3, nonfocal examination  Regular heart rhythm  Not on supplemental oxygen  Dickerson catheter in place    Discharge recommendations  Continue home antihypertensives and monitoring of BP  Has appointment with neurovascular Dr. Quintana on Monday  Follow-up neurology and primary care

## 2020-09-12 NOTE — H&P
[facility]  History & Physical    Patient Name: Kylah Mohan  MRN: [id number]  Admission Date: 2020  Attending Physician: Lamar Colin MD   Primary Care Provider: Kyle Padilla MD     Patient information was obtained from patient, relative(s), past medical records, ED physician and ER records.     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  Seen by [facility] ED earlier this AM  She was discharged home with pain medication before the CT was read  They called her back with results and instructed her to come to ED  She initially presented with headache of 10/10 severity  This is the worst headache of her life   Located b/l frontal  Onset Monday, woke her up from sleep at 4AM  She tried conservative measures to control pain  Including fluids, nsaids, tylenol, caffeine, and sleep  Nothing improved headache and it continued to worsen  Wednesday she experienced RUE weakness + L facial numbness + brain fog  Of note, her aunt  in her 20s of a hemorraghic cerebral aneurysm   LP was attempted and failed 2x in the ED (she has calcified tendons per report)  She previously had viral meningitis years ago (when her daughter was born)  Pt denies fever now  +nausea but no vomiting  BP always controlled at home  No CP, no SOB, no LE edema  She does report neck stiffness to me  However this was relieved by ativan before my exam  And she states she hasn't had fever--she is scanned at work  However she has been taking round-the-clock tylenol  She also reports chills but attributes this to hot flashes  Neurosurgery and neurology were both consulted in ED        Past Medical History:   Diagnosis Date     Benign hypertension     Bladder instability     Depression     DJD (degenerative joint disease), lumbar     History of viral meningitis 1996    Hyperlipidemia     Insomnia     Microscopic hematuria     negative work-up    Pulmonary nodule     minute    Tendonitis        Past Surgical History:   Procedure Laterality Date    ANTERIOR VAGINAL REPAIR      BREAST BIOPSY  6/5/2007    left breast benign    ENDOMETRIAL ABLATION  5/28/2009    HYSTERECTOMY      LAPAROSCOPIC HYSTERECTOMY  2/2013    TUBAL LIGATION  2002       Review of patient's allergies indicates:   Allergen Reactions    Latex      Other reaction(s): Rash       Current Facility-Administered Medications on File Prior to Encounter   Medication    [COMPLETED] iohexoL (OMNIPAQUE 350) 350 mg iodine/mL injection     Current Outpatient Medications on File Prior to Encounter   Medication Sig    ascorbic acid, vitamin C, (VITAMIN C) 1000 MG tablet Take 1,000 mg by mouth once daily.    aspirin (ADULT LOW DOSE ASPIRIN) 81 MG EC tablet Take 81 mg by mouth once daily.    atenolol (TENORMIN) 25 MG tablet Take 1 tablet (25 mg total) by mouth once daily.    cyclobenzaprine (FLEXERIL) 10 MG tablet Take 10 mg by mouth every evening.     estradioL (ESTRACE) 2 MG tablet Take 1 tablet (2 mg total) by mouth once daily.    fluticasone (FLONASE) 50 mcg/actuation nasal spray 1 spray by Each Nare route once daily.    hydroCHLOROthiazide (HYDRODIURIL) 25 MG tablet Take 1 tablet (25 mg total) by mouth once daily.    losartan (COZAAR) 25 MG tablet Take 1 tablet (25 mg total) by mouth once daily.    meloxicam (MOBIC) 15 MG tablet Take 15 mg by mouth once daily.     multivitamin (THERAGRAN) per tablet Take 1 tablet by mouth once daily. Every day    butalbital-acetaminophen-caffeine -40 mg (FIORICET, ESGIC) -40 mg per tablet Take 1 tablet by mouth every 6 (six) hours as needed for Headaches.    [DISCONTINUED] azelastine (ASTELIN) 137 mcg (0.1 %)  nasal spray 2 sprays (274 mcg total) by Nasal route 2 (two) times daily.    [DISCONTINUED] buPROPion (WELLBUTRIN) 75 MG tablet Take 75 mg by mouth 2 (two) times daily.    [DISCONTINUED] ibuprofen (ADVIL,MOTRIN) 600 MG tablet Take 1 tablet (600 mg total) by mouth every 8 (eight) hours as needed for Pain. (Patient not taking: Reported on 2020)    [DISCONTINUED] oxybutynin (DITROPAN) 5 MG Tab Take 1 tablet (5 mg total) by mouth 2 (two) times daily. (Patient not taking: Reported on 2020)    [DISCONTINUED] potassium chloride SA (K-DUR,KLOR-CON) 10 MEQ tablet Take 20 mEq by mouth 2 (two) times daily.     Family History     Problem Relation (Age of Onset)    Allergies Mother, Sister    Breast cancer Paternal Grandmother (56), Maternal Aunt (61)    Cancer Paternal Grandmother    Diabetes Father, Paternal Grandmother    Heart disease Sister    Hyperlipidemia Mother, Father    Hypertension Mother, Father    Osteoarthritis Mother    Ovarian cancer Maternal Cousin (50)        Tobacco Use    Smoking status: Former Smoker     Packs/day: 0.25     Years: 10.00     Pack years: 2.50     Types: Cigarettes     Quit date: 10/20/2013     Years since quittin.8    Smokeless tobacco: Never Used   Substance and Sexual Activity    Alcohol use: Yes     Alcohol/week: 1.0 standard drinks     Types: 1 Glasses of wine per week     Frequency: 2-3 times a week     Drinks per session: 3 or 4     Binge frequency: Less than monthly    Drug use: No    Sexual activity: Not Currently     Partners: Male     Birth control/protection: Surgical     Review of Systems   All systems reviewed and are negative except as noted per above.    Objective:     Vital Signs (Most Recent):  Temp: 98.6 °F (37 °C) (20)  Pulse: 76 (20)  Resp: 18 (20)  BP: (!) 141/70 (20)  SpO2: 100 % (20) Vital Signs (24h Range):  Temp:  [97.6 °F (36.4 °C)-98.6 °F (37 °C)] 98.6 °F (37 °C)  Pulse:  [67-77] 76  Resp:   [16-18] 18  SpO2:  [95 %-100 %] 100 %  BP: (121-160)/(59-87) 141/70     Weight: 84.4 kg (186 lb)  Body mass index is 35.14 kg/m².    Physical Exam      Gen: alert, responsive  HEENT:  Eyes - no pallor  External ears with no lesions  Nares patent  Mouth - lips chapped  CV: RRR  Lungs: CTA B/L  Abd: +BS, soft, NT, ND  Ext: no atrophy or edema  Skin: warm, dry  Neuro: alert, responsive, oriented, motor 5/5, sensation intact, no dysarthria or dysphagia or word-finding difficulties; pt reports she still is feeling brain fog however  Psych: pleasant    Significant Labs:     BMP:   Recent Labs   Lab 09/11/20  1525         K 3.3*      CO2 28   BUN 14   CREATININE 0.8   CALCIUM 9.1     CBC:   Recent Labs   Lab 09/11/20  1525   WBC 9.45   HGB 12.2   HCT 36.8*        CMP:   Recent Labs   Lab 09/11/20  1525      K 3.3*      CO2 28      BUN 14   CREATININE 0.8   CALCIUM 9.1   PROT 6.7   ALBUMIN 3.5   BILITOT 0.5   ALKPHOS 61   AST 23   ALT 20   ANIONGAP 9   EGFRNONAA >60.0     Troponin:   Recent Labs   Lab 09/11/20  1525   TROPONINI <0.030     TSH:   Recent Labs   Lab 08/07/20  1509   TSH 0.233*       Significant Imaging:    Imaging Results          MRI Brain Without Contrast (Final result)  Result time 09/11/20 19:16:00    Final result by Danny Gilliam MD (09/11/20 19:16:00)                 Narrative:    History: 50-year-old female with abnormal neural exam, acute headache. Follow-up abnormal contrast enhanced CT scan head.    PROCEDURE: Noncontrast MRI brain September 11, 2020. Noncontrast MRA brain, 3-D time-of-flight imaging September 11, 2020.    COMPARISON: CT scan with/without contrast September 11, 2020.    FINDINGS:  Noncontrast MRI brain: No areas of restricted diffusion identified. Prominent flow void noted level the anterior communicating artery, [place]. Flow voids maintained within the intracranial vasculature to level of [place]. The ventricular system is of normal  size shape and configuration. No T2 or FLAIR white matter signal hyperintensities identified. Gradient images unremarkable. The corpus callosum and craniocervical junction normal in appearance. No suprasellar masses appreciated. No intraorbital abnormalities appreciated. No opacification of the paranasal sinuses, mastoid air cells, middle ear cavities are external ear canals.        MRA BRAIN: There is a 4.3 mm aneurysm of the anterior communicating artery. No additional vascular anomalies to the [place] identified. The posterior communicating arteries are patent. No major branch vessel cut off evident.    IMPRESSION:  1. 4.3 mm aneurysm, level the anterior communicating artery.  2. Otherwise unremarkable MRI brain.    Electronically signed by:  Danny Gilliam MD  9/11/2020 8:59 PM CDT Workstation: [id number]                             MRA Brain without contrast (Final result)  Result time 09/11/20 19:16:00    Final result by Danny Gilliam MD (09/11/20 19:16:00)                 Narrative:    History: 50-year-old female with abnormal neural exam, acute headache. Follow-up abnormal contrast enhanced CT scan head.    PROCEDURE: Noncontrast MRI brain September 11, 2020. Noncontrast MRA brain, 3-D time-of-flight imaging September 11, 2020.    COMPARISON: CT scan with/without contrast September 11, 2020.    FINDINGS:  Noncontrast MRI brain: No areas of restricted diffusion identified. Prominent flow void noted level the anterior communicating artery, [place]. Flow voids maintained within the intracranial vasculature to level of [place]. The ventricular system is of normal size shape and configuration. No T2 or FLAIR white matter signal hyperintensities identified. Gradient images unremarkable. The corpus callosum and craniocervical junction normal in appearance. No suprasellar masses appreciated. No intraorbital abnormalities appreciated. No opacification of the paranasal sinuses, mastoid air cells, middle ear  cavities are external ear canals.        MRA BRAIN: There is a 4.3 mm aneurysm of the anterior communicating artery. No additional vascular anomalies to the [place] identified. The posterior communicating arteries are patent. No major branch vessel cut off evident.    IMPRESSION:  1. 4.3 mm aneurysm, level the anterior communicating artery.  2. Otherwise unremarkable MRI brain.    Electronically signed by:  Danny Gilliam MD  9/11/2020 8:59 PM CDT Workstation: [id number]                             X-Ray Chest AP Portable (Final result)  Result time 09/11/20 15:46:39    Final result by Cornelio Mix MD (09/11/20 15:46:39)                 Narrative:    XR CHEST AP PORTABLE    CLINICAL HISTORY:  50 years Female Chest Pain    COMPARISON: None    FINDINGS: Cardiac silhouette size is within normal limits. No  confluent airspace disease. Small calcified granulomata within the  lateral aspect of the right lung base. No large pleural effusion or  pneumothorax. No acute osseous abnormality.    IMPRESSION: No acute pulmonary process.    Electronically Signed by Cornelio FLORES on 9/11/2020 3:48 PM                                Assessment/Plan:     Worst headache of pt's life; HA currently resolved  With new 4.3 mm aneurysm of the anterior communicating artery  Concern for SAH  - LP unable to be completed in ED  - IR consulted for LP; scheduled for AM  - monitoring in ICU  - NPO for now  - meningitis/encephalitis considered less likely but will cover empirically for now  - infection workup in progress  - strict BP control  - neurology and neurosurgery following, thank you    Other chronic conditions: home meds as appropriate  Electrolyte derangement:  Replacement prn  VTE ppx: SCDs  FULL CODE      VTE Risk Mitigation (From admission, onward)         Ordered     IP VTE HIGH RISK PATIENT  Once      09/11/20 2048     Place sequential compression device  Until discontinued      09/11/20 2048     Reason for No  Pharmacological VTE Prophylaxis  Once     Question:  Reasons:  Answer:  Risk of Bleeding    09/11/20 2048                   Lamar Colin MD  Department of Hospital Medicine   [facility]

## 2020-09-12 NOTE — PLAN OF CARE
"   09/11/20 2920   Patient Assessment/Suction   Level of Consciousness (AVPU) alert   Respiratory Effort Normal;Unlabored   Expansion/Accessory Muscles/Retractions no use of accessory muscles   All Lung Fields Breath Sounds clear   PRE-TX-O2   O2 Device (Oxygen Therapy) room air   SpO2 97 %   Pulse Oximetry Type Continuous   $ Pulse Oximetry - Multiple Charge Pulse Oximetry - Multiple   Pulse 73   Resp 18   Temp 98 °F (36.7 °C)   /61   Aerosol Therapy   $ Aerosol Therapy Charges PRN treatment not required   Daily Review of Necessity (SVN) completed   Respiratory Treatment Status (SVN) PRN treatment not required   IBW/VT Calculations   Height 5' 1" (1.549 m)   IBW/kg (Calculated) Female 47.8 kg   Low Range Vt 4cc/kg FEMALE 191.2 mL   Low Range Vt 6cc/kg FEMALE 286.8 mL   Adult Moderate Range vt 8cc/kg FEMALE 382.4 mL   Adult High Range Vt 10cc/kg FEMALE 478 mL   continue tx as ordered  "

## 2020-09-12 NOTE — CONSULTS
NEUROSURGICAL INPATIENT CONSULTATION NOTE    DATE OF SERVICE:  2020    ATTENDING PHYSICIAN:  Deng Wright MD    CONSULT REQUESTED BY:  Hospital medicine    REASON FOR CONSULT:  Anterior communicating artery aneurysm    SUBJECTIVE:    HISTORY OF PRESENT ILLNESS:  This is a very pleasant 50 y.o. female, treated for essential hypertension, nonsmoker.  She has a maternal aunt who  from an intracranial aneurysm rupture in her 20s.  She has a history of chronic neck pain.  She was treated with physical therapy and did 1 session before the COVID pandemic in March.  On 2020 she woke up with severe frontal headache.  She usually does not have severe headache.  She was able to function and went to work.  Two days later at work she started having worsening headache, nausea, right upper extremity numbness and weakness.  She reports that she was dropping things at work.  She went to urgent care.  She was admitted his stay at [facility].  Today she reports that the headaches her improved.  She reports that her right upper extremity symptoms are intermittent.  She has been NPO since yesterday.  No new onset of motor weakness or numbness.  No sphincter dysfunction symptoms.                 PAST MEDICAL HISTORY:  Active Ambulatory Problems     Diagnosis Date Noted    Excessive menses 01/10/2013    Pelvic pain in female 01/10/2013    Hyperlipidemia     DJD (degenerative joint disease), lumbar     Insomnia     Scoliosis 2013    Pes planus - acquired 2013    Obesity 2014    Obesity, Class II, BMI 35-39.9, with comorbidity 2014    Hypoglossal neuralgia 2014    Good hypertension control 2014    Depression with anxiety 2015    Hyperthyroidism 2015    Abnormal thyroid function test 2015    Thyroiditis 2015    Nodular thyroid disease 2016    Chronic nonintractable headache 2016    Dysmetabolic syndrome 2018     Subclinical hyperthyroidism 2020     Resolved Ambulatory Problems     Diagnosis Date Noted    Benign hypertension     Hypertension 2014    Thyrotoxicosis without thyroid storm 2015    Thyroid disease 2015    Essential hypertension 2015    Prediabetes 2015    Cervical muscle pain 2020     Past Medical History:   Diagnosis Date    Bladder instability     Depression     History of viral meningitis     Microscopic hematuria     Pulmonary nodule     Tendonitis        PAST SURGICAL HISTORY:  Past Surgical History:   Procedure Laterality Date    ANTERIOR VAGINAL REPAIR      BREAST BIOPSY  2007    left breast benign    ENDOMETRIAL ABLATION  2009    HYSTERECTOMY      LAPAROSCOPIC HYSTERECTOMY  2013    TUBAL LIGATION         SOCIAL HISTORY:   Social History     Socioeconomic History    Marital status:      Spouse name: Not on file    Number of children: Not on file    Years of education: Not on file    Highest education level: Not on file   Occupational History     Employer: [employer]   Social Needs    Financial resource strain: Somewhat hard    Food insecurity     Worry: Sometimes true     Inability: Sometimes true    Transportation needs     Medical: No     Non-medical: No   Tobacco Use    Smoking status: Former Smoker     Packs/day: 0.25     Years: 15.00     Pack years: 3.75     Types: Cigarettes     Quit date: 10/20/2013     Years since quittin.9    Smokeless tobacco: Never Used   Substance and Sexual Activity    Alcohol use: Yes     Alcohol/week: 1.0 standard drinks     Types: 1 Glasses of wine per week     Frequency: 2-3 times a week     Drinks per session: 3 or 4     Binge frequency: Less than monthly    Drug use: No    Sexual activity: Yes     Partners: Male     Birth control/protection: Surgical   Lifestyle    Physical activity     Days per week: 5 days     Minutes per session: 40 min     Stress: Very much   Relationships    Social connections     Talks on phone: More than three times a week     Gets together: Once a week     Attends Confucianist service: Not on file     Active member of club or organization: Yes     Attends meetings of clubs or organizations: More than 4 times per year     Relationship status:    Other Topics Concern    Not on file   Social History Narrative    The patient does exercise regularly (walking daily, 35-40min).Rates diet as fair.She is not satisfied with weight.       FAMILY HISTORY:  Family History   Problem Relation Age of Onset    Osteoarthritis Mother     Hyperlipidemia Mother     Hypertension Mother     Allergies Mother     Hyperlipidemia Father     Diabetes Father     Hypertension Father     Cancer Paternal Grandmother         Breast    Diabetes Paternal Grandmother     Breast cancer Paternal Grandmother 56    Allergies Sister     Heart disease Sister         Congential    Breast cancer Maternal Aunt 61    Ovarian cancer Maternal Cousin 50       CURRENTS MEDICATIONS:    No current facility-administered medications on file prior to encounter.      Current Outpatient Medications on File Prior to Encounter   Medication Sig Dispense Refill    ascorbic acid, vitamin C, (VITAMIN C) 1000 MG tablet Take 1,000 mg by mouth once daily.      aspirin (ADULT LOW DOSE ASPIRIN) 81 MG EC tablet Take 81 mg by mouth once daily.      atenolol (TENORMIN) 25 MG tablet Take 1 tablet (25 mg total) by mouth once daily. 90 tablet 3    cyclobenzaprine (FLEXERIL) 10 MG tablet Take 10 mg by mouth every evening.       estradioL (ESTRACE) 2 MG tablet Take 1 tablet (2 mg total) by mouth once daily. 30 tablet 11    fluticasone (FLONASE) 50 mcg/actuation nasal spray 1 spray by Each Nare route once daily.      hydroCHLOROthiazide (HYDRODIURIL) 25 MG tablet Take 1 tablet (25 mg total) by mouth once daily. 90 tablet 3    losartan (COZAAR) 25 MG tablet Take 1 tablet (25 mg  total) by mouth once daily. 90 tablet 3    meloxicam (MOBIC) 15 MG tablet Take 15 mg by mouth once daily.       multivitamin (THERAGRAN) per tablet Take 1 tablet by mouth once daily. Every day      butalbital-acetaminophen-caffeine -40 mg (FIORICET, ESGIC) -40 mg per tablet Take 1 tablet by mouth every 6 (six) hours as needed for Headaches. 10 tablet 0        acyclovir  10 mg/kg (Ideal) Intravenous Q8H    cefTRIAXone (ROCEPHIN) IVPB  2 g Intravenous Q12H    vancomycin (VANCOCIN) IVPB  1,500 mg Intravenous Q12H       ALLERGIES:  Review of patient's allergies indicates:   Allergen Reactions    Latex      Other reaction(s): Rash       REVIEW OF SYSTEMS:  Review of Systems   Constitutional: Negative for diaphoresis, fever and weight loss.   Respiratory: Negative for shortness of breath.    Cardiovascular: Negative for chest pain.   Gastrointestinal: Negative for blood in stool.   Genitourinary: Negative for hematuria.   Endo/Heme/Allergies: Does not bruise/bleed easily.   All other systems reviewed and are negative.      OBJECTIVE:    PHYSICAL EXAMINATION:   Vitals:    09/12/20 1200   BP: (!) 145/70   Pulse: 94   Resp: (!) 22   Temp: 98.1 °F (36.7 °C)       Physical Exam:  Vitals reviewed.    Constitutional: She appears well-developed and well-nourished.     Eyes: Pupils are equal, round, and reactive to light. Conjunctivae and EOM are normal.     Cardiovascular: Normal distal pulses and no edema.     Abdominal: Soft.     Skin: Skin displays no rash on trunk and no rash on extremities. Skin displays no lesions on trunk and no lesions on extremities.     Psych/Behavior: She is alert. She is oriented to person, place, and time. She has a normal mood and affect.     Musculoskeletal:        Neck: Range of motion is full.     Neurological:        DTRs: Tricep reflexes are 2+ on the right side and 2+ on the left side. Bicep reflexes are 2+ on the right side and 2+ on the left side. Brachioradialis reflexes  are 2+ on the right side and 2+ on the left side. Patellar reflexes are 2+ on the right side and 2+ on the left side. Achilles reflexes are 2+ on the right side and 2+ on the left side.       Back Exam     Tenderness   The patient is experiencing tenderness in the cervical.    Muscle Strength   Right Quadriceps:  5/5   Left Quadriceps:  5/5   Right Hamstrings:  5/5   Left Hamstrings:  5/5               Neurologic Exam     Mental Status   Oriented to person, place, and time.   Speech: speech is normal   Level of consciousness: alert    Cranial Nerves   Cranial nerves II through XII intact.     CN III, IV, VI   Pupils are equal, round, and reactive to light.  Extraocular motions are normal.     Motor Exam   Muscle bulk: normal  Overall muscle tone: normal    Strength   Right deltoid: 5/5  Left deltoid: 5/5  Right biceps: 5/5  Left biceps: 5/5  Right triceps: 5/5  Left triceps: 5/5  Right wrist flexion: 5/5  Left wrist flexion: 5/5  Right wrist extension: 5/5  Left wrist extension: 5/5  Right interossei: 5/5  Left interossei: 5/5  Right iliopsoas: 5/5  Left iliopsoas: 5/5  Right quadriceps: 5/5  Left quadriceps: 5/5  Right hamstrin/5  Left hamstrin/5  Right anterior tibial: 5/5  Left anterior tibial: 5/5  Right posterior tibial: 5/5  Left posterior tibial: 5/5  Right peroneal: 5/5  Left peroneal: 5/5  Right gastroc: 5/5  Left gastroc: 5/5    Sensory Exam   Light touch normal.   Pinprick normal.     Gait, Coordination, and Reflexes     Gait  Gait: normal    Coordination   Finger to nose coordination: normal  Tandem walking coordination: normal    Reflexes   Right brachioradialis: 2+  Left brachioradialis: 2+  Right biceps: 2+  Left biceps: 2+  Right triceps: 2+  Left triceps: 2+  Right patellar: 2+  Left patellar: 2+  Right achilles: 2+  Left achilles: 2+  Right plantar: normal  Left plantar: normal  Right Escalona: absent  Left Escalona: absent  Right ankle clonus: absent  Left ankle clonus: absentBrudzinski and  Kernig signs negative       DIAGNOSTIC DATA:    Recent Labs     09/11/20  1525 09/12/20  0219   HGB 12.2 12.3   HCT 36.8* 36.8*   MCV 90 90   WBC 9.45 10.35    281    137   K 3.3* 3.4*    101    150*   BUN 14 10   CREATININE 0.8 0.6   CALCIUM 9.1 8.6*   MG  --  2.0   ALT 20  --    AST 23  --    ALBUMIN 3.5  --    BILITOT 0.5  --        Microbiology Results (last 7 days)     Procedure Component Value Units Date/Time    CSF culture and Gram Stain (Tube 2) [[id number]] Collected: 09/12/20 1038    Order Status: Completed Specimen: CSF (Spinal Fluid) from CSF Tap, Tube 2 Updated: 09/12/20 1221     Gram Stain Result Cytospin indicates:      No WBC's      No organisms seen    Narrative:      On which sequentially labeled tube should this analysis be  performed?->2    Blood culture [[id number]] Collected: 09/11/20 2242    Order Status: Completed Specimen: Blood Updated: 09/12/20 0558     Blood Culture, Routine No Growth to date    Blood culture [[id number]] Collected: 09/11/20 2238    Order Status: Completed Specimen: Blood Updated: 09/12/20 0558     Blood Culture, Routine No Growth to date          I personally interpreted the following imaging:    Brain MRA showing left anterior communicating artery aneurysm  Lumbar puncture:  0 red blood cell, 0 white blood cell, clear, protein at 42, glucose 52    ASSESMENT:  This is a 50 y.o. female with an episode of persistent headache and incidental finding of left anterior communicating aneurysm.    Lumbar puncture with 0 red blood cell so subarachnoid hemorrhage has been ruled out    PLAN:  Patient has an appointment with Dr. Quintana in vascular Neurosurgery on Monday  All questions answered      Deng Wright MD  Cell: [phone]

## 2020-09-12 NOTE — PROGRESS NOTES
VANCOMYCIN PHARMACOKINETIC NOTE:  Vancomycin Day # 1    Objective/Assessment:    Diagnosis/Indication for Vancomycin: Meningitis     50 y.o., female; Actual Body Weight = 86.7 kg (191 lb 2.2 oz).    The patient has the following labs:  9/11/2020 Estimated Creatinine Clearance: 85.1 mL/min (based on SCr of 0.8 mg/dL). Lab Results   Component Value Date    BUN 14 09/11/2020     Lab Results   Component Value Date    WBC 9.45 09/11/2020          Plan:  Adjust vancomycin dose and/or frequency based on the patient's actual weight and renal function:  Initiate Vancomycin 1500 mg IV every 12 hours.  Orders have been entered into patient's chart.        Vancomycin trough level has been ordered for 9/13 @10:30, prior to 4th dose.    Pharmacy will manage vancomycin therapy, monitor serum vancomycin levels, monitor renal function and adjust regimen as necessary.      Thank you for allowing us to participate in this patient's care.     Deng Cowart 9/11/2020 10:21 PM  Department of Pharmacy  Ext [phone]

## 2020-09-13 ENCOUNTER — PATIENT OUTREACH (OUTPATIENT)
Dept: ADMINISTRATIVE | Facility: OTHER | Age: 50
End: 2020-09-13

## 2020-09-14 ENCOUNTER — TELEPHONE (OUTPATIENT)
Dept: PHARMACY | Facility: CLINIC | Age: 50
End: 2020-09-14

## 2020-09-14 ENCOUNTER — TELEPHONE (OUTPATIENT)
Dept: NEUROSURGERY | Facility: CLINIC | Age: 50
End: 2020-09-14

## 2020-09-14 ENCOUNTER — HOSPITAL ENCOUNTER (OUTPATIENT)
Dept: RADIOLOGY | Facility: HOSPITAL | Age: 50
Discharge: HOME | End: 2020-09-14
Attending: NEUROLOGICAL SURGERY
Payer: COMMERCIAL

## 2020-09-14 ENCOUNTER — OFFICE VISIT (OUTPATIENT)
Dept: NEUROSURGERY | Facility: CLINIC | Age: 50
End: 2020-09-14
Payer: COMMERCIAL

## 2020-09-14 VITALS
HEART RATE: 67 BPM | WEIGHT: 185.88 LBS | SYSTOLIC BLOOD PRESSURE: 124 MMHG | BODY MASS INDEX: 35.12 KG/M2 | DIASTOLIC BLOOD PRESSURE: 74 MMHG | TEMPERATURE: 98 F

## 2020-09-14 DIAGNOSIS — F41.9 ANXIETY: ICD-10-CM

## 2020-09-14 DIAGNOSIS — Z01.818 PRE-OP TESTING: Primary | ICD-10-CM

## 2020-09-14 DIAGNOSIS — I67.1 CEREBRAL ANEURYSM WITHOUT RUPTURE: Primary | Chronic | ICD-10-CM

## 2020-09-14 DIAGNOSIS — I67.1 CEREBRAL ANEURYSM WITHOUT RUPTURE: ICD-10-CM

## 2020-09-14 DIAGNOSIS — I67.1 CEREBRAL ANEURYSM WITHOUT RUPTURE: Chronic | ICD-10-CM

## 2020-09-14 DIAGNOSIS — F41.9 ANXIETY: Primary | ICD-10-CM

## 2020-09-14 PROCEDURE — 99999 PR PBB SHADOW E&M-EST. PATIENT-LVL V: CPT | Mod: PBBFAC,,, | Performed by: NEUROLOGICAL SURGERY

## 2020-09-14 PROCEDURE — 70553 MRI BRAIN STEM W/O & W/DYE: CPT | Mod: 26,,, | Performed by: RADIOLOGY

## 2020-09-14 PROCEDURE — 70553 MRI BRAIN W WO CONTRAST: ICD-10-PCS | Mod: 26,,, | Performed by: RADIOLOGY

## 2020-09-14 PROCEDURE — 99214 PR OFFICE/OUTPT VISIT, EST, LEVL IV, 30-39 MIN: ICD-10-PCS | Mod: S$GLB,,, | Performed by: NEUROLOGICAL SURGERY

## 2020-09-14 PROCEDURE — 3078F PR MOST RECENT DIASTOLIC BLOOD PRESSURE < 80 MM HG: ICD-10-PCS | Mod: CPTII,S$GLB,, | Performed by: NEUROLOGICAL SURGERY

## 2020-09-14 PROCEDURE — A9585 GADOBUTROL INJECTION: HCPCS | Performed by: NEUROLOGICAL SURGERY

## 2020-09-14 PROCEDURE — 3008F PR BODY MASS INDEX (BMI) DOCUMENTED: ICD-10-PCS | Mod: CPTII,S$GLB,, | Performed by: NEUROLOGICAL SURGERY

## 2020-09-14 PROCEDURE — 99214 OFFICE O/P EST MOD 30 MIN: CPT | Mod: S$GLB,,, | Performed by: NEUROLOGICAL SURGERY

## 2020-09-14 PROCEDURE — 3074F SYST BP LT 130 MM HG: CPT | Mod: CPTII,S$GLB,, | Performed by: NEUROLOGICAL SURGERY

## 2020-09-14 PROCEDURE — 25500020 PHARM REV CODE 255: Performed by: NEUROLOGICAL SURGERY

## 2020-09-14 PROCEDURE — 3074F PR MOST RECENT SYSTOLIC BLOOD PRESSURE < 130 MM HG: ICD-10-PCS | Mod: CPTII,S$GLB,, | Performed by: NEUROLOGICAL SURGERY

## 2020-09-14 PROCEDURE — 3008F BODY MASS INDEX DOCD: CPT | Mod: CPTII,S$GLB,, | Performed by: NEUROLOGICAL SURGERY

## 2020-09-14 PROCEDURE — 99999 PR PBB SHADOW E&M-EST. PATIENT-LVL V: ICD-10-PCS | Mod: PBBFAC,,, | Performed by: NEUROLOGICAL SURGERY

## 2020-09-14 PROCEDURE — 70553 MRI BRAIN STEM W/O & W/DYE: CPT | Mod: TC

## 2020-09-14 PROCEDURE — 3078F DIAST BP <80 MM HG: CPT | Mod: CPTII,S$GLB,, | Performed by: NEUROLOGICAL SURGERY

## 2020-09-14 RX ORDER — GADOBUTROL 604.72 MG/ML
9 INJECTION INTRAVENOUS
Status: COMPLETED | OUTPATIENT
Start: 2020-09-14 | End: 2020-09-14

## 2020-09-14 RX ORDER — LORAZEPAM 1 MG/1
1 TABLET ORAL EVERY 4 HOURS PRN
Status: DISCONTINUED | OUTPATIENT
Start: 2020-09-14 | End: 2020-10-08 | Stop reason: CLARIF

## 2020-09-14 RX ORDER — LORAZEPAM 1 MG/1
1 TABLET ORAL EVERY 4 HOURS PRN
Status: DISCONTINUED | OUTPATIENT
Start: 2020-09-14 | End: 2020-09-14

## 2020-09-14 RX ADMIN — GADOBUTROL 9 ML: 604.72 INJECTION INTRAVENOUS at 02:09

## 2020-09-14 NOTE — PROGRESS NOTES
Neurosurgery  Established Patient    SUBJECTIVE:     History of Present Illness:  50-year-old female who had previously presented with complaints of new onset headache which woke her from sleep the middle night.  She noted these happened last Monday 09/07/2020.  She had some associated nausea, no vomiting but severe neck pain, particularly with right-to-left movement in most pronounced with trying to bend her neck anteriorly.  She notes having some visual complaints primarily on the left visual field.  Which have since resolved.  She sought treatment approximately 4 days after the onset of her headache.  She underwent a CT and MRI scan of the head, as well as the image guided lumbar puncture.  Lumbar puncture did not show any evidence of xanthochromia, no evidence of hemorrhage on MRI.  MRA of the head however revealed what appears to be a 4.3 mm anterior communicating artery aneurysm.  Patient notes that her headache has improved somewhat.  Her neck pain is also improved, she does note that she was feeling off and is returned to somewhat over baseline.  She does note she has a history of headaches and this headache was on like any of her previous headaches.  She does note the pain as the worst headache of her life and 10/10 in severity.  She also noted an episode of right hand tingling, and numbness as well as weakness in the right hand which was transient.  She also denies those symptoms ever happening previously.  Patient has a 15 year pack history of smoking, where she stopped smoking approximately 10 years ago.    Review of patient's allergies indicates:   Allergen Reactions    Latex      Other reaction(s): Rash       Current Outpatient Medications   Medication Sig Dispense Refill    ascorbic acid, vitamin C, (VITAMIN C) 1000 MG tablet Take 1,000 mg by mouth once daily.      aspirin (ADULT LOW DOSE ASPIRIN) 81 MG EC tablet Take 81 mg by mouth once daily.      atenolol (TENORMIN) 25 MG tablet Take 1 tablet  (25 mg total) by mouth once daily. 90 tablet 3    buPROPion (WELLBUTRIN) 75 MG tablet Take 1 tablet (75 mg total) by mouth 2 (two) times daily.      butalbital-acetaminophen-caffeine -40 mg (FIORICET, ESGIC) -40 mg per tablet Take 1 tablet by mouth every 6 (six) hours as needed for Headaches. 15 tablet 0    cyclobenzaprine (FLEXERIL) 10 MG tablet Take 10 mg by mouth every evening.       estradioL (ESTRACE) 2 MG tablet Take 1 tablet (2 mg total) by mouth once daily. 30 tablet 11    fluticasone (FLONASE) 50 mcg/actuation nasal spray 1 spray by Each Nare route once daily.      hydroCHLOROthiazide (HYDRODIURIL) 25 MG tablet Take 1 tablet (25 mg total) by mouth once daily. 90 tablet 3    losartan (COZAAR) 25 MG tablet Take 1 tablet (25 mg total) by mouth once daily. 90 tablet 3    multivitamin (THERAGRAN) per tablet Take 1 tablet by mouth once daily. Every day       No current facility-administered medications for this visit.        Past Medical History:   Diagnosis Date    Benign hypertension     Bladder instability     Depression     DJD (degenerative joint disease), lumbar     History of viral meningitis 1996    Hyperlipidemia     Insomnia     Microscopic hematuria     negative work-up    Pulmonary nodule     minute    Tendonitis      Past Surgical History:   Procedure Laterality Date    ANTERIOR VAGINAL REPAIR      BREAST BIOPSY  6/5/2007    left breast benign    ENDOMETRIAL ABLATION  5/28/2009    HYSTERECTOMY      LAPAROSCOPIC HYSTERECTOMY  2/2013    TUBAL LIGATION  2002     Family History     Problem Relation (Age of Onset)    Allergies Mother, Sister    Breast cancer Paternal Grandmother (56), Maternal Aunt (61)    Cancer Paternal Grandmother    Diabetes Father, Paternal Grandmother    Heart disease Sister    Hyperlipidemia Mother, Father    Hypertension Mother, Father    Osteoarthritis Mother    Ovarian cancer Maternal Cousin (50)        Social History     Socioeconomic History     Marital status:      Spouse name: Not on file    Number of children: Not on file    Years of education: Not on file    Highest education level: Not on file   Occupational History     Employer: [employer]   Social Needs    Financial resource strain: Somewhat hard    Food insecurity     Worry: Sometimes true     Inability: Sometimes true    Transportation needs     Medical: No     Non-medical: No   Tobacco Use    Smoking status: Former Smoker     Packs/day: 0.25     Years: 15.00     Pack years: 3.75     Types: Cigarettes     Quit date: 10/20/2013     Years since quittin.9    Smokeless tobacco: Never Used   Substance and Sexual Activity    Alcohol use: Yes     Alcohol/week: 1.0 standard drinks     Types: 1 Glasses of wine per week     Frequency: 2-3 times a week     Drinks per session: 3 or 4     Binge frequency: Less than monthly    Drug use: No    Sexual activity: Yes     Partners: Male     Birth control/protection: Surgical   Lifestyle    Physical activity     Days per week: 5 days     Minutes per session: 40 min    Stress: Very much   Relationships    Social connections     Talks on phone: More than three times a week     Gets together: Once a week     Attends Mormon service: Not on file     Active member of club or organization: Yes     Attends meetings of clubs or organizations: More than 4 times per year     Relationship status:    Other Topics Concern    Not on file   Social History Narrative    The patient does exercise regularly (walking daily, 35-40min).Rates diet as fair.She is not satisfied with weight.       Review of Systems   Genitourinary: Positive for hematuria.   Hematological: Bruises/bleeds easily.       OBJECTIVE:     Vital Signs  Temp: 97.7 °F (36.5 °C)  Pulse: 67  BP: 124/74  Pain Score: 0-No pain  Weight: 84.3 kg (185 lb 13.6 oz)  Body mass index is 35.12 kg/m².    Neurosurgery Physical Exam  Head is normocephalic atraumatic  Neck has  full range of motion, appears to be supple.  There is no labored breathing  Abdomen is soft and nontender  No clubbing cyanosis or edema in the upper extremities or lower extremities    Speech is fluent goal directed without any noted dysarthria  Pupils equal round reactive to light  Extraocular muscles are intact  V1 to V3 is intact to light touch  Masseters intact bilaterally  Face is symmetric  Tongue is midline hearing is intact to voice bilaterally  Palate has normal left  Sternocleidomastoids are intact bilaterally  Trapezius are intact bilaterally    Motor exam  5/5 biceps bilaterally  5/5 triceps bilaterally  5/5 deltoid bilaterally  5/5  bilaterally    5/5 iliopsoas bilaterally  5/5 quadriceps bilaterally  5/5 hamstring bilaterally  5/5 plantar flexion bilaterally  5/5 dorsiflexion bilaterally    Normal finger-to-nose  No drift  No dysdiadochokinesia  Normal gait  No antalgia    Diagnostic Results:  I personally reviewed patient's Diagnostic Imaging.  CT scan of the head does not show any evidence of any subarachnoid blood.  MRI of the brain does not show any evidence of blooming artifact on GRE no clear evidence of any subarachnoid blood on FLAIR.  MRA of the brain shows a 4.3 mm anterior communicating artery aneurysm verses left A1/A2 aneurysm.    ASSESSMENT/PLAN:     50-year-old female with a newly diagnosed 4.3 mm anterior communicating artery aneurysm.    1.  There are no focal neurologic deficits on examination in clinic.  2.  MRA of the brain suggestive of an superiorly pointing anterior communicating artery aneurysm approximately 4.3 mm in size with apparent codominant appearing bilateral A1 segments.  Appears to be a multilobulated on MRA.  3.  Had long discussion with patient regarding her clinical symptoms of the past week.  There is certainly some concern for possible sentinel hemorrhage given her acute onset of headache, as well as her which he describes nuchal rigidity.  As well as  transient right lora numbness and weakness.  Do recommend a diagnostic cerebral angiogram.  Discussed with patient's of risks, benefits, alternatives to a diagnostic cerebral angiogram including but not limited to:  Heart attack, coma, stroke, infection, bleeding, paralysis, and even death.  Informed consent was obtained and secured in the chart after the patient voiced understanding of these risks and side to proceed with the procedure.  As result we will plan for a diagnostic cerebral angiogram.    Thank you for allowing me to participate in the care of this patient.  Please feel free to call any questions, comments, or concerns.    Christi Page MD,MSc  Department of Neurosurgery   [facility]  [facility]    [facility]   [employer] [facility]            Note dictated with voice recognition software, please excuse any grammatical errors.

## 2020-09-14 NOTE — LETTER
September 14, 2020      Jasmeet Caputo MD  [address]  [facility] [address]           [facility] [address]  [address]  [facility] [address]  Phone: [phone]  Fax: [phone]          Patient: Kylah Mohan   MR Number: [id number]   YOB: 1970   Date of Visit: 9/14/2020       Dear Dr. Jasmeet Caputo:    Thank you for referring Kylah Mohan to me for evaluation. Attached you will find relevant portions of my assessment and plan of care.    If you have questions, please do not hesitate to call me. I look forward to following Kylah Mohan along with you.    Sincerely,    Christi Page MD    Enclosure  CC:  No Recipients    If you would like to receive this communication electronically, please contact [email or web address] or [phone] to request more information on [facility] access.    For providers and/or their staff who would like to refer a patient to [facility], please contact us through our one-stop-shop provider referral line, [facility], at [phone].    If you feel you have received this communication in error or would no longer like to receive these types of communications, please e-mail [email or web address]

## 2020-09-14 NOTE — TELEPHONE ENCOUNTER
PLEASE send the patient prescription for LORazepam 1 mg to [facility]. She has missed her appointment for the MRI due to the incorrect pharmacy. Thank you.  Sent to  9/14 CARRIE

## 2020-09-15 LAB
CMV SPEC QL SHELL VIAL CULT: NO GROWTH
GRAM STN SPEC: NORMAL

## 2020-09-16 ENCOUNTER — LAB VISIT (OUTPATIENT)
Dept: SURGERY | Facility: CLINIC | Age: 50
End: 2020-09-16
Attending: NEUROLOGICAL SURGERY
Payer: COMMERCIAL

## 2020-09-16 DIAGNOSIS — I67.1 CEREBRAL ANEURYSM WITHOUT RUPTURE: Primary | ICD-10-CM

## 2020-09-16 DIAGNOSIS — Z01.818 PRE-OP TESTING: ICD-10-CM

## 2020-09-16 LAB — SARS-COV-2 RNA RESP QL NAA+PROBE: NOT DETECTED

## 2020-09-16 PROCEDURE — U0003 INFECTIOUS AGENT DETECTION BY NUCLEIC ACID (DNA OR RNA); SEVERE ACUTE RESPIRATORY SYNDROME CORONAVIRUS 2 (SARS-COV-2) (CORONAVIRUS DISEASE [COVID-19]), AMPLIFIED PROBE TECHNIQUE, MAKING USE OF HIGH THROUGHPUT TECHNOLOGIES AS DESCRIBED BY CMS-2020-01-R: HCPCS

## 2020-09-16 RX ORDER — FENTANYL CITRATE 50 UG/ML
50 INJECTION, SOLUTION INTRAMUSCULAR; INTRAVENOUS
Status: CANCELLED | OUTPATIENT
Start: 2020-09-16

## 2020-09-16 RX ORDER — MIDAZOLAM HYDROCHLORIDE 1 MG/ML
1 INJECTION INTRAMUSCULAR; INTRAVENOUS
Status: CANCELLED | OUTPATIENT
Start: 2020-09-16

## 2020-09-16 RX ORDER — HEPARIN SODIUM 1000 [USP'U]/ML
5000 INJECTION, SOLUTION INTRAVENOUS; SUBCUTANEOUS ONCE
Status: CANCELLED | OUTPATIENT
Start: 2020-09-16 | End: 2020-09-16

## 2020-09-16 RX ORDER — VERAPAMIL HYDROCHLORIDE 2.5 MG/ML
10 INJECTION, SOLUTION INTRAVENOUS ONCE
Status: CANCELLED | OUTPATIENT
Start: 2020-09-16 | End: 2020-09-16

## 2020-09-16 NOTE — NURSING
Pre-op call complete.     Pre procedure instructions given for cerebral angiogram. Pt instructed not to eat or drink after midnight. Allergies reviewed. G[facility] to provide transport and monitor pt 8 hrs. Pt denied anticoagulation medications. Home medications reviewed with patient. Pt instructed to check in to second floor radiology/lab desk to have blood work drawn at 10:30am then to proceed to [facility] waiting area. Pt denied postioning restrictions.  Expected length of stay reviewed.  Covid screening complete.  Pt verbalizes understanding. Questions answered.

## 2020-09-17 ENCOUNTER — HOSPITAL ENCOUNTER (OUTPATIENT)
Facility: HOSPITAL | Age: 50
Discharge: HOME | End: 2020-09-17
Attending: NEUROLOGICAL SURGERY | Admitting: NEUROLOGICAL SURGERY
Payer: COMMERCIAL

## 2020-09-17 VITALS
TEMPERATURE: 98 F | HEART RATE: 69 BPM | SYSTOLIC BLOOD PRESSURE: 112 MMHG | DIASTOLIC BLOOD PRESSURE: 59 MMHG | WEIGHT: 185.88 LBS | BODY MASS INDEX: 35.09 KG/M2 | OXYGEN SATURATION: 100 % | RESPIRATION RATE: 15 BRPM | HEIGHT: 61 IN

## 2020-09-17 DIAGNOSIS — I67.1 CEREBRAL ANEURYSM WITHOUT RUPTURE: ICD-10-CM

## 2020-09-17 DIAGNOSIS — I67.1 CEREBRAL ANEURYSM WITHOUT RUPTURE: Chronic | ICD-10-CM

## 2020-09-17 LAB
BACTERIA BLD CULT: NORMAL
BACTERIA BLD CULT: NORMAL

## 2020-09-17 PROCEDURE — 25500020 PHARM REV CODE 255: Performed by: NEUROLOGICAL SURGERY

## 2020-09-17 PROCEDURE — 25000003 PHARM REV CODE 250: Performed by: FAMILY MEDICINE

## 2020-09-17 PROCEDURE — 25000003 PHARM REV CODE 250: Performed by: STUDENT IN AN ORGANIZED HEALTH CARE EDUCATION/TRAINING PROGRAM

## 2020-09-17 PROCEDURE — 63600175 PHARM REV CODE 636 W HCPCS: Performed by: NEUROLOGICAL SURGERY

## 2020-09-17 RX ORDER — IODIXANOL 320 MG/ML
200 INJECTION, SOLUTION INTRAVASCULAR
Status: COMPLETED | OUTPATIENT
Start: 2020-09-17 | End: 2020-09-17

## 2020-09-17 RX ORDER — ACETAMINOPHEN 325 MG/1
650 TABLET ORAL ONCE
Status: COMPLETED | OUTPATIENT
Start: 2020-09-17 | End: 2020-09-17

## 2020-09-17 RX ORDER — VERAPAMIL HYDROCHLORIDE 2.5 MG/ML
INJECTION, SOLUTION INTRAVENOUS CODE/TRAUMA/SEDATION MEDICATION
Status: COMPLETED | OUTPATIENT
Start: 2020-09-17 | End: 2020-09-17

## 2020-09-17 RX ORDER — FENTANYL CITRATE 50 UG/ML
INJECTION, SOLUTION INTRAMUSCULAR; INTRAVENOUS CODE/TRAUMA/SEDATION MEDICATION
Status: COMPLETED | OUTPATIENT
Start: 2020-09-17 | End: 2020-09-17

## 2020-09-17 RX ORDER — SODIUM CHLORIDE 0.9 % (FLUSH) 0.9 %
10 SYRINGE (ML) INJECTION
Status: DISCONTINUED | OUTPATIENT
Start: 2020-09-17 | End: 2020-09-17 | Stop reason: HOSPADM

## 2020-09-17 RX ORDER — SODIUM CHLORIDE 9 MG/ML
INJECTION, SOLUTION INTRAVENOUS CONTINUOUS
Status: DISCONTINUED | OUTPATIENT
Start: 2020-09-17 | End: 2020-09-17 | Stop reason: HOSPADM

## 2020-09-17 RX ORDER — LIDOCAINE AND PRILOCAINE 25; 25 MG/G; MG/G
CREAM TOPICAL ONCE
Status: COMPLETED | OUTPATIENT
Start: 2020-09-17 | End: 2020-09-17

## 2020-09-17 RX ORDER — LIDOCAINE HYDROCHLORIDE 10 MG/ML
INJECTION INFILTRATION; PERINEURAL CODE/TRAUMA/SEDATION MEDICATION
Status: COMPLETED | OUTPATIENT
Start: 2020-09-17 | End: 2020-09-17

## 2020-09-17 RX ORDER — MIDAZOLAM HYDROCHLORIDE 1 MG/ML
INJECTION INTRAMUSCULAR; INTRAVENOUS CODE/TRAUMA/SEDATION MEDICATION
Status: COMPLETED | OUTPATIENT
Start: 2020-09-17 | End: 2020-09-17

## 2020-09-17 RX ADMIN — IODIXANOL 70 ML: 320 INJECTION, SOLUTION INTRAVASCULAR at 02:09

## 2020-09-17 RX ADMIN — FENTANYL CITRATE 50 MCG: 50 INJECTION, SOLUTION INTRAMUSCULAR; INTRAVENOUS at 01:09

## 2020-09-17 RX ADMIN — ACETAMINOPHEN 650 MG: 325 TABLET ORAL at 03:09

## 2020-09-17 RX ADMIN — LIDOCAINE AND PRILOCAINE: 25; 25 CREAM TOPICAL at 12:09

## 2020-09-17 RX ADMIN — MIDAZOLAM HYDROCHLORIDE 2 MG: 1 INJECTION, SOLUTION INTRAMUSCULAR; INTRAVENOUS at 01:09

## 2020-09-17 RX ADMIN — VERAPAMIL HYDROCHLORIDE 10 MG: 2.5 INJECTION, SOLUTION INTRAVENOUS at 01:09

## 2020-09-17 RX ADMIN — LIDOCAINE HYDROCHLORIDE 10 ML: 10 INJECTION, SOLUTION INFILTRATION; PERINEURAL at 01:09

## 2020-09-17 RX ADMIN — MIDAZOLAM HYDROCHLORIDE 1 MG: 1 INJECTION, SOLUTION INTRAMUSCULAR; INTRAVENOUS at 01:09

## 2020-09-17 NOTE — NURSING
Recovery complete. Pt tolerated well. Site clean, dry, intact, no bleeding, no hematoma. No c/o pain. Pt states headache resolved. Pt given site care and discharge instructions. Pt verbalized understanding. Pt dressed and ambulated independently. Pt to DC home in care of daughter. Pt to be escorted to garage in wheelchair via transport staff.

## 2020-09-17 NOTE — NURSING
Pt in ROCU s/p Dx Cerebral. Right wrist site has TR Band on. Site clean, dry, intact, no bleeding, no hematoma. Report received from Rodriguez Delong RN.

## 2020-09-17 NOTE — PROCEDURES
Neurointerventional Radiology Post-Procedure Note    Pre Op Diagnosis: Acomm aneurysm    Post Op Diagnosis: Acomm aneurysm    Procedure: Cerebral angiogram    Procedure performed by: MD Art Campbell MD.    Written Informed Consent Obtained: Yes    Specimen Removed: NO    Estimated Blood Loss: less than 50     Procedure report:     A 5F sheath was placed into the right radial artery and a 5F Sim2 catheter was advanced into the aortic arch.  The bilateral ICA and vertebral arteries were subselected and angiography of the brain was performed after injection into each of these vessels.    Preliminary interpretation: Unruptured Acomm aneurysm. Otherwise normal cerebral angiogram. Please see Imaging report for full details.    A right radial artery angiogram was performed, the sheath removed and hemostasis achieved using Angioseal.  No hematoma was present at the time of hemostasis.    The patient tolerated the procedure well.     Art Lehman MD  NeuroInterventional Radiology Fellow

## 2020-09-17 NOTE — NURSING
3cc air removed at 1730. Site clean, dry, intact, no bleeding, no hematoma. Pressure dressing applied.

## 2020-09-17 NOTE — NURSING
Pt procedure complete, incision to r wrist for angiogram. Tr band applied with 14cc's of air. Recovery for 2 hours then deflation of band can start @ 1630. Pt received 200mcg of fentanyl and 3 of versed.

## 2020-09-17 NOTE — H&P
.  Radiology History & Physical      SUBJECTIVE:     Chief Complaint: Acomm aneurysm    History of Present Illness:  Kylah Mohan is a 50 y.o. female who presents for diagnostic cerebral angiogram in patient with Acomm aneurysm     Past Medical History:   Diagnosis Date    Benign hypertension     Bladder instability     Depression     DJD (degenerative joint disease), lumbar     History of viral meningitis 1996    Hyperlipidemia     Insomnia     Microscopic hematuria     negative work-up    Pulmonary nodule     minute    Tendonitis      Past Surgical History:   Procedure Laterality Date    ANTERIOR VAGINAL REPAIR      BREAST BIOPSY  6/5/2007    left breast benign    ENDOMETRIAL ABLATION  5/28/2009    HYSTERECTOMY      LAPAROSCOPIC HYSTERECTOMY  2/2013    TUBAL LIGATION  2002       Home Meds:   Prior to Admission medications    Medication Sig Start Date End Date Taking? Authorizing Provider   ascorbic acid, vitamin C, (VITAMIN C) 1000 MG tablet Take 1,000 mg by mouth once daily.   Yes Historical Provider   aspirin (ADULT LOW DOSE ASPIRIN) 81 MG EC tablet Take 81 mg by mouth once daily.   Yes Historical Provider   atenolol (TENORMIN) 25 MG tablet Take 1 tablet (25 mg total) by mouth once daily. 1/2/20  Yes Gonzalo Ramirez MD   butalbital-acetaminophen-caffeine -40 mg (FIORICET, ESGIC) -40 mg per tablet Take 1 tablet by mouth every 6 (six) hours as needed for Headaches. 9/12/20 9/17/20 Yes Shawnee Bernal MD   cyclobenzaprine (FLEXERIL) 10 MG tablet Take 10 mg by mouth every evening.  9/10/20  Yes Historical Provider   estradioL (ESTRACE) 2 MG tablet Take 1 tablet (2 mg total) by mouth once daily. 9/8/20 9/8/21 Yes Eduardo Cheung MD   fluticasone (FLONASE) 50 mcg/actuation nasal spray 1 spray by Each Nare route once daily.   Yes Historical Provider   hydroCHLOROthiazide (HYDRODIURIL) 25 MG tablet Take 1 tablet (25 mg total) by mouth once daily. 1/2/20 12/27/20 Yes Gonzalo FLORES  MD Ashley   LORazepam (ATIVAN) 1 MG tablet Take 1 tablet by mouth every 6 hours (take before MRI scan) 9/14/20  Yes Georgina Zamora PA-C   losartan (COZAAR) 25 MG tablet Take 1 tablet (25 mg total) by mouth once daily. 1/2/20  Yes Gonzalo Ramirez MD   multivitamin (THERAGRAN) per tablet Take 1 tablet by mouth once daily. Every day 9/23/11  Yes Historical Provider   buPROPion (WELLBUTRIN) 75 MG tablet Take 1 tablet (75 mg total) by mouth 2 (two) times daily. 9/12/20   Shawnee Bernal MD     Anticoagulants/Antiplatelets: no anticoagulation    Allergies:   Review of patient's allergies indicates:   Allergen Reactions    Latex      Other reaction(s): Rash     Sedation History:  no adverse reactions    Review of Systems:   Hematological: no known coagulopathies  Respiratory: no shortness of breath  Cardiovascular: no chest pain  Gastrointestinal: no abdominal pain  Genito-Urinary: no dysuria  Musculoskeletal: negative  Neurological: no TIA or stroke symptoms         OBJECTIVE:     Vital Signs (Most Recent)  Temp: 98.4 °F (36.9 °C) (09/17/20 1053)  Pulse: 68 (09/17/20 1053)  Resp: 18 (09/17/20 1053)  BP: 127/60 (09/17/20 1053)  SpO2: 98 % (09/17/20 1053)    Physical Exam:  ASA: 2  Mallampati: 3    General: no acute distress  Mental Status: alert and oriented to person, place and time  HEENT: normocephalic, atraumatic  Chest: unlabored breathing  Heart: regular heart rate  Abdomen: nondistended  Extremity: moves all extremities    Laboratory  Lab Results   Component Value Date    INR 0.9 09/17/2020       Lab Results   Component Value Date    WBC 10.35 09/12/2020    HGB 12.3 09/12/2020    HCT 36.8 (L) 09/12/2020    MCV 90 09/12/2020     09/12/2020      Lab Results   Component Value Date     (H) 09/12/2020     09/12/2020    K 3.4 (L) 09/12/2020     09/12/2020    CO2 27 09/12/2020    BUN 10 09/12/2020    CREATININE 0.6 09/12/2020    CALCIUM 8.6 (L) 09/12/2020    MG 2.0 09/12/2020    ALT  20 09/11/2020    AST 23 09/11/2020    ALBUMIN 3.5 09/11/2020    BILITOT 0.5 09/11/2020       ASSESSMENT/PLAN:     Sedation Plan: moderate sedation  Patient will undergo : diagnostic cerebral angiogram    Art Lehman MD  NeuroIR fellow.

## 2020-09-17 NOTE — NURSING
Per ROSALIO Lehman MD, TR Band to remain on for 2 hours (until 1630) then 3 cc of air to be removed every 15 mins.

## 2020-09-17 NOTE — DISCHARGE INSTRUCTIONS
Please call with any questions or concerns.      Monday thru Friday 8:00 am - 4:30 pm    Interventional Radiology   [phone]    After Hours    Ask for the Radiology Intern on call  [phone]

## 2020-09-21 ENCOUNTER — PATIENT MESSAGE (OUTPATIENT)
Dept: ENDOCRINOLOGY | Facility: CLINIC | Age: 50
End: 2020-09-21

## 2020-09-29 ENCOUNTER — TELEPHONE (OUTPATIENT)
Dept: NEUROSURGERY | Facility: CLINIC | Age: 50
End: 2020-09-29

## 2020-09-29 ENCOUNTER — OFFICE VISIT (OUTPATIENT)
Dept: NEUROSURGERY | Facility: CLINIC | Age: 50
End: 2020-09-29
Payer: COMMERCIAL

## 2020-09-29 VITALS — HEART RATE: 69 BPM | SYSTOLIC BLOOD PRESSURE: 134 MMHG | TEMPERATURE: 98 F | DIASTOLIC BLOOD PRESSURE: 82 MMHG

## 2020-09-29 DIAGNOSIS — I67.1 CEREBRAL ANEURYSM WITHOUT RUPTURE: Primary | ICD-10-CM

## 2020-09-29 DIAGNOSIS — Z01.818 PRE-OP TESTING: Primary | ICD-10-CM

## 2020-09-29 PROCEDURE — 99999 PR PBB SHADOW E&M-EST. PATIENT-LVL III: ICD-10-PCS | Mod: PBBFAC,,, | Performed by: NEUROLOGICAL SURGERY

## 2020-09-29 PROCEDURE — 99214 OFFICE O/P EST MOD 30 MIN: CPT | Mod: S$GLB,,, | Performed by: NEUROLOGICAL SURGERY

## 2020-09-29 PROCEDURE — 3075F PR MOST RECENT SYSTOLIC BLOOD PRESS GE 130-139MM HG: ICD-10-PCS | Mod: CPTII,S$GLB,, | Performed by: NEUROLOGICAL SURGERY

## 2020-09-29 PROCEDURE — 3079F PR MOST RECENT DIASTOLIC BLOOD PRESSURE 80-89 MM HG: ICD-10-PCS | Mod: CPTII,S$GLB,, | Performed by: NEUROLOGICAL SURGERY

## 2020-09-29 PROCEDURE — 99999 PR PBB SHADOW E&M-EST. PATIENT-LVL III: CPT | Mod: PBBFAC,,, | Performed by: NEUROLOGICAL SURGERY

## 2020-09-29 PROCEDURE — 3079F DIAST BP 80-89 MM HG: CPT | Mod: CPTII,S$GLB,, | Performed by: NEUROLOGICAL SURGERY

## 2020-09-29 PROCEDURE — 99214 PR OFFICE/OUTPT VISIT, EST, LEVL IV, 30-39 MIN: ICD-10-PCS | Mod: S$GLB,,, | Performed by: NEUROLOGICAL SURGERY

## 2020-09-29 PROCEDURE — 3075F SYST BP GE 130 - 139MM HG: CPT | Mod: CPTII,S$GLB,, | Performed by: NEUROLOGICAL SURGERY

## 2020-09-29 NOTE — PROGRESS NOTES
Neurosurgery  Established Patient    SUBJECTIVE:     History of Present Illness:  50-year-old female who had previously presented with complaints of new onset headache which woke her from sleep the middle night.  She noted these happened last Monday 09/07/2020.  She had some associated nausea, no vomiting but severe neck pain, particularly with right-to-left movement in most pronounced with trying to bend her neck anteriorly.  She notes having some visual complaints primarily on the left visual field.  Which have since resolved.  She sought treatment approximately 4 days after the onset of her headache.  She underwent a CT and MRI scan of the head, as well as the image guided lumbar puncture.  Lumbar puncture did not show any evidence of xanthochromia, no evidence of hemorrhage on MRI.  MRA of the head however revealed what appears to be a 4.3-5 mm anterior communicating artery aneurysm.  Patient notes that her headache has improved somewhat.  Her neck pain is also improved, she does note that she was feeling off and is returned to somewhat over baseline.  She does note she has a history of headaches and this headache was un like any of her previous headaches.  She does note the pain as the worst headache of her life and 10/10 in severity.  She also noted an episode of right hand tingling, and numbness as well as weakness in the right hand which was transient.  She also denies those symptoms ever happening previously.  Patient has a 15 year pack history of smoking, where she stopped smoking approximately 10 years ago.    Interval history:  Patient is here to discuss her most recent diagnostic cerebral angiogram.  Which showed a anterior communicating artery aneurysm approximately 5 mm in size    Review of patient's allergies indicates:   Allergen Reactions    Latex      Other reaction(s): Rash       Current Outpatient Medications   Medication Sig Dispense Refill    ascorbic acid, vitamin C, (VITAMIN C) 1000 MG  tablet Take 1,000 mg by mouth once daily.      aspirin (ADULT LOW DOSE ASPIRIN) 81 MG EC tablet Take 81 mg by mouth once daily.      atenolol (TENORMIN) 25 MG tablet Take 1 tablet (25 mg total) by mouth once daily. 90 tablet 3    buPROPion (WELLBUTRIN) 75 MG tablet Take 1 tablet (75 mg total) by mouth 2 (two) times daily.      cyclobenzaprine (FLEXERIL) 10 MG tablet Take 10 mg by mouth every evening.       estradioL (ESTRACE) 2 MG tablet Take 1 tablet (2 mg total) by mouth once daily. 30 tablet 11    fluticasone (FLONASE) 50 mcg/actuation nasal spray 1 spray by Each Nare route once daily.      hydroCHLOROthiazide (HYDRODIURIL) 25 MG tablet Take 1 tablet (25 mg total) by mouth once daily. 90 tablet 3    LORazepam (ATIVAN) 1 MG tablet Take 1 tablet by mouth every 6 hours (take before MRI scan) 2 tablet 0    losartan (COZAAR) 25 MG tablet Take 1 tablet (25 mg total) by mouth once daily. 90 tablet 3    multivitamin (THERAGRAN) per tablet Take 1 tablet by mouth once daily. Every day       Current Facility-Administered Medications   Medication Dose Route Frequency Provider Last Rate Last Dose    LORazepam tablet 1 mg  1 mg Oral Q4H PRN Christi Page MD           Past Medical History:   Diagnosis Date    Benign hypertension     Bladder instability     Depression     DJD (degenerative joint disease), lumbar     History of viral meningitis 1996    Hyperlipidemia     Insomnia     Microscopic hematuria     negative work-up    Pulmonary nodule     minute    Tendonitis      Past Surgical History:   Procedure Laterality Date    ANTERIOR VAGINAL REPAIR      BREAST BIOPSY  6/5/2007    left breast benign    CEREBRAL ANGIOGRAM N/A 9/17/2020    Procedure: ANGIOGRAM-CEREBRAL;  [facility];  Location: [facility] OR [address] [place] [address];  Service: Radiology;  Laterality: N/A;  SYDNI Flores/Camilo    ENDOMETRIAL ABLATION  5/28/2009    HYSTERECTOMY      LAPAROSCOPIC HYSTERECTOMY  2/2013    TUBAL LIGATION   2002     Family History     Problem Relation (Age of Onset)    Allergies Mother, Sister    Breast cancer Paternal Grandmother (56), Maternal Aunt (61)    Cancer Paternal Grandmother    Diabetes Father, Paternal Grandmother    Heart disease Sister    Hyperlipidemia Mother, Father    Hypertension Mother, Father    Osteoarthritis Mother    Ovarian cancer Maternal Cousin (50)        Social History     Socioeconomic History    Marital status:      Spouse name: Not on file    Number of children: Not on file    Years of education: Not on file    Highest education level: Not on file   Occupational History     Employer: [employer]   Social Needs    Financial resource strain: Somewhat hard    Food insecurity     Worry: Sometimes true     Inability: Sometimes true    Transportation needs     Medical: No     Non-medical: No   Tobacco Use    Smoking status: Former Smoker     Packs/day: 0.25     Years: 15.00     Pack years: 3.75     Types: Cigarettes     Quit date: 10/20/2013     Years since quittin.9    Smokeless tobacco: Never Used   Substance and Sexual Activity    Alcohol use: Yes     Alcohol/week: 1.0 standard drinks     Types: 1 Glasses of wine per week     Frequency: 2-3 times a week     Drinks per session: 3 or 4     Binge frequency: Less than monthly    Drug use: No    Sexual activity: Yes     Partners: Male     Birth control/protection: Surgical   Lifestyle    Physical activity     Days per week: 5 days     Minutes per session: 40 min    Stress: Very much   Relationships    Social connections     Talks on phone: More than three times a week     Gets together: Once a week     Attends Anabaptism service: Not on file     Active member of club or organization: Yes     Attends meetings of clubs or organizations: More than 4 times per year     Relationship status:    Other Topics Concern    Not on file   Social History Narrative    The patient does exercise regularly  (walking daily, 35-40min).Rates diet as fair.She is not satisfied with weight.       Review of Systems    OBJECTIVE:     Vital Signs  Temp: 97.7 °F (36.5 °C)  Pulse: 69  BP: 134/82  Pain Score:   4  There is no height or weight on file to calculate BMI.    Neurosurgery Physical Exam  Head is normocephalic atraumatic  Neck has full range of motion, appears to be supple.  There is no labored breathing  Abdomen is soft and nontender  No clubbing cyanosis or edema in the upper extremities or lower extremities     Speech is fluent goal directed without any noted dysarthria  Pupils equal round reactive to light  Extraocular muscles are intact  V1 to V3 is intact to light touch  Masseters intact bilaterally  Face is symmetric  Tongue is midline hearing is intact to voice bilaterally  Palate has normal left  Sternocleidomastoids are intact bilaterally  Trapezius are intact bilaterally     Motor exam  5/5 biceps bilaterally  5/5 triceps bilaterally  5/5 deltoid bilaterally  5/5  bilaterally     5/5 iliopsoas bilaterally  5/5 quadriceps bilaterally  5/5 hamstring bilaterally  5/5 plantar flexion bilaterally  5/5 dorsiflexion bilaterally     Normal finger-to-nose  No drift  No dysdiadochokinesia  Normal gait  No antalgia    Diagnostic Results:  I personally reviewed the patient's Diagnostic Imaging.  Diagnostic cerebral angiogram, notes that there is a 4.5-5 mm anterior communicating artery aneurysm no clear excrescence but there may be some multiple lobulations.    ASSESSMENT/PLAN:     50-year-old female previously presented with worst headache of her life after 5 days noted to have a 5 mm anterior communicating artery aneurysm.    1.  Patient has no focal neurologic deficits on examination here in clinic  2.  Diagnostic cerebral angiogram with evidence of anterior communicating artery aneurysm between 4.5 and 5 mm in size  3.  A long discussion with patient regarding treatment of her aneurysm.  Given her presentation, as  well as a history of smoking, as well as her family history of rupture, I do recommend treatment for aneurysm we discussed treatment options above open microsurgical clip ligation, as well as endovascular treatment her aneurysm.  I noted that given her location she would likely require stent assisted coil embolization which may require adjunctive treatments.  Would unlikely be able to adequately protect of the branches with primary flow diversion.  As result patient opted for microsurgical clip ligation.  The recommended approach wise this would be more ideally approached from a right-sided modified orbital zygomatic craniotomy.  I discussed with the patient the risks, benefits, and alternatives to procedure including but not limited to heart attack, coma, stroke, infection, bleeding, paralysis, and even death.  Informed consent was obtained and secured in the chart after the patient voiced understanding of these risks and decided proceed with the procedure.  Will proceed with a right orbital zygomatic craniotomy with clip ligation of anterior communicating artery aneurysm.    Thank you for allowing me to participate in the care of this patient.  Please feel free to call any questions, comments, concerns.    Christi Page MD,MSc  Department of Neurosurgery   [facility]    [facility]   [employer] [facility] [employer]      Note dictated with voice recognition software, please excuse any grammatical errors.

## 2020-09-30 ENCOUNTER — PATIENT MESSAGE (OUTPATIENT)
Dept: FAMILY MEDICINE | Facility: CLINIC | Age: 50
End: 2020-09-30

## 2020-10-01 ENCOUNTER — OFFICE VISIT (OUTPATIENT)
Dept: FAMILY MEDICINE | Facility: CLINIC | Age: 50
End: 2020-10-01
Payer: COMMERCIAL

## 2020-10-01 ENCOUNTER — TELEPHONE (OUTPATIENT)
Dept: NEUROSURGERY | Facility: CLINIC | Age: 50
End: 2020-10-01

## 2020-10-01 VITALS
DIASTOLIC BLOOD PRESSURE: 86 MMHG | HEIGHT: 61 IN | BODY MASS INDEX: 34.72 KG/M2 | RESPIRATION RATE: 18 BRPM | SYSTOLIC BLOOD PRESSURE: 118 MMHG | HEART RATE: 76 BPM | OXYGEN SATURATION: 97 % | WEIGHT: 183.88 LBS | TEMPERATURE: 98 F

## 2020-10-01 DIAGNOSIS — I67.1 CEREBRAL ANEURYSM WITHOUT RUPTURE: Primary | ICD-10-CM

## 2020-10-01 DIAGNOSIS — R00.2 PALPITATION: ICD-10-CM

## 2020-10-01 DIAGNOSIS — R51.9 ACUTE NONINTRACTABLE HEADACHE, UNSPECIFIED HEADACHE TYPE: ICD-10-CM

## 2020-10-01 DIAGNOSIS — E66.09 CLASS 1 OBESITY DUE TO EXCESS CALORIES WITH SERIOUS COMORBIDITY AND BODY MASS INDEX (BMI) OF 33.0 TO 33.9 IN ADULT: ICD-10-CM

## 2020-10-01 DIAGNOSIS — Z01.818 PRE-OPERATIVE EXAM: Primary | ICD-10-CM

## 2020-10-01 DIAGNOSIS — R94.6 ABNORMAL THYROID FUNCTION TEST: ICD-10-CM

## 2020-10-01 DIAGNOSIS — E78.5 HYPERLIPIDEMIA, UNSPECIFIED HYPERLIPIDEMIA TYPE: ICD-10-CM

## 2020-10-01 DIAGNOSIS — Z23 INFLUENZA VACCINE ADMINISTERED: ICD-10-CM

## 2020-10-01 DIAGNOSIS — F41.8 DEPRESSION WITH ANXIETY: ICD-10-CM

## 2020-10-01 DIAGNOSIS — I67.1 CEREBRAL ANEURYSM WITHOUT RUPTURE: ICD-10-CM

## 2020-10-01 DIAGNOSIS — I10 GOOD HYPERTENSION CONTROL: ICD-10-CM

## 2020-10-01 DIAGNOSIS — I67.1 CEREBRAL ANEURYSM, NONRUPTURED: ICD-10-CM

## 2020-10-01 DIAGNOSIS — E04.1 NODULAR THYROID DISEASE: ICD-10-CM

## 2020-10-01 PROCEDURE — 3079F DIAST BP 80-89 MM HG: CPT | Mod: CPTII,S$GLB,, | Performed by: FAMILY MEDICINE

## 2020-10-01 PROCEDURE — 3008F BODY MASS INDEX DOCD: CPT | Mod: CPTII,S$GLB,, | Performed by: FAMILY MEDICINE

## 2020-10-01 PROCEDURE — 90686 FLU VACCINE (QUAD) GREATER THAN OR EQUAL TO 3YO PRESERVATIVE FREE IM: ICD-10-PCS | Mod: S$GLB,,, | Performed by: FAMILY MEDICINE

## 2020-10-01 PROCEDURE — 99214 PR OFFICE/OUTPT VISIT, EST, LEVL IV, 30-39 MIN: ICD-10-PCS | Mod: 25,S$GLB,, | Performed by: FAMILY MEDICINE

## 2020-10-01 PROCEDURE — 90471 IMMUNIZATION ADMIN: CPT | Mod: S$GLB,,, | Performed by: FAMILY MEDICINE

## 2020-10-01 PROCEDURE — 90471 FLU VACCINE (QUAD) GREATER THAN OR EQUAL TO 3YO PRESERVATIVE FREE IM: ICD-10-PCS | Mod: S$GLB,,, | Performed by: FAMILY MEDICINE

## 2020-10-01 PROCEDURE — 90686 IIV4 VACC NO PRSV 0.5 ML IM: CPT | Mod: S$GLB,,, | Performed by: FAMILY MEDICINE

## 2020-10-01 PROCEDURE — 3079F PR MOST RECENT DIASTOLIC BLOOD PRESSURE 80-89 MM HG: ICD-10-PCS | Mod: CPTII,S$GLB,, | Performed by: FAMILY MEDICINE

## 2020-10-01 PROCEDURE — 3008F PR BODY MASS INDEX (BMI) DOCUMENTED: ICD-10-PCS | Mod: CPTII,S$GLB,, | Performed by: FAMILY MEDICINE

## 2020-10-01 PROCEDURE — 3074F PR MOST RECENT SYSTOLIC BLOOD PRESSURE < 130 MM HG: ICD-10-PCS | Mod: CPTII,S$GLB,, | Performed by: FAMILY MEDICINE

## 2020-10-01 PROCEDURE — 99999 PR PBB SHADOW E&M-EST. PATIENT-LVL V: CPT | Mod: PBBFAC,,, | Performed by: FAMILY MEDICINE

## 2020-10-01 PROCEDURE — 99214 OFFICE O/P EST MOD 30 MIN: CPT | Mod: 25,S$GLB,, | Performed by: FAMILY MEDICINE

## 2020-10-01 PROCEDURE — 99999 PR PBB SHADOW E&M-EST. PATIENT-LVL V: ICD-10-PCS | Mod: PBBFAC,,, | Performed by: FAMILY MEDICINE

## 2020-10-01 PROCEDURE — 3074F SYST BP LT 130 MM HG: CPT | Mod: CPTII,S$GLB,, | Performed by: FAMILY MEDICINE

## 2020-10-01 NOTE — PATIENT INSTRUCTIONS
Electrocardiogram (ECG)  The electrocardiogram (ECG) is a test that records electrical signals from your heart. The pattern of these signals can help tell the doctor whether your heart is normal, under stress, or having  electrical problems, changes in the heartbeat, strain, or other damage.    Getting ready  · Wear loose, comfortable clothing that allows easy access to the chest.  · Allow enough time before your ECG to check in. You will likely need to fill out paperwork before the test.  What happens during an ECG  · You will be asked to remove your clothing from the waist up and to put on a gown. You will then lie down on your back.  · Electrodes (small pads) are placed on your chest, shoulders, and legs. If you have excess body hair that keeps the electrodes from making contact with your skin, small patches of hair may need to be shaved.  · If your skin is moist or sweaty, it may have to be dried off for the small patches to stick to the skin.  · The electrodes record your heart rhythm and any change in your hearts signals that occur during the test.  · After a few minutes of recording, the healthcare provider will remove the electrodes. Occasionally, they may have to repeat the ECG to get accurate results. The ECG takes about 10 minutes.  What happens after the test  · You can resume your normal activity.  · The results are sent to your doctor for interpretation.  · Be sure to keep your follow-up appointment.  Your next appointment is:____________________     Tell your healthcare provider if you:  · Are taking any medicines including over-the-counter, recreational drugs, and non-prescribed medicines  · Feel any chest discomfort, pain, burning, tightness, or pressure  · Feel as though your heart is beating rapidly or irregularly (palpitations)  · Have ever blacked-out, lost consciousness, or fainted   Date Last Reviewed: 6/1/2016  © 9141-1523 "[employer] [address] [place] [address], [place]  [address]. All rights reserved. This information is not intended as a substitute for professional medical care. Always follow your healthcare professional's instructions.

## 2020-10-01 NOTE — PROGRESS NOTES
Subjective:       Patient ID: Kylah Mohan is a 50 y.o. female.    Chief Complaint: Pre-op Exam    Patient comes in today for preoperative clearance.  Having open brain aneurysm repair done November 4.  This is an issue as preoperative clearance is only good for 30 days and this date is more than 30 days out today.  She was also told by her neurosurgeon she needs several prescreening labs done as well as an EKG and chest x-ray.  However she does not have a list of the necessary labs his surgeon is requesting.  She was told that her surgeon's office fax them over to here but that fax was never received by this office.    Review of Systems   Constitutional: Negative for activity change, chills and fever.   HENT: Negative for nasal congestion and sinus pressure/congestion.    Eyes: Negative for itching.   Respiratory: Negative for chest tightness and shortness of breath.    Cardiovascular: Negative for chest pain and palpitations.   Gastrointestinal: Negative for abdominal pain, constipation, nausea and vomiting.   Endocrine: Negative for cold intolerance.   Genitourinary: Negative for difficulty urinating and menstrual problem.   Musculoskeletal: Negative for arthralgias, joint swelling and myalgias.   Integumentary:  Negative for rash.   Allergic/Immunologic: Negative for environmental allergies.   Neurological: Positive for headaches. Negative for dizziness and weakness.   Psychiatric/Behavioral: Negative for agitation and confusion.         Objective:      Physical Exam  Vitals signs and nursing note reviewed.   Constitutional:       Appearance: She is well-developed.   HENT:      Head: Normocephalic and atraumatic.   Eyes:      Pupils: Pupils are equal, round, and reactive to light.   Neck:      Musculoskeletal: Normal range of motion and neck supple.   Cardiovascular:      Rate and Rhythm: Normal rate and regular rhythm.      Heart sounds: No murmur.   Pulmonary:      Effort: Pulmonary effort is normal.  No respiratory distress.      Breath sounds: Normal breath sounds. No wheezing or rales.   Abdominal:      General: There is no distension.      Palpations: Abdomen is soft.      Tenderness: There is no abdominal tenderness. There is no guarding.   Musculoskeletal: Normal range of motion.   Skin:     General: Skin is warm and dry.   Neurological:      Mental Status: She is alert and oriented to person, place, and time.      Deep Tendon Reflexes: Reflexes normal.   Psychiatric:         Behavior: Behavior normal.         Thought Content: Thought content normal.         Judgment: Judgment normal.         Assessment:       1. Pre-operative exam    2. Acute nonintractable headache, unspecified headache type    3. Cerebral aneurysm without rupture, left anterior communicating 4.3    4. Good hypertension control    5. Nodular thyroid disease    6. Class 1 obesity due to excess calories with serious comorbidity and body mass index (BMI) of 33.0 to 33.9 in adult    7. Abnormal thyroid function test    8. Influenza vaccine administered    9. Palpitation    10. Depression with anxiety    11. Hyperlipidemia, unspecified hyperlipidemia type        Plan:       Orders Placed This Encounter   Procedures    X-Ray Chest PA And Lateral     Standing Status:   Future     Standing Expiration Date:   10/1/2021     Order Specific Question:   May the Radiologist modify the order per protocol to meet the clinical needs of the patient?     Answer:   Yes    Influenza - Quadrivalent (PF)    CBC auto differential     Standing Status:   Future     Standing Expiration Date:   11/1/2020    Comprehensive metabolic panel     Standing Status:   Future     Standing Expiration Date:   11/1/2020    Hemoglobin A1C     Standing Status:   Future     Standing Expiration Date:   11/1/2020    TSH     Standing Status:   Future     Standing Expiration Date:   11/1/2020    Iron     Standing Status:   Future     Standing Expiration Date:   11/30/2021     Iron and TIBC     Standing Status:   Future     Standing Expiration Date:   11/30/2021    APTT     Standing Status:   Future     Standing Expiration Date:   11/30/2021    Protime-INR     Standing Status:   Future     Standing Expiration Date:   11/30/2021    Sedimentation rate     Standing Status:   Future     Standing Expiration Date:   11/30/2021    T4, free     Standing Status:   Future     Standing Expiration Date:   11/1/2020    SCHEDULED EKG 12-LEAD (to Muse)     Standing Status:   Future     Standing Expiration Date:   10/1/2021     Order Specific Question:   Diagnosis     Answer:   Pre-op evaluation [[id number]]     -ordered chest x-ray and EKG as well as reasonable prescreening labs to be done next week.  -will try to find out what the necessary labs were and if they are not included in the above orders we can reorder them.  -patient will follow-up in two weeks and she is within 30 day window for a true preoperative clearance exam.

## 2020-10-01 NOTE — PROGRESS NOTES
ID patient by name and date of birth.  Allergies confirmed.  Flu shot given as per orders , using aseptic technique.  Patient tolerated well.  Information sheet reviewed and given to patient.

## 2020-10-06 ENCOUNTER — PATIENT MESSAGE (OUTPATIENT)
Dept: SURGERY | Facility: HOSPITAL | Age: 50
End: 2020-10-06

## 2020-10-07 ENCOUNTER — OFFICE VISIT (OUTPATIENT)
Dept: FAMILY MEDICINE | Facility: CLINIC | Age: 50
End: 2020-10-07
Payer: COMMERCIAL

## 2020-10-07 VITALS
OXYGEN SATURATION: 96 % | WEIGHT: 185.44 LBS | DIASTOLIC BLOOD PRESSURE: 72 MMHG | BODY MASS INDEX: 36.4 KG/M2 | RESPIRATION RATE: 18 BRPM | SYSTOLIC BLOOD PRESSURE: 128 MMHG | HEIGHT: 60 IN | HEART RATE: 76 BPM | TEMPERATURE: 97 F

## 2020-10-07 DIAGNOSIS — I67.1 CEREBRAL ANEURYSM WITHOUT RUPTURE: ICD-10-CM

## 2020-10-07 DIAGNOSIS — Z01.818 PRE-OPERATIVE EXAM: Primary | ICD-10-CM

## 2020-10-07 DIAGNOSIS — E05.90 SUBCLINICAL HYPERTHYROIDISM: ICD-10-CM

## 2020-10-07 PROCEDURE — 99999 PR PBB SHADOW E&M-EST. PATIENT-LVL V: ICD-10-PCS | Mod: PBBFAC,,, | Performed by: STUDENT IN AN ORGANIZED HEALTH CARE EDUCATION/TRAINING PROGRAM

## 2020-10-07 PROCEDURE — 3074F SYST BP LT 130 MM HG: CPT | Mod: CPTII,S$GLB,, | Performed by: STUDENT IN AN ORGANIZED HEALTH CARE EDUCATION/TRAINING PROGRAM

## 2020-10-07 PROCEDURE — 3078F DIAST BP <80 MM HG: CPT | Mod: CPTII,S$GLB,, | Performed by: STUDENT IN AN ORGANIZED HEALTH CARE EDUCATION/TRAINING PROGRAM

## 2020-10-07 PROCEDURE — 3008F BODY MASS INDEX DOCD: CPT | Mod: CPTII,S$GLB,, | Performed by: STUDENT IN AN ORGANIZED HEALTH CARE EDUCATION/TRAINING PROGRAM

## 2020-10-07 PROCEDURE — 99999 PR PBB SHADOW E&M-EST. PATIENT-LVL V: CPT | Mod: PBBFAC,,, | Performed by: STUDENT IN AN ORGANIZED HEALTH CARE EDUCATION/TRAINING PROGRAM

## 2020-10-07 PROCEDURE — 99213 OFFICE O/P EST LOW 20 MIN: CPT | Mod: S$GLB,,, | Performed by: STUDENT IN AN ORGANIZED HEALTH CARE EDUCATION/TRAINING PROGRAM

## 2020-10-07 PROCEDURE — 3008F PR BODY MASS INDEX (BMI) DOCUMENTED: ICD-10-PCS | Mod: CPTII,S$GLB,, | Performed by: STUDENT IN AN ORGANIZED HEALTH CARE EDUCATION/TRAINING PROGRAM

## 2020-10-07 PROCEDURE — 3074F PR MOST RECENT SYSTOLIC BLOOD PRESSURE < 130 MM HG: ICD-10-PCS | Mod: CPTII,S$GLB,, | Performed by: STUDENT IN AN ORGANIZED HEALTH CARE EDUCATION/TRAINING PROGRAM

## 2020-10-07 PROCEDURE — 99213 PR OFFICE/OUTPT VISIT, EST, LEVL III, 20-29 MIN: ICD-10-PCS | Mod: S$GLB,,, | Performed by: STUDENT IN AN ORGANIZED HEALTH CARE EDUCATION/TRAINING PROGRAM

## 2020-10-07 PROCEDURE — 3078F PR MOST RECENT DIASTOLIC BLOOD PRESSURE < 80 MM HG: ICD-10-PCS | Mod: CPTII,S$GLB,, | Performed by: STUDENT IN AN ORGANIZED HEALTH CARE EDUCATION/TRAINING PROGRAM

## 2020-10-07 NOTE — PROGRESS NOTES
[facility] MEDICINE CLINIC NOTE    Patient Name: Kylah Mohan  YOB: 1970    PRESENTING HISTORY   Chief Complaint:   Chief Complaint   Patient presents with    Establish Care    Pre-op Exam        History of Present Illness:  Ms. Kylah Mohan is a 50 y.o. female with HTN, subclinical hypothyroidism, menopausal here for pre-op evaluation.     Scheduled for aneurysm repair with neurosurgery.     Medical hx: No CAD, CHF, COPD/asthma    No lung problems. SOB occasionally.   Palpitations from caffeine in the past.   Has been walking during quarantine. 5miles/day.   Started running intermittently. Getting SOB during breaks. And heart would race. Was struggling to breathe out of nose.   No chest pain, leg swelling.     HTN- checks at home. Has been running 126/85. Highest  134/90       Sugical hx: parital hysterectomy, ablation. No problems with anaesthesia. Mother slow to wake up.       Patient has had an extremely difficult year. Numerous family members have  from COVID and she has been unable to go to the funerals or grieve appropriately. She had an aunt who  from aneurysm rupture and she is nervous about the waiting until her surgery.     She has been on wellbutrin in the past. Doesn't feel like she needs medication right now for mood disorder.     Works at [employer] which she enjoys. Worked in [employer] for about 20 years, some jobs very stressful including [employer].      Review of Systems   Constitutional: Negative for chills and fever.   HENT: Negative for hearing loss and tinnitus.    Eyes: Negative for blurred vision and double vision.   Respiratory: Negative for cough and shortness of breath.    Cardiovascular: Positive for palpitations (with exercise). Negative for chest pain.   Gastrointestinal: Negative for abdominal pain and vomiting.   Genitourinary: Negative for dysuria and hematuria.   Musculoskeletal: Negative for joint pain and myalgias.    Neurological: Positive for headaches. Negative for focal weakness.   Psychiatric/Behavioral: Positive for depression. Negative for substance abuse.         PAST HISTORY:     Past Medical History:   Diagnosis Date    Benign hypertension     Bladder instability     Depression     DJD (degenerative joint disease), lumbar     History of viral meningitis 1996    Hyperlipidemia     Insomnia     Microscopic hematuria     negative work-up    Pulmonary nodule     minute    Tendonitis        Past Surgical History:   Procedure Laterality Date    ANTERIOR VAGINAL REPAIR      BREAST BIOPSY  6/5/2007    left breast benign    CEREBRAL ANGIOGRAM N/A 9/17/2020    Procedure: ANGIOGRAM-CEREBRAL;  Surgeon: [facility];  Location: [facility] OR [address] [place] [address];  Service: Radiology;  Laterality: N/A;  /Camilo    ENDOMETRIAL ABLATION  5/28/2009    HYSTERECTOMY      LAPAROSCOPIC HYSTERECTOMY  2/2013    TUBAL LIGATION  2002       Family History   Problem Relation Age of Onset    Osteoarthritis Mother     Hyperlipidemia Mother     Hypertension Mother     Allergies Mother     Hyperlipidemia Father     Diabetes Father     Hypertension Father     Cancer Paternal Grandmother         Breast    Diabetes Paternal Grandmother     Breast cancer Paternal Grandmother 56    Allergies Sister     Heart disease Sister         Congential    Breast cancer Maternal Aunt 61    Ovarian cancer Maternal Cousin 50       Social History     Socioeconomic History    Marital status:      Spouse name: Not on file    Number of children: Not on file    Years of education: Not on file    Highest education level: Not on file   Occupational History     Employer: [employer]   Social Needs    Financial resource strain: Somewhat hard    Food insecurity     Worry: Sometimes true     Inability: Sometimes true    Transportation needs     Medical: No     Non-medical: No   Tobacco Use    Smoking status: Former  Smoker     Packs/day: 0.25     Years: 15.00     Pack years: 3.75     Types: Cigarettes     Quit date: 10/20/2013     Years since quittin.9    Smokeless tobacco: Never Used   Substance and Sexual Activity    Alcohol use: Yes     Alcohol/week: 1.0 standard drinks     Types: 1 Glasses of wine per week     Frequency: 2-3 times a week     Drinks per session: 3 or 4     Binge frequency: Less than monthly    Drug use: No    Sexual activity: Yes     Partners: Male     Birth control/protection: Surgical   Lifestyle    Physical activity     Days per week: 5 days     Minutes per session: 40 min    Stress: Very much   Relationships    Social connections     Talks on phone: More than three times a week     Gets together: Once a week     Attends Buddhist service: Not on file     Active member of club or organization: Yes     Attends meetings of clubs or organizations: More than 4 times per year     Relationship status:    Other Topics Concern    Not on file   Social History Narrative    The patient does exercise regularly (walking daily, 35-40min).Rates diet as fair.She is not satisfied with weight.       MEDICATIONS & ALLERGIES:     Current Outpatient Medications on File Prior to Visit   Medication Sig    ascorbic acid, vitamin C, (VITAMIN C) 1000 MG tablet Take 1,000 mg by mouth once daily.    aspirin (ADULT LOW DOSE ASPIRIN) 81 MG EC tablet Take 81 mg by mouth once daily.    atenolol (TENORMIN) 25 MG tablet Take 1 tablet (25 mg total) by mouth once daily.    buPROPion (WELLBUTRIN) 75 MG tablet Take 1 tablet (75 mg total) by mouth 2 (two) times daily.    cyclobenzaprine (FLEXERIL) 10 MG tablet Take 10 mg by mouth every evening.     estradioL (ESTRACE) 2 MG tablet TAKE 1 TABLET BY MOUTH ONCE DAILY.    fluticasone (FLONASE) 50 mcg/actuation nasal spray 1 spray by Each Nare route once daily.    hydroCHLOROthiazide (HYDRODIURIL) 25 MG tablet Take 1 tablet (25 mg total) by mouth once daily.    LORazepam  (ATIVAN) 1 MG tablet Take 1 tablet by mouth every 6 hours (take before MRI scan)    losartan (COZAAR) 25 MG tablet Take 1 tablet (25 mg total) by mouth once daily.    multivitamin (THERAGRAN) per tablet Take 1 tablet by mouth once daily. Every day     Current Facility-Administered Medications on File Prior to Visit   Medication    LORazepam tablet 1 mg       Review of patient's allergies indicates:   Allergen Reactions    Latex      Other reaction(s): Rash       OBJECTIVE:   Vital Signs:  Vitals:    10/07/20 0759   BP: 128/72   Pulse: 76   Resp: 18   Temp: 97.1 °F (36.2 °C)   TempSrc: Temporal   SpO2: 96%   Weight: 84.1 kg (185 lb 6.5 oz)   Height: 5' (1.524 m)       No results found for this or any previous visit (from the past 24 hour(s)).      Physical Exam   Constitutional: She is oriented to person, place, and time and well-developed, well-nourished, and in no distress.   HENT:   Head: Normocephalic and atraumatic.   Right Ear: External ear normal.   Left Ear: External ear normal.   Eyes: Pupils are equal, round, and reactive to light. Conjunctivae and EOM are normal. No scleral icterus.   Neck: Normal range of motion. Neck supple. No thyromegaly present.   Cardiovascular: Normal rate, regular rhythm and normal heart sounds.   No murmur heard.  Pulmonary/Chest: Effort normal and breath sounds normal. No respiratory distress. She has no wheezes. She has no rales.   Abdominal: Soft. Bowel sounds are normal. She exhibits no distension. There is no abdominal tenderness.   Musculoskeletal: Normal range of motion.         General: No tenderness, deformity or edema.   Lymphadenopathy:     She has no cervical adenopathy.   Neurological: She is alert and oriented to person, place, and time. No cranial nerve deficit. Gait normal. GCS score is 15.   Skin: Skin is warm and dry. No rash noted. She is not diaphoretic. No erythema.   Psychiatric: Mood, memory, affect and judgment normal.   Nursing note and vitals  reviewed.      ASSESSMENT & PLAN:     50 F with well controlled HTN needing aneurysm repair.     Pre-operative exam  -     URINALYSIS; Future; Expected date: 10/07/2020  -     CBC auto differential; Future; Expected date: 10/07/2020  -     Comprehensive Metabolic Panel; Future; Expected date: 10/07/2020  -     Protime-INR; Future; Expected date: 10/07/2020  -     APTT; Future; Expected date: 10/07/2020  -     X-Ray Chest PA And Lateral; Future; Expected date: 10/07/2020  -     IN OFFICE EKG 12-LEAD (to Muse)    Subclinical hyperthyroidism  Asymptomatic    Cerebral aneurysm without rupture, left anterior communicating 4.3  Planned surgical management.         RCRI 0 points- 3.9% 30 day risk of perioperative MACE  No further testing needed.   Holding aspirin, hormonal therapy due to risk of bleeding, VTE respectively.   Labs and workup per NSGY request  Take all other medications as prescribed.     Patient is cleared for surgery and anaesthesia    James Tanner MD

## 2020-10-08 DIAGNOSIS — Z01.818 PREOPERATIVE TESTING: Primary | ICD-10-CM

## 2020-10-08 RX ORDER — ESTRADIOL 2 MG/1
2 TABLET ORAL DAILY
COMMUNITY
End: 2021-02-19 | Stop reason: SDUPTHER

## 2020-10-08 RX ORDER — CYANOCOBALAMIN (VITAMIN B-12) 250 MCG
250 TABLET ORAL DAILY
COMMUNITY
End: 2021-02-19 | Stop reason: ALTCHOICE

## 2020-10-08 NOTE — ANESTHESIA PAT ROS NOTE
10/08/2020  Kylah Mohan is a 50 y.o., female.      Pre-op Assessment          Review of Systems         Anesthesia Assessment: Preoperative EQUATION    Planned Procedure: Procedure(s) (LRB):  CRANIOTOMY, WITH ANEURYSM CLIPPING RIGHT MODIFIED ORBITOZYGOMATIC CRANIOTOMY (Right)  Requested Anesthesia Type:General  Surgeon: Christi Page MD  Service: Neurosurgery  Known or anticipated Date of Surgery:11/4/2020    Surgeon notes: reviewed    Electronic QUestionnaire Assessment completed via nurse interview with patient.        Triage considerations:       Previous anesthesia records:No problems and Not available    Last PCP note: within 1 month , within [facility]   Subspecialty notes: Endocrinology, ENT, Neurosurgery, Pain Management, OB/GYN    Other important co-morbidities:PER Epic;  HLD, HTN, Obesity and CEREBRAL ANEURYSM, HYPERTHYROIDISM      Tests already available:  Available tests,  within 1 month , within 3 months , within [facility] .9/17/2020 PT/INR, 9/14/2020MRI BRAIN W W/O CONTRAST, 9/12/2020 CBC, PHOS, MG, BMP, 9/11/2020 HGA1C, CMP, EKG, 8/7/2020 TSH, FT4, T3            Instructions given. (See in Nurse's note)    Optimization:  Anesthesia Preop Clinic Assessment  Indicated    Medical Opinion Indicated      Plan:    Testing:  T&C and T&S   Pre-anesthesia  visit       Visit focus: concerns in complex and/or prolonged anesthesia     Consultation:Patient's PCP for re-evaluation     Patient  has previously scheduled Medical Appointment:10/20 CXR, UA, LAB, 11/2 COVID TEST    Navigation: Tests Scheduled. TBD             Consults scheduled.TBD             Results will be tracked by Preop Clinic  10/8 Medical clearance given by Dr. James Tanner on 10/7.  10/22 T&S resulted.  Ekaterina Swift RN BSN

## 2020-10-08 NOTE — PRE-PROCEDURE INSTRUCTIONS
Patient stated has not had any problem with anesthesia in the past. Saw her PCP, DR. James Tanner on 10/7 for medical clearance. Will need poc appt and T&S. Our  will call to set up these appts.  She said her pcp told her to stop asa 81 mg until after surgery and he would address it after surgery. She said he told her to hold estradiol until after surgery.  Preop instructions given. Hold aspirin, aspirin containing products, nsaids(aleve, advil, motrin, ibuprofen, naprosyn, naproxen, voltaren, diclofenac), vitamins and supplements one week prior to surgery.(sent to my chart)Verbalizes understanding.

## 2020-10-12 ENCOUNTER — PATIENT MESSAGE (OUTPATIENT)
Dept: SURGERY | Facility: HOSPITAL | Age: 50
End: 2020-10-12

## 2020-10-20 ENCOUNTER — CLINICAL SUPPORT (OUTPATIENT)
Dept: FAMILY MEDICINE | Facility: CLINIC | Age: 50
End: 2020-10-20
Payer: COMMERCIAL

## 2020-10-20 ENCOUNTER — HOSPITAL ENCOUNTER (OUTPATIENT)
Dept: RADIOLOGY | Facility: CLINIC | Age: 50
Discharge: HOME | End: 2020-10-20
Attending: STUDENT IN AN ORGANIZED HEALTH CARE EDUCATION/TRAINING PROGRAM
Payer: COMMERCIAL

## 2020-10-20 ENCOUNTER — TELEPHONE (OUTPATIENT)
Dept: FAMILY MEDICINE | Facility: CLINIC | Age: 50
End: 2020-10-20

## 2020-10-20 DIAGNOSIS — Z01.818 PRE-OPERATIVE EXAM: Primary | ICD-10-CM

## 2020-10-20 DIAGNOSIS — Z01.818 PRE-OPERATIVE EXAM: ICD-10-CM

## 2020-10-20 PROCEDURE — 71046 XR CHEST PA AND LATERAL: ICD-10-PCS | Mod: 26,,, | Performed by: RADIOLOGY

## 2020-10-20 PROCEDURE — 93005 EKG 12-LEAD: ICD-10-PCS | Mod: S$GLB,,, | Performed by: STUDENT IN AN ORGANIZED HEALTH CARE EDUCATION/TRAINING PROGRAM

## 2020-10-20 PROCEDURE — 71046 X-RAY EXAM CHEST 2 VIEWS: CPT | Mod: TC,FY,PO

## 2020-10-20 PROCEDURE — 93010 EKG 12-LEAD: ICD-10-PCS | Mod: S$GLB,,, | Performed by: INTERNAL MEDICINE

## 2020-10-20 PROCEDURE — 93005 ELECTROCARDIOGRAM TRACING: CPT | Mod: S$GLB,,, | Performed by: STUDENT IN AN ORGANIZED HEALTH CARE EDUCATION/TRAINING PROGRAM

## 2020-10-20 PROCEDURE — 71046 X-RAY EXAM CHEST 2 VIEWS: CPT | Mod: 26,,, | Performed by: RADIOLOGY

## 2020-10-20 PROCEDURE — 99999 PR PBB SHADOW E&M-EST. PATIENT-LVL II: ICD-10-PCS | Mod: PBBFAC,,,

## 2020-10-20 PROCEDURE — 93010 ELECTROCARDIOGRAM REPORT: CPT | Mod: S$GLB,,, | Performed by: INTERNAL MEDICINE

## 2020-10-20 PROCEDURE — 99999 PR PBB SHADOW E&M-EST. PATIENT-LVL II: CPT | Mod: PBBFAC,,,

## 2020-10-22 ENCOUNTER — HOSPITAL ENCOUNTER (OUTPATIENT)
Dept: PREADMISSION TESTING | Facility: HOSPITAL | Age: 50
Discharge: HOME | End: 2020-10-22
Attending: ANESTHESIOLOGY
Payer: COMMERCIAL

## 2020-10-22 ENCOUNTER — ANESTHESIA EVENT (OUTPATIENT)
Dept: SURGERY | Facility: HOSPITAL | Age: 50
DRG: 027 | End: 2020-10-22
Payer: COMMERCIAL

## 2020-10-22 VITALS
BODY MASS INDEX: 33.68 KG/M2 | SYSTOLIC BLOOD PRESSURE: 136 MMHG | OXYGEN SATURATION: 96 % | DIASTOLIC BLOOD PRESSURE: 79 MMHG | TEMPERATURE: 98 F | HEIGHT: 62 IN | HEART RATE: 71 BPM | WEIGHT: 183 LBS

## 2020-10-22 NOTE — DISCHARGE INSTRUCTIONS
Neurosurgery Patient Information  -Return to work will be determined on an individual basis.  -No driving until released by your medical provider or while taking narcotics  -Do not take any OTC products containing acetaminophen at the same time as you take your narcotic pain medication. Medications that may contain acetaminophen include but are not limited to: Excedrin and other headache medications, arthritis medications, cold and sinus medications, etc. Please review the list of active ingredients in any OTC medication prior to taking it.  -Do not take any Aspirin or Aspirin-containing products for 2 weeks after surgery.  -Do not take any Aleve, Naprosyn, Naproxen, Ibuprofen, Advil or any other nonsteroidal anti-inflammatory drug (NSAID) for 2 weeks after surgery.  -Do not take any herbal supplements for 2 weeks after surgery.   -Do not consume any alcoholic beverages until released by your neurosurgeon  -Do not perform any excessive bending over or leaning forward as this is a fall hazard.  -Do not perform any heavy lifting or lifting more than 5-10 lbs from the ground level as this is a fall hazard.  -Slowly increase your ambulation [walking] over the next 2 weeks as tolerated. The goal is to be walking 1-2 miles by the time of your post op appointment.   -Walk on paved surfaces only. It is okay to walk up and down stairs while holding onto a side rail.      Contact the Neurosurgery Office immediately if:  If you begin to notice any neurologic changes such as:           -Sudden onset of lethargy or sleepiness           -Sudden confusion, trouble speaking, or understanding            -Sudden trouble seeing in one or both eyes            -Sudden trouble walking, dizziness, loss of coordination            -Sudden severe headache with no known cause            -Sudden onset of numbness or weakness           -Pulsating at the groin site, bleeding from the groin site, swelling at the groin site or leg     Wound  Care:    Call your doctor or go to the Emergency Room for any signs of infection including: increased redness, drainage, pain or fever (temperature greater than or equal to 101.4).       Miscellaneous:   -You have been discharged on antibiotics. Please complete the course as instructed.  -Follow up with Dr. Page in 2 weeks for wound check. Appointment will be mailed to you.    Please call our office with any questions or concerns.    [facility] Neurosurgery Office: [phone]

## 2020-10-22 NOTE — PRE-PROCEDURE INSTRUCTIONS
Reviewed:. Hold aspirin, aspirin containing products, nsaids(aleve, advil, motrin, ibuprofen, naprosyn, naproxen, voltaren, diclofenac), vitamins and supplements one week prior to surgery.    Medication instructions given.Shower the night before surgery and the morning of surgery with an antibacterial soap( hibiclens or dial antibacterial soap).  Nothing on the skin once shower. Do not apply any deodorant, lotion, powder, perfume,or aftershave.  No makeup or moisturizer.No fingernail polish or jewelry going to surgery.  Call your surgeon for any changes in your medical condition. May have solid foods, gum, and hard candy until 8 hours before surgery/procedure time.  May have clear liquids( water, gatorade, powerade or apple juice) until 2 hours prior to surgery/procedure time.  No red drinks. If in doubt , drink water. Nothing to drink 2 hours before arrival time for surgery/procedure. If you are told to take medication in the morning of surgery, it may be taken with a sip of water. If your doctor tells you something different pertaining to when to stop eating or drinking, follow your doctor's instructions.Verbalizes understanding. AVS given  With written preop and med instructions.

## 2020-10-22 NOTE — ANESTHESIA PREPROCEDURE EVALUATION
[facility]  Anesthesia Pre-Operative Evaluation         Patient Name: Kylah Mohan  YOB: 1970  MRN: [id number]    SUBJECTIVE:     Pre-operative evaluation for Procedure(s) (LRB):  CRANIOTOMY, WITH ANEURYSM CLIPPING RIGHT MODIFIED ORBITOZYGOMATIC CRANIOTOMY (Right)     10/22/2020    Kylah Mohan is a 50 y.o. female w/ a significant PMHx of HTN, HLD, subclinical hypothyroidism, lumbar DJD, and depression who presented with new onset headache that woke her up from sleep in the middle of the night. Associated with nausea and severe neck pain, particularly with right-to-left movement most pronounced with trying to bend her neck anteriorly. However, her headache and neck pain has been somewhat improving. Workup included: LP without any evidence of xanthochromia. No hemorrhage on MRI. MRA of the head with a 4.3-5mm anterior communicating artery aneurysm.     Patient now presents for the above procedure(s).      LDA: None documented.      Prev airway: None documented.    Drips: None documented.       Patient Active Problem List   Diagnosis    Excessive menses    Pelvic pain in female    Hyperlipidemia    DJD (degenerative joint disease), lumbar    Insomnia    Scoliosis    Pes planus - acquired    Obesity    Obesity, Class II, BMI 35-39.9, with comorbidity    Hypoglossal neuralgia    Depression with anxiety    Hyperthyroidism    Abnormal thyroid function test    Thyroiditis    Nodular thyroid disease    Chronic nonintractable headache    Dysmetabolic syndrome    Subclinical hyperthyroidism    Class 2 obesity in adult    Hypokalemia    Headache    Cerebral aneurysm without rupture, left anterior communicating 4.3       Review of patient's allergies indicates:   Allergen Reactions    Latex      Other reaction(s): Rash       Current Outpatient Medications:    Current Outpatient Medications:     ascorbic acid, vitamin C, (VITAMIN C) 1000 MG tablet, Take  1,000 mg by mouth once daily., Disp: , Rfl:     atenolol (TENORMIN) 25 MG tablet, Take 1 tablet (25 mg total) by mouth once daily., Disp: 90 tablet, Rfl: 3    cyanocobalamin (VITAMIN B-12) 250 MCG tablet, Take 250 mcg by mouth once daily., Disp: , Rfl:     cyclobenzaprine (FLEXERIL) 10 MG tablet, Take 10 mg by mouth every evening. , Disp: , Rfl:     estradioL (ESTRACE) 2 MG tablet, Take 2 mg by mouth once daily., Disp: , Rfl:     fluticasone (FLONASE) 50 mcg/actuation nasal spray, 1 spray by Each Nare route once daily., Disp: , Rfl:     hydroCHLOROthiazide (HYDRODIURIL) 25 MG tablet, Take 1 tablet (25 mg total) by mouth once daily., Disp: 90 tablet, Rfl: 3    losartan (COZAAR) 25 MG tablet, Take 1 tablet (25 mg total) by mouth once daily., Disp: 90 tablet, Rfl: 3    multivitamin (THERAGRAN) per tablet, Take 1 tablet by mouth once daily. Every day, Disp: , Rfl:     Past Surgical History:   Procedure Laterality Date    ANTERIOR VAGINAL REPAIR      BREAST BIOPSY  6/5/2007    left breast benign    CEREBRAL ANGIOGRAM N/A 9/17/2020    Procedure: ANGIOGRAM-CEREBRAL;  Surgeon: [facility] Diagnostic Provider;  Location: [facility] OR [address] [place] [address];  Service: Radiology;  Laterality: N/A;  /[place]    ENDOMETRIAL ABLATION  5/28/2009    HYSTERECTOMY      LAPAROSCOPIC HYSTERECTOMY  2/2013    TUBAL LIGATION  2002       Social History     Socioeconomic History    Marital status:      Spouse name: Not on file    Number of children: Not on file    Years of education: Not on file    Highest education level: Not on file   Occupational History     Employer: [employer]   Social Needs    Financial resource strain: Somewhat hard    Food insecurity     Worry: Sometimes true     Inability: Sometimes true    Transportation needs     Medical: No     Non-medical: No   Tobacco Use    Smoking status: Former Smoker     Packs/day: 0.25     Years: 15.00     Pack years: 3.75     Types: Cigarettes     Quit date:  10/20/2013     Years since quittin.0    Smokeless tobacco: Never Used   Substance and Sexual Activity    Alcohol use: Yes     Alcohol/week: 1.0 standard drinks     Types: 1 Glasses of wine per week     Frequency: 2-3 times a week     Drinks per session: 3 or 4     Binge frequency: Less than monthly    Drug use: No    Sexual activity: Yes     Partners: Male     Birth control/protection: Surgical   Lifestyle    Physical activity     Days per week: 5 days     Minutes per session: 40 min    Stress: Very much   Relationships    Social connections     Talks on phone: More than three times a week     Gets together: Once a week     Attends Faith service: Not on file     Active member of club or organization: Yes     Attends meetings of clubs or organizations: More than 4 times per year     Relationship status:    Other Topics Concern    Not on file   Social History Narrative    The patient does exercise regularly (walking daily, 35-40min).Rates diet as fair.She is not satisfied with weight.       OBJECTIVE:     Vital Signs Range (Last 24H):         Significant Labs:  Lab Results   Component Value Date    WBC 8.72 10/20/2020    HGB 12.9 10/20/2020    HCT 40.2 10/20/2020     10/20/2020    CHOL 219 (H) 2020    TRIG 86 2020    HDL 51 2020    ALT 13 10/20/2020    AST 16 10/20/2020     10/20/2020    K 3.8 10/20/2020    CL 98 10/20/2020    CREATININE 0.7 10/20/2020    BUN 12 10/20/2020    CO2 33 (H) 10/20/2020    TSH 0.233 (L) 2020    INR 1.0 10/20/2020    HGBA1C 5.6 2020       Diagnostic Studies: No relevant studies.    EKG:   Results for orders placed or performed in visit on 10/07/20   IN OFFICE EKG 12-LEAD (to [place])    Collection Time: 10/20/20  9:17 AM    Narrative    Test Reason : Z01.818,    Vent. Rate : 066 BPM     Atrial Rate : 066 BPM     P-R Int : 174 ms          QRS Dur : 086 ms      QT Int : 408 ms       P-R-T Axes : 017 029 000 degrees     QTc Int :  427 ms    Normal sinus rhythm  Normal ECG  When compared with ECG of 11-SEP-2020 16:15,  Premature atrial complexes are no longer Present  Confirmed by Gonzalo Hatch MD ([id number]) on 10/20/2020 12:58:13 PM    Referred By:  GUTIERREZ           Confirmed By:Gonzalo Hatch MD       2D ECHO:  TTE:  No results found for this or any previous visit.    EMILY:  No results found for this or any previous visit.    ASSESSMENT/PLAN:       Anesthesia Evaluation    I have reviewed the Patient Summary Reports.    I have reviewed the Nursing Notes. I have reviewed the NPO Status.   I have reviewed the Medications.     Review of Systems  Anesthesia Hx:  No problems with previous Anesthesia Denies Hx of Anesthetic complications  History of prior surgery of interest to airway management or planning: Denies Family Hx of Anesthesia complications.   Denies Personal Hx of Anesthesia complications.   Social:  Former Smoker, Social Alcohol Use 15 pack year smoking hx, quit 2013   Hematology/Oncology:  Hematology Normal   Oncology Normal     EENT/Dental:EENT/Dental Normal   Cardiovascular:   Exercise tolerance: good Hypertension  Denies Angina. hyperlipidemia ECG has been reviewed.  Functional Capacity good / => 4 METS    Pulmonary:  Pulmonary Normal  Denies COPD.  Denies Asthma.  Denies Shortness of breath.    Renal/:  Renal/ Normal     Hepatic/GI:  Hepatic/GI Normal  Denies GERD. Denies Liver Disease.    Musculoskeletal:   Arthritis     Neurological:   Denies CVA. Headaches Denies Seizures. Anterior communicating artery aneurysm   Endocrine:  Endocrine Normal Denies Diabetes.    Psych:   Psychiatric History depression          Physical Exam  General:  Well nourished, Obesity    Airway/Jaw/Neck:  Airway Findings: Mouth Opening: Normal Tongue: Normal  General Airway Assessment: Adult, Good  Mallampati: II  TM Distance: Normal, at least 6 cm  Jaw/Neck Findings:  Neck ROM: Normal ROM  Neck Findings: Normal    Eyes/Ears/Nose:  EYES/EARS/NOSE FINDINGS: Normal    Dental:  Dental Findings: In tact    Chest/Lungs:  Chest/Lungs Findings: Clear to auscultation, Normal Respiratory Rate     Heart/Vascular:  Heart Findings: Rate: Normal  Rhythm: Regular Rhythm  Sounds: Normal  Heart murmur: negative       Mental Status:  Mental Status Findings:  Cooperative, Alert and Oriented         Anesthesia Plan  Type of Anesthesia, risks & benefits discussed:  Anesthesia Type:  general  Patient's Preference:   Intra-op Monitoring Plan: standard ASA monitors and arterial line  Intra-op Monitoring Plan Comments:   Post Op Pain Control Plan: multimodal analgesia, IV/PO Opioids PRN and per primary service following discharge from PACU  Post Op Pain Control Plan Comments:   Induction:   IV  Beta Blocker:  Patient is on a Beta-Blocker and has received one dose within the past 24 hours (No further documentation required).       Informed Consent: Patient understands risks and agrees with Anesthesia plan.  Questions answered. Anesthesia consent signed with patient.  ASA Score: 3     Day of Surgery Review of History & Physical:    H&P update referred to the surgeon.         Ready For Surgery From Anesthesia Perspective.

## 2020-11-02 ENCOUNTER — LAB VISIT (OUTPATIENT)
Dept: SURGERY | Facility: CLINIC | Age: 50
DRG: 027 | End: 2020-11-02
Payer: COMMERCIAL

## 2020-11-02 DIAGNOSIS — Z01.818 PRE-OP TESTING: ICD-10-CM

## 2020-11-02 LAB — SARS-COV-2 RNA RESP QL NAA+PROBE: NOT DETECTED

## 2020-11-02 PROCEDURE — U0003 INFECTIOUS AGENT DETECTION BY NUCLEIC ACID (DNA OR RNA); SEVERE ACUTE RESPIRATORY SYNDROME CORONAVIRUS 2 (SARS-COV-2) (CORONAVIRUS DISEASE [COVID-19]), AMPLIFIED PROBE TECHNIQUE, MAKING USE OF HIGH THROUGHPUT TECHNOLOGIES AS DESCRIBED BY CMS-2020-01-R: HCPCS

## 2020-11-04 ENCOUNTER — HOSPITAL ENCOUNTER (INPATIENT)
Facility: HOSPITAL | Age: 50
LOS: 6 days | Discharge: HOME | DRG: 027 | End: 2020-11-10
Attending: NEUROLOGICAL SURGERY | Admitting: PSYCHIATRY & NEUROLOGY
Payer: COMMERCIAL

## 2020-11-04 ENCOUNTER — ANESTHESIA (OUTPATIENT)
Dept: SURGERY | Facility: HOSPITAL | Age: 50
DRG: 027 | End: 2020-11-04
Payer: COMMERCIAL

## 2020-11-04 DIAGNOSIS — E66.01 CLASS 2 SEVERE OBESITY WITH SERIOUS COMORBIDITY IN ADULT, UNSPECIFIED BMI, UNSPECIFIED OBESITY TYPE: Chronic | ICD-10-CM

## 2020-11-04 DIAGNOSIS — Z01.818 PREOPERATIVE TESTING: ICD-10-CM

## 2020-11-04 DIAGNOSIS — I10 ESSENTIAL HYPERTENSION: ICD-10-CM

## 2020-11-04 DIAGNOSIS — I82.409 DVT (DEEP VENOUS THROMBOSIS): ICD-10-CM

## 2020-11-04 DIAGNOSIS — E78.5 HYPERLIPIDEMIA, UNSPECIFIED HYPERLIPIDEMIA TYPE: ICD-10-CM

## 2020-11-04 DIAGNOSIS — I67.1 CEREBRAL ANEURYSM: ICD-10-CM

## 2020-11-04 DIAGNOSIS — I72.9 ANEURYSM: ICD-10-CM

## 2020-11-04 DIAGNOSIS — I67.1 CEREBRAL ANEURYSM WITHOUT RUPTURE: Primary | Chronic | ICD-10-CM

## 2020-11-04 DIAGNOSIS — I67.1 CEREBRAL ANEURYSM WITHOUT RUPTURE: Chronic | ICD-10-CM

## 2020-11-04 DIAGNOSIS — E87.6 HYPOKALEMIA: ICD-10-CM

## 2020-11-04 LAB
ABO + RH BLD: NORMAL
ALBUMIN SERPL BCP-MCNC: 3.1 G/DL (ref 3.5–5.2)
ALP SERPL-CCNC: 69 U/L (ref 55–135)
ALT SERPL W/O P-5'-P-CCNC: 16 U/L (ref 10–44)
ANION GAP SERPL CALC-SCNC: 10 MMOL/L (ref 8–16)
ANION GAP SERPL CALC-SCNC: 10 MMOL/L (ref 8–16)
AST SERPL-CCNC: 22 U/L (ref 10–40)
BASOPHILS # BLD AUTO: 0.03 K/UL (ref 0–0.2)
BASOPHILS NFR BLD: 0.2 % (ref 0–1.9)
BILIRUB SERPL-MCNC: 0.3 MG/DL (ref 0.1–1)
BLD GP AB SCN CELLS X3 SERPL QL: NORMAL
BUN SERPL-MCNC: 12 MG/DL (ref 6–20)
BUN SERPL-MCNC: 12 MG/DL (ref 6–20)
CALCIUM SERPL-MCNC: 7.5 MG/DL (ref 8.7–10.5)
CALCIUM SERPL-MCNC: 7.5 MG/DL (ref 8.7–10.5)
CHLORIDE SERPL-SCNC: 107 MMOL/L (ref 95–110)
CHLORIDE SERPL-SCNC: 107 MMOL/L (ref 95–110)
CHOLEST SERPL-MCNC: 197 MG/DL (ref 120–199)
CHOLEST/HDLC SERPL: 4.4 {RATIO} (ref 2–5)
CO2 SERPL-SCNC: 23 MMOL/L (ref 23–29)
CO2 SERPL-SCNC: 23 MMOL/L (ref 23–29)
CREAT SERPL-MCNC: 0.7 MG/DL (ref 0.5–1.4)
CREAT SERPL-MCNC: 0.7 MG/DL (ref 0.5–1.4)
DIFFERENTIAL METHOD: ABNORMAL
EOSINOPHIL # BLD AUTO: 0 K/UL (ref 0–0.5)
EOSINOPHIL NFR BLD: 0.1 % (ref 0–8)
ERYTHROCYTE [DISTWIDTH] IN BLOOD BY AUTOMATED COUNT: 12.6 % (ref 11.5–14.5)
EST. GFR  (AFRICAN AMERICAN): >60 ML/MIN/1.73 M^2
EST. GFR  (AFRICAN AMERICAN): >60 ML/MIN/1.73 M^2
EST. GFR  (NON AFRICAN AMERICAN): >60 ML/MIN/1.73 M^2
EST. GFR  (NON AFRICAN AMERICAN): >60 ML/MIN/1.73 M^2
ESTIMATED AVG GLUCOSE: 105 MG/DL (ref 68–131)
GLUCOSE SERPL-MCNC: 231 MG/DL (ref 70–110)
GLUCOSE SERPL-MCNC: 231 MG/DL (ref 70–110)
GLUCOSE SERPL-MCNC: 95 MG/DL (ref 70–110)
HBA1C MFR BLD HPLC: 5.3 % (ref 4–5.6)
HCO3 UR-SCNC: 23.5 MMOL/L (ref 24–28)
HCT VFR BLD AUTO: 37.1 % (ref 37–48.5)
HCT VFR BLD CALC: 31 %PCV (ref 36–54)
HDLC SERPL-MCNC: 45 MG/DL (ref 40–75)
HDLC SERPL: 22.8 % (ref 20–50)
HGB BLD-MCNC: 11.7 G/DL (ref 12–16)
IMM GRANULOCYTES # BLD AUTO: 0.08 K/UL (ref 0–0.04)
IMM GRANULOCYTES NFR BLD AUTO: 0.4 % (ref 0–0.5)
LDLC SERPL CALC-MCNC: 134.4 MG/DL (ref 63–159)
LYMPHOCYTES # BLD AUTO: 1.9 K/UL (ref 1–4.8)
LYMPHOCYTES NFR BLD: 10.4 % (ref 18–48)
MAGNESIUM SERPL-MCNC: 2.2 MG/DL (ref 1.6–2.6)
MCH RBC QN AUTO: 30.1 PG (ref 27–31)
MCHC RBC AUTO-ENTMCNC: 31.5 G/DL (ref 32–36)
MCV RBC AUTO: 95 FL (ref 82–98)
MONOCYTES # BLD AUTO: 0.4 K/UL (ref 0.3–1)
MONOCYTES NFR BLD: 2.2 % (ref 4–15)
NEUTROPHILS # BLD AUTO: 15.9 K/UL (ref 1.8–7.7)
NEUTROPHILS NFR BLD: 86.7 % (ref 38–73)
NONHDLC SERPL-MCNC: 152 MG/DL
NRBC BLD-RTO: 0 /100 WBC
PCO2 BLDA: 36.5 MMHG (ref 35–45)
PH SMN: 7.42 [PH] (ref 7.35–7.45)
PHOSPHATE SERPL-MCNC: 2.5 MG/DL (ref 2.7–4.5)
PLATELET # BLD AUTO: 387 K/UL (ref 150–350)
PMV BLD AUTO: 10 FL (ref 9.2–12.9)
PO2 BLDA: 304 MMHG (ref 80–100)
POC BE: -1 MMOL/L
POC IONIZED CALCIUM: 1.04 MMOL/L (ref 1.06–1.42)
POC SATURATED O2: 100 % (ref 95–100)
POC TCO2: 25 MMOL/L (ref 23–27)
POCT GLUCOSE: 197 MG/DL (ref 70–110)
POCT GLUCOSE: 208 MG/DL (ref 70–110)
POTASSIUM BLD-SCNC: 3.5 MMOL/L (ref 3.5–5.1)
POTASSIUM SERPL-SCNC: 3.4 MMOL/L (ref 3.5–5.1)
POTASSIUM SERPL-SCNC: 3.4 MMOL/L (ref 3.5–5.1)
PROT SERPL-MCNC: 6.6 G/DL (ref 6–8.4)
RBC # BLD AUTO: 3.89 M/UL (ref 4–5.4)
SAMPLE: ABNORMAL
SODIUM BLD-SCNC: 137 MMOL/L (ref 136–145)
SODIUM SERPL-SCNC: 140 MMOL/L (ref 136–145)
SODIUM SERPL-SCNC: 140 MMOL/L (ref 136–145)
T4 FREE SERPL-MCNC: 1.12 NG/DL (ref 0.71–1.51)
TRIGL SERPL-MCNC: 88 MG/DL (ref 30–150)
TSH SERPL DL<=0.005 MIU/L-ACNC: 0.19 UIU/ML (ref 0.4–4)
WBC # BLD AUTO: 18.35 K/UL (ref 3.9–12.7)

## 2020-11-04 PROCEDURE — 36620 ARTERIAL: ICD-10-PCS | Mod: 59,,, | Performed by: STUDENT IN AN ORGANIZED HEALTH CARE EDUCATION/TRAINING PROGRAM

## 2020-11-04 PROCEDURE — 36415 COLL VENOUS BLD VENIPUNCTURE: CPT

## 2020-11-04 PROCEDURE — 25000003 PHARM REV CODE 250: Performed by: STUDENT IN AN ORGANIZED HEALTH CARE EDUCATION/TRAINING PROGRAM

## 2020-11-04 PROCEDURE — 37000009 HC ANESTHESIA EA ADD 15 MINS: Performed by: NEUROLOGICAL SURGERY

## 2020-11-04 PROCEDURE — 25000003 PHARM REV CODE 250: Performed by: NEUROLOGICAL SURGERY

## 2020-11-04 PROCEDURE — 63600175 PHARM REV CODE 636 W HCPCS: Performed by: STUDENT IN AN ORGANIZED HEALTH CARE EDUCATION/TRAINING PROGRAM

## 2020-11-04 PROCEDURE — C1729 CATH, DRAINAGE: HCPCS | Performed by: NEUROLOGICAL SURGERY

## 2020-11-04 PROCEDURE — 99223 1ST HOSP IP/OBS HIGH 75: CPT | Mod: ,,, | Performed by: NURSE PRACTITIONER

## 2020-11-04 PROCEDURE — 25000003 PHARM REV CODE 250: Performed by: NURSE PRACTITIONER

## 2020-11-04 PROCEDURE — 80053 COMPREHEN METABOLIC PANEL: CPT

## 2020-11-04 PROCEDURE — 86920 COMPATIBILITY TEST SPIN: CPT

## 2020-11-04 PROCEDURE — 37000008 HC ANESTHESIA 1ST 15 MINUTES: Performed by: NEUROLOGICAL SURGERY

## 2020-11-04 PROCEDURE — 84100 ASSAY OF PHOSPHORUS: CPT

## 2020-11-04 PROCEDURE — 84439 ASSAY OF FREE THYROXINE: CPT

## 2020-11-04 PROCEDURE — 83036 HEMOGLOBIN GLYCOSYLATED A1C: CPT

## 2020-11-04 PROCEDURE — 99223 PR INITIAL HOSPITAL CARE,LEVL III: ICD-10-PCS | Mod: ,,, | Performed by: NURSE PRACTITIONER

## 2020-11-04 PROCEDURE — 94761 N-INVAS EAR/PLS OXIMETRY MLT: CPT

## 2020-11-04 PROCEDURE — D9220A PRA ANESTHESIA: ICD-10-PCS | Mod: ,,, | Performed by: STUDENT IN AN ORGANIZED HEALTH CARE EDUCATION/TRAINING PROGRAM

## 2020-11-04 PROCEDURE — 36620 INSERTION CATHETER ARTERY: CPT | Mod: 59,,, | Performed by: STUDENT IN AN ORGANIZED HEALTH CARE EDUCATION/TRAINING PROGRAM

## 2020-11-04 PROCEDURE — 36000710: Performed by: NEUROLOGICAL SURGERY

## 2020-11-04 PROCEDURE — 25500020 PHARM REV CODE 255: Performed by: NEUROLOGICAL SURGERY

## 2020-11-04 PROCEDURE — 27800903 OPTIME MED/SURG SUP & DEVICES OTHER IMPLANTS: Performed by: NEUROLOGICAL SURGERY

## 2020-11-04 PROCEDURE — 20000000 HC ICU ROOM

## 2020-11-04 PROCEDURE — 63600175 PHARM REV CODE 636 W HCPCS: Performed by: NEUROLOGICAL SURGERY

## 2020-11-04 PROCEDURE — 27201037 HC PRESSURE MONITORING SET UP

## 2020-11-04 PROCEDURE — 85025 COMPLETE CBC W/AUTO DIFF WBC: CPT

## 2020-11-04 PROCEDURE — 84443 ASSAY THYROID STIM HORMONE: CPT

## 2020-11-04 PROCEDURE — 36000711: Performed by: NEUROLOGICAL SURGERY

## 2020-11-04 PROCEDURE — C1713 ANCHOR/SCREW BN/BN,TIS/BN: HCPCS | Performed by: NEUROLOGICAL SURGERY

## 2020-11-04 PROCEDURE — 80061 LIPID PANEL: CPT

## 2020-11-04 PROCEDURE — D9220A PRA ANESTHESIA: Mod: ,,, | Performed by: STUDENT IN AN ORGANIZED HEALTH CARE EDUCATION/TRAINING PROGRAM

## 2020-11-04 PROCEDURE — 83735 ASSAY OF MAGNESIUM: CPT

## 2020-11-04 PROCEDURE — 61697 PR COMPLEX ANEURYSM SURG/CAROTID CIRC: ICD-10-PCS | Mod: 22,62,, | Performed by: NEUROLOGICAL SURGERY

## 2020-11-04 PROCEDURE — 61697 BRAIN ANEURYSM REPR COMPLX: CPT | Mod: 22,62,, | Performed by: NEUROLOGICAL SURGERY

## 2020-11-04 PROCEDURE — 27201423 OPTIME MED/SURG SUP & DEVICES STERILE SUPPLY: Performed by: NEUROLOGICAL SURGERY

## 2020-11-04 PROCEDURE — A4216 STERILE WATER/SALINE, 10 ML: HCPCS | Performed by: NURSE PRACTITIONER

## 2020-11-04 PROCEDURE — 86850 RBC ANTIBODY SCREEN: CPT

## 2020-11-04 PROCEDURE — 63600175 PHARM REV CODE 636 W HCPCS: Performed by: NURSE PRACTITIONER

## 2020-11-04 DEVICE — PATCH CRANIAL DURAL GRAFT 3X3: Type: IMPLANTABLE DEVICE | Site: CRANIAL | Status: FUNCTIONAL

## 2020-11-04 DEVICE — IMPLANTABLE DEVICE: Type: IMPLANTABLE DEVICE | Site: CRANIAL | Status: FUNCTIONAL

## 2020-11-04 DEVICE — PLATE BONE 2 HOLE TAB: Type: IMPLANTABLE DEVICE | Site: CRANIAL | Status: FUNCTIONAL

## 2020-11-04 DEVICE — SCREW UN3 AXS SD 1.5X4MM: Type: IMPLANTABLE DEVICE | Site: CRANIAL | Status: FUNCTIONAL

## 2020-11-04 DEVICE — PLATE BONE BUR HOLE COVER 10MM: Type: IMPLANTABLE DEVICE | Site: CRANIAL | Status: FUNCTIONAL

## 2020-11-04 DEVICE — PLATE CRANIAL UN3 BOX LG 2X2: Type: IMPLANTABLE DEVICE | Site: CRANIAL | Status: FUNCTIONAL

## 2020-11-04 DEVICE — PLATE BONE CRANIAL 16MM: Type: IMPLANTABLE DEVICE | Site: CRANIAL | Status: FUNCTIONAL

## 2020-11-04 RX ORDER — OXYCODONE HYDROCHLORIDE 5 MG/1
5 TABLET ORAL EVERY 6 HOURS PRN
Status: DISCONTINUED | OUTPATIENT
Start: 2020-11-04 | End: 2020-11-10 | Stop reason: HOSPADM

## 2020-11-04 RX ORDER — HYDROCODONE BITARTRATE AND ACETAMINOPHEN 5; 325 MG/1; MG/1
1 TABLET ORAL EVERY 4 HOURS PRN
Status: DISCONTINUED | OUTPATIENT
Start: 2020-11-04 | End: 2020-11-04

## 2020-11-04 RX ORDER — HYDROMORPHONE HYDROCHLORIDE 2 MG/ML
INJECTION, SOLUTION INTRAMUSCULAR; INTRAVENOUS; SUBCUTANEOUS
Status: DISCONTINUED | OUTPATIENT
Start: 2020-11-04 | End: 2020-11-04

## 2020-11-04 RX ORDER — ESTRADIOL 0.5 MG/1
2 TABLET ORAL DAILY
Status: DISCONTINUED | OUTPATIENT
Start: 2020-11-05 | End: 2020-11-04

## 2020-11-04 RX ORDER — PROPOFOL 10 MG/ML
VIAL (ML) INTRAVENOUS
Status: DISCONTINUED | OUTPATIENT
Start: 2020-11-04 | End: 2020-11-04

## 2020-11-04 RX ORDER — PROPOFOL 10 MG/ML
VIAL (ML) INTRAVENOUS CONTINUOUS PRN
Status: DISCONTINUED | OUTPATIENT
Start: 2020-11-04 | End: 2020-11-04

## 2020-11-04 RX ORDER — DEXAMETHASONE SODIUM PHOSPHATE 4 MG/ML
INJECTION, SOLUTION INTRA-ARTICULAR; INTRALESIONAL; INTRAMUSCULAR; INTRAVENOUS; SOFT TISSUE
Status: DISCONTINUED | OUTPATIENT
Start: 2020-11-04 | End: 2020-11-04

## 2020-11-04 RX ORDER — ONDANSETRON 2 MG/ML
4 INJECTION INTRAMUSCULAR; INTRAVENOUS EVERY 12 HOURS PRN
Status: DISCONTINUED | OUTPATIENT
Start: 2020-11-04 | End: 2020-11-04

## 2020-11-04 RX ORDER — ATORVASTATIN CALCIUM 20 MG/1
40 TABLET, FILM COATED ORAL DAILY
Status: DISCONTINUED | OUTPATIENT
Start: 2020-11-05 | End: 2020-11-10 | Stop reason: HOSPADM

## 2020-11-04 RX ORDER — MUPIROCIN 20 MG/G
OINTMENT TOPICAL 2 TIMES DAILY
Status: COMPLETED | OUTPATIENT
Start: 2020-11-04 | End: 2020-11-09

## 2020-11-04 RX ORDER — LOSARTAN POTASSIUM 25 MG/1
25 TABLET ORAL DAILY
Status: DISCONTINUED | OUTPATIENT
Start: 2020-11-05 | End: 2020-11-10 | Stop reason: HOSPADM

## 2020-11-04 RX ORDER — FUROSEMIDE 10 MG/ML
INJECTION INTRAMUSCULAR; INTRAVENOUS
Status: DISCONTINUED | OUTPATIENT
Start: 2020-11-04 | End: 2020-11-04

## 2020-11-04 RX ORDER — GLUCAGON 1 MG
1 KIT INJECTION
Status: DISCONTINUED | OUTPATIENT
Start: 2020-11-04 | End: 2020-11-05

## 2020-11-04 RX ORDER — ASCORBIC ACID 250 MG
1000 TABLET ORAL DAILY
Status: DISCONTINUED | OUTPATIENT
Start: 2020-11-05 | End: 2020-11-10 | Stop reason: HOSPADM

## 2020-11-04 RX ORDER — ACETAMINOPHEN 325 MG/1
650 TABLET ORAL EVERY 6 HOURS PRN
Status: DISCONTINUED | OUTPATIENT
Start: 2020-11-04 | End: 2020-11-10 | Stop reason: HOSPADM

## 2020-11-04 RX ORDER — ACETAMINOPHEN 650 MG/1
650 SUPPOSITORY RECTAL EVERY 6 HOURS PRN
Status: DISCONTINUED | OUTPATIENT
Start: 2020-11-04 | End: 2020-11-10 | Stop reason: HOSPADM

## 2020-11-04 RX ORDER — LIDOCAINE HCL/PF 100 MG/5ML
SYRINGE (ML) INTRAVENOUS
Status: DISCONTINUED | OUTPATIENT
Start: 2020-11-04 | End: 2020-11-04

## 2020-11-04 RX ORDER — MANNITOL 250 MG/ML
INJECTION, SOLUTION INTRAVENOUS
Status: DISCONTINUED | OUTPATIENT
Start: 2020-11-04 | End: 2020-11-04

## 2020-11-04 RX ORDER — MUPIROCIN 20 MG/G
OINTMENT TOPICAL
Status: DISCONTINUED | OUTPATIENT
Start: 2020-11-04 | End: 2020-11-04 | Stop reason: HOSPADM

## 2020-11-04 RX ORDER — LIDOCAINE HYDROCHLORIDE AND EPINEPHRINE 10; 10 MG/ML; UG/ML
INJECTION, SOLUTION INFILTRATION; PERINEURAL
Status: DISCONTINUED | OUTPATIENT
Start: 2020-11-04 | End: 2020-11-04 | Stop reason: HOSPADM

## 2020-11-04 RX ORDER — INDOCYANINE GREEN AND WATER 25 MG
KIT INJECTION
Status: DISCONTINUED | OUTPATIENT
Start: 2020-11-04 | End: 2020-11-04

## 2020-11-04 RX ORDER — ONDANSETRON 2 MG/ML
4 INJECTION INTRAMUSCULAR; INTRAVENOUS EVERY 6 HOURS PRN
Status: DISCONTINUED | OUTPATIENT
Start: 2020-11-04 | End: 2020-11-10 | Stop reason: HOSPADM

## 2020-11-04 RX ORDER — INDOCYANINE GREEN AND WATER 25 MG
25 KIT INJECTION ONCE
Status: DISCONTINUED | OUTPATIENT
Start: 2020-11-04 | End: 2020-11-04

## 2020-11-04 RX ORDER — HYDROCHLOROTHIAZIDE 12.5 MG/1
25 TABLET ORAL DAILY
Status: DISCONTINUED | OUTPATIENT
Start: 2020-11-05 | End: 2020-11-04

## 2020-11-04 RX ORDER — CYCLOBENZAPRINE HCL 5 MG
10 TABLET ORAL NIGHTLY
Status: DISCONTINUED | OUTPATIENT
Start: 2020-11-04 | End: 2020-11-05

## 2020-11-04 RX ORDER — SODIUM CHLORIDE 0.9 % (FLUSH) 0.9 %
3 SYRINGE (ML) INJECTION EVERY 8 HOURS
Status: DISCONTINUED | OUTPATIENT
Start: 2020-11-04 | End: 2020-11-10 | Stop reason: HOSPADM

## 2020-11-04 RX ORDER — ONDANSETRON 2 MG/ML
INJECTION INTRAMUSCULAR; INTRAVENOUS
Status: DISCONTINUED | OUTPATIENT
Start: 2020-11-04 | End: 2020-11-04

## 2020-11-04 RX ORDER — HEPARIN SODIUM 5000 [USP'U]/ML
5000 INJECTION, SOLUTION INTRAVENOUS; SUBCUTANEOUS EVERY 8 HOURS
Status: DISCONTINUED | OUTPATIENT
Start: 2020-11-05 | End: 2020-11-10 | Stop reason: HOSPADM

## 2020-11-04 RX ORDER — FENTANYL CITRATE 50 UG/ML
INJECTION, SOLUTION INTRAMUSCULAR; INTRAVENOUS
Status: DISCONTINUED | OUTPATIENT
Start: 2020-11-04 | End: 2020-11-04

## 2020-11-04 RX ORDER — ACETAMINOPHEN 325 MG/1
650 TABLET ORAL EVERY 4 HOURS PRN
Status: DISCONTINUED | OUTPATIENT
Start: 2020-11-04 | End: 2020-11-04

## 2020-11-04 RX ORDER — INSULIN ASPART 100 [IU]/ML
1-10 INJECTION, SOLUTION INTRAVENOUS; SUBCUTANEOUS EVERY 6 HOURS PRN
Status: DISCONTINUED | OUTPATIENT
Start: 2020-11-04 | End: 2020-11-05

## 2020-11-04 RX ORDER — ACETAMINOPHEN 10 MG/ML
INJECTION, SOLUTION INTRAVENOUS
Status: DISCONTINUED | OUTPATIENT
Start: 2020-11-04 | End: 2020-11-04

## 2020-11-04 RX ORDER — NICARDIPINE HYDROCHLORIDE 0.2 MG/ML
2.5 INJECTION INTRAVENOUS CONTINUOUS
Status: DISCONTINUED | OUTPATIENT
Start: 2020-11-04 | End: 2020-11-05

## 2020-11-04 RX ORDER — IODIXANOL 320 MG/ML
100 INJECTION, SOLUTION INTRAVASCULAR
Status: COMPLETED | OUTPATIENT
Start: 2020-11-04 | End: 2020-11-04

## 2020-11-04 RX ORDER — FLUTICASONE PROPIONATE 50 MCG
1 SPRAY, SUSPENSION (ML) NASAL DAILY
Status: DISCONTINUED | OUTPATIENT
Start: 2020-11-05 | End: 2020-11-10 | Stop reason: HOSPADM

## 2020-11-04 RX ORDER — LEVETIRACETAM 500 MG/5ML
INJECTION, SOLUTION, CONCENTRATE INTRAVENOUS
Status: DISCONTINUED | OUTPATIENT
Start: 2020-11-04 | End: 2020-11-04

## 2020-11-04 RX ORDER — CYANOCOBALAMIN (VITAMIN B-12) 250 MCG
250 TABLET ORAL DAILY
Status: DISCONTINUED | OUTPATIENT
Start: 2020-11-05 | End: 2020-11-10 | Stop reason: HOSPADM

## 2020-11-04 RX ORDER — REMIFENTANIL HYDROCHLORIDE 1 MG/ML
INJECTION, POWDER, LYOPHILIZED, FOR SOLUTION INTRAVENOUS CONTINUOUS PRN
Status: DISCONTINUED | OUTPATIENT
Start: 2020-11-04 | End: 2020-11-04

## 2020-11-04 RX ORDER — DEXMEDETOMIDINE HYDROCHLORIDE 100 UG/ML
INJECTION, SOLUTION INTRAVENOUS
Status: DISCONTINUED | OUTPATIENT
Start: 2020-11-04 | End: 2020-11-04

## 2020-11-04 RX ORDER — HYDROMORPHONE HYDROCHLORIDE 1 MG/ML
1 INJECTION, SOLUTION INTRAMUSCULAR; INTRAVENOUS; SUBCUTANEOUS EVERY 4 HOURS PRN
Status: DISCONTINUED | OUTPATIENT
Start: 2020-11-04 | End: 2020-11-04

## 2020-11-04 RX ORDER — MIDAZOLAM HYDROCHLORIDE 1 MG/ML
INJECTION, SOLUTION INTRAMUSCULAR; INTRAVENOUS
Status: DISCONTINUED | OUTPATIENT
Start: 2020-11-04 | End: 2020-11-04

## 2020-11-04 RX ORDER — ROCURONIUM BROMIDE 10 MG/ML
INJECTION, SOLUTION INTRAVENOUS
Status: DISCONTINUED | OUTPATIENT
Start: 2020-11-04 | End: 2020-11-04

## 2020-11-04 RX ORDER — SUCCINYLCHOLINE CHLORIDE 20 MG/ML
INJECTION INTRAMUSCULAR; INTRAVENOUS
Status: DISCONTINUED | OUTPATIENT
Start: 2020-11-04 | End: 2020-11-04

## 2020-11-04 RX ORDER — LABETALOL HCL 20 MG/4 ML
20 SYRINGE (ML) INTRAVENOUS EVERY 4 HOURS PRN
Status: DISCONTINUED | OUTPATIENT
Start: 2020-11-04 | End: 2020-11-05

## 2020-11-04 RX ORDER — MUPIROCIN 20 MG/G
1 OINTMENT TOPICAL 2 TIMES DAILY
Status: DISCONTINUED | OUTPATIENT
Start: 2020-11-04 | End: 2020-11-04 | Stop reason: HOSPADM

## 2020-11-04 RX ORDER — CEFAZOLIN SODIUM 1 G/3ML
2 INJECTION, POWDER, FOR SOLUTION INTRAMUSCULAR; INTRAVENOUS
Status: COMPLETED | OUTPATIENT
Start: 2020-11-04 | End: 2020-11-04

## 2020-11-04 RX ORDER — SODIUM CHLORIDE 9 MG/ML
INJECTION, SOLUTION INTRAVENOUS CONTINUOUS
Status: DISCONTINUED | OUTPATIENT
Start: 2020-11-04 | End: 2020-11-06

## 2020-11-04 RX ADMIN — IODIXANOL 60 ML: 320 INJECTION, SOLUTION INTRAVASCULAR at 01:11

## 2020-11-04 RX ADMIN — INDOCYANINE GREEN AND WATER 12.5 MG: KIT at 12:11

## 2020-11-04 RX ADMIN — CEFAZOLIN 2 G: 1 INJECTION, POWDER, FOR SOLUTION INTRAMUSCULAR; INTRAVENOUS at 05:11

## 2020-11-04 RX ADMIN — SUCCINYLCHOLINE CHLORIDE 100 MG: 20 INJECTION, SOLUTION INTRAMUSCULAR; INTRAVENOUS at 08:11

## 2020-11-04 RX ADMIN — DEXMEDETOMIDINE HYDROCHLORIDE 4 MCG: 100 INJECTION, SOLUTION INTRAVENOUS at 12:11

## 2020-11-04 RX ADMIN — CEFTRIAXONE SODIUM 1 G: 1 INJECTION, POWDER, FOR SOLUTION INTRAMUSCULAR; INTRAVENOUS at 09:11

## 2020-11-04 RX ADMIN — HYDROMORPHONE HYDROCHLORIDE 0.2 MG: 2 INJECTION INTRAMUSCULAR; INTRAVENOUS; SUBCUTANEOUS at 02:11

## 2020-11-04 RX ADMIN — ACETAMINOPHEN 650 MG: 325 TABLET ORAL at 09:11

## 2020-11-04 RX ADMIN — OXYCODONE HYDROCHLORIDE 5 MG: 5 TABLET ORAL at 10:11

## 2020-11-04 RX ADMIN — SODIUM CHLORIDE 0.3 MCG/KG/MIN: 9 INJECTION, SOLUTION INTRAVENOUS at 08:11

## 2020-11-04 RX ADMIN — DEXMEDETOMIDINE HYDROCHLORIDE 4 MCG: 100 INJECTION, SOLUTION INTRAVENOUS at 01:11

## 2020-11-04 RX ADMIN — ROCURONIUM BROMIDE 10 MG: 10 INJECTION, SOLUTION INTRAVENOUS at 08:11

## 2020-11-04 RX ADMIN — PROPOFOL 100 MCG/KG/MIN: 10 INJECTION, EMULSION INTRAVENOUS at 08:11

## 2020-11-04 RX ADMIN — MUPIROCIN: 20 OINTMENT TOPICAL at 09:11

## 2020-11-04 RX ADMIN — INSULIN ASPART 2 UNITS: 100 INJECTION, SOLUTION INTRAVENOUS; SUBCUTANEOUS at 06:11

## 2020-11-04 RX ADMIN — DEXMEDETOMIDINE HYDROCHLORIDE 8 MCG: 100 INJECTION, SOLUTION INTRAVENOUS at 01:11

## 2020-11-04 RX ADMIN — MIDAZOLAM HYDROCHLORIDE 2 MG: 1 INJECTION, SOLUTION INTRAMUSCULAR; INTRAVENOUS at 08:11

## 2020-11-04 RX ADMIN — HYDROMORPHONE HYDROCHLORIDE 1 MG: 2 INJECTION INTRAMUSCULAR; INTRAVENOUS; SUBCUTANEOUS at 02:11

## 2020-11-04 RX ADMIN — MANNITOL 50 G: 12.5 INJECTION, SOLUTION INTRAVENOUS at 09:11

## 2020-11-04 RX ADMIN — DEXMEDETOMIDINE HYDROCHLORIDE 8 MCG: 100 INJECTION, SOLUTION INTRAVENOUS at 12:11

## 2020-11-04 RX ADMIN — ONDANSETRON 4 MG: 2 INJECTION INTRAMUSCULAR; INTRAVENOUS at 01:11

## 2020-11-04 RX ADMIN — FENTANYL CITRATE 75 MCG: 50 INJECTION, SOLUTION INTRAMUSCULAR; INTRAVENOUS at 08:11

## 2020-11-04 RX ADMIN — DEXMEDETOMIDINE HYDROCHLORIDE 4 MCG: 100 INJECTION, SOLUTION INTRAVENOUS at 11:11

## 2020-11-04 RX ADMIN — LIDOCAINE HYDROCHLORIDE 5 MG: 20 INJECTION, SOLUTION INTRAVENOUS at 08:11

## 2020-11-04 RX ADMIN — FENTANYL CITRATE 25 MCG: 50 INJECTION, SOLUTION INTRAMUSCULAR; INTRAVENOUS at 10:11

## 2020-11-04 RX ADMIN — HYDROMORPHONE HYDROCHLORIDE 0.4 MG: 2 INJECTION INTRAMUSCULAR; INTRAVENOUS; SUBCUTANEOUS at 02:11

## 2020-11-04 RX ADMIN — REMIFENTANIL HYDROCHLORIDE 0.2 MCG/KG/MIN: 1 INJECTION, POWDER, LYOPHILIZED, FOR SOLUTION INTRAVENOUS at 08:11

## 2020-11-04 RX ADMIN — FUROSEMIDE 20 MG: 10 INJECTION, SOLUTION INTRAVENOUS at 09:11

## 2020-11-04 RX ADMIN — NICARDIPINE HYDROCHLORIDE 10 MG/HR: 0.2 INJECTION, SOLUTION INTRAVENOUS at 02:11

## 2020-11-04 RX ADMIN — LEVETIRACETAM 1000 MG: 100 INJECTION, SOLUTION, CONCENTRATE INTRAVENOUS at 01:11

## 2020-11-04 RX ADMIN — ACETAMINOPHEN 1000 MG: 10 INJECTION, SOLUTION INTRAVENOUS at 12:11

## 2020-11-04 RX ADMIN — DEXAMETHASONE SODIUM PHOSPHATE 12 MG: 4 INJECTION, SOLUTION INTRAMUSCULAR; INTRAVENOUS at 09:11

## 2020-11-04 RX ADMIN — PROPOFOL 150 MG: 10 INJECTION, EMULSION INTRAVENOUS at 08:11

## 2020-11-04 RX ADMIN — NICARDIPINE HYDROCHLORIDE 10 MG/HR: 0.2 INJECTION, SOLUTION INTRAVENOUS at 09:11

## 2020-11-04 RX ADMIN — SODIUM CHLORIDE: 0.9 INJECTION, SOLUTION INTRAVENOUS at 03:11

## 2020-11-04 RX ADMIN — Medication 3 ML: at 10:11

## 2020-11-04 RX ADMIN — SODIUM CHLORIDE, SODIUM GLUCONATE, SODIUM ACETATE, POTASSIUM CHLORIDE, MAGNESIUM CHLORIDE, SODIUM PHOSPHATE, DIBASIC, AND POTASSIUM PHOSPHATE: .53; .5; .37; .037; .03; .012; .00082 INJECTION, SOLUTION INTRAVENOUS at 08:11

## 2020-11-04 NOTE — ANESTHESIA PROCEDURE NOTES
Arterial    Diagnosis: anterior communicating artery aneurysm    Patient location during procedure: done in OR  Procedure start time: 11/4/2020 8:17 AM  Timeout: 11/4/2020 8:15 AM  Procedure end time: 11/4/2020 8:25 AM    Staffing  Authorizing Provider: Biju Mansfield MD  Performing Provider: Abhinav Bennett MD    Anesthesiologist was present at the time of the procedure.    Preanesthetic Checklist  Completed: patient identified, site marked, surgical consent, pre-op evaluation, timeout performed, IV checked, risks and benefits discussed, monitors and equipment checked and anesthesia consent givenArterial  Skin Prep: chlorhexidine gluconate  Orientation: left  Location: radial  Catheter Size: 16 G  Catheter placement by Anatomical landmarks. Heme positive aspiration all ports.Insertion Attempts: 1  Assessment  Dressing: secured with tape and tegaderm  Patient: Tolerated well

## 2020-11-04 NOTE — HOSPITAL COURSE
11/4/2020 Admit to NCC following elective aneurysm repair, CTH pending  11/5/20: remains on cardene gtt; keppra decreased to 250 mg BID; restart hydrochlorathiazide; start low calorie diabetic diet due to increased glucose levels- POCT glucose q6h and SSI; oxy and tylenol for pain  11/6/20: echo with EF of 70%; keppra x 5 days remaining; dc cardene; stepdown to neurosurgery

## 2020-11-04 NOTE — PROGRESS NOTES
Patient arrived to [facility] from OR to [id number]    Type of stroke/diagnosis: elective R craniotomy for aneurysm       Current symptoms:  Lethargy s/p surgery    Skin assessment done: yes  Wounds noted: staples to left head subgaleal to full suction     Patient Belongings on Admit: none     NCC notified:Alberto Price NP     HOB flat until 1700 per Jazmyne COOPER

## 2020-11-04 NOTE — PLAN OF CARE
[facility] Care Plan    POC reviewed with Kylah Mohan at 1400. Pt unable to verbalize understanding. Pt lethargic. NS at 75ml/hr. Dickerson in place. Arterial line in place. Drain to full suction. Will continue to monitor. See below and flowsheets for full assessment and VS info.       Neuro:  [place] Coma Scale  Best Eye Response: 3-->(E3) to speech  Best Motor Response: 4-->(M4) withdraws from pain  Best Verbal Response: 1-->(V1) none  [place] Coma Scale Score: 8  Pupil PERRLA: yes     24 hr Temp:  [98.2 °F (36.8 °C)-98.5 °F (36.9 °C)]     CV:   Rhythm: sinus bradycardia  BP goals:   SBP < 140  MAP > 65    Resp:   O2 Device (Oxygen Therapy): Simple Face Mask       Plan: N/A    GI/:     Diet/Nutrition Received: NPO  Last Bowel Movement: 11/03/20  Voiding Characteristics: urethral catheter (bladder)    Intake/Output Summary (Last 24 hours) at 11/4/2020 1715  Last data filed at 11/4/2020 1700  Gross per 24 hour   Intake 76.25 ml   Output 2690 ml   Net -2613.75 ml     Unmeasured Output  Urine Occurrence: 0  Stool Occurrence: 0    Labs/Accuchecks:  Recent Labs   Lab 11/04/20  1534   WBC 18.35*   RBC 3.89*   HGB 11.7*   HCT 37.1   *      Recent Labs   Lab 11/04/20  1534     140   K 3.4*  3.4*   CO2 23  23     107   BUN 12  12   CREATININE 0.7  0.7    No results for input(s): PROTIME, INR, APTT, HEPANTIXA in the last 168 hours. No results for input(s): CPK, CPKMB, TROPONINI, MB in the last 168 hours.    Electrolytes: No replacement orders  Accuchecks: Q6H    Gtts:   sodium chloride 0.9% 75 mL/hr at 11/04/20 1700       LDA/Wounds:  Lines/Drains/Airways       Drain                   Closed/Suction Drain 11/04/20 1409 Left Other (Comment) Accordion 10 Fr. less than 1 day         Urethral Catheter 11/04/20 0845 Non-latex;Straight-tip 16 Fr. less than 1 day              Arterial Line              Arterial Line 11/04/20 0817 Left Radial less than 1 day              Peripheral Intravenous Line                    Peripheral IV - Single Lumen 11/04/20 0804 18 G Left Forearm less than 1 day                  Wounds: none  Wound care consulted: No

## 2020-11-04 NOTE — ASSESSMENT & PLAN NOTE
s/p Elective Right modified orbitozygomatic craniotomy with Acomm aneurysm clipping  -- Continue Neuro checks q 1hr  -- Neurosurgery consulted  -- CT Head pending  -- SBP goal <140  -- PT/OT/Speech  -- CXR pending  -- 2D echo pending  -- Dc diaz when patient alert  -- Maintain [place]  -- Subgaleal drain to accordion with bloody drainage

## 2020-11-04 NOTE — BRIEF OP NOTE
[facility]  Brief Operative Note    SUMMARY     Surgery Date: 11/4/2020     Surgeon(s) and Role:     * Christi Page MD - Primary     * Drake Samano MD - Resident - Assisting     * Andrew Mix MD        Pre-op Diagnosis:  Cerebral aneurysm without rupture [I67.1]  Cerebral aneurysm, nonruptured [I67.1]    Post-op Diagnosis:  Post-Op Diagnosis Codes:     * Cerebral aneurysm without rupture [I67.1]     * Cerebral aneurysm, nonruptured [I67.1]    Procedure(s) (LRB):  CRANIOTOMY, WITH ANEURYSM CLIPPING RIGHT MODIFIED ORBITOZYGOMATIC CRANIOTOMY (Left)    Anesthesia: General      Estimated Blood Loss: * No values recorded between 11/4/2020  9:50 AM and 11/4/2020  3:22 PM *    Estimated Blood Loss has been documented.         Specimens:   Specimen (12h ago, onward)    None          [id number]

## 2020-11-04 NOTE — H&P
[facility]  Neurocritical Care  History & Physical    Admit Date: 11/4/2020  Service Date: 11/04/2020  Length of Stay: 0    Subjective:     Chief Complaint: Cerebral aneurysm    History of Present Illness: Ms Mohan is a 51 yo female with sig pmhx of HTN, Depression, DJD, HLD, Insomnia, and Pulmonary nodule who presents to [facility] for a higher level of care s/p Elective Right modified orbitozygomatic craniotomy with Acomm aneurysm clipping(Aneurysm clipping and crani). On 9/17/2020 IR Angiogram Carotid Cerebral Bilateral revealed 1. A saccular anterior communicating artery aneurysm measuring 4.7 mm X 2.48 mm X 5.27 mm with a neck of 3.27 mm was identified on the angiogram.        Past Medical History:   Diagnosis Date    Benign hypertension     Bladder instability     Depression     DJD (degenerative joint disease), lumbar     History of viral meningitis 1996    Hyperlipidemia     Insomnia     Microscopic hematuria     negative work-up    Pulmonary nodule     minute    Tendonitis      Past Surgical History:   Procedure Laterality Date    ANTERIOR VAGINAL REPAIR      BREAST BIOPSY  6/5/2007    left breast benign    CEREBRAL ANGIOGRAM N/A 9/17/2020    Procedure: ANGIOGRAM-CEREBRAL;  Surgeon: [facility] Diagnostic Provider;  Location: [facility] OR [address] [place] [address];  Service: Radiology;  Laterality: N/A;  /[place]    ENDOMETRIAL ABLATION  5/28/2009    HYSTERECTOMY      LAPAROSCOPIC HYSTERECTOMY  2/2013    TUBAL LIGATION  2002      No current facility-administered medications on file prior to encounter.      Current Outpatient Medications on File Prior to Encounter   Medication Sig Dispense Refill    atenolol (TENORMIN) 25 MG tablet Take 1 tablet (25 mg total) by mouth once daily. 90 tablet 3    cyanocobalamin (VITAMIN B-12) 250 MCG tablet Take 250 mcg by mouth once daily.      cyclobenzaprine (FLEXERIL) 10 MG tablet Take 10 mg by mouth every evening.       estradioL (ESTRACE) 2 MG tablet Take 2 mg  by mouth once daily.      fluticasone (FLONASE) 50 mcg/actuation nasal spray 1 spray by Each Nare route once daily.      hydroCHLOROthiazide (HYDRODIURIL) 25 MG tablet Take 1 tablet (25 mg total) by mouth once daily. 90 tablet 3    losartan (COZAAR) 25 MG tablet Take 1 tablet (25 mg total) by mouth once daily. 90 tablet 3    multivitamin (THERAGRAN) per tablet Take 1 tablet by mouth once daily. Every day      ascorbic acid, vitamin C, (VITAMIN C) 1000 MG tablet Take 1,000 mg by mouth once daily.        Allergies: Latex    Family History   Problem Relation Age of Onset    Osteoarthritis Mother     Hyperlipidemia Mother     Hypertension Mother     Allergies Mother     Hyperlipidemia Father     Diabetes Father     Hypertension Father     Cancer Paternal Grandmother         Breast    Diabetes Paternal Grandmother     Breast cancer Paternal Grandmother 56    Allergies Sister     Heart disease Sister         Congential    Breast cancer Maternal Aunt 61    Ovarian cancer Maternal Cousin 50     Social History     Tobacco Use    Smoking status: Former Smoker     Packs/day: 0.25     Years: 15.00     Pack years: 3.75     Types: Cigarettes     Quit date: 10/20/2013     Years since quittin.0    Smokeless tobacco: Never Used   Substance Use Topics    Alcohol use: Yes     Alcohol/week: 1.0 standard drinks     Types: 1 Glasses of wine per week     Frequency: 2-3 times a week     Drinks per session: 3 or 4     Binge frequency: Less than monthly    Drug use: No     Review of Systems   ROS  Unable to obtain due to post op sedation, patient slowly recovering from sedation    Objective:     Vitals:    Temp: 98.5 °F (36.9 °C)  Pulse: (!) 59  Rhythm: sinus bradycardia  BP: (!) 100/51  MAP (mmHg): 73  Resp: 14  SpO2: 100 %  O2 Device (Oxygen Therapy): Simple Face Mask    Temp  Min: 98.2 °F (36.8 °C)  Max: 98.5 °F (36.9 °C)  Pulse  Min: 59  Max: 81  BP  Min: 99/49  Max: 121/67  MAP (mmHg)  Min: 71  Max: 77  Resp   Min: 13  Max: 16  SpO2  Min: 98 %  Max: 100 %    No intake/output data recorded.   Unmeasured Output  Urine Occurrence: 0  Stool Occurrence: 0       Physical Exam  GA: Sedated, comfortable, no acute distress.   HEENT: No scleral icterus or JVD. + Subgaleal drain to left   Pulmonary: Clear to auscultation A/P/L. No wheezing, crackles, or rhonchi.  Cardiac: RRR S1 & S2 w/o rubs/murmurs/gallops.   Abdominal: Bowel sounds present x 4. No appreciable hepatosplenomegaly.  Skin: No jaundice, rashes, or visible lesions.  Neuro:  --GCS: E2 V2 M4  --Mental Status: Drowsy, not following commands, flexes to pain in all ext  --CN II-XII grossly intact.   --Pupils 1-2mm, PERRL.   --Corneal reflex, gag, cough intact.  --LUE strength: 2/5  --LLE strength: 2/5  --RUE strength: 2/5  --RLE strength: 2/5     Today I personally reviewed pertinent medications, lines/drains/airways, imaging, laboratory results, notably:        Assessment/Plan:     Neuro  * Cerebral aneurysm  s/p Elective Right modified orbitozygomatic craniotomy with Acomm aneurysm clipping  -- Continue Neuro checks q 1hr  -- Neurosurgery consulted  -- CT Head pending  -- SBP goal <140  -- PT/OT/Speech  -- CXR pending  -- 2D echo pending  -- Dc diaz when patient alert  -- Maintain [place]  -- Subgaleal drain to accordion with bloody drainage    Cerebral aneurysm without rupture, left anterior communicating 4.3  -- See cerebral aneurysm     Cardiac/Vascular  HTN (hypertension)  -- Continue to monitor HR and BP   -- SBP goal < 140  -- 2D echo pending  -- Labetalol Prn    Hyperlipidemia  -- atorvastatin 40 mg daily    Renal/  Hypokalemia  -- Initiated electrolyte replacements    Endocrine  Class 2 obesity in adult  -- consult to registered dietitian         Activity Orders          Diet NPO: NPO starting at 11/04 1534        Full Code    Alberto Price NP  Neurocritical Care  [facility]

## 2020-11-04 NOTE — OP NOTE
[facility]  Neurosurgery  Operative Note    SUMMARY      Date of Procedure: 11/4/2020     Procedure: Procedure(s) (LRB):  CRANIOTOMY, WITH ANEURYSM CLIPPING RIGHT MODIFIED ORBITOZYGOMATIC CRANIOTOMY (Left)     Surgeon(s) and Role:     * Christi Page MD - Primary     * Andrew Mix MD - Co-surgeon      * Drake Samano MD - Resident - Assisting    Pre-Operative Diagnosis: Cerebral aneurysm without rupture [I67.1]  Cerebral aneurysm, nonruptured [I67.1]    Post-Operative Diagnosis: Post-Op Diagnosis Codes:     * Cerebral aneurysm without rupture [I67.1]     * Cerebral aneurysm, nonruptured [I67.1]    Anesthesia: General    Technical Procedures Used:  Left orbital zygomatic craniotomy microsurgical clip ligation of anterior communicating artery aneurysm, use of intraoperative neurophysiological monitoring, use of intraoperative micro angiography, use of operative microscope, intraoperative diagnostic cerebral angiogram with selective catheterization of the left internal carotid artery and the right internal carotid artery, percutaneous closure of right femoral arteriotomy with a 6 Filipino Angio-Seal percutaneous closure device.    Indication for the procedure:  This is a 50-year-old female who had high suspicion for a previous sentinel hemorrhage.  She had acute onset of headache.  With no history of headaches which was unrelenting.  She presented to the emergency department several days later.  The CSF analysis was not done at that particular time.  CTA of the head did note that there was an anterior communicating artery aneurysm.  Later she subsequently underwent diagnostic cerebral angiogram which showed a anterior communicating artery aneurysm filling primarily from the left side.  As result of her age and the wide neck nature of the aneurysm as well as the fenestration throughout the anterior communicating artery complex and concern for a possible sentinel hemorrhage decision was made  for microsurgical clip ligation of the aneurysm.  The patient's family as well as the patient will appears of all risks, benefits, alternatives to the procedure including but not limited to heart attack, coma, stroke, infection, bleeding, paralysis, and death.  Informed consent was then obtained and secured in the chart at the patient and family voiced understanding of these risks and decided proceed with the procedure.    Description of the Procedure:  Patient brought to the operative theatre, she was intubated by the anesthesia team.  She was given preoperative prophylactic IV antibiotics.  Dickerson catheter was placed appropriate lines were guarded by the anesthesia team.  The neurophysiological monitoring team placed to the appropriate position.  Patient was placed in a Avila head barrios with head turned to the contralateral side to the right.  Malar eminence was the highest point in the surgical field.  Incision was planned from the left Kristin zygoma anterior to the tragus the contralateral midpupillary line.  The hair in the air and of the planned incision was clipped.  Patient was then prepped and draped in standard sterile fashion.  Skin was opened sharply with a 10.  Blade down level periosteum.  Scalp flap was then reflected anteriorly noted place utilizing fish hooks.  Madisyn clips were placed on the skin edges to aid in hemostasis.  Periosteal flap was reflected anteriorly and preserved.  Linear incision along the temporalis fascia was made inferior to the superior temporal line.  Subfascial dissection of the temporalis fascia was carried out reflected anteriorly.  The periorbita was carefully dissected from the orbital roof and lateral orbital rim.  High-speed pneumatic drill was used to drill a bur hole at the Aggarwal keyhole.  A pterional craniotomy was then rendered with the B1 footplate after bur hole was encountered in the floor of the middle fossa.  As the dissection was carried out medially the  supraorbital notch was identified in the supraorbital nerve was mobilized with the scalp flap.  As the orbit superior orbital roof and lateral wall was dissected utilizing the Penfield 1.  Novel retractors were placed to protect the brain and a reciprocating saw was utilized for the orbitotomy at the medial edge of the craniotomy at the superior orbital roof.  After dissection is carried out to the the frontozygomatic suture the reciprocating saw was used for lateral over tummy.  Osteotomies and were continued utilizing the osteotome and mallet.  The orbitotomy was then removed from the field in 1 piece.  The remaining posterior inferior portion of the orbital wall was removed utilizing the Leksell rongeur, until dissection was carried down to the superior or orbital fissure.  Hemostasis was achieved with a combination of bipolar monopolar cautery as well as thrombin Gelfoam paste.  Then utilizing a C1 drill bit  holes were drilled at the edge of the bone.  Dural tack-ups were used with 4 Nurolon.  At this point when hemostasis was adequately achieved the microscope was then brought into the operative field.  The dura was then opened sharply under the microscope and reflected anteriorly.  For Nurolon was utilized to tack up the dura.  Dural tack-ups were then used to depress the orbital rim.  Then subarachnoid dissection was carried out in the proximal sylvian fissure.  And sub frontally.  The optical carotid cistern was then opened to release CSF.  Dissection was carried out in the the ICA was identified and dissected to the ICA terminus medially to the a to the to the ipsilateral left-sided A1 segment, and then the dissection was carried across the anterior communicating complex to the contralateral A1 and then the interhemispheric dissection was carried out sharply with combination of microscissors and arachnoid knife.  We identified to fenestrations at the anterior communicating artery complex.  Which were  noted on the previous preoperative cerebral angiography artery.  The ipsilateral A2 was dissected as well as a consult for A2 was dissected and well visualized.  The number contractions utilized to visualize these vessels.  A 6 mm curved clip was then placed after the new proximal distal branches were identified and the neck and the dome was adequately dissected the such that it was an appropriately view.  The clip was then placed such that the aneurysm was occluded.  We then utilized the micro angiography with indocyanine green.  We were able to adequately visualize the ipsilateral A1 contralateral A1 ipsilateral A2 conscious artery to filling across the anterior communicating artery complex and visualized the fenestrations there was no filling of the aneurysm dome.  The cavity was then irrigated with copious irrigation until perfectly clear.      The previously guarded left femoral arteriotomy was identified and the C-arm was brought in for cerebral angiography the Jose Manuel catheter was subsequently advanced up the left femoral artery after access had been a gleaned utilizing modified Seldinger technique and a 5 Turkmen sheath was placed.  The left ICA was selectively catheterized and angiographic runs in the AP and lateral plane were then identified and countered there appeared to be adequate clip ligation of the anterior communicating artery aneurysm.  There was still filling across the fenestrated anterior communicating artery complex as result we selectively catheterized the right internal carotid artery as well.  Angiogram angiography then demonstrated that there was no filling across the aneurysm dome and adequate embolization had been achieved.  The right femoral arteriotomy was then closed with the 6 Turkmen Angio-Seal percutaneous closure device.      We then turned our attention again to the cranial portion.  The cavity was irrigated again with copious irrigation hemostasis was adequately achieved.  The dural  leaflets were then reapproximated utilizing 4-0 Nurolon.  Dural substitute was then placed over the reapproximated dura the orbitotomy as well as the pterional craniotomy was then reapproximated utilizing titanium plates and screws.  The temporalis fascia was then reapproximated as well as the underlying periosteum.  Utilizing 3 0 Vicryl.  The underlying galea was then reapproximated utilizing 3 0 Vicryl and the skin reapproximated utilizing staples.  A Hemovac drain had been placed and tunneled posteriorly and held in place utilizing 2-0 nylon.  All sponge needle counts were correct at the end there at the end of the procedure x2.  The neurophysiological monitoring remained unchanged from the baseline.  Patient was transferred to the ICU in stable condition and hemodynamically stable.  I was present for all portions of the case.     Validation for co-surgeon 22 modifier.  This is a complex anterior communicating artery aneurysm with multiple fenestrations throughout the anterior communicating artery complex wide necked nature high risk for intraoperative rupture given a previous sentinel hemorrhage.  With complex skull-base approach utilizing orbital zygomatic craniotomy for optimal exposure.  This necessitated the for co-surgeon in 22 modifier.  There was no qualified resident available.    Complications: No    Estimated Blood Loss (EBL): 100ml    Specimens:   Specimen (12h ago, onward)    None           Implants:   Implant Name Type Inv. Item Serial No.  Lot No. LRB No. Used Action   PATCH CRANIAL DURAL GRAFT 3X3 - [id number]  PATCH CRANIAL DURAL GRAFT 3X3  [employer] [id number] Left 1 Implanted   CLIP ANEUR YASARGIL 6.4MM - [id number]  CLIP ANEUR YASARGIL 6.4MM  AESCULAP  Left 1 Implanted   PLATE BONE CRANIAL 16MM - [id number]  PLATE BONE CRANIAL 16MM  [employer] THEODORE.  Left 1 Implanted   PLATE BONE 2 HOLE TAB - [id number]  PLATE BONE 2 HOLE TAB  [employer] THEODORE.  Left 3 Implanted   PLATE CRANIAL UN3  BOX LG 2X2 - [id number]  PLATE CRANIAL UN3 BOX LG 2X2  [email or web address] THEODORE.  Left 1 Implanted   SCREW UN3 AXS SD 1.5X4MM - [id number]  SCREW UN3 AXS SD 1.5X4MM  [employer] THEODORE.  Left 15 Implanted              Condition: Good    Disposition: ICU - extubated and stable.    Attestation: I was present and scrubbed for the entire procedure.

## 2020-11-04 NOTE — H&P
History and Physical  Neurosurgery    Admit Date: 11/4/2020  LOS: 0    Code Status: Prior     CC: <principal problem not specified>    SUBJECTIVE:     History of Present Illness: 51 yo female presenting for elective R Serene craniotomy for clip ligation of aneurysm.    Neurologically stable on exam. Updated blood consent signed at bedside.           OBJECTIVE:   Vital Signs (Most Recent):        Vital Signs (24h Range):          I & O (Last 24h):  No intake or output data in the 24 hours ending 11/04/20 0706    Physical Exam:  General: well developed, well nourished, no distress.   Head: normocephalic, atraumatic  Cervical Spine: No midline tenderness to palpation.  Thoracolumbosacral Spine: No midline tenderness to palpation.  GCS: Motor: 6/Verbal: 5/Eyes: 4 GCS Total: 15  Mental Status: Awake, Alert, Oriented x 4  Language: No aphasia  Speech: No dysarthria  Facial Droop: None   Cranial nerves: CN III-XII grossly intact.  Visual Fields: Intact.   Eyes: Pupils equal and reactive to light. Intact Conjugate horizontal and vertical pursuit. No nystagmus. No gaze deviation.   Pulmonary: No distress.  Sensory: No deficit.  Propioception: No deficit in 1st digit of toe bilaterally.  Rectal Tone: Not tested.  Drift: None.  Upper Extremity Ataxia: No Dysmetria Bilaterally  Lower Extremity Ataxia: Not tested.  Dysdiadochokinesia: Not tested.  Reflexes: 2+ patellar bilaterally.  Escalona: Absent  Clonus: Absent  Babinski: Absent  Romberg: Not Tested  Pulses: Brisk and symmetric radial, DP and tibial pulses.  Motor Strength:    Strength  Shoulder Abduction Elbow Extension Elbow Flexion Wrist Extension Wrist Flexion Finger Opposition Finger Add Finger Abd   Upper: R 5/5 5/5 5/5 5/5 5/5 5/5 5/5 5/5    L 5/5 5/5 5/5 5/5 5/5 5/5 5/5 5/5     Hip Flexion Knee Extension Knee  Flexion Ankle Dflexion Ankle Pflexion EHL     Lower: R 5/5 5/5 5/5 5/5 5/5 5/5      L 5/5 5/5 5/5 5/5 5/5 5/5                   Lines/Drains/Airway:           Nutrition/Tube Feeds:   Current Diet Order   No orders of the defined types were placed in this encounter.       Labs:  ABG: No results for input(s): PH, PO2, PCO2, HCO3, POCSATURATED, BE in the last 24 hours.  BMP:No results for input(s): NA, K, CL, CO2, BUN, CREATININE, GLU, MG, PHOS in the last 24 hours.  LFT:   Lab Results   Component Value Date    AST 16 10/20/2020    ALT 13 10/20/2020    ALKPHOS 79 10/20/2020    BILITOT 0.3 10/20/2020    ALBUMIN 3.5 10/20/2020    PROT 7.5 10/20/2020     CBC:   Lab Results   Component Value Date    WBC 8.72 10/20/2020    HGB 12.9 10/20/2020    HCT 40.2 10/20/2020    MCV 95 10/20/2020     10/20/2020     Microbiology x 7d:   Microbiology Results (last 7 days)     ** No results found for the last 168 hours. **            ASSESSMENT/PLAN:   51 yo female presenting for elective R Serene craniotomy for clip ligation of aneurysm.    --To OR for Acomm aneurysm clipping.    Drake Samano

## 2020-11-04 NOTE — HPI
Ms Mohan is a 51 yo female with sig pmhx of HTN, Depression, DJD, HLD, Insomnia, and Pulmonary nodule who presents to [facility] for a higher level of care s/p Elective Right modified orbitozygomatic craniotomy with Acomm aneurysm clipping(Aneurysm clipping and crani). On 9/17/2020 IR Angiogram Carotid Cerebral Bilateral revealed 1. A saccular anterior communicating artery aneurysm measuring 4.7 mm X 2.48 mm X 5.27 mm with a neck of 3.27 mm was identified on the angiogram.

## 2020-11-04 NOTE — ANESTHESIA PROCEDURE NOTES
Intubation  Performed by: Abhinav Bennett MD  Authorized by: Biju Mansfield MD     Intubation:     Induction:  Intravenous    Intubated:  Postinduction    Mask Ventilation:  Easy mask    Attempts:  1    Attempted By:  Resident anesthesiologist    Method of Intubation:  Direct    Blade:  Yobani 3    Laryngeal View Grade: Grade I - full view of chords      Difficult Airway Encountered?: No      Complications:  None    Airway Device:  Oral endotracheal tube    Airway Device Size:  7.0    Style/Cuff Inflation:  Cuffed    Inflation Amount (mL):  5    Tube secured:  22    Placement Verified By:  Capnometry    Complicating Factors:  None    Findings Post-Intubation:  BS equal bilateral

## 2020-11-04 NOTE — SUBJECTIVE & OBJECTIVE
Past Medical History:   Diagnosis Date    Benign hypertension     Bladder instability     Depression     DJD (degenerative joint disease), lumbar     History of viral meningitis 1996    Hyperlipidemia     Insomnia     Microscopic hematuria     negative work-up    Pulmonary nodule     minute    Tendonitis      Past Surgical History:   Procedure Laterality Date    ANTERIOR VAGINAL REPAIR      BREAST BIOPSY  6/5/2007    left breast benign    CEREBRAL ANGIOGRAM N/A 9/17/2020    Procedure: ANGIOGRAM-CEREBRAL;  Surgeon: Maged Diagnostic Provider;  Location: [facility] OR [address] [place] [address];  Service: Radiology;  Laterality: N/A;  /Camilo    ENDOMETRIAL ABLATION  5/28/2009    HYSTERECTOMY      LAPAROSCOPIC HYSTERECTOMY  2/2013    TUBAL LIGATION  2002      No current facility-administered medications on file prior to encounter.      Current Outpatient Medications on File Prior to Encounter   Medication Sig Dispense Refill    atenolol (TENORMIN) 25 MG tablet Take 1 tablet (25 mg total) by mouth once daily. 90 tablet 3    cyanocobalamin (VITAMIN B-12) 250 MCG tablet Take 250 mcg by mouth once daily.      cyclobenzaprine (FLEXERIL) 10 MG tablet Take 10 mg by mouth every evening.       estradioL (ESTRACE) 2 MG tablet Take 2 mg by mouth once daily.      fluticasone (FLONASE) 50 mcg/actuation nasal spray 1 spray by Each Nare route once daily.      hydroCHLOROthiazide (HYDRODIURIL) 25 MG tablet Take 1 tablet (25 mg total) by mouth once daily. 90 tablet 3    losartan (COZAAR) 25 MG tablet Take 1 tablet (25 mg total) by mouth once daily. 90 tablet 3    multivitamin (THERAGRAN) per tablet Take 1 tablet by mouth once daily. Every day      ascorbic acid, vitamin C, (VITAMIN C) 1000 MG tablet Take 1,000 mg by mouth once daily.        Allergies: Latex    Family History   Problem Relation Age of Onset    Osteoarthritis Mother     Hyperlipidemia Mother     Hypertension Mother     Allergies Mother     Hyperlipidemia  Father     Diabetes Father     Hypertension Father     Cancer Paternal Grandmother         Breast    Diabetes Paternal Grandmother     Breast cancer Paternal Grandmother 56    Allergies Sister     Heart disease Sister         Congential    Breast cancer Maternal Aunt 61    Ovarian cancer Maternal Cousin 50     Social History     Tobacco Use    Smoking status: Former Smoker     Packs/day: 0.25     Years: 15.00     Pack years: 3.75     Types: Cigarettes     Quit date: 10/20/2013     Years since quittin.0    Smokeless tobacco: Never Used   Substance Use Topics    Alcohol use: Yes     Alcohol/week: 1.0 standard drinks     Types: 1 Glasses of wine per week     Frequency: 2-3 times a week     Drinks per session: 3 or 4     Binge frequency: Less than monthly    Drug use: No     Review of Systems   ROS  Unable to obtain due to post op sedation, patient slowly recovering from sedation    Objective:     Vitals:    Temp: 98.5 °F (36.9 °C)  Pulse: (!) 59  Rhythm: sinus bradycardia  BP: (!) 100/51  MAP (mmHg): 73  Resp: 14  SpO2: 100 %  O2 Device (Oxygen Therapy): Simple Face Mask    Temp  Min: 98.2 °F (36.8 °C)  Max: 98.5 °F (36.9 °C)  Pulse  Min: 59  Max: 81  BP  Min: 99/49  Max: 121/67  MAP (mmHg)  Min: 71  Max: 77  Resp  Min: 13  Max: 16  SpO2  Min: 98 %  Max: 100 %    No intake/output data recorded.   Unmeasured Output  Urine Occurrence: 0  Stool Occurrence: 0       Physical Exam  GA: Sedated, comfortable, no acute distress.   HEENT: No scleral icterus or JVD. + Subgaleal drain to left   Pulmonary: Clear to auscultation A/P/L. No wheezing, crackles, or rhonchi.  Cardiac: RRR S1 & S2 w/o rubs/murmurs/gallops.   Abdominal: Bowel sounds present x 4. No appreciable hepatosplenomegaly.  Skin: No jaundice, rashes, or visible lesions.  Neuro:  --GCS: E2 V2 M4  --Mental Status: Drowsy, not following commands, flexes to pain in all ext  --CN II-XII grossly intact.   --Pupils 1-2mm, PERRL.   --Corneal reflex, gag,  cough intact.  --LUE strength: 2/5  --LLE strength: 2/5  --RUE strength: 2/5  --RLE strength: 2/5     Today I personally reviewed pertinent medications, lines/drains/airways, imaging, laboratory results, notably:

## 2020-11-05 ENCOUNTER — TELEPHONE (OUTPATIENT)
Dept: NEUROSURGERY | Facility: CLINIC | Age: 50
End: 2020-11-05

## 2020-11-05 LAB
ALBUMIN SERPL BCP-MCNC: 2.9 G/DL (ref 3.5–5.2)
ALP SERPL-CCNC: 63 U/L (ref 55–135)
ALT SERPL W/O P-5'-P-CCNC: 13 U/L (ref 10–44)
ANION GAP SERPL CALC-SCNC: 9 MMOL/L (ref 8–16)
ASCENDING AORTA: 3.6 CM
AST SERPL-CCNC: 17 U/L (ref 10–40)
AV INDEX (PROSTH): 0.89
AV MEAN GRADIENT: 13 MMHG
AV PEAK GRADIENT: 19 MMHG
AV VALVE AREA: 2.78 CM2
AV VELOCITY RATIO: 0.83
BASOPHILS # BLD AUTO: 0.02 K/UL (ref 0–0.2)
BASOPHILS NFR BLD: 0.1 % (ref 0–1.9)
BILIRUB SERPL-MCNC: 0.4 MG/DL (ref 0.1–1)
BSA FOR ECHO PROCEDURE: 1.94 M2
BUN SERPL-MCNC: 11 MG/DL (ref 6–20)
CALCIUM SERPL-MCNC: 7.4 MG/DL (ref 8.7–10.5)
CHLORIDE SERPL-SCNC: 110 MMOL/L (ref 95–110)
CO2 SERPL-SCNC: 21 MMOL/L (ref 23–29)
CREAT SERPL-MCNC: 0.6 MG/DL (ref 0.5–1.4)
CV ECHO LV RWT: 0.4 CM
DIFFERENTIAL METHOD: ABNORMAL
DOP CALC AO PEAK VEL: 2.17 M/S
DOP CALC AO VTI: 32.71 CM
DOP CALC LVOT AREA: 3.1 CM2
DOP CALC LVOT DIAMETER: 1.99 CM
DOP CALC LVOT PEAK VEL: 1.8 M/S
DOP CALC LVOT STROKE VOLUME: 90.87 CM3
DOP CALCLVOT PEAK VEL VTI: 29.23 CM
ECHO LV POSTERIOR WALL: 0.98 CM (ref 0.6–1.1)
EOSINOPHIL # BLD AUTO: 0 K/UL (ref 0–0.5)
EOSINOPHIL NFR BLD: 0 % (ref 0–8)
ERYTHROCYTE [DISTWIDTH] IN BLOOD BY AUTOMATED COUNT: 12.7 % (ref 11.5–14.5)
EST. GFR  (AFRICAN AMERICAN): >60 ML/MIN/1.73 M^2
EST. GFR  (NON AFRICAN AMERICAN): >60 ML/MIN/1.73 M^2
FRACTIONAL SHORTENING: 41 % (ref 28–44)
GLUCOSE SERPL-MCNC: 176 MG/DL (ref 70–110)
HCT VFR BLD AUTO: 34.8 % (ref 37–48.5)
HGB BLD-MCNC: 11.3 G/DL (ref 12–16)
IMM GRANULOCYTES # BLD AUTO: 0.08 K/UL (ref 0–0.04)
IMM GRANULOCYTES NFR BLD AUTO: 0.4 % (ref 0–0.5)
INR PPP: 0.9 (ref 0.8–1.2)
INTERVENTRICULAR SEPTUM: 0.9 CM (ref 0.6–1.1)
LA MAJOR: 5.63 CM
LA MINOR: 5.92 CM
LA WIDTH: 4.14 CM
LEFT ATRIUM SIZE: 3.2 CM
LEFT ATRIUM VOLUME INDEX: 34.7 ML/M2
LEFT ATRIUM VOLUME: 64.99 CM3
LEFT INTERNAL DIMENSION IN SYSTOLE: 2.9 CM (ref 2.1–4)
LEFT VENTRICLE DIASTOLIC VOLUME INDEX: 59.76 ML/M2
LEFT VENTRICLE DIASTOLIC VOLUME: 111.77 ML
LEFT VENTRICLE MASS INDEX: 86 G/M2
LEFT VENTRICLE SYSTOLIC VOLUME INDEX: 17.2 ML/M2
LEFT VENTRICLE SYSTOLIC VOLUME: 32.17 ML
LEFT VENTRICULAR INTERNAL DIMENSION IN DIASTOLE: 4.88 CM (ref 3.5–6)
LEFT VENTRICULAR MASS: 160.93 G
LYMPHOCYTES # BLD AUTO: 0.8 K/UL (ref 1–4.8)
LYMPHOCYTES NFR BLD: 4.5 % (ref 18–48)
MAGNESIUM SERPL-MCNC: 2.1 MG/DL (ref 1.6–2.6)
MCH RBC QN AUTO: 30.3 PG (ref 27–31)
MCHC RBC AUTO-ENTMCNC: 32.5 G/DL (ref 32–36)
MCV RBC AUTO: 93 FL (ref 82–98)
MONOCYTES # BLD AUTO: 1.5 K/UL (ref 0.3–1)
MONOCYTES NFR BLD: 8.2 % (ref 4–15)
NEUTROPHILS # BLD AUTO: 16 K/UL (ref 1.8–7.7)
NEUTROPHILS NFR BLD: 86.8 % (ref 38–73)
NRBC BLD-RTO: 0 /100 WBC
PHOSPHATE SERPL-MCNC: 1.4 MG/DL (ref 2.7–4.5)
PLATELET # BLD AUTO: 319 K/UL (ref 150–350)
PMV BLD AUTO: 10.1 FL (ref 9.2–12.9)
POCT GLUCOSE: 113 MG/DL (ref 70–110)
POCT GLUCOSE: 134 MG/DL (ref 70–110)
POCT GLUCOSE: 136 MG/DL (ref 70–110)
POCT GLUCOSE: 144 MG/DL (ref 70–110)
POCT GLUCOSE: 168 MG/DL (ref 70–110)
POTASSIUM SERPL-SCNC: 3.1 MMOL/L (ref 3.5–5.1)
POTASSIUM SERPL-SCNC: 3.2 MMOL/L (ref 3.5–5.1)
POTASSIUM SERPL-SCNC: 4.6 MMOL/L (ref 3.5–5.1)
PROT SERPL-MCNC: 6.5 G/DL (ref 6–8.4)
PROTHROMBIN TIME: 10.3 SEC (ref 9–12.5)
RA MAJOR: 5.17 CM
RA PRESSURE: 3 MMHG
RA WIDTH: 3.29 CM
RBC # BLD AUTO: 3.73 M/UL (ref 4–5.4)
RIGHT VENTRICULAR END-DIASTOLIC DIMENSION: 3.13 CM
SINUS: 2.88 CM
SODIUM SERPL-SCNC: 140 MMOL/L (ref 136–145)
STJ: 3.27 CM
TDI LATERAL: 0.08 M/S
TDI SEPTAL: 0.09 M/S
TDI: 0.09 M/S
WBC # BLD AUTO: 18.42 K/UL (ref 3.9–12.7)

## 2020-11-05 PROCEDURE — 97165 OT EVAL LOW COMPLEX 30 MIN: CPT

## 2020-11-05 PROCEDURE — 80053 COMPREHEN METABOLIC PANEL: CPT

## 2020-11-05 PROCEDURE — A4216 STERILE WATER/SALINE, 10 ML: HCPCS | Performed by: NURSE PRACTITIONER

## 2020-11-05 PROCEDURE — 83735 ASSAY OF MAGNESIUM: CPT

## 2020-11-05 PROCEDURE — 97802 MEDICAL NUTRITION INDIV IN: CPT

## 2020-11-05 PROCEDURE — 97161 PT EVAL LOW COMPLEX 20 MIN: CPT

## 2020-11-05 PROCEDURE — 84100 ASSAY OF PHOSPHORUS: CPT

## 2020-11-05 PROCEDURE — 25000003 PHARM REV CODE 250: Performed by: NURSE PRACTITIONER

## 2020-11-05 PROCEDURE — 25000242 PHARM REV CODE 250 ALT 637 W/ HCPCS: Performed by: STUDENT IN AN ORGANIZED HEALTH CARE EDUCATION/TRAINING PROGRAM

## 2020-11-05 PROCEDURE — 85610 PROTHROMBIN TIME: CPT

## 2020-11-05 PROCEDURE — 84132 ASSAY OF SERUM POTASSIUM: CPT | Mod: 91

## 2020-11-05 PROCEDURE — 84132 ASSAY OF SERUM POTASSIUM: CPT

## 2020-11-05 PROCEDURE — 63600175 PHARM REV CODE 636 W HCPCS: Performed by: NURSE PRACTITIONER

## 2020-11-05 PROCEDURE — 94761 N-INVAS EAR/PLS OXIMETRY MLT: CPT

## 2020-11-05 PROCEDURE — 99233 SBSQ HOSP IP/OBS HIGH 50: CPT | Mod: ,,, | Performed by: PSYCHIATRY & NEUROLOGY

## 2020-11-05 PROCEDURE — 99233 PR SUBSEQUENT HOSPITAL CARE,LEVL III: ICD-10-PCS | Mod: ,,, | Performed by: PSYCHIATRY & NEUROLOGY

## 2020-11-05 PROCEDURE — 63600175 PHARM REV CODE 636 W HCPCS: Performed by: STUDENT IN AN ORGANIZED HEALTH CARE EDUCATION/TRAINING PROGRAM

## 2020-11-05 PROCEDURE — 25000003 PHARM REV CODE 250: Performed by: STUDENT IN AN ORGANIZED HEALTH CARE EDUCATION/TRAINING PROGRAM

## 2020-11-05 PROCEDURE — 20000000 HC ICU ROOM

## 2020-11-05 PROCEDURE — 85025 COMPLETE CBC W/AUTO DIFF WBC: CPT

## 2020-11-05 PROCEDURE — 97530 THERAPEUTIC ACTIVITIES: CPT

## 2020-11-05 RX ORDER — IBUPROFEN 200 MG
24 TABLET ORAL
Status: DISCONTINUED | OUTPATIENT
Start: 2020-11-05 | End: 2020-11-10 | Stop reason: HOSPADM

## 2020-11-05 RX ORDER — NICARDIPINE HYDROCHLORIDE 0.2 MG/ML
2.5 INJECTION INTRAVENOUS CONTINUOUS
Status: DISCONTINUED | OUTPATIENT
Start: 2020-11-05 | End: 2020-11-06

## 2020-11-05 RX ORDER — SODIUM,POTASSIUM PHOSPHATES 280-250MG
2 POWDER IN PACKET (EA) ORAL
Status: DISCONTINUED | OUTPATIENT
Start: 2020-11-05 | End: 2020-11-09

## 2020-11-05 RX ORDER — INSULIN ASPART 100 [IU]/ML
1-10 INJECTION, SOLUTION INTRAVENOUS; SUBCUTANEOUS
Status: DISCONTINUED | OUTPATIENT
Start: 2020-11-05 | End: 2020-11-10 | Stop reason: HOSPADM

## 2020-11-05 RX ORDER — POTASSIUM CHLORIDE 1.5 G/1.58G
40 POWDER, FOR SOLUTION ORAL
Status: DISCONTINUED | OUTPATIENT
Start: 2020-11-05 | End: 2020-11-09

## 2020-11-05 RX ORDER — LANOLIN ALCOHOL/MO/W.PET/CERES
800 CREAM (GRAM) TOPICAL
Status: DISCONTINUED | OUTPATIENT
Start: 2020-11-05 | End: 2020-11-09

## 2020-11-05 RX ORDER — IBUPROFEN 200 MG
16 TABLET ORAL
Status: DISCONTINUED | OUTPATIENT
Start: 2020-11-05 | End: 2020-11-10 | Stop reason: HOSPADM

## 2020-11-05 RX ORDER — HYDROCHLOROTHIAZIDE 25 MG/1
25 TABLET ORAL DAILY
Status: DISCONTINUED | OUTPATIENT
Start: 2020-11-05 | End: 2020-11-10 | Stop reason: HOSPADM

## 2020-11-05 RX ORDER — AMOXICILLIN 250 MG
1 CAPSULE ORAL DAILY
Status: DISCONTINUED | OUTPATIENT
Start: 2020-11-05 | End: 2020-11-10 | Stop reason: HOSPADM

## 2020-11-05 RX ORDER — POTASSIUM CHLORIDE 20 MEQ/1
40 TABLET, EXTENDED RELEASE ORAL ONCE
Status: COMPLETED | OUTPATIENT
Start: 2020-11-05 | End: 2020-11-05

## 2020-11-05 RX ORDER — LEVETIRACETAM 5 MG/ML
500 INJECTION INTRAVASCULAR EVERY 12 HOURS
Status: DISCONTINUED | OUTPATIENT
Start: 2020-11-05 | End: 2020-11-05

## 2020-11-05 RX ORDER — LABETALOL HCL 20 MG/4 ML
20 SYRINGE (ML) INTRAVENOUS EVERY 4 HOURS PRN
Status: DISCONTINUED | OUTPATIENT
Start: 2020-11-05 | End: 2020-11-10 | Stop reason: HOSPADM

## 2020-11-05 RX ADMIN — OXYCODONE HYDROCHLORIDE 5 MG: 5 TABLET ORAL at 12:11

## 2020-11-05 RX ADMIN — MUPIROCIN: 20 OINTMENT TOPICAL at 09:11

## 2020-11-05 RX ADMIN — Medication 1000 MG: at 08:11

## 2020-11-05 RX ADMIN — ATENOLOL 25 MG: 25 TABLET ORAL at 08:11

## 2020-11-05 RX ADMIN — LEVETIRACETAM 250 MG: 100 INJECTION, SOLUTION INTRAVENOUS at 09:11

## 2020-11-05 RX ADMIN — ACETAMINOPHEN 650 MG: 325 TABLET ORAL at 08:11

## 2020-11-05 RX ADMIN — DOCUSATE SODIUM 50MG AND SENNOSIDES 8.6MG 1 TABLET: 8.6; 5 TABLET, FILM COATED ORAL at 09:11

## 2020-11-05 RX ADMIN — LOSARTAN POTASSIUM 25 MG: 25 TABLET, FILM COATED ORAL at 08:11

## 2020-11-05 RX ADMIN — SODIUM CHLORIDE: 0.9 INJECTION, SOLUTION INTRAVENOUS at 05:11

## 2020-11-05 RX ADMIN — NICARDIPINE HYDROCHLORIDE 12.5 MG/HR: 0.2 INJECTION, SOLUTION INTRAVENOUS at 06:11

## 2020-11-05 RX ADMIN — HEPARIN SODIUM 5000 UNITS: 5000 INJECTION INTRAVENOUS; SUBCUTANEOUS at 10:11

## 2020-11-05 RX ADMIN — OXYCODONE HYDROCHLORIDE 5 MG: 5 TABLET ORAL at 05:11

## 2020-11-05 RX ADMIN — Medication 3 ML: at 01:11

## 2020-11-05 RX ADMIN — INSULIN ASPART 1 UNITS: 100 INJECTION, SOLUTION INTRAVENOUS; SUBCUTANEOUS at 02:11

## 2020-11-05 RX ADMIN — NICARDIPINE HYDROCHLORIDE 15 MG/HR: 0.2 INJECTION, SOLUTION INTRAVENOUS at 08:11

## 2020-11-05 RX ADMIN — FLUTICASONE PROPIONATE 50 MCG: 50 SPRAY, METERED NASAL at 08:11

## 2020-11-05 RX ADMIN — POTASSIUM CHLORIDE 40 MEQ: 1.5 POWDER, FOR SOLUTION ORAL at 11:11

## 2020-11-05 RX ADMIN — Medication 3 ML: at 06:11

## 2020-11-05 RX ADMIN — HEPARIN SODIUM 5000 UNITS: 5000 INJECTION INTRAVENOUS; SUBCUTANEOUS at 05:11

## 2020-11-05 RX ADMIN — HYDROCHLOROTHIAZIDE 25 MG: 12.5 TABLET ORAL at 09:11

## 2020-11-05 RX ADMIN — Medication 10 MG: at 02:11

## 2020-11-05 RX ADMIN — Medication 3 ML: at 09:11

## 2020-11-05 RX ADMIN — NICARDIPINE HYDROCHLORIDE 15 MG/HR: 0.2 INJECTION, SOLUTION INTRAVENOUS at 01:11

## 2020-11-05 RX ADMIN — Medication 10 MG: at 03:11

## 2020-11-05 RX ADMIN — CYANOCOBALAMIN TAB 250 MCG 250 MCG: 250 TAB at 08:11

## 2020-11-05 RX ADMIN — OXYCODONE HYDROCHLORIDE 5 MG: 5 TABLET ORAL at 11:11

## 2020-11-05 RX ADMIN — OXYCODONE HYDROCHLORIDE 5 MG: 5 TABLET ORAL at 04:11

## 2020-11-05 RX ADMIN — LEVETIRACETAM 500 MG: 5 INJECTION INTRAVENOUS at 08:11

## 2020-11-05 RX ADMIN — MUPIROCIN: 20 OINTMENT TOPICAL at 08:11

## 2020-11-05 RX ADMIN — HEPARIN SODIUM 5000 UNITS: 5000 INJECTION INTRAVENOUS; SUBCUTANEOUS at 01:11

## 2020-11-05 RX ADMIN — ATORVASTATIN CALCIUM 40 MG: 20 TABLET, FILM COATED ORAL at 08:11

## 2020-11-05 RX ADMIN — THERA TABS 1 TABLET: TAB at 08:11

## 2020-11-05 RX ADMIN — NICARDIPINE HYDROCHLORIDE 15 MG/HR: 0.2 INJECTION, SOLUTION INTRAVENOUS at 04:11

## 2020-11-05 RX ADMIN — POTASSIUM CHLORIDE 40 MEQ: 1500 TABLET, EXTENDED RELEASE ORAL at 04:11

## 2020-11-05 NOTE — SUBJECTIVE & OBJECTIVE
Interval History: NAEON. C/o pain with L sided facial movement, expected postoperatively. HV with 210cc, bloody. Pending therapy eval.     Medications:  Continuous Infusions:   sodium chloride 0.9% 75 mL/hr at 11/05/20 0605    nicardipine 15 mg/hr (11/05/20 0640)     Scheduled Meds:   ascorbic acid (vitamin C)  1,000 mg Oral Daily    atenoloL  25 mg Oral Daily    atorvastatin  40 mg Oral Daily    cyanocobalamin  250 mcg Oral Daily    cyclobenzaprine  10 mg Oral QHS    fluticasone propionate  1 spray Each Nostril Daily    heparin (porcine)  5,000 Units Subcutaneous Q8H    levetiracetam IVPB  500 mg Intravenous Q12H    losartan  25 mg Oral Daily    multivitamin  1 tablet Oral Daily    mupirocin   Nasal BID    sodium chloride 0.9%  3 mL Intravenous Q8H     PRN Meds:acetaminophen, acetaminophen, dextrose 50%, glucagon (human recombinant), insulin aspart U-100, labetaloL, ondansetron, oxyCODONE     Review of Systems  Objective:     Weight: 86.6 kg (190 lb 14.7 oz)  Body mass index is 34.92 kg/m².  Vital Signs (Most Recent):  Temp: 98.7 °F (37.1 °C) (11/05/20 0305)  Pulse: 86 (11/05/20 0605)  Resp: (!) 26 (11/05/20 0605)  BP: 134/60 (11/05/20 0605)  SpO2: (!) 94 % (11/05/20 0605) Vital Signs (24h Range):  Temp:  [98 °F (36.7 °C)-98.7 °F (37.1 °C)] 98.7 °F (37.1 °C)  Pulse:  [53-91] 86  Resp:  [13-28] 26  SpO2:  [94 %-100 %] 94 %  BP: ()/(49-68) 134/60  Arterial Line BP: (120-150)/(47-67) 129/62                          Closed/Suction Drain 11/04/20 1409 Left Other (Comment) Accordion 10 Fr. (Active)   Site Description Unable to view 11/05/20 0305   Dressing Type Gauze 11/05/20 0305   Dressing Status Clean;Dry;Intact 11/05/20 0305   Dressing Intervention Integrity maintained 11/05/20 0305   Drainage Bloody 11/05/20 0305   Status Other (Comment) 11/05/20 0305   Output (mL) 30 mL 11/05/20 0405       Neurosurgery Physical Exam  Awake, alert  OX4  PERRL/EOMI  FCx4  AG in all extremities  Groin  soft  Dressing c/d/i  HV with bloody output    Significant Labs:  Recent Labs   Lab 11/04/20  1534 11/05/20  0144   *  231* 176*     140 140   K 3.4*  3.4* 3.1*     107 110   CO2 23  23 21*   BUN 12  12 11   CREATININE 0.7  0.7 0.6   CALCIUM 7.5*  7.5* 7.4*   MG 2.2 2.1     Recent Labs   Lab 11/04/20  1015 11/04/20  1534 11/05/20  0144   WBC  --  18.35* 18.42*   HGB  --  11.7* 11.3*   HCT 31* 37.1 34.8*   PLT  --  387* 319     Recent Labs   Lab 11/05/20  0144   INR 0.9       Significant Diagnostics:  Ct Head Without Contrast    Result Date: 11/5/2020  Postoperative changes of left frontal craniotomy for anterior communicating artery aneurysm clipping with expected postoperative pneumocephalus, subdural fluid, and blood products along the left cerebral convexity.. 2.4 cm area of hypoattenuation with punctate hyperdensity in the left anterior frontal lobe adjacent to the basal ganglia, likely representing postsurgical edema/contusion however infarct cannot be entirely excluded.  Recommend correlation with neurologic exam and appropriate follow-up. This report was flagged in Epic as abnormal. Findings discussed with CASSIE Mathew by Dr. Gusman at 00:55 on 11/05/2020 Electronically signed by resident: Kodi Gusman Date:    11/05/2020 Time:    00:46 Electronically signed by: Aime Manzo MD Date:    11/05/2020 Time:    01:06

## 2020-11-05 NOTE — TELEPHONE ENCOUNTER
----- Message from Tegan Montes sent at 11/5/2020 11:57 AM CST -----  Contact: Daughter - Lucien Collins  Pt's daughter is asking to get a call once mom is out of surgery.      Contact Info

## 2020-11-05 NOTE — ASSESSMENT & PLAN NOTE
-- Continue to monitor HR and BP   -- SBP goal < 140  -- 2D echo pending  -- scheduled atenolol and losartan  -- cardene gtt

## 2020-11-05 NOTE — PLAN OF CARE
Problem: Physical Therapy Goal  Goal: Physical Therapy Goal  Description: PT goals to be met by: 11/15/20    Patient will perform supine <> sitting with supervision.  Patient will perform sit <> stand transitions with supervision.  Patient will perform transfers from bed <> chair or BSC with supervision.  Patient will ambulate 100 feet with supervision.  Patient will ascend/descend 1 flight of steps using B handrails with CGA.  Outcome: Ongoing, Progressing     Initial evaluation completed and plan of care established.    Dennise Nova, PT, DPT  11/5/2020

## 2020-11-05 NOTE — SUBJECTIVE & OBJECTIVE
Interval History: remains on cardene gtt; keppra decreased to 250 mg BID; restart hydrochlorathiazide; start low calorie diabetic diet due to increased glucose levels- POCT glucose q6h and SSI; oxy and tylenol for pain    Review of Systems   Constitutional: Negative for fever.   Respiratory: Negative for cough and shortness of breath.    Cardiovascular: Negative for chest pain and palpitations.   Gastrointestinal: Negative for abdominal pain and vomiting.   Neurological: Positive for headaches. Negative for facial asymmetry, speech difficulty and numbness.   Psychiatric/Behavioral: Positive for decreased concentration. Negative for confusion.      ROS      Objective:     Vitals:    Temp: 99.6 °F (37.6 °C)  Pulse: 87  Rhythm: normal sinus rhythm  BP: (!) 124/58  MAP (mmHg): 83  Resp: (!) 22  SpO2: 97 %  O2 Device (Oxygen Therapy): nasal cannula    Temp  Min: 98 °F (36.7 °C)  Max: 99.6 °F (37.6 °C)  Pulse  Min: 53  Max: 94  BP  Min: 99/49  Max: 143/65  MAP (mmHg)  Min: 71  Max: 95  Resp  Min: 13  Max: 29  SpO2  Min: 93 %  Max: 100 %    11/04 0701 - 11/05 0700  In: 2059.6 [I.V.:2059.6]  Out: 3735 [Urine:3500; Drains:235]   Unmeasured Output  Urine Occurrence: 0  Stool Occurrence: 0       Physical Exam  GA: Sedated, comfortable, no acute distress.   HEENT: No scleral icterus or JVD.   Pulmonary: Clear to auscultation A/P/L. No wheezing, crackles, or rhonchi.  Cardiac: RRR S1 & S2 w/o rubs/murmurs/gallops.   Abdominal: Bowel sounds present x 4. No appreciable hepatosplenomegaly.  Skin: No jaundice, rashes, or visible lesions.  Neuro:  --GCS: E2 V2 M4  --Mental Status: Drowsy, following commands, moves all 4 extremities spontaneously--CN II-XII grossly intact.   --Pupils 3mm, PERRL.   --Corneal reflex, gag, cough intact.  --LUE strength: 4/5  --LLE strength: 4/5  --RUE strength: 4/5  --RLE strength: 4/5     Today I personally reviewed pertinent medications, lines/drains/airways, imaging, laboratory results, notably:

## 2020-11-05 NOTE — CONSULTS
"  [facility]  Adult Nutrition  Consult Note    SUMMARY     Recommendations    Recommendation:  1. Suggest cardiac diet; encourage good PO intake at meals   2. Diet education to be completed as warranted   3. RD to monitor and follow up    Goals: pt to tolerate >85% of EEN/EPN by RD follow up  Nutrition Goal Status: new  Communication of RD Recs: reviewed with RN    Reason for Assessment    Reason For Assessment: consult  Diagnosis: (cerebral aneurysm)  Relevant Medical History: HTN; HLD: DJD  Interdisciplinary Rounds: did not attend  General Information Comments: Pt resting in bed, s/p crainotomy. Pt reported good PO intake PTA, hungry at time of visit. PTA pt following low Na diet and trying to lose wt. MYRON usual body wt, pt reported 3 lbs wt loss PTA. Chart shows UBW ~180 lbs over past few years. NFPE not indicated at this time, pt with no s/s of malnutrition. Diet education can be completed before discharge if warranted.  Nutrition Discharge Planning: adeuqate PO intake    Nutrition Risk Screen    Nutrition Risk Screen: no indicators present    Nutrition/Diet History    Spiritual, Cultural Beliefs, Adventism Practices, Values that Affect Care: no  Food Allergies: NKFA  Factors Affecting Nutritional Intake: None identified at this time    Anthropometrics    Temp: 99.6 °F (37.6 °C)  Height Method: Estimated  Height: 5' 2" (157.5 cm)  Height (inches): 62 in  Weight Method: Bed Scale  Weight: 86.6 kg (190 lb 14.7 oz)  Weight (lb): 190.92 lb  Ideal Body Weight (IBW), Female: 110 lb  % Ideal Body Weight, Female (lb): 173.36 %  BMI (Calculated): 34.9  BMI Grade: 30 - 34.9- obesity - grade I       Lab/Procedures/Meds    Pertinent Labs Reviewed: reviewed  Pertinent Labs Comments: K 3.1; Glucose 176; Ca 7.4; Phos 1.4  Pertinent Medications Reviewed: reviewed  Pertinent Medications Comments: senna-docusate; ascorbic acid; statin; heparin; MVI     Estimated/Assessed Needs    Weight Used For Calorie " Calculations: 86.6 kg (190 lb 14.7 oz)  Energy Calorie Requirements (kcal): 1444 kcal/d  Energy Need Method: Farson-St Jeor  Protein Requirements:  g/day  Weight Used For Protein Calculations: 86.6 kg (190 lb 14.7 oz)  Fluid Requirements (mL): 1 mL/kcal or per MD     RDA Method (mL): 1444    Nutrition Prescription Ordered    Current Diet Order: DM diet    Evaluation of Received Nutrient/Fluid Intake    I/O: -1.7 L since admit  Energy Calories Required: meeting needs  Protein Required: meeting needs  Fluid Required: other (see comments)  Comments: LBM 11/3  Tolerance: tolerating  % Intake of Estimated Energy Needs: 75 - 100 %  % Meal Intake: Other: diet recently advanced    Nutrition Risk    Level of Risk/Frequency of Follow-up: high     Assessment and Plan    Nutrition Problem  obesity    Related to (etiology):   Excessive oral intake    Signs and Symptoms (as evidenced by):   BMI of 34     Interventions  (treatment strategy):  Collaboration of care with other providers    Nutrition Diagnosis Status:   New      Monitor and Evaluation    Food and Nutrient Intake: energy intake, food and beverage intake  Food and Nutrient Adminstration: diet order  Knowledge/Beliefs/Attitudes: food and nutrition knowledge/skill  Anthropometric Measurements: weight, weight change  Biochemical Data, Medical Tests and Procedures: electrolyte and renal panel, gastrointestinal profile, glucose/endocrine profile, inflammatory profile, lipid profile  Nutrition-Focused Physical Findings: overall appearance     Nutrition Follow-Up    RD Follow-up?: Yes

## 2020-11-05 NOTE — ASSESSMENT & PLAN NOTE
s/p Elective Right modified orbitozygomatic craniotomy with Acomm aneurysm clipping  -- Continue Neuro checks q 1hr  -- Neurosurgery consulted  -- SBP goal <140  -- PT/OT/Speech  -- CXR pending  -- 2D echo pending  -- Dc diaz when patient alert  -- Maintain A-line  -- Subgaleal drain to accordion with bloody drainage  -- decreased keppra to 250 mg BID

## 2020-11-05 NOTE — PROGRESS NOTES
[facility]  Neurocritical Care  Progress Note    Admit Date: 11/4/2020  Service Date: 11/05/2020  Length of Stay: 1    Subjective:     Chief Complaint: Cerebral aneurysm    History of Present Illness: Ms Mohan is a 49 yo female with sig pmhx of HTN, Depression, DJD, HLD, Insomnia, and Pulmonary nodule who presents to [facility] for a higher level of care s/p Elective Right modified orbitozygomatic craniotomy with Acomm aneurysm clipping(Aneurysm clipping and crani). On 9/17/2020 IR Angiogram Carotid Cerebral Bilateral revealed 1. A saccular anterior communicating artery aneurysm measuring 4.7 mm X 2.48 mm X 5.27 mm with a neck of 3.27 mm was identified on the angiogram.        Hospital Course: 11/4/2020 Admit to [facility] following elective aneurysm repair, CTH pending  11/5/20: remains on cardene gtt; keppra decreased to 250 mg BID; restart hydrochlorathiazide; start low calorie diabetic diet due to increased glucose levels- POCT glucose q6h and SSI; oxy and tylenol for pain    Interval History: remains on cardene gtt; keppra decreased to 250 mg BID; restart hydrochlorathiazide; start low calorie diabetic diet due to increased glucose levels- POCT glucose q6h and SSI; oxy and tylenol for pain    Review of Systems   Constitutional: Negative for fever.   Respiratory: Negative for cough and shortness of breath.    Cardiovascular: Negative for chest pain and palpitations.   Gastrointestinal: Negative for abdominal pain and vomiting.   Neurological: Positive for headaches. Negative for facial asymmetry, speech difficulty and numbness.   Psychiatric/Behavioral: Positive for decreased concentration. Negative for confusion.       Objective:     Vitals:    Temp: 99.6 °F (37.6 °C)  Pulse: 87  Rhythm: normal sinus rhythm  BP: (!) 124/58  MAP (mmHg): 83  Resp: (!) 22  SpO2: 97 %  O2 Device (Oxygen Therapy): nasal cannula    Temp  Min: 98 °F (36.7 °C)  Max: 99.6 °F (37.6 °C)  Pulse  Min: 53  Max: 94  BP  Min: 99/49  Max:  143/65  MAP (mmHg)  Min: 71  Max: 95  Resp  Min: 13  Max: 29  SpO2  Min: 93 %  Max: 100 %    11/04 0701 - 11/05 0700  In: 2059.6 [I.V.:2059.6]  Out: 3735 [Urine:3500; Drains:235]   Unmeasured Output  Urine Occurrence: 0  Stool Occurrence: 0       Physical Exam  GA: Sedated, comfortable, no acute distress.   HEENT: No scleral icterus or JVD.   Pulmonary: Clear to auscultation A/P/L. No wheezing, crackles, or rhonchi.  Cardiac: RRR S1 & S2 w/o rubs/murmurs/gallops.   Abdominal: Bowel sounds present x 4. No appreciable hepatosplenomegaly.  Skin: No jaundice, rashes, or visible lesions.  Neuro:  --GCS: E2 V2 M4  --Mental Status: Drowsy, following commands, moves all 4 extremities spontaneously--CN II-XII grossly intact.   --Pupils 3mm, PERRL.   --Corneal reflex, gag, cough intact.  --LUE strength: 4/5  --LLE strength: 4/5  --RUE strength: 4/5  --RLE strength: 4/5     Today I personally reviewed pertinent medications, lines/drains/airways, imaging, laboratory results, notably:        Assessment/Plan:     Neuro  * Cerebral aneurysm  s/p Elective Right modified orbitozygomatic craniotomy with Acomm aneurysm clipping  -- Continue Neuro checks q 1hr  -- Neurosurgery consulted  -- SBP goal <140  -- PT/OT/Speech  -- CXR pending  -- 2D echo pending  -- Dc diaz when patient alert  -- Maintain A-line  -- Subgaleal drain to accordion with bloody drainage  -- decreased keppra to 250 mg BID    Cerebral aneurysm without rupture, left anterior communicating 4.3  -- See cerebral aneurysm     Cardiac/Vascular  HTN (hypertension)  -- Continue to monitor HR and BP   -- SBP goal < 140  -- 2D echo pending  -- scheduled atenolol and losartan  -- cardene gtt    Hyperlipidemia  -- atorvastatin 40 mg daily    Renal/  Hypokalemia  -- Initiated electrolyte replacements    Endocrine  Class 2 obesity in adult  -- consult to registered dietitian   --started 2000 calorie diabetic diet          The patient is being Prophylaxed for:  Venous  Thromboembolism with: Mechanical or Chemical  Stress Ulcer with: None  Ventilator Pneumonia with: not applicable    Activity Orders          Diet diabetic [facility]; 2000 Calorie; Isolation Tray - Regular [place]; Thin: Diabetic starting at 11/05 0913        Full Code    Jonny Morocho MD  Neurocritical Care  [facility]

## 2020-11-05 NOTE — PLAN OF CARE
Admit Date:  11/4/2020  6:35 AM      Admit Diagnosis:  Cerebral aneurysm without rupture [I67.1]  Cerebral aneurysm, nonruptured [I67.1]  Cerebral aneurysm [I67.1]  Cerebral aneurysm [I67.1]  Cerebral aneurysm [I67.1]    Transferred from:  Home    Past Medical History:   Diagnosis Date    Benign hypertension     Bladder instability     Depression     DJD (degenerative joint disease), lumbar     History of viral meningitis 1996    Hyperlipidemia     Insomnia     Microscopic hematuria     negative work-up    Pulmonary nodule     minute    Tendonitis          CM met with patient in room for Discharge Planning Assessment.  Patient is able to answer questions.  Per patient, she lives alone in a 2 story house with no step(s) to enter and the living quarters on the first floor.    Per patient, she was independent with ADLS and used no dme for ambulation.  Patient will have assistance from her daughter, Lucien,  upon discharge.   Discharge Planning Booklet given to patient and discussed.  All questions addressed.  CM will follow for needs.    Home Health discussed.  Patient is OK with [facility].  Referral sent.          11/05/20 1122   Discharge Assessment   Assessment Type Discharge Planning Assessment   Confirmed/corrected address and phone number on facesheet? Yes   Assessment information obtained from? Patient   Expected Length of Stay (days) 3   Communicated expected length of stay with patient/caregiver yes   Prior to hospitilization cognitive status: Alert/Oriented   Prior to hospitalization functional status: Independent   Current cognitive status: Alert/Oriented   Current Functional Status: Needs Assistance  (post op)   Facility Arrived From: Home   Lives With alone  (daughter can assist)   Able to Return to Prior Arrangements yes   Is patient able to care for self after discharge? Yes   Who are your caregiver(s) and their phone number(s)? Lucien Collins (dtr) [phone]   Patient's perception of  discharge disposition home or selfcare;home health   Readmission Within the Last 30 Days no previous admission in last 30 days   Patient currently being followed by outpatient case management? No   Patient currently receives any other outside agency services? No   Equipment Currently Used at Home none   Do you have any problems affording any of your prescribed medications? No   Is the patient taking medications as prescribed? yes   Does the patient have transportation home? Yes   Transportation Anticipated family or friend will provide   Does the patient receive services at the [facility]? No   Discharge Plan A Home Health;Home with family   Discharge Plan B Home   DME Needed Upon Discharge  none   Patient/Family in Agreement with Plan yes   Does the patient have transportation to healthcare appointments? Yes                PCP:  James Tanner MD  [phone]        Pharmacy:    [id number] - [place] - [address]  [address] [address]  Phone: [phone] Fax: [phone]        Emergency Contacts:  Extended Emergency Contact Information  Primary Emergency Contact: Lucien Collins  Address: [address] [place] [address] [place] [address] [employer] [place]  Mobile Phone: [phone]  Relation: Daughter  Preferred language: English   needed? No  Secondary Emergency Contact: Ibeth Burk  Address: [address] [place] [address] [address]           [place] [address] [place]  Mobile Phone: [phone]  Relation: Daughter  Preferred language: English   needed? No      Insurance:    Payor: HUMANA / Plan: HUMANA HMO OPEN ACCESS / Product Type: HMO /         Sheyla Hansen RN, CCRN-K, [facility]  Neuro-Critical Care   X [phone]    11/05/2020  11:26 AM

## 2020-11-05 NOTE — PLAN OF CARE
[facility] Care Plan    POC reviewed with Kylah Mohan at 0300. Pt verbalized understanding. Questions and concerns addressed. No acute events overnight. Pt progressing toward goals. Will continue to monitor. See below and flowsheets for full assessment and VS info.     Post op CTH over night  Labetalol admin x 1 for SBP > 140  40 mEq K tab admin to pt for a K of 3.1      Neuro:  Noble Coma Scale  Best Eye Response: 4-->(E4) spontaneous  Best Motor Response: 6-->(M6) obeys commands  Best Verbal Response: 5-->(V5) oriented  Noble Coma Scale Score: 15  Assessment Qualifiers: no eye obstruction present  Pupil PERRLA: yes     24hr Temp:  [98 °F (36.7 °C)-98.5 °F (36.9 °C)]     CV:   Rhythm: normal sinus rhythm  BP goals:   SBP < 140  MAP > 65    Resp:   O2 Device (Oxygen Therapy): room air       Plan: N/A    GI/:     Diet/Nutrition Received: NPO  Last Bowel Movement: 11/03/20  Voiding Characteristics: urethral catheter (bladder)    Intake/Output Summary (Last 24 hours) at 11/5/2020 0515  Last data filed at 11/5/2020 0505  Gross per 24 hour   Intake 1800.21 ml   Output 3710 ml   Net -1909.79 ml     Unmeasured Output  Urine Occurrence: 0  Stool Occurrence: 0    Labs/Accuchecks:  Recent Labs   Lab 11/05/20  0144   WBC 18.42*   RBC 3.73*   HGB 11.3*   HCT 34.8*         Recent Labs   Lab 11/05/20  0144      K 3.1*   CO2 21*      BUN 11   CREATININE 0.6   ALKPHOS 63   ALT 13   AST 17   BILITOT 0.4      Recent Labs   Lab 11/05/20  0144   INR 0.9    No results for input(s): CPK, CPKMB, TROPONINI, MB in the last 168 hours.    Electrolytes: Electrolytes replaced  Accuchecks: Q6H    Gtts:   sodium chloride 0.9% 75 mL/hr at 11/05/20 0505    nicardipine 15 mg/hr (11/05/20 0505)       LDA/Wounds:  Lines/Drains/Airways       Drain                   Closed/Suction Drain 11/04/20 1409 Left Other (Comment) Accordion 10 Fr. less than 1 day              Arterial Line              Arterial Line 11/04/20 0817 Left  Radial less than 1 day              Peripheral Intravenous Line                   Peripheral IV - Single Lumen 11/04/20 16 G Posterior;Right Wrist 1 day         Peripheral IV - Single Lumen 11/04/20 18 G Anterior;Distal;Right Forearm 1 day         Peripheral IV - Single Lumen 11/04/20 0804 18 G Left Forearm less than 1 day                  Wounds: No  Wound care consulted: No

## 2020-11-05 NOTE — TELEPHONE ENCOUNTER
Spoke with patient daughter informing her that someone on the hospital team will contact them regarding her mother care. Patient daughter Lucien verbalized understanding

## 2020-11-05 NOTE — ANESTHESIA POSTPROCEDURE EVALUATION
Anesthesia Post Evaluation    Patient: Kylah Mohan    Procedure(s) Performed: Procedure(s) (LRB):  CRANIOTOMY, WITH ANEURYSM CLIPPING RIGHT MODIFIED ORBITOZYGOMATIC CRANIOTOMY (Left)    Final Anesthesia Type: general    Patient location during evaluation: ICU  Patient participation: Yes- Able to Participate  Level of consciousness: awake and alert and oriented  Post-procedure vital signs: reviewed and stable  Pain management: adequate  Airway patency: patent    PONV status at discharge: No PONV  Anesthetic complications: no      Cardiovascular status: blood pressure returned to baseline and hemodynamically stable  Respiratory status: spontaneous ventilation, unassisted and room air  Hydration status: euvolemic  Follow-up not needed.          Vitals Value Taken Time   /60 11/05/20 0605   Temp 37.1 °C (98.7 °F) 11/05/20 0305   Pulse 87 11/05/20 0621   Resp 29 11/05/20 0621   SpO2 94 % 11/05/20 0621   Vitals shown include unvalidated device data.      No case tracking events are documented in the log.      Pain/Lucrecia Score: Pain Rating Prior to Med Admin: 8 (11/5/2020  4:54 AM)  Pain Rating Post Med Admin: 6 (11/5/2020  5:54 AM)

## 2020-11-05 NOTE — ASSESSMENT & PLAN NOTE
50 F with known L MILI aneurysm s/p elective L OZ craniotomy for clipping, POD#1    Continue NCC admit  Q1h neurochecks  Post op imaging completed, reviewed  SBP<160  HV in place, continue abx while in place  Continue [place] for diet  PT/OT  SQH, PPI    D/w Dr. Page

## 2020-11-05 NOTE — PT/OT/SLP EVAL
"Occupational Therapy   Evaluation    Name: Kylah Mohan  MRN: [id number]  Admitting Diagnosis:  Cerebral aneurysm 1 Day Post-Op    Recommendations:     Discharge Recommendations: home health OT  Discharge Equipment Recommendations:  none  Barriers to discharge:  None    Assessment:     Kylah Mohan is a 50 y.o. female with a medical diagnosis of Cerebral aneurysm.  She presents with performance deficits affecting function: pain, impaired functional mobilty, impaired self care skills, impaired endurance.      Rehab Prognosis: Good; patient would benefit from acute skilled OT services to address these deficits and reach maximum level of function.       Plan:     Patient to be seen 4 x/week to address the above listed problems via therapeutic activities, therapeutic exercises, neuromuscular re-education, sensory integration, self-care/home management  · Plan of Care Expires: 12/03/20  · Plan of Care Reviewed with: patient    Subjective     Patient: "My daughter doesn't work and she'll be home to assist me."    Occupational Profile:  Per patient:  Patient resides in [place] alone in 2 story home with bedroom on the first floor, no steps to enter.  Patient is right handed.  PTA patient independent with ADLs including driving.  Works:  at [employer].  Hobbies: reading, swimming, TV.    Pain/Comfort:  · Pain Rating 1: 10/10  · Location 1: head  · Pain Addressed 1: Reposition, Nurse notified  · Pain Rating Post-Intervention 1: 10/10    Patients cultural, spiritual, Alevism conflicts given the current situation: no    Objective:     Communicated with: Nurse and MD prior to session.  Patient found supine with diaz catheter, telemetry, peripheral IV, arterial line, bed alarm, blood pressure cuff, SCD, pulse ox (continuous)(drain) upon OT entry to room.    General Precautions: Standard, aspiration, fall, NPO   Orthopedic Precautions:N/A   Braces: N/A     Occupational " Performance:    Bed Mobility:    · Patient completed Rolling/Turning to Left with  supervision  · Patient completed Rolling/Turning to Right with supervision  · Patient completed Supine to Sit with supervision  · Patient completed Sit to Supine with supervision    Functional Mobility/Transfers:  · Deferred 2* 10/10 headache    Activities of Daily Living:  · Feeding:  NPO    · Grooming: SBA while seated EOB  · Upper Body Dressing:SBA while seated EOB    Cognitive/Visual Perceptual:  Cognitive/Psychosocial Skills:     -       Oriented to: Person, Place, Time and Situation   -       Follows Commands/attention:Follows two-step commands  -       Communication: clear/fluent    Physical Exam:  Postural examination/scapula alignment:    -       Rounded shoulders  Skin integrity: Visible skin intact  Upper Extremity Range of Motion:     -       Right Upper Extremity: WFL  -       Left Upper Extremity: WFL  Upper Extremity Strength:    -       Right Upper Extremity: WFL  -       Left Upper Extremity: WFL    AMPAC 6 Click ADL:  AMPAC Total Score: 16    Treatment & Education:  Patient education provided on role of OT.  Continued education, patient/ family training recommended. Daily orientation provided. Patient alert and oriented x 3; able to follow 4/4 one step commands.  Patient attentive and interactive throughout the session.  White board updated in patient's room.  OT asked if there were any other questions; patient had no further questions.   ducation:    Patient left supine with all lines intact and call button in reach    GOALS:   Multidisciplinary Problems     Occupational Therapy Goals        Problem: Occupational Therapy Goal    Goal Priority Disciplines Outcome Interventions   Occupational Therapy Goal     OT, PT/OT Ongoing, Progressing    Description: Goals set 11/5 to be addressed for 14 days with expiration date, 11/19:  Patient will increase functional independence with ADLs by performing:    Patient will  demonstrate rolling to the right with modified independence.  Not met   Patient will demonstrate rolling to the left with modified independence.   Not met  Patient will demonstrate supine -sit with modified independence.   Not met  Patient will demonstrate stand pivot transfers with modified independence.   Not met  Patient will demonstrate grooming while standing with modified independence.   Not met  Patient will demonstrate upper body dressing with modified independence while seated EOB.   Not met  Patient will demonstrate lower body dressing with modified independence while seated EOB.   Not met  Patient will demonstrate toileting with modified independence.   Not met  Patient will demonstrate bathing while seated EOB with modified independence.   Not met                           History:     Past Medical History:   Diagnosis Date    Benign hypertension     Bladder instability     Depression     DJD (degenerative joint disease), lumbar     History of viral meningitis 1996    Hyperlipidemia     Insomnia     Microscopic hematuria     negative work-up    Pulmonary nodule     minute    Tendonitis        Past Surgical History:   Procedure Laterality Date    ANTERIOR VAGINAL REPAIR      BREAST BIOPSY  6/5/2007    left breast benign    CEREBRAL ANGIOGRAM N/A 9/17/2020    Procedure: ANGIOGRAM-CEREBRAL;  Surgeon: Maged Diagnostic Provider;  [facility]: [facility] OR [address] [place] [address];  Service: Radiology;  Laterality: N/A;  /[place]    ENDOMETRIAL ABLATION  5/28/2009    HYSTERECTOMY      LAPAROSCOPIC HYSTERECTOMY  2/2013    TUBAL LIGATION  2002       Time Tracking:     OT Date of Treatment: 11/05/20  OT Start Time: 0458  OT Stop Time: 0516  OT Total Time (min): 18 min    Billable Minutes:Evaluation 10  Therapeutic Activity 8    JIHAN Daugherty  11/5/2020

## 2020-11-05 NOTE — PT/OT/SLP EVAL
Physical Therapy Evaluation    Patient Name:  Kylah Mohan   MRN:  [id number]  Admit Date: 11/4/2020  Admitting Diagnosis:  Cerebral aneurysm  Length of Stay: 1 days  Recent Surgery: Procedure(s) (LRB):  CRANIOTOMY, WITH ANEURYSM CLIPPING RIGHT MODIFIED ORBITOZYGOMATIC CRANIOTOMY (Left) 1 Day Post-Op    Recommendations:     Discharge Recommendations:  home health PT   Discharge Equipment Recommendations: none   Barriers to discharge: Decreased caregiver support    Assessment:     Kylah Mohan is a 50 y.o. female admitted with a medical diagnosis of Cerebral aneurysm. She is post op day 1 s/p craniotomy for aneurysm clipping. Medical history includes hypertension and depression. She is most limited by decreased endurance. She was very tired today, but participated well. Today, she was able to perform bed mobility, standing, and transfers. Based on clinical presentation and co-morbidities, pt would benefit from acute skilled physical therapy services to improve functional mobility and return to max capacity prior level of function. See detailed evaluation below:    Problem List: impaired endurance, impaired self care skills, impaired functional mobilty, gait instability, impaired balance, pain  Rehab Prognosis: Good; patient would benefit from acute skilled PT services to address these deficits and reach maximum level of function.      Plan:     During this hospitalization, patient to be seen 4 x/week to address the identified rehab impairments via gait training, therapeutic exercises, neuromuscular re-education, therapeutic activities and progress towards the established goals.    · Plan of Care Expires:  12/05/20    Subjective   Communicated with RN prior to session.  Patient found HOB elevated upon PT entry to room, agreeable to evaluation.     Chief Complaint: headache  Patient/Family Comments/goals: to go home  Pain/Comfort:  · Pain Rating 1: (pt reported headache, but did not quantify)  · Location -  "Orientation 1: generalized  · Location 1: head  · Pain Addressed 1: Reposition, Distraction, Nurse notified  · Pain Rating Post-Intervention 1: (resting comfortably)    Living Environment:  Patient lives alone in a 2 story home with ~14 steps to enter. She reports that her bedroom is downstairs and she does not go upstairs often.     Prior Level of Function:   Patient reports being independent with mobility & with ADLs. Patient uses DME as follows: none. DME owned (not currently used): none.    Patient reports they will have assistance from her daughter upon discharge.    Objective:   Patient found with: telemetry, SCD, pulse ox (continuous), peripheral IV, arterial line, bed alarm, blood pressure cuff, hemovac     General Precautions: Standard, fall   Orthopedic Precautions:N/A   Braces: N/A   Oxygen Device: Room Air  Vitals: /60   Pulse 79   Temp 99.8 °F (37.7 °C) (Axillary)   Resp (!) 23   Ht 5' 2" (1.575 m)   Wt 86.2 kg (190 lb)   SpO2 97%   Breastfeeding No   BMI 34.75 kg/m²     Exams:  · Cognition:   · Lethargic and Cooperative  · Command following: Follows multistep  commands  · Fluency: clear/fluent  · Hearing: Intact  · Vision:  Intact visual fields  · Skin Integrity: surgical incisions  · Sensation: intact  · Coordination: intact  · LE Strength:  · L Lower Extremity: WFL  · R Lower Extremity: WFL  · LE ROM:  · L Lower Extremity: WFL  · R Lower Extremity: WFL      Outcome Measures:  AM-PAC 6 CLICK MOBILITY  Turning over in bed (including adjusting bedclothes, sheets and blankets)?: 3  Sitting down on and standing up from a chair with arms (e.g., wheelchair, bedside commode, etc.): 3  Moving from lying on back to sitting on the side of the bed?: 3  Moving to and from a bed to a chair (including a wheelchair)?: 3  Need to walk in hospital room?: 3  Climbing 3-5 steps with a railing?: 2  Basic Mobility Total Score: 17     Functional Mobility:    Bed Mobility:  · Rolling/Turning to Left: minimum " assistance  · Supine to Sit: minimum assistance  · Scooting anteriorly to EOB to have both feet planted on floor: minimum assistance    Sitting Balance at Edge of Bed:   Assistance Level Required: Stand-by Assistance    Transfers:   · Sit <> Stand Transfer: minimum assistance with no assistive device   · Standing Tolerance: contact guard assistance with no assistive device   · Bed <> Chair Transfer: Step Transfer technique with minimum assistance with no assistive device   · Pt able to transfer to/from bedside commode and from bed to recliner    Gait:   · Patient ambulated: few steps for transfer   · Patient required: minimal assist  · Patient used: no assistive device  · Gait Deviation(s): decreased speed    Education:   Patient was educated on the following:   Role of PT, plan of care, and goals   In room safety and use of call button   Importance of continued upright mobility and exercise    Patient left up in chair with all lines intact, call button in reach, and RN notified.    GOALS:   Multidisciplinary Problems     Physical Therapy Goals        Problem: Physical Therapy Goal    Goal Priority Disciplines Outcome Goal Variances Interventions   Physical Therapy Goal     PT, PT/OT Ongoing, Progressing     Description: PT goals to be met by: 11/15/20    Patient will perform supine <> sitting with supervision.  Patient will perform sit <> stand transitions with supervision.  Patient will perform transfers from bed <> chair or BSC with supervision.  Patient will ambulate 100 feet with supervision.  Patient will ascend/descend 1 flight of steps using B handrails with CGA.                   History:     Past Medical History:   Diagnosis Date    Benign hypertension     Bladder instability     Depression     DJD (degenerative joint disease), lumbar     History of viral meningitis 1996    Hyperlipidemia     Insomnia     Microscopic hematuria     negative work-up    Pulmonary nodule     minute    Tendonitis         Past Surgical History:   Procedure Laterality Date    ANTERIOR VAGINAL REPAIR      BREAST BIOPSY  6/5/2007    left breast benign    CEREBRAL ANGIOGRAM N/A 9/17/2020    Procedure: ANGIOGRAM-CEREBRAL;  Surgeon: [facility] Diagnostic Provider;  Location: [facility] OR [address] [place] [address];  Service: Radiology;  Laterality: N/A;   190/Camilo    CLIP LIGATION OF INTRACRANIAL ANEURYSM BY CRANIOTOMY Left 11/4/2020    Procedure: CRANIOTOMY, WITH ANEURYSM CLIPPING RIGHT MODIFIED ORBITOZYGOMATIC CRANIOTOMY;  Surgeon: Christi Page MD;  Location: [facility] OR [address] [place] [address];  Service: Neurosurgery;  Laterality: Left;  Anesthesia: General   Blood: Type & Cross 2 units  Neuro Monitoring: SEP, MEP, EEG   Radiology: C-Arm   Bed: Regular Bed  Headrest: Morgan  Position: Supine   ,  Reciprocating Saw  Length of Surgery: 4.5     ENDOMETRIAL ABLATION  5/28/2009    HYSTERECTOMY      LAPAROSCOPIC HYSTERECTOMY  2/2013    TUBAL LIGATION  2002       Time Tracking:     PT Received On: 11/05/20  PT Start Time: 1427     PT Stop Time: 1445  PT Total Time (min): 18 min       Billable Minutes: Evaluation 8 and Therapeutic Activity 10    Dennise Nova, PT, DPT  11/5/2020

## 2020-11-05 NOTE — PROGRESS NOTES
[facility]  Neurosurgery  Progress Note    Subjective:     History of Present Illness: No notes on file    Post-Op Info:  Procedure(s) (LRB):  CRANIOTOMY, WITH ANEURYSM CLIPPING RIGHT MODIFIED ORBITOZYGOMATIC CRANIOTOMY (Left)   1 Day Post-Op     Interval History: NAEON. C/o pain with L sided facial movement, expected postoperatively. HV with 210cc, bloody. Pending therapy eval.     Medications:  Continuous Infusions:   sodium chloride 0.9% 75 mL/hr at 11/05/20 0605    nicardipine 15 mg/hr (11/05/20 0640)     Scheduled Meds:   ascorbic acid (vitamin C)  1,000 mg Oral Daily    atenoloL  25 mg Oral Daily    atorvastatin  40 mg Oral Daily    cyanocobalamin  250 mcg Oral Daily    cyclobenzaprine  10 mg Oral QHS    fluticasone propionate  1 spray Each Nostril Daily    heparin (porcine)  5,000 Units Subcutaneous Q8H    levetiracetam IVPB  500 mg Intravenous Q12H    losartan  25 mg Oral Daily    multivitamin  1 tablet Oral Daily    mupirocin   Nasal BID    sodium chloride 0.9%  3 mL Intravenous Q8H     PRN Meds:acetaminophen, acetaminophen, dextrose 50%, glucagon (human recombinant), insulin aspart U-100, labetaloL, ondansetron, oxyCODONE     Review of Systems  Objective:     Weight: 86.6 kg (190 lb 14.7 oz)  Body mass index is 34.92 kg/m².  Vital Signs (Most Recent):  Temp: 98.7 °F (37.1 °C) (11/05/20 0305)  Pulse: 86 (11/05/20 0605)  Resp: (!) 26 (11/05/20 0605)  BP: 134/60 (11/05/20 0605)  SpO2: (!) 94 % (11/05/20 0605) Vital Signs (24h Range):  Temp:  [98 °F (36.7 °C)-98.7 °F (37.1 °C)] 98.7 °F (37.1 °C)  Pulse:  [53-91] 86  Resp:  [13-28] 26  SpO2:  [94 %-100 %] 94 %  BP: ()/(49-68) 134/60  Arterial Line BP: (120-150)/(47-67) 129/62                          Closed/Suction Drain 11/04/20 1409 Left Other (Comment) Accordion 10 Fr. (Active)   Site Description Unable to view 11/05/20 0305   Dressing Type Gauze 11/05/20 0305   Dressing Status Clean;Dry;Intact 11/05/20 0305   Dressing  Intervention Integrity maintained 11/05/20 0305   Drainage Bloody 11/05/20 0305   Status Other (Comment) 11/05/20 0305   Output (mL) 30 mL 11/05/20 0405       Neurosurgery Physical Exam  Awake, alert  OX4  PERRL/EOMI  FCx4  AG in all extremities  Groin soft  Dressing c/d/i  HV with bloody output    Significant Labs:  Recent Labs   Lab 11/04/20  1534 11/05/20  0144   *  231* 176*     140 140   K 3.4*  3.4* 3.1*     107 110   CO2 23  23 21*   BUN 12  12 11   CREATININE 0.7  0.7 0.6   CALCIUM 7.5*  7.5* 7.4*   MG 2.2 2.1     Recent Labs   Lab 11/04/20  1015 11/04/20  1534 11/05/20  0144   WBC  --  18.35* 18.42*   HGB  --  11.7* 11.3*   HCT 31* 37.1 34.8*   PLT  --  387* 319     Recent Labs   Lab 11/05/20  0144   INR 0.9       Significant Diagnostics:  Ct Head Without Contrast    Result Date: 11/5/2020  Postoperative changes of left frontal craniotomy for anterior communicating artery aneurysm clipping with expected postoperative pneumocephalus, subdural fluid, and blood products along the left cerebral convexity.. 2.4 cm area of hypoattenuation with punctate hyperdensity in the left anterior frontal lobe adjacent to the basal ganglia, likely representing postsurgical edema/contusion however infarct cannot be entirely excluded.  Recommend correlation with neurologic exam and appropriate follow-up. This report was flagged in Epic as abnormal. Findings discussed with CASSIE Mathew by Dr. Gusman at 00:55 on 11/05/2020 Electronically signed by resident: Kodi Gusman Date:    11/05/2020 Time:    00:46 Electronically signed by: Aime Manzo MD Date:    11/05/2020 Time:    01:06      Assessment/Plan:     Cerebral aneurysm without rupture, left anterior communicating 4.3  50 F with known L MILI aneurysm s/p elective L OZ craniotomy for clipping, POD#1    Continue NCC admit  Q1h neurochecks  Post op imaging completed, reviewed  SBP<160  HV in place, continue abx while in place  Continue  Li Okeefe for diet  PT/OT  SQH, PPI    D/w Dr. Camilo Peoples MD  Neurosurgery  [facility]

## 2020-11-05 NOTE — PLAN OF CARE
Referral sent to [facility].         11/05/20 1134   Post-Acute Status   Post-Acute Authorization Home Health   Home Health Status Referrals Sent  ([facility])   Patient choice form signed by patient/caregiver List from [facility]  ([employer])   Discharge Delays None known at this time       Sheyla Hansen RN, CCRN-K, [facility]  Neuro-Critical Care   [phone]

## 2020-11-05 NOTE — PLAN OF CARE
OT evaluation completed.  JIHAN Daugherty  11/5/2020    Problem: Occupational Therapy Goal  Goal: Occupational Therapy Goal  Description: Goals set 11/5 to be addressed for 14 days with expiration date, 11/19:  Patient will increase functional independence with ADLs by performing:    Patient will demonstrate rolling to the right with modified independence.  Not met   Patient will demonstrate rolling to the left with modified independence.   Not met  Patient will demonstrate supine -sit with modified independence.   Not met  Patient will demonstrate stand pivot transfers with modified independence.   Not met  Patient will demonstrate grooming while standing with modified independence.   Not met  Patient will demonstrate upper body dressing with modified independence while seated EOB.   Not met  Patient will demonstrate lower body dressing with modified independence while seated EOB.   Not met  Patient will demonstrate toileting with modified independence.   Not met  Patient will demonstrate bathing while seated EOB with modified independence.   Not met          Outcome: Ongoing, Progressing

## 2020-11-05 NOTE — PLAN OF CARE
Recommendations    Recommendation:  1. Suggest cardiac diet; encourage good PO intake at meals   2. Diet education to be completed as warranted   3. RD to monitor and follow up    Goals: pt to tolerate >85% of EEN/EPN by RD follow up  Nutrition Goal Status: new  Communication of RD Recs: reviewed with RN

## 2020-11-06 LAB
ALBUMIN SERPL BCP-MCNC: 2.8 G/DL (ref 3.5–5.2)
ALP SERPL-CCNC: 58 U/L (ref 55–135)
ALT SERPL W/O P-5'-P-CCNC: 12 U/L (ref 10–44)
ANION GAP SERPL CALC-SCNC: 9 MMOL/L (ref 8–16)
ANISOCYTOSIS BLD QL SMEAR: SLIGHT
AST SERPL-CCNC: 16 U/L (ref 10–40)
BACTERIA #/AREA URNS AUTO: ABNORMAL /HPF
BASOPHILS # BLD AUTO: 0.05 K/UL (ref 0–0.2)
BASOPHILS NFR BLD: 0.3 % (ref 0–1.9)
BILIRUB SERPL-MCNC: 0.5 MG/DL (ref 0.1–1)
BILIRUB UR QL STRIP: NEGATIVE
BUN SERPL-MCNC: 8 MG/DL (ref 6–20)
CALCIUM SERPL-MCNC: 8.1 MG/DL (ref 8.7–10.5)
CHLORIDE SERPL-SCNC: 110 MMOL/L (ref 95–110)
CLARITY UR REFRACT.AUTO: CLEAR
CO2 SERPL-SCNC: 24 MMOL/L (ref 23–29)
COLOR UR AUTO: ABNORMAL
CREAT SERPL-MCNC: 0.6 MG/DL (ref 0.5–1.4)
DIFFERENTIAL METHOD: ABNORMAL
EOSINOPHIL # BLD AUTO: 0 K/UL (ref 0–0.5)
EOSINOPHIL NFR BLD: 0 % (ref 0–8)
ERYTHROCYTE [DISTWIDTH] IN BLOOD BY AUTOMATED COUNT: 13.2 % (ref 11.5–14.5)
EST. GFR  (AFRICAN AMERICAN): >60 ML/MIN/1.73 M^2
EST. GFR  (NON AFRICAN AMERICAN): >60 ML/MIN/1.73 M^2
GLUCOSE SERPL-MCNC: 131 MG/DL (ref 70–110)
GLUCOSE UR QL STRIP: NEGATIVE
HCT VFR BLD AUTO: 32.8 % (ref 37–48.5)
HGB BLD-MCNC: 10.5 G/DL (ref 12–16)
HGB UR QL STRIP: ABNORMAL
HYPOCHROMIA BLD QL SMEAR: ABNORMAL
IMM GRANULOCYTES # BLD AUTO: 0.11 K/UL (ref 0–0.04)
IMM GRANULOCYTES NFR BLD AUTO: 0.6 % (ref 0–0.5)
KETONES UR QL STRIP: NEGATIVE
LEUKOCYTE ESTERASE UR QL STRIP: NEGATIVE
LYMPHOCYTES # BLD AUTO: 2.4 K/UL (ref 1–4.8)
LYMPHOCYTES NFR BLD: 12.7 % (ref 18–48)
MAGNESIUM SERPL-MCNC: 2.2 MG/DL (ref 1.6–2.6)
MCH RBC QN AUTO: 30.3 PG (ref 27–31)
MCHC RBC AUTO-ENTMCNC: 32 G/DL (ref 32–36)
MCV RBC AUTO: 95 FL (ref 82–98)
MICROSCOPIC COMMENT: ABNORMAL
MONOCYTES # BLD AUTO: 2.1 K/UL (ref 0.3–1)
MONOCYTES NFR BLD: 11 % (ref 4–15)
NEUTROPHILS # BLD AUTO: 14.5 K/UL (ref 1.8–7.7)
NEUTROPHILS NFR BLD: 75.4 % (ref 38–73)
NITRITE UR QL STRIP: NEGATIVE
NRBC BLD-RTO: 0 /100 WBC
PH UR STRIP: 7 [PH] (ref 5–8)
PHOSPHATE SERPL-MCNC: 1.3 MG/DL (ref 2.7–4.5)
PLATELET # BLD AUTO: 308 K/UL (ref 150–350)
PLATELET BLD QL SMEAR: ABNORMAL
PMV BLD AUTO: 10.2 FL (ref 9.2–12.9)
POCT GLUCOSE: 119 MG/DL (ref 70–110)
POCT GLUCOSE: 132 MG/DL (ref 70–110)
POCT GLUCOSE: 151 MG/DL (ref 70–110)
POTASSIUM SERPL-SCNC: 3.5 MMOL/L (ref 3.5–5.1)
PROT SERPL-MCNC: 6.5 G/DL (ref 6–8.4)
PROT UR QL STRIP: NEGATIVE
RBC # BLD AUTO: 3.47 M/UL (ref 4–5.4)
RBC #/AREA URNS AUTO: 1 /HPF (ref 0–4)
SODIUM SERPL-SCNC: 143 MMOL/L (ref 136–145)
SP GR UR STRIP: 1 (ref 1–1.03)
SQUAMOUS #/AREA URNS AUTO: 1 /HPF
URN SPEC COLLECT METH UR: ABNORMAL
WBC # BLD AUTO: 19.2 K/UL (ref 3.9–12.7)
WBC #/AREA URNS AUTO: 2 /HPF (ref 0–5)

## 2020-11-06 PROCEDURE — 25000003 PHARM REV CODE 250: Performed by: NURSE PRACTITIONER

## 2020-11-06 PROCEDURE — 80053 COMPREHEN METABOLIC PANEL: CPT

## 2020-11-06 PROCEDURE — 81001 URINALYSIS AUTO W/SCOPE: CPT

## 2020-11-06 PROCEDURE — 25000003 PHARM REV CODE 250: Performed by: STUDENT IN AN ORGANIZED HEALTH CARE EDUCATION/TRAINING PROGRAM

## 2020-11-06 PROCEDURE — 11000001 HC ACUTE MED/SURG PRIVATE ROOM

## 2020-11-06 PROCEDURE — A4216 STERILE WATER/SALINE, 10 ML: HCPCS | Performed by: NURSE PRACTITIONER

## 2020-11-06 PROCEDURE — 25000003 PHARM REV CODE 250: Performed by: PSYCHIATRY & NEUROLOGY

## 2020-11-06 PROCEDURE — 63600175 PHARM REV CODE 636 W HCPCS: Performed by: NURSE PRACTITIONER

## 2020-11-06 PROCEDURE — 97535 SELF CARE MNGMENT TRAINING: CPT

## 2020-11-06 PROCEDURE — 99233 SBSQ HOSP IP/OBS HIGH 50: CPT | Mod: ,,, | Performed by: PSYCHIATRY & NEUROLOGY

## 2020-11-06 PROCEDURE — 84100 ASSAY OF PHOSPHORUS: CPT

## 2020-11-06 PROCEDURE — 99233 PR SUBSEQUENT HOSPITAL CARE,LEVL III: ICD-10-PCS | Mod: ,,, | Performed by: PSYCHIATRY & NEUROLOGY

## 2020-11-06 PROCEDURE — 63600175 PHARM REV CODE 636 W HCPCS: Performed by: STUDENT IN AN ORGANIZED HEALTH CARE EDUCATION/TRAINING PROGRAM

## 2020-11-06 PROCEDURE — 83735 ASSAY OF MAGNESIUM: CPT

## 2020-11-06 PROCEDURE — 85025 COMPLETE CBC W/AUTO DIFF WBC: CPT

## 2020-11-06 RX ORDER — LEVETIRACETAM 250 MG/1
250 TABLET ORAL 2 TIMES DAILY
Status: DISCONTINUED | OUTPATIENT
Start: 2020-11-06 | End: 2020-11-10 | Stop reason: HOSPADM

## 2020-11-06 RX ADMIN — THERA TABS 1 TABLET: TAB at 09:11

## 2020-11-06 RX ADMIN — FLUTICASONE PROPIONATE 50 MCG: 50 SPRAY, METERED NASAL at 09:11

## 2020-11-06 RX ADMIN — HEPARIN SODIUM 5000 UNITS: 5000 INJECTION INTRAVENOUS; SUBCUTANEOUS at 01:11

## 2020-11-06 RX ADMIN — LEVETIRACETAM 250 MG: 100 INJECTION, SOLUTION INTRAVENOUS at 09:11

## 2020-11-06 RX ADMIN — HEPARIN SODIUM 5000 UNITS: 5000 INJECTION INTRAVENOUS; SUBCUTANEOUS at 05:11

## 2020-11-06 RX ADMIN — Medication 3 ML: at 08:11

## 2020-11-06 RX ADMIN — MUPIROCIN: 20 OINTMENT TOPICAL at 08:11

## 2020-11-06 RX ADMIN — CYANOCOBALAMIN TAB 250 MCG 250 MCG: 250 TAB at 09:11

## 2020-11-06 RX ADMIN — HEPARIN SODIUM 5000 UNITS: 5000 INJECTION INTRAVENOUS; SUBCUTANEOUS at 08:11

## 2020-11-06 RX ADMIN — POTASSIUM & SODIUM PHOSPHATES POWDER PACK 280-160-250 MG 2 PACKET: 280-160-250 PACK at 05:11

## 2020-11-06 RX ADMIN — Medication 20 MG: at 03:11

## 2020-11-06 RX ADMIN — ATENOLOL 25 MG: 25 TABLET ORAL at 09:11

## 2020-11-06 RX ADMIN — LEVETIRACETAM 250 MG: 250 TABLET ORAL at 08:11

## 2020-11-06 RX ADMIN — Medication 1000 MG: at 09:11

## 2020-11-06 RX ADMIN — OXYCODONE HYDROCHLORIDE 5 MG: 5 TABLET ORAL at 10:11

## 2020-11-06 RX ADMIN — ACETAMINOPHEN 650 MG: 325 TABLET ORAL at 07:11

## 2020-11-06 RX ADMIN — LOSARTAN POTASSIUM 25 MG: 25 TABLET, FILM COATED ORAL at 09:11

## 2020-11-06 RX ADMIN — POTASSIUM & SODIUM PHOSPHATES POWDER PACK 280-160-250 MG 2 PACKET: 280-160-250 PACK at 09:11

## 2020-11-06 RX ADMIN — POTASSIUM & SODIUM PHOSPHATES POWDER PACK 280-160-250 MG 2 PACKET: 280-160-250 PACK at 01:11

## 2020-11-06 RX ADMIN — Medication 3 ML: at 05:11

## 2020-11-06 RX ADMIN — POTASSIUM CHLORIDE 40 MEQ: 1.5 POWDER, FOR SOLUTION ORAL at 05:11

## 2020-11-06 RX ADMIN — HYDROCHLOROTHIAZIDE 25 MG: 12.5 TABLET ORAL at 09:11

## 2020-11-06 RX ADMIN — MUPIROCIN: 20 OINTMENT TOPICAL at 09:11

## 2020-11-06 RX ADMIN — DOCUSATE SODIUM 50MG AND SENNOSIDES 8.6MG 1 TABLET: 8.6; 5 TABLET, FILM COATED ORAL at 09:11

## 2020-11-06 RX ADMIN — Medication 3 ML: at 02:11

## 2020-11-06 RX ADMIN — ATORVASTATIN CALCIUM 40 MG: 20 TABLET, FILM COATED ORAL at 09:11

## 2020-11-06 NOTE — PROGRESS NOTES
Nursing Transfer Note       Transfer To 903    Transfer via wheelchair    Transfer with cardiac monitoring    Transported by LYSSA Alonso    Medicines sent: yes    Chart sent with patient: Yes    Belongings sent with patient: Daughters x2 took all patient belongings with them.    Notified: daughter    Bedside Neuro assessment performed: Yes    Bedside Handoff given to: LYSSA Orona    Upon arrival to floor: cardiac monitor applied, patient oriented to room, call bell in reach and bed in lowest position

## 2020-11-06 NOTE — PT/OT/SLP PROGRESS
"Occupational Therapy   Treatment    Name: Kylah Mohan  MRN: [id number]  Admitting Diagnosis:  Cerebral aneurysm  2 Days Post-Op    Recommendations:     Discharge Recommendations: home health OT  Discharge Equipment Recommendations:  none  Barriers to discharge:  None    Assessment:     Kylah Mohan is a 50 y.o. female with a medical diagnosis of Cerebral aneurysm.  She presents with performance deficits affecting function are weakness, decreased coordination, impaired self care skills, impaired functional mobilty, gait instability, impaired balance.     Rehab Prognosis:  Good; patient would benefit from acute skilled OT services to address these deficits and reach maximum level of function.       Plan:     Patient to be seen 4 x/week to address the above listed problems via self-care/home management, therapeutic activities, therapeutic exercises, neuromuscular re-education  · Plan of Care Expires: 12/03/20  · Plan of Care Reviewed with: patient    Subjective   Patient:  "I sat up in the chair for an hour yesterday."   Pain/Comfort:  · Pain Rating 1: 7/10  · Location:  Headache  · Addressed by: notifying nurse and repositioning  · Pain Rating Post-Intervention 1: 5/10    Objective:     Communicated with:  Nurse prior to session.  Patient found supine with telemetry, SCD, pulse ox (continuous), peripheral IV, bed alarm, blood pressure cuff, hemovac upon OT entry to room.    General Precautions: Standard, fall   Orthopedic Precautions:N/A   Braces: N/A     Occupational Performance:     Bed Mobility:    · Patient completed Rolling/Turning to Left with  supervision  · Patient completed Rolling/Turning to Right with supervision  · Patient completed Supine to Sit with supervision  · Patient completed Sit to Supine with supervision     Functional Mobility/Transfers:  · Patient completed Sit <> Stand Transfer with stand by assistance  with  no assistive device   · Patient completed Bed <> Chair Transfer using " Stand Pivot technique with contact guard assistance with no assistive device    Activities of Daily Living:  · Grooming: contact guard assistance while standing  · Lower Body Dressing: contact guard assistance for standing balance    [facility] 6 Click ADL: 18    Treatment & Education:  Patient alert and oriented x 3; able to follow 4/4 one step commands.  Patient attentive and interactive throughout the session.      Patient left supine with all lines intact, call button in reach and bed alarm onEducation:      GOALS:   Multidisciplinary Problems     Occupational Therapy Goals        Problem: Occupational Therapy Goal    Goal Priority Disciplines Outcome Interventions   Occupational Therapy Goal     OT, PT/OT Ongoing, Progressing    Description: Goals set 11/5 to be addressed for 14 days with expiration date, 11/19:  Patient will increase functional independence with ADLs by performing:    Patient will demonstrate rolling to the right with modified independence.  Not met   Patient will demonstrate rolling to the left with modified independence.   Not met  Patient will demonstrate supine -sit with modified independence.   Not met  Patient will demonstrate stand pivot transfers with modified independence.   Not met  Patient will demonstrate grooming while standing with modified independence.   Not met  Patient will demonstrate upper body dressing with modified independence while seated EOB.   Not met  Patient will demonstrate lower body dressing with modified independence while seated EOB.   Not met  Patient will demonstrate toileting with modified independence.   Not met  Patient will demonstrate bathing while seated EOB with modified independence.   Not met                           Time Tracking:     OT Date of Treatment: 11/06/20  OT Start Time: 0555  OT Stop Time: 0619  OT Total Time (min): 24 min    Billable Minutes:Self Care/Home Management 24    JIHAN Daugherty  11/6/2020

## 2020-11-06 NOTE — ASSESSMENT & PLAN NOTE
50 F with known L MILI aneurysm s/p elective L OZ craniotomy for clipping, POD#3    Continue NCC admit, will discuss possible TTF with staff  Q1h neurochecks  Post op imaging completed, reviewed  SBP<160  HV in place, continue abx while in place - possible removal today  Continue Li Okeefe for diet  PT/OT - HH  SQH, PPI

## 2020-11-06 NOTE — SUBJECTIVE & OBJECTIVE
Interval History: NAEON. C/o pain with chewing. HV clearing. PT/OT cleared for HH.     Medications:  Continuous Infusions:   sodium chloride 0.9% 75 mL/hr at 11/06/20 0501    nicardipine Stopped (11/05/20 1200)     Scheduled Meds:   ascorbic acid (vitamin C)  1,000 mg Oral Daily    atenoloL  25 mg Oral Daily    atorvastatin  40 mg Oral Daily    cyanocobalamin  250 mcg Oral Daily    fluticasone propionate  1 spray Each Nostril Daily    heparin (porcine)  5,000 Units Subcutaneous Q8H    hydroCHLOROthiazide  25 mg Oral Daily    levetiracetam IVPB  250 mg Intravenous Q12H    losartan  25 mg Oral Daily    multivitamin  1 tablet Oral Daily    mupirocin   Nasal BID    senna-docusate 8.6-50 mg  1 tablet Oral Daily    sodium chloride 0.9%  3 mL Intravenous Q8H     PRN Meds:acetaminophen, acetaminophen, dextrose 50%, dextrose 50%, glucose, glucose, insulin aspart U-100, labetaloL, magnesium oxide, magnesium oxide, ondansetron, oxyCODONE, potassium chloride, potassium chloride, potassium chloride, potassium, sodium phosphates, potassium, sodium phosphates, potassium, sodium phosphates     Review of Systems  Objective:     Weight: 87.9 kg (193 lb 12.6 oz)  Body mass index is 35.44 kg/m².  Vital Signs (Most Recent):  Temp: 99.6 °F (37.6 °C) (11/06/20 0301)  Pulse: 85 (11/06/20 0501)  Resp: (!) 23 (11/06/20 0501)  BP: (!) 144/67 (11/06/20 0501)  SpO2: 96 % (11/06/20 0501) Vital Signs (24h Range):  Temp:  [99.1 °F (37.3 °C)-100.5 °F (38.1 °C)] 99.6 °F (37.6 °C)  Pulse:  [] 85  Resp:  [16-29] 23  SpO2:  [93 %-98 %] 96 %  BP: (120-162)/(57-74) 144/67  Arterial Line BP: (105-165)/(58-93) 143/68                          Closed/Suction Drain 11/04/20 1409 Left Other (Comment) Accordion 10 Fr. (Active)   Site Description Unable to view 11/06/20 0301   Dressing Type Gauze 11/06/20 0301   Dressing Status Clean;Dry;Intact 11/06/20 0301   Dressing Intervention Integrity maintained 11/06/20 0301   Drainage Bloody  11/06/20 0301   Status Other (Comment) 11/06/20 0301   Output (mL) 20 mL 11/05/20 2301       Neurosurgery Physical Exam   Awake, alert  OX4  PERRL/EOMI  FCx4  AG in all extremities  Incision c/d/i  HV with serous output       Significant Labs:  Recent Labs   Lab 11/04/20  1534 11/05/20  0144 11/05/20  0903 11/05/20  1326 11/06/20  0232   *  231* 176*  --   --  131*     140 140  --   --  143   K 3.4*  3.4* 3.1* 3.2* 4.6 3.5     107 110  --   --  110   CO2 23  23 21*  --   --  24   BUN 12  12 11  --   --  8   CREATININE 0.7  0.7 0.6  --   --  0.6   CALCIUM 7.5*  7.5* 7.4*  --   --  8.1*   MG 2.2 2.1  --   --  2.2     Recent Labs   Lab 11/04/20  1534 11/05/20  0144 11/06/20  0232   WBC 18.35* 18.42* 19.20*   HGB 11.7* 11.3* 10.5*   HCT 37.1 34.8* 32.8*   * 319 308     Recent Labs   Lab 11/05/20  0144   INR 0.9         Significant Diagnostics:  No results found in the last 24 hours.

## 2020-11-06 NOTE — PROGRESS NOTES
[facility]  Neurosurgery  Progress Note    Subjective:     History of Present Illness: No notes on file    Post-Op Info:  Procedure(s) (LRB):  CRANIOTOMY, WITH ANEURYSM CLIPPING RIGHT MODIFIED ORBITOZYGOMATIC CRANIOTOMY (Left)   2 Days Post-Op     Interval History: NAEON. C/o pain with chewing. HV clearing. PT/OT cleared for HH.     Medications:  Continuous Infusions:   sodium chloride 0.9% 75 mL/hr at 11/06/20 0501    nicardipine Stopped (11/05/20 1200)     Scheduled Meds:   ascorbic acid (vitamin C)  1,000 mg Oral Daily    atenoloL  25 mg Oral Daily    atorvastatin  40 mg Oral Daily    cyanocobalamin  250 mcg Oral Daily    fluticasone propionate  1 spray Each Nostril Daily    heparin (porcine)  5,000 Units Subcutaneous Q8H    hydroCHLOROthiazide  25 mg Oral Daily    levetiracetam IVPB  250 mg Intravenous Q12H    losartan  25 mg Oral Daily    multivitamin  1 tablet Oral Daily    mupirocin   Nasal BID    senna-docusate 8.6-50 mg  1 tablet Oral Daily    sodium chloride 0.9%  3 mL Intravenous Q8H     PRN Meds:acetaminophen, acetaminophen, dextrose 50%, dextrose 50%, glucose, glucose, insulin aspart U-100, labetaloL, magnesium oxide, magnesium oxide, ondansetron, oxyCODONE, potassium chloride, potassium chloride, potassium chloride, potassium, sodium phosphates, potassium, sodium phosphates, potassium, sodium phosphates     Review of Systems  Objective:     Weight: 87.9 kg (193 lb 12.6 oz)  Body mass index is 35.44 kg/m².  Vital Signs (Most Recent):  Temp: 99.6 °F (37.6 °C) (11/06/20 0301)  Pulse: 85 (11/06/20 0501)  Resp: (!) 23 (11/06/20 0501)  BP: (!) 144/67 (11/06/20 0501)  SpO2: 96 % (11/06/20 0501) Vital Signs (24h Range):  Temp:  [99.1 °F (37.3 °C)-100.5 °F (38.1 °C)] 99.6 °F (37.6 °C)  Pulse:  [] 85  Resp:  [16-29] 23  SpO2:  [93 %-98 %] 96 %  BP: (120-162)/(57-74) 144/67  Arterial Line BP: (105-165)/(58-93) 143/68                          Closed/Suction Drain 11/04/20  1409 Left Other (Comment) Accordion 10 Fr. (Active)   Site Description Unable to view 11/06/20 0301   Dressing Type Gauze 11/06/20 0301   Dressing Status Clean;Dry;Intact 11/06/20 0301   Dressing Intervention Integrity maintained 11/06/20 0301   Drainage Bloody 11/06/20 0301   Status Other (Comment) 11/06/20 0301   Output (mL) 20 mL 11/05/20 2301       Neurosurgery Physical Exam   Awake, alert  OX4  PERRL/EOMI  FCx4  AG in all extremities  Incision c/d/i  HV with serous output       Significant Labs:  Recent Labs   Lab 11/04/20  1534 11/05/20  0144 11/05/20  0903 11/05/20  1326 11/06/20  0232   *  231* 176*  --   --  131*     140 140  --   --  143   K 3.4*  3.4* 3.1* 3.2* 4.6 3.5     107 110  --   --  110   CO2 23  23 21*  --   --  24   BUN 12  12 11  --   --  8   CREATININE 0.7  0.7 0.6  --   --  0.6   CALCIUM 7.5*  7.5* 7.4*  --   --  8.1*   MG 2.2 2.1  --   --  2.2     Recent Labs   Lab 11/04/20  1534 11/05/20  0144 11/06/20  0232   WBC 18.35* 18.42* 19.20*   HGB 11.7* 11.3* 10.5*   HCT 37.1 34.8* 32.8*   * 319 308     Recent Labs   Lab 11/05/20  0144   INR 0.9         Significant Diagnostics:  No results found in the last 24 hours.      Assessment/Plan:     Cerebral aneurysm without rupture, left anterior communicating 4.3  50 F with known L MILI aneurysm s/p elective L OZ craniotomy for clipping, POD#3    Continue NCC admit, will discuss possible TTF with staff  Q1h neurochecks  Post op imaging completed, reviewed  SBP<160  HV in place, continue abx while in place - possible removal today  Continue Keppra  Ok for diet  PT/OT - HH  SQH, PPI            Colleen Peoples MD  Neurosurgery  [facility]

## 2020-11-06 NOTE — NURSING
The patient arrived to the floor via wheelchair with family and Cirilo OMALLEY. The patient is able to transfer to the bed with standby assist. The patient is oriented to the room call light and cell phone present at the bedside the patient placed on fall precautions bed alarm active. Allergy to Latex verified with the patient. The patient is lethargic but easily arousable to voice/touch

## 2020-11-06 NOTE — ASSESSMENT & PLAN NOTE
-- Continue to monitor HR and BP   -- SBP goal < 160  -- 2D echo pending  -- scheduled atenolol and losartan  -- cardene gtt discontinued

## 2020-11-06 NOTE — PLAN OF CARE
[facility] Care Plan    POC reviewed with Kylah Beltránas and family at 0500. Pt verbalized understanding. Questions and concerns addressed. No acute events overnight. Pt given lopressor for SBP<160. Pt progressing toward goals. Will continue to monitor. See below and flowsheets for full assessment and VS info.       Neuro:  Helen Coma Scale  Best Eye Response: 4-->(E4) spontaneous  Best Motor Response: 6-->(M6) obeys commands  Best Verbal Response: 5-->(V5) oriented  [place] Coma Scale Score: 15  Assessment Qualifiers: no eye obstruction present, patient not sedated/intubated  Pupil PERRLA: yes     24hr Temp:  [99.1 °F (37.3 °C)-100.5 °F (38.1 °C)]     CV:   Rhythm: normal sinus rhythm  BP goals:   SBP < 160  MAP > 65    Resp:   O2 Device (Oxygen Therapy): room air       Plan: N/A    GI/:     Diet/Nutrition Received: consistent carb/diabetic diet  Last Bowel Movement: 11/03/20  Voiding Characteristics: external catheter    Intake/Output Summary (Last 24 hours) at 11/6/2020 0557  Last data filed at 11/6/2020 0501  Gross per 24 hour   Intake 1517.92 ml   Output 2335 ml   Net -817.08 ml     Unmeasured Output  Urine Occurrence: 1  Stool Occurrence: 0    Labs/Accuchecks:  Recent Labs   Lab 11/06/20  0232   WBC 19.20*   RBC 3.47*   HGB 10.5*   HCT 32.8*         Recent Labs   Lab 11/06/20  0232      K 3.5   CO2 24      BUN 8   CREATININE 0.6   ALKPHOS 58   ALT 12   AST 16   BILITOT 0.5      Recent Labs   Lab 11/05/20  0144   INR 0.9    No results for input(s): CPK, CPKMB, TROPONINI, MB in the last 168 hours.    Electrolytes: Electrolytes replaced  Accuchecks: ACHS    Gtts:   sodium chloride 0.9% 75 mL/hr at 11/06/20 0501    nicardipine Stopped (11/05/20 1200)       LDA/Wounds:  Lines/Drains/Airways       Drain                   Closed/Suction Drain 11/04/20 1409 Left Other (Comment) Accordion 10 Fr. 1 day              Arterial Line              Arterial Line 11/04/20 0817 Left Radial 1 day               Peripheral Intravenous Line                   Peripheral IV - Single Lumen 11/04/20 18 G Anterior;Distal;Right Forearm 2 days         Peripheral IV - Single Lumen 11/04/20 0804 18 G Left Forearm 1 day                  Wounds: Yes  Wound care consulted: Yes

## 2020-11-06 NOTE — SUBJECTIVE & OBJECTIVE
Interval History: echo with EF of 70%; keppra x 5 days remaining; dc cardene; stepdown to neurosurgery    Review of Systems   Constitutional: Negative for fever.   Eyes: Positive for pain and redness.   Respiratory: Negative for cough and shortness of breath.    Cardiovascular: Negative for chest pain and palpitations.   Gastrointestinal: Negative for abdominal pain and vomiting.   Neurological: Positive for headaches. Negative for facial asymmetry, speech difficulty and numbness.   Psychiatric/Behavioral: Positive for decreased concentration. Negative for confusion.      ROS      Objective:     Vitals:    Temp: 99.6 °F (37.6 °C)  Pulse: 85  Rhythm: normal sinus rhythm  BP: (!) 144/67  MAP (mmHg): 96  Resp: (!) 23  SpO2: 96 %  O2 Device (Oxygen Therapy): room air    Temp  Min: 99.3 °F (37.4 °C)  Max: 100.5 °F (38.1 °C)  Pulse  Min: 77  Max: 101  BP  Min: 120/57  Max: 162/74  MAP (mmHg)  Min: 82  Max: 107  Resp  Min: 16  Max: 26  SpO2  Min: 94 %  Max: 98 %    11/05 0701 - 11/06 0700  In: 1258.5 [I.V.:1058.5]  Out: 2310 [Urine:2200; Drains:110]   Unmeasured Output  Urine Occurrence: 1  Stool Occurrence: 0       Physical Exam  Eyes:      Comments: Left eye swelling       GA: Sedated, comfortable, no acute distress.   HEENT: No scleral icterus or JVD.   Pulmonary: Clear to auscultation A/P/L. No wheezing, crackles, or rhonchi.  Cardiac: RRR S1 & S2 w/o rubs/murmurs/gallops.   Abdominal: Bowel sounds present x 4. No appreciable hepatosplenomegaly.  Skin: No jaundice, rashes, or visible lesions.  Neuro:  --GCS: E2 V2 M4  --Mental Status: Drowsy, following commands, moves all 4 extremities spontaneously--CN II-XII grossly intact.   --Pupils 3mm, PERRL.   --Corneal reflex, gag, cough intact.  --LUE strength: 4/5  --LLE strength: 4/5  --RUE strength: 4/5  --RLE strength: 4/5     Today I personally reviewed pertinent medications, lines/drains/airways, imaging, laboratory results, notably:

## 2020-11-06 NOTE — PROGRESS NOTES
[facility]  Neurocritical Care  Progress Note    Admit Date: 11/4/2020  Service Date: 11/06/2020  Length of Stay: 2    Subjective:     Chief Complaint: Cerebral aneurysm    History of Present Illness: Ms Mohan is a 51 yo female with sig pmhx of HTN, Depression, DJD, HLD, Insomnia, and Pulmonary nodule who presents to [facility] for a higher level of care s/p Elective Right modified orbitozygomatic craniotomy with Acomm aneurysm clipping(Aneurysm clipping and crani). On 9/17/2020 IR Angiogram Carotid Cerebral Bilateral revealed 1. A saccular anterior communicating artery aneurysm measuring 4.7 mm X 2.48 mm X 5.27 mm with a neck of 3.27 mm was identified on the angiogram.        Hospital Course: 11/4/2020 Admit to [facility] following elective aneurysm repair, CTH pending  11/5/20: remains on cardene gtt; keppra decreased to 250 mg BID; restart hydrochlorathiazide; start low calorie diabetic diet due to increased glucose levels- POCT glucose q6h and SSI; oxy and tylenol for pain  11/6/20: echo with EF of 70%; keppra x 5 days remaining; dc cardene; stepdown to neurosurgery    Interval History: echo with EF of 70%; keppra x 5 days remaining; dc cardene; stepdown to neurosurgery    Review of Systems   Constitutional: Negative for fever.   Eyes: Positive for pain and redness.   Respiratory: Negative for cough and shortness of breath.    Cardiovascular: Negative for chest pain and palpitations.   Gastrointestinal: Negative for abdominal pain and vomiting.   Neurological: Positive for headaches. Negative for facial asymmetry, speech difficulty and numbness.   Psychiatric/Behavioral: Positive for decreased concentration. Negative for confusion.      ROS      Objective:     Vitals:    Temp: 99.6 °F (37.6 °C)  Pulse: 85  Rhythm: normal sinus rhythm  BP: (!) 144/67  MAP (mmHg): 96  Resp: (!) 23  SpO2: 96 %  O2 Device (Oxygen Therapy): room air    Temp  Min: 99.3 °F (37.4 °C)  Max: 100.5 °F (38.1 °C)  Pulse  Min: 77  Max:  101  BP  Min: 120/57  Max: 162/74  MAP (mmHg)  Min: 82  Max: 107  Resp  Min: 16  Max: 26  SpO2  Min: 94 %  Max: 98 %    11/05 0701 - 11/06 0700  In: 1258.5 [I.V.:1058.5]  Out: 2310 [Urine:2200; Drains:110]   Unmeasured Output  Urine Occurrence: 1  Stool Occurrence: 0       Physical Exam  Eyes:      Comments: Left eye swelling       GA: Sedated, comfortable, no acute distress.   HEENT: No scleral icterus or JVD.   Pulmonary: Clear to auscultation A/P/L. No wheezing, crackles, or rhonchi.  Cardiac: RRR S1 & S2 w/o rubs/murmurs/gallops.   Abdominal: Bowel sounds present x 4. No appreciable hepatosplenomegaly.  Skin: No jaundice, rashes, or visible lesions.  Neuro:  --GCS: E2 V2 M4  --Mental Status: Drowsy, following commands, moves all 4 extremities spontaneously--CN II-XII grossly intact.   --Pupils 3mm, PERRL.   --Corneal reflex, gag, cough intact.  --LUE strength: 4/5  --LLE strength: 4/5  --RUE strength: 4/5  --RLE strength: 4/5     Today I personally reviewed pertinent medications, lines/drains/airways, imaging, laboratory results, notably:        Assessment/Plan:     Neuro  * Cerebral aneurysm  s/p Elective Right modified orbitozygomatic craniotomy with Acomm aneurysm clipping  -- Continue Neuro checks q 1hr  -- Neurosurgery consulted  -- SBP goal <160  -- PT/OT/Speech  -- CXR pending  -- 2D echo showing 70 % EF  -- Dc diaz when patient alert  -- DC A-line  -- Subgaleal drain to accordion with bloody drainage  -- keppra to 250 mg BID x 5 more days    Cerebral aneurysm without rupture, left anterior communicating 4.3  -- See cerebral aneurysm     Cardiac/Vascular  HTN (hypertension)  -- Continue to monitor HR and BP   -- SBP goal < 160  -- 2D echo pending  -- scheduled atenolol and losartan  -- cardene gtt discontinued    Hyperlipidemia  -- atorvastatin 40 mg daily    Renal/  Hypokalemia  -- Initiated electrolyte replacements    Endocrine  Class 2 obesity in adult  -- consult to registered dietitian   --liquid  diet          The patient is being Prophylaxed for:  Venous Thromboembolism with: Mechanical or Chemical  Stress Ulcer with: None  Ventilator Pneumonia with: not applicable    Activity Orders          Diet diabetic [facility]; 2000 Calorie; Isolation Tray - Regular [place]; Thin: Diabetic starting at 11/05 0913        Full Code    Jonny Morocho MD  Neurocritical Care  [facility]

## 2020-11-06 NOTE — ASSESSMENT & PLAN NOTE
s/p Elective Right modified orbitozygomatic craniotomy with Acomm aneurysm clipping  -- Continue Neuro checks q 1hr  -- Neurosurgery consulted  -- SBP goal <160  -- PT/OT/Speech  -- CXR pending  -- 2D echo showing 70 % EF  -- Dc diaz when patient alert  -- DC A-line  -- Subgaleal drain to accordion with bloody drainage  -- keppra to 250 mg BID x 5 more days

## 2020-11-06 NOTE — PLAN OF CARE
Goals remain appropriate.  JIHAN Daugherty  11/6/2020    Problem: Occupational Therapy Goal  Goal: Occupational Therapy Goal  Description: Goals set 11/5 to be addressed for 14 days with expiration date, 11/19:  Patient will increase functional independence with ADLs by performing:    Patient will demonstrate rolling to the right with modified independence.  Not met   Patient will demonstrate rolling to the left with modified independence.   Not met  Patient will demonstrate supine -sit with modified independence.   Not met  Patient will demonstrate stand pivot transfers with modified independence.   Not met  Patient will demonstrate grooming while standing with modified independence.   Not met  Patient will demonstrate upper body dressing with modified independence while seated EOB.   Not met  Patient will demonstrate lower body dressing with modified independence while seated EOB.   Not met  Patient will demonstrate toileting with modified independence.   Not met  Patient will demonstrate bathing while seated EOB with modified independence.   Not met          Outcome: Ongoing, Progressing

## 2020-11-07 LAB
ALBUMIN SERPL BCP-MCNC: 2.7 G/DL (ref 3.5–5.2)
ALP SERPL-CCNC: 70 U/L (ref 55–135)
ALT SERPL W/O P-5'-P-CCNC: 14 U/L (ref 10–44)
ANION GAP SERPL CALC-SCNC: 13 MMOL/L (ref 8–16)
AST SERPL-CCNC: 18 U/L (ref 10–40)
BASOPHILS # BLD AUTO: 0.07 K/UL (ref 0–0.2)
BASOPHILS NFR BLD: 0.5 % (ref 0–1.9)
BILIRUB SERPL-MCNC: 0.7 MG/DL (ref 0.1–1)
BUN SERPL-MCNC: 12 MG/DL (ref 6–20)
CALCIUM SERPL-MCNC: 8.7 MG/DL (ref 8.7–10.5)
CHLORIDE SERPL-SCNC: 106 MMOL/L (ref 95–110)
CO2 SERPL-SCNC: 25 MMOL/L (ref 23–29)
CREAT SERPL-MCNC: 0.6 MG/DL (ref 0.5–1.4)
DIFFERENTIAL METHOD: ABNORMAL
EOSINOPHIL # BLD AUTO: 0 K/UL (ref 0–0.5)
EOSINOPHIL NFR BLD: 0.1 % (ref 0–8)
ERYTHROCYTE [DISTWIDTH] IN BLOOD BY AUTOMATED COUNT: 12.6 % (ref 11.5–14.5)
EST. GFR  (AFRICAN AMERICAN): >60 ML/MIN/1.73 M^2
EST. GFR  (NON AFRICAN AMERICAN): >60 ML/MIN/1.73 M^2
GLUCOSE SERPL-MCNC: 111 MG/DL (ref 70–110)
HCT VFR BLD AUTO: 33.7 % (ref 37–48.5)
HGB BLD-MCNC: 10.8 G/DL (ref 12–16)
IMM GRANULOCYTES # BLD AUTO: 0.06 K/UL (ref 0–0.04)
IMM GRANULOCYTES NFR BLD AUTO: 0.4 % (ref 0–0.5)
LYMPHOCYTES # BLD AUTO: 2.7 K/UL (ref 1–4.8)
LYMPHOCYTES NFR BLD: 19.4 % (ref 18–48)
MAGNESIUM SERPL-MCNC: 2 MG/DL (ref 1.6–2.6)
MCH RBC QN AUTO: 30.6 PG (ref 27–31)
MCHC RBC AUTO-ENTMCNC: 32 G/DL (ref 32–36)
MCV RBC AUTO: 96 FL (ref 82–98)
MONOCYTES # BLD AUTO: 1.5 K/UL (ref 0.3–1)
MONOCYTES NFR BLD: 10.6 % (ref 4–15)
NEUTROPHILS # BLD AUTO: 9.7 K/UL (ref 1.8–7.7)
NEUTROPHILS NFR BLD: 69 % (ref 38–73)
NRBC BLD-RTO: 0 /100 WBC
PHOSPHATE SERPL-MCNC: 2.7 MG/DL (ref 2.7–4.5)
PLATELET # BLD AUTO: 268 K/UL (ref 150–350)
PMV BLD AUTO: 10.4 FL (ref 9.2–12.9)
POCT GLUCOSE: 101 MG/DL (ref 70–110)
POCT GLUCOSE: 104 MG/DL (ref 70–110)
POCT GLUCOSE: 126 MG/DL (ref 70–110)
POTASSIUM SERPL-SCNC: 3.4 MMOL/L (ref 3.5–5.1)
PROT SERPL-MCNC: 6.8 G/DL (ref 6–8.4)
RBC # BLD AUTO: 3.53 M/UL (ref 4–5.4)
SODIUM SERPL-SCNC: 144 MMOL/L (ref 136–145)
WBC # BLD AUTO: 13.99 K/UL (ref 3.9–12.7)

## 2020-11-07 PROCEDURE — 36415 COLL VENOUS BLD VENIPUNCTURE: CPT

## 2020-11-07 PROCEDURE — 25000003 PHARM REV CODE 250: Performed by: NURSE PRACTITIONER

## 2020-11-07 PROCEDURE — 83735 ASSAY OF MAGNESIUM: CPT

## 2020-11-07 PROCEDURE — 25000003 PHARM REV CODE 250: Performed by: STUDENT IN AN ORGANIZED HEALTH CARE EDUCATION/TRAINING PROGRAM

## 2020-11-07 PROCEDURE — 85025 COMPLETE CBC W/AUTO DIFF WBC: CPT

## 2020-11-07 PROCEDURE — 63600175 PHARM REV CODE 636 W HCPCS: Performed by: STUDENT IN AN ORGANIZED HEALTH CARE EDUCATION/TRAINING PROGRAM

## 2020-11-07 PROCEDURE — 11000001 HC ACUTE MED/SURG PRIVATE ROOM

## 2020-11-07 PROCEDURE — 84100 ASSAY OF PHOSPHORUS: CPT

## 2020-11-07 PROCEDURE — 80053 COMPREHEN METABOLIC PANEL: CPT

## 2020-11-07 PROCEDURE — 25000003 PHARM REV CODE 250: Performed by: PSYCHIATRY & NEUROLOGY

## 2020-11-07 PROCEDURE — 87040 BLOOD CULTURE FOR BACTERIA: CPT

## 2020-11-07 PROCEDURE — A4216 STERILE WATER/SALINE, 10 ML: HCPCS | Performed by: NURSE PRACTITIONER

## 2020-11-07 RX ADMIN — LEVETIRACETAM 250 MG: 250 TABLET ORAL at 10:11

## 2020-11-07 RX ADMIN — MUPIROCIN: 20 OINTMENT TOPICAL at 08:11

## 2020-11-07 RX ADMIN — HEPARIN SODIUM 5000 UNITS: 5000 INJECTION INTRAVENOUS; SUBCUTANEOUS at 05:11

## 2020-11-07 RX ADMIN — Medication 3 ML: at 02:11

## 2020-11-07 RX ADMIN — HEPARIN SODIUM 5000 UNITS: 5000 INJECTION INTRAVENOUS; SUBCUTANEOUS at 08:11

## 2020-11-07 RX ADMIN — HEPARIN SODIUM 5000 UNITS: 5000 INJECTION INTRAVENOUS; SUBCUTANEOUS at 02:11

## 2020-11-07 RX ADMIN — Medication 1000 MG: at 10:11

## 2020-11-07 RX ADMIN — ACETAMINOPHEN 650 MG: 325 TABLET ORAL at 08:11

## 2020-11-07 RX ADMIN — OXYCODONE HYDROCHLORIDE 5 MG: 5 TABLET ORAL at 02:11

## 2020-11-07 RX ADMIN — MUPIROCIN: 20 OINTMENT TOPICAL at 10:11

## 2020-11-07 RX ADMIN — ACETAMINOPHEN 650 MG: 325 TABLET ORAL at 02:11

## 2020-11-07 RX ADMIN — ATORVASTATIN CALCIUM 40 MG: 20 TABLET, FILM COATED ORAL at 10:11

## 2020-11-07 RX ADMIN — Medication 3 ML: at 05:11

## 2020-11-07 RX ADMIN — FLUTICASONE PROPIONATE 50 MCG: 50 SPRAY, METERED NASAL at 10:11

## 2020-11-07 RX ADMIN — DOCUSATE SODIUM 50MG AND SENNOSIDES 8.6MG 1 TABLET: 8.6; 5 TABLET, FILM COATED ORAL at 10:11

## 2020-11-07 RX ADMIN — LOSARTAN POTASSIUM 25 MG: 25 TABLET, FILM COATED ORAL at 10:11

## 2020-11-07 RX ADMIN — CYANOCOBALAMIN TAB 250 MCG 250 MCG: 250 TAB at 10:11

## 2020-11-07 RX ADMIN — POTASSIUM CHLORIDE 40 MEQ: 1.5 POWDER, FOR SOLUTION ORAL at 02:11

## 2020-11-07 RX ADMIN — LEVETIRACETAM 250 MG: 250 TABLET ORAL at 08:11

## 2020-11-07 RX ADMIN — OXYCODONE HYDROCHLORIDE 5 MG: 5 TABLET ORAL at 10:11

## 2020-11-07 RX ADMIN — ACETAMINOPHEN 650 MG: 325 TABLET ORAL at 03:11

## 2020-11-07 RX ADMIN — THERA TABS 1 TABLET: TAB at 10:11

## 2020-11-07 RX ADMIN — HYDROCHLOROTHIAZIDE 25 MG: 12.5 TABLET ORAL at 10:11

## 2020-11-07 RX ADMIN — Medication 3 ML: at 08:11

## 2020-11-07 NOTE — PLAN OF CARE
Problem: Fall Injury Risk  Goal: Absence of Fall and Fall-Related Injury  Outcome: Ongoing, Progressing  Intervention: Promote Injury-Free Environment  Flowsheets (Taken 11/7/2020 0308)  Safety Promotion/Fall Prevention:   assistive device/personal item within reach   bed alarm set   lighting adjusted   medications reviewed   nonskid shoes/socks when out of bed   side rails raised x 2   upper side rails raised x 2, lower siderails raised x 1 (Peds only)   instructed to call staff for mobility  Environmental Safety Modification:   assistive device/personal items within reach   clutter free environment maintained   lighting adjusted     Problem: Adult Inpatient Plan of Care  Goal: Plan of Care Review  Outcome: Ongoing, Progressing  Flowsheets (Taken 11/7/2020 0308)  Plan of Care Reviewed With: patient    POC reviewed with patient. All questions and concerns reviewed. Fall/safety precautions implemented and maintained.  Blood glucose monitored. Patient febrile. PRN tylenol given with effect. UA collected to r/o UTI. Pain management provided. Drain care provided. Please see flowsheet for full assessment and vitals. Bed locked in lowest position. Call bell within reach. Will continue to monitor.

## 2020-11-07 NOTE — ASSESSMENT & PLAN NOTE
50 F with known L MILI aneurysm s/p elective L OZ craniotomy for clipping, POD#4    Doing well neurologically, febrile overnight, asymptomatic, infectious workup largely negative  Recommend IS use    Floor status  Q4h neurochecks  Post op imaging completed, reviewed  SBP<160  HV in place, continue abx while in place - possible removal today  Continue [place]  Ok for diet  PT/OT - HH  SQH, PPI

## 2020-11-07 NOTE — NURSING
0336: Patient febrile x2. Temp 102.2 F             PRN: tylenol given    0446: Nsx made aware of situation above.     0509: Temp: 99.4             Tylenol effective   Diagnostic wire inserted.

## 2020-11-07 NOTE — PT/OT/SLP PROGRESS
Physical Therapy  Continue Current Plan of Care     Patient Name:  Kylah Mohan   MRN:  [id number]  Admitting Diagnosis:  Cerebral aneurysm   Recent Surgery: Procedure(s) (LRB):  CRANIOTOMY, WITH ANEURYSM CLIPPING RIGHT MODIFIED ORBITOZYGOMATIC CRANIOTOMY (Left) 2 Days Post-Op  Admit Date: 11/4/2020  Length of Stay: 2 days    Patient not seen on this date, however per chart review pt continues to benefit from acute PT services. PT to follow up and POC allows. Please continue progressive mobility as appropriate.    Discharge Disposition Recommendation: PRUDENCE Sandra PTA  11/6/2020  Pager: [phone]

## 2020-11-07 NOTE — SUBJECTIVE & OBJECTIVE
Interval History: Tmax 102.2 overnight, resolved this morning    Medications:  Continuous Infusions:  Scheduled Meds:   ascorbic acid (vitamin C)  1,000 mg Oral Daily    atenoloL  25 mg Oral Daily    atorvastatin  40 mg Oral Daily    cyanocobalamin  250 mcg Oral Daily    fluticasone propionate  1 spray Each Nostril Daily    heparin (porcine)  5,000 Units Subcutaneous Q8H    hydroCHLOROthiazide  25 mg Oral Daily    levETIRAcetam  250 mg Oral BID    losartan  25 mg Oral Daily    multivitamin  1 tablet Oral Daily    mupirocin   Nasal BID    senna-docusate 8.6-50 mg  1 tablet Oral Daily    sodium chloride 0.9%  3 mL Intravenous Q8H     PRN Meds:acetaminophen, acetaminophen, dextrose 50%, dextrose 50%, glucose, glucose, insulin aspart U-100, labetaloL, magnesium oxide, magnesium oxide, ondansetron, oxyCODONE, potassium chloride, potassium chloride, potassium chloride, potassium, sodium phosphates, potassium, sodium phosphates, potassium, sodium phosphates     Review of Systems  Objective:     Weight: 87.9 kg (193 lb 12.6 oz)  Body mass index is 35.44 kg/m².  Vital Signs (Most Recent):  Temp: 99.9 °F (37.7 °C) (11/07/20 0714)  Pulse: 83 (11/07/20 1033)  Resp: 17 (11/07/20 1030)  BP: 130/74 (11/07/20 0714)  SpO2: 96 % (11/07/20 0714) Vital Signs (24h Range):  Temp:  [99 °F (37.2 °C)-102.6 °F (39.2 °C)] 99.9 °F (37.7 °C)  Pulse:  [73-87] 83  Resp:  [17-39] 17  SpO2:  [94 %-99 %] 96 %  BP: (130-169)/(61-74) 130/74                          Closed/Suction Drain 11/04/20 1409 Left Other (Comment) Accordion 10 Fr. (Active)   Site Description Healing 11/06/20 2000   Dressing Type Gauze 11/06/20 0301   Dressing Status Clean;Dry;Intact 11/06/20 0301   Dressing Intervention Other (Comment) 11/06/20 1500   Drainage Bloody 11/06/20 1500   Status Other (Comment) 11/06/20 1500   Output (mL) 17 mL 11/07/20 0500       Physical Exam:    Constitutional: No distress.     Eyes: Pupils are equal, round, and reactive to light.  Conjunctivae and EOM are normal.     Cardiovascular: Normal rate and regular rhythm.     Abdominal: Soft.     Psych/Behavior: She is alert. She has a normal mood and affect.      Neuro:   AOx3  PERRL/EOMI  Strength 5/5  SILT  No drift        Significant Labs:  Recent Labs   Lab 11/05/20  1326 11/06/20  0232 11/07/20  0509   GLU  --  131* 111*   NA  --  143 144   K 4.6 3.5 3.4*   CL  --  110 106   CO2  --  24 25   BUN  --  8 12   CREATININE  --  0.6 0.6   CALCIUM  --  8.1* 8.7   MG  --  2.2 2.0     Recent Labs   Lab 11/06/20  0232 11/07/20  0509   WBC 19.20* 13.99*   HGB 10.5* 10.8*   HCT 32.8* 33.7*    268     No results for input(s): LABPT, INR, APTT in the last 48 hours.  Microbiology Results (last 7 days)     ** No results found for the last 168 hours. **        All pertinent labs from the last 24 hours have been reviewed.    Significant Diagnostics:  I have reviewed and interpreted all pertinent imaging results/findings within the past 24 hours.

## 2020-11-08 LAB
ALBUMIN SERPL BCP-MCNC: 2.7 G/DL (ref 3.5–5.2)
ALP SERPL-CCNC: 75 U/L (ref 55–135)
ALT SERPL W/O P-5'-P-CCNC: 25 U/L (ref 10–44)
ANION GAP SERPL CALC-SCNC: 10 MMOL/L (ref 8–16)
AST SERPL-CCNC: 26 U/L (ref 10–40)
BASOPHILS # BLD AUTO: 0.04 K/UL (ref 0–0.2)
BASOPHILS NFR BLD: 0.3 % (ref 0–1.9)
BILIRUB SERPL-MCNC: 0.6 MG/DL (ref 0.1–1)
BLD PROD TYP BPU: NORMAL
BLD PROD TYP BPU: NORMAL
BLOOD UNIT EXPIRATION DATE: NORMAL
BLOOD UNIT EXPIRATION DATE: NORMAL
BLOOD UNIT TYPE CODE: 5100
BLOOD UNIT TYPE CODE: 5100
BLOOD UNIT TYPE: NORMAL
BLOOD UNIT TYPE: NORMAL
BUN SERPL-MCNC: 12 MG/DL (ref 6–20)
CALCIUM SERPL-MCNC: 8.8 MG/DL (ref 8.7–10.5)
CHLORIDE SERPL-SCNC: 103 MMOL/L (ref 95–110)
CO2 SERPL-SCNC: 27 MMOL/L (ref 23–29)
CODING SYSTEM: NORMAL
CODING SYSTEM: NORMAL
CREAT SERPL-MCNC: 0.7 MG/DL (ref 0.5–1.4)
DIFFERENTIAL METHOD: ABNORMAL
DISPENSE STATUS: NORMAL
DISPENSE STATUS: NORMAL
EOSINOPHIL # BLD AUTO: 0 K/UL (ref 0–0.5)
EOSINOPHIL NFR BLD: 0.3 % (ref 0–8)
ERYTHROCYTE [DISTWIDTH] IN BLOOD BY AUTOMATED COUNT: 12.2 % (ref 11.5–14.5)
EST. GFR  (AFRICAN AMERICAN): >60 ML/MIN/1.73 M^2
EST. GFR  (NON AFRICAN AMERICAN): >60 ML/MIN/1.73 M^2
GLUCOSE SERPL-MCNC: 116 MG/DL (ref 70–110)
HCT VFR BLD AUTO: 34.6 % (ref 37–48.5)
HGB BLD-MCNC: 11.2 G/DL (ref 12–16)
IMM GRANULOCYTES # BLD AUTO: 0.03 K/UL (ref 0–0.04)
IMM GRANULOCYTES NFR BLD AUTO: 0.3 % (ref 0–0.5)
INFLUENZA A, MOLECULAR: NEGATIVE
INFLUENZA B, MOLECULAR: NEGATIVE
LYMPHOCYTES # BLD AUTO: 2 K/UL (ref 1–4.8)
LYMPHOCYTES NFR BLD: 17.7 % (ref 18–48)
MAGNESIUM SERPL-MCNC: 1.9 MG/DL (ref 1.6–2.6)
MCH RBC QN AUTO: 30.2 PG (ref 27–31)
MCHC RBC AUTO-ENTMCNC: 32.4 G/DL (ref 32–36)
MCV RBC AUTO: 93 FL (ref 82–98)
MONOCYTES # BLD AUTO: 1.2 K/UL (ref 0.3–1)
MONOCYTES NFR BLD: 10.7 % (ref 4–15)
NEUTROPHILS # BLD AUTO: 8.2 K/UL (ref 1.8–7.7)
NEUTROPHILS NFR BLD: 70.7 % (ref 38–73)
NRBC BLD-RTO: 0 /100 WBC
NUM UNITS TRANS PACKED RBC: NORMAL
NUM UNITS TRANS PACKED RBC: NORMAL
PHOSPHATE SERPL-MCNC: 3.2 MG/DL (ref 2.7–4.5)
PLATELET # BLD AUTO: 285 K/UL (ref 150–350)
PMV BLD AUTO: 10.4 FL (ref 9.2–12.9)
POCT GLUCOSE: 123 MG/DL (ref 70–110)
POCT GLUCOSE: 132 MG/DL (ref 70–110)
POTASSIUM SERPL-SCNC: 3.2 MMOL/L (ref 3.5–5.1)
PROT SERPL-MCNC: 7 G/DL (ref 6–8.4)
RBC # BLD AUTO: 3.71 M/UL (ref 4–5.4)
SARS-COV-2 RNA RESP QL NAA+PROBE: NOT DETECTED
SODIUM SERPL-SCNC: 140 MMOL/L (ref 136–145)
SPECIMEN SOURCE: NORMAL
WBC # BLD AUTO: 11.55 K/UL (ref 3.9–12.7)

## 2020-11-08 PROCEDURE — 94761 N-INVAS EAR/PLS OXIMETRY MLT: CPT

## 2020-11-08 PROCEDURE — 25000003 PHARM REV CODE 250: Performed by: NURSE PRACTITIONER

## 2020-11-08 PROCEDURE — 80053 COMPREHEN METABOLIC PANEL: CPT

## 2020-11-08 PROCEDURE — 25000003 PHARM REV CODE 250: Performed by: NEUROLOGICAL SURGERY

## 2020-11-08 PROCEDURE — 94640 AIRWAY INHALATION TREATMENT: CPT

## 2020-11-08 PROCEDURE — 25000003 PHARM REV CODE 250: Performed by: PSYCHIATRY & NEUROLOGY

## 2020-11-08 PROCEDURE — 11000001 HC ACUTE MED/SURG PRIVATE ROOM

## 2020-11-08 PROCEDURE — 63600175 PHARM REV CODE 636 W HCPCS: Performed by: STUDENT IN AN ORGANIZED HEALTH CARE EDUCATION/TRAINING PROGRAM

## 2020-11-08 PROCEDURE — 25000003 PHARM REV CODE 250: Performed by: STUDENT IN AN ORGANIZED HEALTH CARE EDUCATION/TRAINING PROGRAM

## 2020-11-08 PROCEDURE — 87502 INFLUENZA DNA AMP PROBE: CPT

## 2020-11-08 PROCEDURE — 83735 ASSAY OF MAGNESIUM: CPT

## 2020-11-08 PROCEDURE — 36415 COLL VENOUS BLD VENIPUNCTURE: CPT

## 2020-11-08 PROCEDURE — A4216 STERILE WATER/SALINE, 10 ML: HCPCS | Performed by: NURSE PRACTITIONER

## 2020-11-08 PROCEDURE — 63600175 PHARM REV CODE 636 W HCPCS: Performed by: NEUROLOGICAL SURGERY

## 2020-11-08 PROCEDURE — 84100 ASSAY OF PHOSPHORUS: CPT

## 2020-11-08 PROCEDURE — U0003 INFECTIOUS AGENT DETECTION BY NUCLEIC ACID (DNA OR RNA); SEVERE ACUTE RESPIRATORY SYNDROME CORONAVIRUS 2 (SARS-COV-2) (CORONAVIRUS DISEASE [COVID-19]), AMPLIFIED PROBE TECHNIQUE, MAKING USE OF HIGH THROUGHPUT TECHNOLOGIES AS DESCRIBED BY CMS-2020-01-R: HCPCS

## 2020-11-08 PROCEDURE — 25000242 PHARM REV CODE 250 ALT 637 W/ HCPCS: Performed by: STUDENT IN AN ORGANIZED HEALTH CARE EDUCATION/TRAINING PROGRAM

## 2020-11-08 PROCEDURE — 85025 COMPLETE CBC W/AUTO DIFF WBC: CPT

## 2020-11-08 RX ORDER — CEFEPIME HYDROCHLORIDE 1 G/1
1 INJECTION, POWDER, FOR SOLUTION INTRAMUSCULAR; INTRAVENOUS
Status: DISCONTINUED | OUTPATIENT
Start: 2020-11-08 | End: 2020-11-10 | Stop reason: HOSPADM

## 2020-11-08 RX ORDER — IPRATROPIUM BROMIDE AND ALBUTEROL SULFATE 2.5; .5 MG/3ML; MG/3ML
3 SOLUTION RESPIRATORY (INHALATION) EVERY 6 HOURS
Status: DISCONTINUED | OUTPATIENT
Start: 2020-11-08 | End: 2020-11-10 | Stop reason: HOSPADM

## 2020-11-08 RX ADMIN — ATORVASTATIN CALCIUM 40 MG: 20 TABLET, FILM COATED ORAL at 10:11

## 2020-11-08 RX ADMIN — Medication 3 ML: at 02:11

## 2020-11-08 RX ADMIN — Medication 3 ML: at 10:11

## 2020-11-08 RX ADMIN — HEPARIN SODIUM 5000 UNITS: 5000 INJECTION INTRAVENOUS; SUBCUTANEOUS at 02:11

## 2020-11-08 RX ADMIN — MUPIROCIN: 20 OINTMENT TOPICAL at 10:11

## 2020-11-08 RX ADMIN — CEFEPIME 1 G: 1 INJECTION, POWDER, FOR SOLUTION INTRAMUSCULAR; INTRAVENOUS at 05:11

## 2020-11-08 RX ADMIN — VANCOMYCIN HYDROCHLORIDE 2250 MG: 1.25 INJECTION, POWDER, LYOPHILIZED, FOR SOLUTION INTRAVENOUS at 06:11

## 2020-11-08 RX ADMIN — LEVETIRACETAM 250 MG: 250 TABLET ORAL at 10:11

## 2020-11-08 RX ADMIN — ACETAMINOPHEN 650 MG: 325 TABLET ORAL at 07:11

## 2020-11-08 RX ADMIN — Medication 1000 MG: at 10:11

## 2020-11-08 RX ADMIN — FLUTICASONE PROPIONATE 50 MCG: 50 SPRAY, METERED NASAL at 10:11

## 2020-11-08 RX ADMIN — CEFEPIME 1 G: 1 INJECTION, POWDER, FOR SOLUTION INTRAMUSCULAR; INTRAVENOUS at 10:11

## 2020-11-08 RX ADMIN — THERA TABS 1 TABLET: TAB at 10:11

## 2020-11-08 RX ADMIN — CYANOCOBALAMIN TAB 250 MCG 250 MCG: 250 TAB at 10:11

## 2020-11-08 RX ADMIN — OXYCODONE HYDROCHLORIDE 5 MG: 5 TABLET ORAL at 10:11

## 2020-11-08 RX ADMIN — POTASSIUM CHLORIDE 40 MEQ: 1.5 POWDER, FOR SOLUTION ORAL at 10:11

## 2020-11-08 RX ADMIN — ACETAMINOPHEN 650 MG: 325 TABLET ORAL at 03:11

## 2020-11-08 RX ADMIN — LOSARTAN POTASSIUM 25 MG: 25 TABLET, FILM COATED ORAL at 10:11

## 2020-11-08 RX ADMIN — IPRATROPIUM BROMIDE AND ALBUTEROL SULFATE 3 ML: .5; 2.5 SOLUTION RESPIRATORY (INHALATION) at 07:11

## 2020-11-08 RX ADMIN — ACETAMINOPHEN 650 MG: 325 TABLET ORAL at 10:11

## 2020-11-08 RX ADMIN — HEPARIN SODIUM 5000 UNITS: 5000 INJECTION INTRAVENOUS; SUBCUTANEOUS at 05:11

## 2020-11-08 RX ADMIN — VANCOMYCIN HYDROCHLORIDE 1500 MG: 1.5 INJECTION, POWDER, LYOPHILIZED, FOR SOLUTION INTRAVENOUS at 10:11

## 2020-11-08 RX ADMIN — ATENOLOL 25 MG: 25 TABLET ORAL at 10:11

## 2020-11-08 RX ADMIN — Medication 3 ML: at 05:11

## 2020-11-08 RX ADMIN — HEPARIN SODIUM 5000 UNITS: 5000 INJECTION INTRAVENOUS; SUBCUTANEOUS at 10:11

## 2020-11-08 NOTE — CARE UPDATE
"RAPID RESPONSE NURSE PROACTIVE ROUNDING NOTE     Time of Visit: 0400    Admit Date: 2020  LOS: 4  Code Status: Full Code   Date of Visit: 2020  : 1970  Age: 50 y.o.  Sex: female  Race: Black or   Bed: [address] [place] [address] A:   MRN: [id number]  Was the patient discharged from an ICU this admission? yes   Was the patient discharged from a PACU within last 24 hours?  no  Did the patient receive conscious sedation/general anesthesia in last 24 hours?  no  Was the patient in the ED within the past 24 hours?  no  Was the patient started on NIPPV within the past 24 hours?  no  Attending Physician: Christi Page MD  Primary Service: Networked reference to record PCT     ASSESSMENT     Notified by Epic patient alert.  Reason for alert: temp 103.5    Diagnosis: Cerebral aneurysm    Abnormal Vital Signs: BP (!) 116/56 (BP Location: Left arm, Patient Position: Lying)   Pulse 97   Temp (!) 103.5 °F (39.7 °C) (Oral)   Resp 18   Ht 5' 2" (1.575 m)   Wt 87.9 kg (193 lb 12.6 oz)   SpO2 (!) 93%   Breastfeeding No   BMI 35.44 kg/m²      Clinical Issues: Circulatory    Patient  has a past medical history of Benign hypertension, Bladder instability, Depression, DJD (degenerative joint disease), lumbar, History of viral meningitis, Hyperlipidemia, Insomnia, Microscopic hematuria, Pulmonary nodule, and Tendonitis.      Upon arrival, pt is laying in bed in no distress with cold towel on forehead. Pt is oriented x4 and is not complaining of any pain. Pt states she does not feel like she has fever at this time. PRN tylenol was just given at 0345 for temp of 103.5. Vidya RN at bedside preparing to get COVID and Flu swab. Vidya RN to recheck temp in 30 minutes. MD Tameka with NSGY contacted. MD Tameka to order Vancomycin and Zosyn. Will continue to closely monitor.      INTERVENTIONS/ RECOMMENDATIONS     Place ice packs under armpits and groin. Continue to give PRN tylenol Q6. . Frequent vital signs. " Consider broad spectrum antibiotics.       Discussed plan of care with RNVidya.    PHYSICIAN ESCALATION     Yes/No  yes    Orders received and case discussed with Dr. English with NSGY.    Disposition: Remain in room 903.    FOLLOW-UP     Call back the Rapid Response Nurse, Kendy Land RN at [phone] for additional questions or concerns.

## 2020-11-08 NOTE — PROGRESS NOTES
[facility]  Neurosurgery  Progress Note    Subjective:     History of Present Illness: 50-year-old female who had previously presented with complaints of new onset headache which woke her from sleep the middle night.  She noted these happened last Monday 09/07/2020.  She had some associated nausea, no vomiting but severe neck pain, particularly with right-to-left movement in most pronounced with trying to bend her neck anteriorly.  She notes having some visual complaints primarily on the left visual field.  Which have since resolved.  She sought treatment approximately 4 days after the onset of her headache.  She underwent a CT and MRI scan of the head, as well as the image guided lumbar puncture.  Lumbar puncture did not show any evidence of xanthochromia, no evidence of hemorrhage on MRI.  MRA of the head however revealed what appears to be a 4.3 mm anterior communicating artery aneurysm.  Patient notes that her headache has improved somewhat.  Her neck pain is also improved, she does note that she was feeling off and is returned to somewhat over baseline.  She does note she has a history of headaches and this headache was on like any of her previous headaches.  She does note the pain as the worst headache of her life and 10/10 in severity.  She also noted an episode of right hand tingling, and numbness as well as weakness in the right hand which was transient.  She also denies those symptoms ever happening previously.  Patient has a 15 year pack history of smoking, where she stopped smoking approximately 10 years ago.    Post-Op Info:  Procedure(s) (LRB):  CRANIOTOMY, WITH ANEURYSM CLIPPING RIGHT MODIFIED ORBITOZYGOMATIC CRANIOTOMY (Left)   4 Days Post-Op     Interval History: Tmax 102.2 overnight, resolved this morning    Medications:  Continuous Infusions:  Scheduled Meds:   ascorbic acid (vitamin C)  1,000 mg Oral Daily    atenoloL  25 mg Oral Daily    atorvastatin  40 mg Oral Daily     cyanocobalamin  250 mcg Oral Daily    fluticasone propionate  1 spray Each Nostril Daily    heparin (porcine)  5,000 Units Subcutaneous Q8H    hydroCHLOROthiazide  25 mg Oral Daily    levETIRAcetam  250 mg Oral BID    losartan  25 mg Oral Daily    multivitamin  1 tablet Oral Daily    mupirocin   Nasal BID    senna-docusate 8.6-50 mg  1 tablet Oral Daily    sodium chloride 0.9%  3 mL Intravenous Q8H     PRN Meds:acetaminophen, acetaminophen, dextrose 50%, dextrose 50%, glucose, glucose, insulin aspart U-100, labetaloL, magnesium oxide, magnesium oxide, ondansetron, oxyCODONE, potassium chloride, potassium chloride, potassium chloride, potassium, sodium phosphates, potassium, sodium phosphates, potassium, sodium phosphates     Review of Systems  Objective:     Weight: 87.9 kg (193 lb 12.6 oz)  Body mass index is 35.44 kg/m².  Vital Signs (Most Recent):  Temp: 99.9 °F (37.7 °C) (11/07/20 0714)  Pulse: 83 (11/07/20 1033)  Resp: 17 (11/07/20 1030)  BP: 130/74 (11/07/20 0714)  SpO2: 96 % (11/07/20 0714) Vital Signs (24h Range):  Temp:  [99 °F (37.2 °C)-102.6 °F (39.2 °C)] 99.9 °F (37.7 °C)  Pulse:  [73-87] 83  Resp:  [17-39] 17  SpO2:  [94 %-99 %] 96 %  BP: (130-169)/(61-74) 130/74                          Closed/Suction Drain 11/04/20 1409 Left Other (Comment) Accordion 10 Fr. (Active)   Site Description Healing 11/06/20 2000   Dressing Type Gauze 11/06/20 0301   Dressing Status Clean;Dry;Intact 11/06/20 0301   Dressing Intervention Other (Comment) 11/06/20 1500   Drainage Bloody 11/06/20 1500   Status Other (Comment) 11/06/20 1500   Output (mL) 17 mL 11/07/20 0500       Physical Exam:    Constitutional: No distress.     Eyes: Pupils are equal, round, and reactive to light. Conjunctivae and EOM are normal.     Cardiovascular: Normal rate and regular rhythm.     Abdominal: Soft.     Psych/Behavior: She is alert. She has a normal mood and affect.      Neuro:   AOx3  PERRL/EOMI  Strength 5/5  SILT  No  drift        Significant Labs:  Recent Labs   Lab 11/05/20  1326 11/06/20  0232 11/07/20  0509   GLU  --  131* 111*   NA  --  143 144   K 4.6 3.5 3.4*   CL  --  110 106   CO2  --  24 25   BUN  --  8 12   CREATININE  --  0.6 0.6   CALCIUM  --  8.1* 8.7   MG  --  2.2 2.0     Recent Labs   Lab 11/06/20  0232 11/07/20  0509   WBC 19.20* 13.99*   HGB 10.5* 10.8*   HCT 32.8* 33.7*    268     No results for input(s): LABPT, INR, APTT in the last 48 hours.  Microbiology Results (last 7 days)     ** No results found for the last 168 hours. **        All pertinent labs from the last 24 hours have been reviewed.    Significant Diagnostics:  I have reviewed and interpreted all pertinent imaging results/findings within the past 24 hours.    Assessment/Plan:     Cerebral aneurysm without rupture, left anterior communicating 4.3  50 F with known L MILI aneurysm s/p elective L OZ craniotomy for clipping, POD#4    Doing well neurologically, febrile overnight, asymptomatic, infectious workup largely negative  Recommend IS use    Floor status  Q4h neurochecks  Post op imaging completed, reviewed  SBP<160  HV in place, continue abx while in place - possible removal today  Continue Keppra  Ok for diet  PT/OT -   SQH, PPI            Blanco Pan MD  Neurosurgery  [facility]

## 2020-11-08 NOTE — NURSING
2032: Patient temp 101.1 F and PRN tylenol given with no effect.    0020: Spoke to NSX at this time concerning this patient's temp. 101.3 F              Interventions: US of BLE to r/o DVTs                         UA                          Chest Xray                          I/S                         Blood Culture           Will continue to monitor this patient closely.

## 2020-11-08 NOTE — PLAN OF CARE
Problem: Adult Inpatient Plan of Care  Goal: Plan of Care Review  Outcome: Ongoing, Progressing  Flowsheets (Taken 11/8/2020 0259)  Plan of Care Reviewed With: patient     Problem: Fall Injury Risk  Goal: Absence of Fall and Fall-Related Injury  Outcome: Ongoing, Progressing  Intervention: Promote Injury-Free Environment  Flowsheets (Taken 11/8/2020 0259)  Safety Promotion/Fall Prevention:   assistive device/personal item within reach   bed alarm set   lighting adjusted   medications reviewed   nonskid shoes/socks when out of bed   side rails raised x 2   upper side rails raised x 2, lower siderails raised x 1 (Peds only)   instructed to call staff for mobility  Environmental Safety Modification:   assistive device/personal items within reach   clutter free environment maintained   lighting adjusted     Problem: Sensorimotor Impairment (Stroke, Ischemic/Transient Ischemic Attack)  Goal: Improved Sensorimotor Function  Outcome: Ongoing, Progressing  Intervention: Optimize Range of Motion, Motor Control and Function  Flowsheets (Taken 11/8/2020 0259)  Positioning: Shoulder: safety cues provided  Positioning/Transfer Devices:   pillows   in use  Range of Motion:   active ROM (range of motion) encouraged   ROM (range of motion) performed  Intervention: Optimize Sensory and Perceptual Abilities  Flowsheets (Taken 11/8/2020 0259)  Pressure Reduction Techniques: frequent weight shift encouraged  Sensation Impairment Protection: cues provided for safety  Pressure Reduction Devices: foam padding utilized    POC reviewed with patient. All questions and concerns reviewed. Fall/safety precautions implemented and maintained. Blood glucose monitored. Please see flowsheet for full assessment and vitals. Bed locked in lowest position. Call bell within reach. Will continue to monitor.

## 2020-11-08 NOTE — NURSING
Notified Neuro sx that US was not completed far during shift    Pt temp 102.2; and pain 7/7 after PRN pain medication admin; no relief obtained     PO Tylenol given for temp & team  ordered to give an additional dose of the oxy 5mg until new pain meds ordered

## 2020-11-08 NOTE — NURSING
0335: Patient temp 103.5 F- Notified NSX.             New order: Covid test and flu swab    0345: PRN tylenol given. Ice packs applied to axillary and groin.    0500: temp 100.6 F  0514: Cefepime given  0528: temp 99.8 F  0602: Vanc started-loading dose  0613: temp 98.0 F    Will continue to monitor closely.

## 2020-11-08 NOTE — PROGRESS NOTES
Pharmacokinetic Initial Assessment: IV Vancomycin    Assessment/Plan:    Initiate intravenous vancomycin with loading dose of 2250 mg once followed by a maintenance dose of vancomycin 1500mg IV every 12 hours  Desired empiric serum trough concentration is 10 to 20 mcg/mL  Draw vancomycin trough level 60 min prior to fourth dose on 11/9 at approximately 1700  Pharmacy will continue to follow and monitor vancomycin.      Please contact pharmacy at extension [phone] with any questions regarding this assessment.     Thank you for the consult,   Rena Rosales       Patient brief summary:  Kylah Mohan is a 50 y.o. female initiated on antimicrobial therapy with IV Vancomycin for treatment of suspected possible cns infection     Drug Allergies:   Review of patient's allergies indicates:   Allergen Reactions    Latex      Other reaction(s): Rash       Actual Body Weight:   87.9kg    Renal Function:   Estimated Creatinine Clearance: 116.7 mL/min (based on SCr of 0.6 mg/dL).,     Dialysis Method (if applicable):  N/A    CBC (last 72 hours):  Recent Labs   Lab Result Units 11/06/20  0232 11/07/20  0509   WBC K/uL 19.20* 13.99*   Hemoglobin g/dL 10.5* 10.8*   Hematocrit % 32.8* 33.7*   Platelets K/uL 308 268   Gran % % 75.4* 69.0   Lymph % % 12.7* 19.4   Mono % % 11.0 10.6   Eosinophil % % 0.0 0.1   Basophil % % 0.3 0.5   Differential Method  Automated Automated       Metabolic Panel (last 72 hours):  Recent Labs   Lab Result Units 11/05/20  0903 11/05/20  1326 11/06/20  0232 11/06/20  2214 11/07/20  0509   Sodium mmol/L  --   --  143  --  144   Potassium mmol/L 3.2* 4.6 3.5  --  3.4*   Chloride mmol/L  --   --  110  --  106   CO2 mmol/L  --   --  24  --  25   Glucose mg/dL  --   --  131*  --  111*   Glucose, UA   --   --   --  Negative  --    BUN mg/dL  --   --  8  --  12   Creatinine mg/dL  --   --  0.6  --  0.6   Albumin g/dL  --   --  2.8*  --  2.7*   Total Bilirubin mg/dL  --   --  0.5  --  0.7   Alkaline  Phosphatase U/L  --   --  58  --  70   AST U/L  --   --  16  --  18   ALT U/L  --   --  12  --  14   Magnesium mg/dL  --   --  2.2  --  2.0   Phosphorus mg/dL  --   --  1.3*  --  2.7       Drug levels (last 3 results):  No results for input(s): VANCOMYCINRA, VANCOMYCINPE, VANCOMYCINTR in the last 72 hours.    Microbiologic Results:  Microbiology Results (last 7 days)     Procedure Component Value Units Date/Time    Influenza A & B by Molecular [[id number]] Collected: 11/08/20 0440    Order Status: Sent Specimen: Nasopharyngeal Swab Updated: 11/08/20 0447    Blood culture [[id number]] Collected: 11/07/20 2039    Order Status: Sent Specimen: Blood Updated: 11/07/20 2047

## 2020-11-08 NOTE — HPI
50-year-old female who had previously presented with complaints of new onset headache which woke her from sleep the middle night.  She noted these happened last Monday 09/07/2020.  She had some associated nausea, no vomiting but severe neck pain, particularly with right-to-left movement in most pronounced with trying to bend her neck anteriorly.  She notes having some visual complaints primarily on the left visual field.  Which have since resolved.  She sought treatment approximately 4 days after the onset of her headache.  She underwent a CT and MRI scan of the head, as well as the image guided lumbar puncture.  Lumbar puncture did not show any evidence of xanthochromia, no evidence of hemorrhage on MRI.  MRA of the head however revealed what appears to be a 4.3 mm anterior communicating artery aneurysm.  Patient notes that her headache has improved somewhat.  Her neck pain is also improved, she does note that she was feeling off and is returned to somewhat over baseline.  She does note she has a history of headaches and this headache was on like any of her previous headaches.  She does note the pain as the worst headache of her life and 10/10 in severity.  She also noted an episode of right hand tingling, and numbness as well as weakness in the right hand which was transient.  She also denies those symptoms ever happening previously.  Patient has a 15 year pack history of smoking, where she stopped smoking approximately 10 years ago.

## 2020-11-08 NOTE — NURSING
Paged neuro sx x2 to follow up regarding US & new pain med orders not being put in.  Paged not returned; pt pain remains 7/7 after additional oxy 5mg dose per MD order  No S/S of distress; pt & family encouraged to call for assistance if needed; call  light within reach; will continue to assess hourly.

## 2020-11-09 LAB
ALBUMIN SERPL BCP-MCNC: 2.8 G/DL (ref 3.5–5.2)
ALP SERPL-CCNC: 81 U/L (ref 55–135)
ALT SERPL W/O P-5'-P-CCNC: 28 U/L (ref 10–44)
ANION GAP SERPL CALC-SCNC: 11 MMOL/L (ref 8–16)
AST SERPL-CCNC: 25 U/L (ref 10–40)
BASOPHILS # BLD AUTO: 0.06 K/UL (ref 0–0.2)
BASOPHILS NFR BLD: 0.5 % (ref 0–1.9)
BILIRUB SERPL-MCNC: 0.5 MG/DL (ref 0.1–1)
BUN SERPL-MCNC: 8 MG/DL (ref 6–20)
CALCIUM SERPL-MCNC: 8.8 MG/DL (ref 8.7–10.5)
CHLORIDE SERPL-SCNC: 105 MMOL/L (ref 95–110)
CO2 SERPL-SCNC: 26 MMOL/L (ref 23–29)
CREAT SERPL-MCNC: 0.6 MG/DL (ref 0.5–1.4)
DIFFERENTIAL METHOD: ABNORMAL
EOSINOPHIL # BLD AUTO: 0.1 K/UL (ref 0–0.5)
EOSINOPHIL NFR BLD: 1 % (ref 0–8)
ERYTHROCYTE [DISTWIDTH] IN BLOOD BY AUTOMATED COUNT: 12.2 % (ref 11.5–14.5)
EST. GFR  (AFRICAN AMERICAN): >60 ML/MIN/1.73 M^2
EST. GFR  (NON AFRICAN AMERICAN): >60 ML/MIN/1.73 M^2
GLUCOSE SERPL-MCNC: 120 MG/DL (ref 70–110)
HCT VFR BLD AUTO: 34.8 % (ref 37–48.5)
HGB BLD-MCNC: 11.2 G/DL (ref 12–16)
IMM GRANULOCYTES # BLD AUTO: 0.03 K/UL (ref 0–0.04)
IMM GRANULOCYTES NFR BLD AUTO: 0.3 % (ref 0–0.5)
LYMPHOCYTES # BLD AUTO: 2.4 K/UL (ref 1–4.8)
LYMPHOCYTES NFR BLD: 20.3 % (ref 18–48)
MAGNESIUM SERPL-MCNC: 2 MG/DL (ref 1.6–2.6)
MCH RBC QN AUTO: 29.8 PG (ref 27–31)
MCHC RBC AUTO-ENTMCNC: 32.2 G/DL (ref 32–36)
MCV RBC AUTO: 93 FL (ref 82–98)
MONOCYTES # BLD AUTO: 1.3 K/UL (ref 0.3–1)
MONOCYTES NFR BLD: 11 % (ref 4–15)
NEUTROPHILS # BLD AUTO: 7.9 K/UL (ref 1.8–7.7)
NEUTROPHILS NFR BLD: 66.9 % (ref 38–73)
NRBC BLD-RTO: 0 /100 WBC
PHOSPHATE SERPL-MCNC: 2.7 MG/DL (ref 2.7–4.5)
PLATELET # BLD AUTO: 304 K/UL (ref 150–350)
PMV BLD AUTO: 10.2 FL (ref 9.2–12.9)
POCT GLUCOSE: 113 MG/DL (ref 70–110)
POTASSIUM SERPL-SCNC: 3.4 MMOL/L (ref 3.5–5.1)
PROT SERPL-MCNC: 7.1 G/DL (ref 6–8.4)
RBC # BLD AUTO: 3.76 M/UL (ref 4–5.4)
SODIUM SERPL-SCNC: 142 MMOL/L (ref 136–145)
WBC # BLD AUTO: 11.74 K/UL (ref 3.9–12.7)

## 2020-11-09 PROCEDURE — 99024 PR POST-OP FOLLOW-UP VISIT: ICD-10-PCS | Mod: ,,, | Performed by: PHYSICIAN ASSISTANT

## 2020-11-09 PROCEDURE — 25000003 PHARM REV CODE 250: Performed by: NEUROLOGICAL SURGERY

## 2020-11-09 PROCEDURE — 63600175 PHARM REV CODE 636 W HCPCS: Performed by: NEUROLOGICAL SURGERY

## 2020-11-09 PROCEDURE — 11000001 HC ACUTE MED/SURG PRIVATE ROOM

## 2020-11-09 PROCEDURE — 94761 N-INVAS EAR/PLS OXIMETRY MLT: CPT

## 2020-11-09 PROCEDURE — 80053 COMPREHEN METABOLIC PANEL: CPT

## 2020-11-09 PROCEDURE — 25000242 PHARM REV CODE 250 ALT 637 W/ HCPCS: Performed by: STUDENT IN AN ORGANIZED HEALTH CARE EDUCATION/TRAINING PROGRAM

## 2020-11-09 PROCEDURE — 25000003 PHARM REV CODE 250: Performed by: NURSE PRACTITIONER

## 2020-11-09 PROCEDURE — 84100 ASSAY OF PHOSPHORUS: CPT

## 2020-11-09 PROCEDURE — 25000003 PHARM REV CODE 250: Performed by: STUDENT IN AN ORGANIZED HEALTH CARE EDUCATION/TRAINING PROGRAM

## 2020-11-09 PROCEDURE — 36415 COLL VENOUS BLD VENIPUNCTURE: CPT

## 2020-11-09 PROCEDURE — A4216 STERILE WATER/SALINE, 10 ML: HCPCS | Performed by: NURSE PRACTITIONER

## 2020-11-09 PROCEDURE — 63600175 PHARM REV CODE 636 W HCPCS: Performed by: STUDENT IN AN ORGANIZED HEALTH CARE EDUCATION/TRAINING PROGRAM

## 2020-11-09 PROCEDURE — 99024 POSTOP FOLLOW-UP VISIT: CPT | Mod: ,,, | Performed by: PHYSICIAN ASSISTANT

## 2020-11-09 PROCEDURE — 97535 SELF CARE MNGMENT TRAINING: CPT

## 2020-11-09 PROCEDURE — 99900035 HC TECH TIME PER 15 MIN (STAT)

## 2020-11-09 PROCEDURE — 83735 ASSAY OF MAGNESIUM: CPT

## 2020-11-09 PROCEDURE — 94640 AIRWAY INHALATION TREATMENT: CPT

## 2020-11-09 PROCEDURE — 25000003 PHARM REV CODE 250: Performed by: PSYCHIATRY & NEUROLOGY

## 2020-11-09 PROCEDURE — 25000003 PHARM REV CODE 250: Performed by: PHYSICIAN ASSISTANT

## 2020-11-09 PROCEDURE — 85025 COMPLETE CBC W/AUTO DIFF WBC: CPT

## 2020-11-09 RX ORDER — POTASSIUM CHLORIDE 20 MEQ/1
40 TABLET, EXTENDED RELEASE ORAL ONCE
Status: COMPLETED | OUTPATIENT
Start: 2020-11-09 | End: 2020-11-09

## 2020-11-09 RX ADMIN — ACETAMINOPHEN 650 MG: 325 TABLET ORAL at 03:11

## 2020-11-09 RX ADMIN — IPRATROPIUM BROMIDE AND ALBUTEROL SULFATE 3 ML: .5; 2.5 SOLUTION RESPIRATORY (INHALATION) at 09:11

## 2020-11-09 RX ADMIN — LOSARTAN POTASSIUM 25 MG: 25 TABLET, FILM COATED ORAL at 09:11

## 2020-11-09 RX ADMIN — Medication 3 ML: at 05:11

## 2020-11-09 RX ADMIN — THERA TABS 1 TABLET: TAB at 09:11

## 2020-11-09 RX ADMIN — ATORVASTATIN CALCIUM 40 MG: 20 TABLET, FILM COATED ORAL at 09:11

## 2020-11-09 RX ADMIN — HEPARIN SODIUM 5000 UNITS: 5000 INJECTION INTRAVENOUS; SUBCUTANEOUS at 09:11

## 2020-11-09 RX ADMIN — OXYCODONE HYDROCHLORIDE 5 MG: 5 TABLET ORAL at 09:11

## 2020-11-09 RX ADMIN — HEPARIN SODIUM 5000 UNITS: 5000 INJECTION INTRAVENOUS; SUBCUTANEOUS at 05:11

## 2020-11-09 RX ADMIN — FLUTICASONE PROPIONATE 50 MCG: 50 SPRAY, METERED NASAL at 09:11

## 2020-11-09 RX ADMIN — CEFEPIME 1 G: 1 INJECTION, POWDER, FOR SOLUTION INTRAMUSCULAR; INTRAVENOUS at 10:11

## 2020-11-09 RX ADMIN — HEPARIN SODIUM 5000 UNITS: 5000 INJECTION INTRAVENOUS; SUBCUTANEOUS at 02:11

## 2020-11-09 RX ADMIN — ACETAMINOPHEN 650 MG: 325 TABLET ORAL at 05:11

## 2020-11-09 RX ADMIN — CEFEPIME 1 G: 1 INJECTION, POWDER, FOR SOLUTION INTRAMUSCULAR; INTRAVENOUS at 03:11

## 2020-11-09 RX ADMIN — MUPIROCIN: 20 OINTMENT TOPICAL at 09:11

## 2020-11-09 RX ADMIN — LEVETIRACETAM 250 MG: 250 TABLET ORAL at 09:11

## 2020-11-09 RX ADMIN — Medication 3 ML: at 09:11

## 2020-11-09 RX ADMIN — VANCOMYCIN HYDROCHLORIDE 1500 MG: 1.5 INJECTION, POWDER, LYOPHILIZED, FOR SOLUTION INTRAVENOUS at 03:11

## 2020-11-09 RX ADMIN — Medication 1000 MG: at 09:11

## 2020-11-09 RX ADMIN — POTASSIUM CHLORIDE 40 MEQ: 1500 TABLET, EXTENDED RELEASE ORAL at 03:11

## 2020-11-09 RX ADMIN — IPRATROPIUM BROMIDE AND ALBUTEROL SULFATE 3 ML: .5; 2.5 SOLUTION RESPIRATORY (INHALATION) at 07:11

## 2020-11-09 RX ADMIN — IPRATROPIUM BROMIDE AND ALBUTEROL SULFATE 3 ML: .5; 2.5 SOLUTION RESPIRATORY (INHALATION) at 01:11

## 2020-11-09 RX ADMIN — Medication 3 ML: at 02:11

## 2020-11-09 RX ADMIN — POTASSIUM CHLORIDE 40 MEQ: 1.5 POWDER, FOR SOLUTION ORAL at 09:11

## 2020-11-09 RX ADMIN — CYANOCOBALAMIN TAB 250 MCG 250 MCG: 250 TAB at 09:11

## 2020-11-09 NOTE — PLAN OF CARE
Problem: Adult Inpatient Plan of Care  Goal: Plan of Care Review  Outcome: Ongoing, Progressing  Flowsheets (Taken 11/9/2020 0246)  Plan of Care Reviewed With: patient     Problem: Fall Injury Risk  Goal: Absence of Fall and Fall-Related Injury  Outcome: Ongoing, Progressing  Intervention: Identify and Manage Contributors to Fall Injury Risk  Flowsheets (Taken 11/9/2020 0246)  Self-Care Promotion:   independence encouraged   BADL personal objects within reach   BADL personal routines maintained  Medication Review/Management: medications reviewed  Intervention: Promote Injury-Free Environment  Flowsheets (Taken 11/9/2020 0246)  Safety Promotion/Fall Prevention:   assistive device/personal item within reach   bed alarm set   lighting adjusted   medications reviewed   nonskid shoes/socks when out of bed   side rails raised x 2   upper side rails raised x 2, lower siderails raised x 1 (Peds only)   instructed to call staff for mobility  Environmental Safety Modification:   assistive device/personal items within reach   clutter free environment maintained   lighting adjusted     Problem: Infection  Goal: Infection Symptom Resolution  Outcome: Ongoing, Progressing  Intervention: Prevent or Manage Infection  Flowsheets (Taken 11/9/2020 0246)  Fever Reduction/Comfort Measures:   lightweight bedding   lightweight clothing  Infection Management: aseptic technique maintained  Isolation Precautions: protective environment maintained     POC reviewed with patient. All questions and concerns reviewed. Patient verbalized understanding. Fall/safety precautions implemented and maintained. Patient febrile. 103 F. PRN tylenol given with effect (98.8 F). At 2100, US BUE to r/o DVTs. Blood glucose monitored. VANCOMYCIN administered at 2219. Cefepime given. Fluids encouraged. Please see flowsheet for full assessment and vitals. Bed locked in lowest position. Call bell within reach. Will continue to monitor.

## 2020-11-09 NOTE — SUBJECTIVE & OBJECTIVE
Interval History: Continues to have intermittent fevers but trending down, last fever at 0500 to 101.1. Otherwise labs and vitals stable. Infectious w/u and BCx's remain negative to date. On empiric Vanc/Cefepime. Pt denies subjective fevers, chills, sweats, n/v, and headaches. Neurologically intact.     Medications:  Continuous Infusions:  Scheduled Meds:   albuterol-ipratropium  3 mL Nebulization Q6H    ascorbic acid (vitamin C)  1,000 mg Oral Daily    atenoloL  25 mg Oral Daily    atorvastatin  40 mg Oral Daily    ceFEPime (MAXIPIME) IVPB  1 g Intravenous Q8H    cyanocobalamin  250 mcg Oral Daily    fluticasone propionate  1 spray Each Nostril Daily    heparin (porcine)  5,000 Units Subcutaneous Q8H    hydroCHLOROthiazide  25 mg Oral Daily    levETIRAcetam  250 mg Oral BID    losartan  25 mg Oral Daily    multivitamin  1 tablet Oral Daily    senna-docusate 8.6-50 mg  1 tablet Oral Daily    sodium chloride 0.9%  3 mL Intravenous Q8H    vancomycin (VANCOCIN) IVPB  1,500 mg Intravenous Q12H     PRN Meds:acetaminophen, acetaminophen, dextrose 50%, dextrose 50%, glucose, glucose, insulin aspart U-100, labetaloL, magnesium oxide, magnesium oxide, ondansetron, oxyCODONE, potassium chloride, potassium chloride, potassium chloride, potassium, sodium phosphates, potassium, sodium phosphates, potassium, sodium phosphates     Review of Systems  Objective:     Weight: 87.9 kg (193 lb 12.6 oz)  Body mass index is 35.44 kg/m².  Vital Signs (Most Recent):  Temp: 98.6 °F (37 °C) (11/09/20 1319)  Pulse: 85 (11/09/20 1319)  Resp: 20 (11/09/20 1319)  BP: (!) 115/57 (11/09/20 1319)  SpO2: 98 % (11/09/20 1319) Vital Signs (24h Range):  Temp:  [98.3 °F (36.8 °C)-103 °F (39.4 °C)] 98.6 °F (37 °C)  Pulse:  [71-90] 85  Resp:  [16-20] 20  SpO2:  [95 %-98 %] 98 %  BP: (114-127)/(56-65) 115/57                          Neurosurgery Physical Exam    General: well developed, well nourished, no distress.   Head:  normocephalic  Neck: No tracheal deviation. No palpable masses. Full ROM.   Neurologic: Alert and oriented. Thought content appropriate.  GCS: Motor: 6/Verbal: 5/Eyes: 4 GCS Total: 15  Mental Status: Awake, Alert, Oriented x 4  Language: No aphasia  Speech: No dysarthria  Cranial nerves: face symmetric, tongue midline, CN II-XII grossly intact.   Eyes: pupils equal, round, reactive to light with accomodation, EOMI.   Ears: No drainage.   Pulmonary: normal respirations, no signs of respiratory distress  Abdomen: soft, non-distended, not tender to palpation    Sensory: intact to light touch throughout  Motor Strength: Moves all extremities spontaneously with good tone.  Full strength upper and lower extremities. No abnormal movements seen.     Pronator Drift: no drift noted  Finger-to-nose: Intact bilaterally  Vascular: No LE edema.   Skin: Skin is warm, dry and intact.    Cranial Incision c/d/I with skin edges well approximated with staples. No surrounding erythema or edema. No drainage from incision. No palpable hematoma or underlying fluid collection.      Significant Labs:  Recent Labs   Lab 11/08/20  0435 11/09/20  0604   * 120*    142   K 3.2* 3.4*    105   CO2 27 26   BUN 12 8   CREATININE 0.7 0.6   CALCIUM 8.8 8.8   MG 1.9 2.0     Recent Labs   Lab 11/08/20  0436 11/09/20  0604   WBC 11.55 11.74   HGB 11.2* 11.2*   HCT 34.6* 34.8*    304     No results for input(s): LABPT, INR, APTT in the last 48 hours.  Microbiology Results (last 7 days)     Procedure Component Value Units Date/Time    Blood culture [[id number]] Collected: 11/07/20 2039    Order Status: Completed Specimen: Blood Updated: 11/08/20 2212     Blood Culture, Routine No Growth to date      No Growth to date    Influenza A & B by Molecular [[id number]] Collected: 11/08/20 0440    Order Status: Completed Specimen: Nasopharyngeal Swab Updated: 11/08/20 0523     Influenza A, Molecular Negative     Influenza B, Molecular  Negative     Flu A & B Source Nasal swab        All pertinent labs from the last 24 hours have been reviewed.    Significant Diagnostics:  I have reviewed and interpreted all pertinent imaging results/findings within the past 24 hours.

## 2020-11-09 NOTE — PT/OT/SLP PROGRESS
"Occupational Therapy   Treatment    Name: Kylah Mohan  MRN: [id number]  Admitting Diagnosis:  Cerebral aneurysm  5 Days Post-Op    Recommendations:     Discharge Recommendations: home  Discharge Equipment Recommendations:  none  Barriers to discharge:  None    Assessment:     Kylah Mohan is a 50 y.o. female with a medical diagnosis of Cerebral aneurysm.  She presents with performance deficits affecting function are impaired endurance, impaired functional mobilty.     Rehab Prognosis:  Good; patient would benefit from acute skilled OT services to address these deficits and reach maximum level of function.       Plan:     Patient to be seen 4 x/week to address the above listed problems via self-care/home management, therapeutic activities, therapeutic exercises, neuromuscular re-education  · Plan of Care Expires: 12/03/20  · Plan of Care Reviewed with: patient    Subjective   Patient:  "I need to go to the bathroom."   Pain/Comfort:  · Pain Rating 1: 0/10  · Pain Rating Post-Intervention 1: 0/10    Objective:     Communicated with: Nurse prior to session.  Patient found supine with telemetry, pulse ox (continuous), peripheral IV, bed alarm upon OT entry to room.    General Precautions: Standard, fall   Orthopedic Precautions:N/A   Braces: N/A     Occupational Performance:     Bed Mobility:    · Patient completed Rolling/Turning to Left with  modified independence  · Patient completed Rolling/Turning to Right with modified independence  · Patient completed Supine to Sit with modified independence  · Patient completed Sit to Supine with modified independence     Functional Mobility/Transfers:  · Patient completed Sit <> Stand Transfer with supervision  with  no assistive device   · Patient completed Bed <> Chair Transfer using Stand Pivot technique with supervision with no assistive device    Activities of Daily Living:  · Grooming: supervision while standing  · Upper Body Dressing: supervision while " standing  · Lower Body Dressing: supervision while standing  · Toileting: supervision with use of std commode    AMPAC 6 Click ADL: 18    Treatment & Education:  Patient alert and oriented x 3; able to follow 4/4 one step commands.  Patient attentive and interactive throughout the session.    Patient left supine with all lines intact and call button in reachEducation:      GOALS:   Multidisciplinary Problems     Occupational Therapy Goals        Problem: Occupational Therapy Goal    Goal Priority Disciplines Outcome Interventions   Occupational Therapy Goal     OT, PT/OT Ongoing, Progressing    Description: Goals set 11/5 to be addressed for 14 days with expiration date, 11/19:  Patient will increase functional independence with ADLs by performing:    Patient will demonstrate rolling to the right with modified independence.  Not met   Patient will demonstrate rolling to the left with modified independence.   Not met  Patient will demonstrate supine -sit with modified independence.   Not met  Patient will demonstrate stand pivot transfers with modified independence.   Not met  Patient will demonstrate grooming while standing with modified independence.   Not met  Patient will demonstrate upper body dressing with modified independence while seated EOB.   Not met  Patient will demonstrate lower body dressing with modified independence while seated EOB.   Not met  Patient will demonstrate toileting with modified independence.   Not met  Patient will demonstrate bathing while seated EOB with modified independence.   Not met                           Time Tracking:     OT Date of Treatment: 11/09/20  OT Start Time: 0725  OT Stop Time: 0749  OT Total Time (min): 24 min    Billable Minutes:Self Care/Home Management 24    JIHAN Daugherty  11/9/2020

## 2020-11-09 NOTE — PROGRESS NOTES
[facility]  Neurosurgery  Progress Note    Subjective:     History of Present Illness: 50-year-old female who had previously presented with complaints of new onset headache which woke her from sleep the middle night.  She noted these happened last Monday 09/07/2020.  She had some associated nausea, no vomiting but severe neck pain, particularly with right-to-left movement in most pronounced with trying to bend her neck anteriorly.  She notes having some visual complaints primarily on the left visual field.  Which have since resolved.  She sought treatment approximately 4 days after the onset of her headache.  She underwent a CT and MRI scan of the head, as well as the image guided lumbar puncture.  Lumbar puncture did not show any evidence of xanthochromia, no evidence of hemorrhage on MRI.  MRA of the head however revealed what appears to be a 4.3 mm anterior communicating artery aneurysm.  Patient notes that her headache has improved somewhat.  Her neck pain is also improved, she does note that she was feeling off and is returned to somewhat over baseline.  She does note she has a history of headaches and this headache was on like any of her previous headaches.  She does note the pain as the worst headache of her life and 10/10 in severity.  She also noted an episode of right hand tingling, and numbness as well as weakness in the right hand which was transient.  She also denies those symptoms ever happening previously.  Patient has a 15 year pack history of smoking, where she stopped smoking approximately 10 years ago.    Post-Op Info:  Procedure(s) (LRB):  CRANIOTOMY, WITH ANEURYSM CLIPPING RIGHT MODIFIED ORBITOZYGOMATIC CRANIOTOMY (Left)   5 Days Post-Op     Interval History: Continues to have intermittent fevers but trending down, last fever at 0500 to 101.1. Otherwise labs and vitals stable. Infectious w/u and BCx's remain negative to date. On empiric Vanc/Cefepime. Pt denies subjective  fevers, chills, sweats, n/v, and headaches. Neurologically intact.     Medications:  Continuous Infusions:  Scheduled Meds:   albuterol-ipratropium  3 mL Nebulization Q6H    ascorbic acid (vitamin C)  1,000 mg Oral Daily    atenoloL  25 mg Oral Daily    atorvastatin  40 mg Oral Daily    ceFEPime (MAXIPIME) IVPB  1 g Intravenous Q8H    cyanocobalamin  250 mcg Oral Daily    fluticasone propionate  1 spray Each Nostril Daily    heparin (porcine)  5,000 Units Subcutaneous Q8H    hydroCHLOROthiazide  25 mg Oral Daily    levETIRAcetam  250 mg Oral BID    losartan  25 mg Oral Daily    multivitamin  1 tablet Oral Daily    senna-docusate 8.6-50 mg  1 tablet Oral Daily    sodium chloride 0.9%  3 mL Intravenous Q8H    vancomycin (VANCOCIN) IVPB  1,500 mg Intravenous Q12H     PRN Meds:acetaminophen, acetaminophen, dextrose 50%, dextrose 50%, glucose, glucose, insulin aspart U-100, labetaloL, magnesium oxide, magnesium oxide, ondansetron, oxyCODONE, potassium chloride, potassium chloride, potassium chloride, potassium, sodium phosphates, potassium, sodium phosphates, potassium, sodium phosphates     Review of Systems  Objective:     Weight: 87.9 kg (193 lb 12.6 oz)  Body mass index is 35.44 kg/m².  Vital Signs (Most Recent):  Temp: 98.6 °F (37 °C) (11/09/20 1319)  Pulse: 85 (11/09/20 1319)  Resp: 20 (11/09/20 1319)  BP: (!) 115/57 (11/09/20 1319)  SpO2: 98 % (11/09/20 1319) Vital Signs (24h Range):  Temp:  [98.3 °F (36.8 °C)-103 °F (39.4 °C)] 98.6 °F (37 °C)  Pulse:  [71-90] 85  Resp:  [16-20] 20  SpO2:  [95 %-98 %] 98 %  BP: (114-127)/(56-65) 115/57                          Neurosurgery Physical Exam    General: well developed, well nourished, no distress.   Head: normocephalic  Neck: No tracheal deviation. No palpable masses. Full ROM.   Neurologic: Alert and oriented. Thought content appropriate.  GCS: Motor: 6/Verbal: 5/Eyes: 4 GCS Total: 15  Mental Status: Awake, Alert, Oriented x 4  Language: No  aphasia  Speech: No dysarthria  Cranial nerves: face symmetric, tongue midline, CN II-XII grossly intact.   Eyes: pupils equal, round, reactive to light with accomodation, EOMI.   Ears: No drainage.   Pulmonary: normal respirations, no signs of respiratory distress  Abdomen: soft, non-distended, not tender to palpation    Sensory: intact to light touch throughout  Motor Strength: Moves all extremities spontaneously with good tone.  Full strength upper and lower extremities. No abnormal movements seen.     Pronator Drift: no drift noted  Finger-to-nose: Intact bilaterally  Vascular: No LE edema.   Skin: Skin is warm, dry and intact.    Cranial Incision c/d/I with skin edges well approximated with staples. No surrounding erythema or edema. No drainage from incision. No palpable hematoma or underlying fluid collection.      Significant Labs:  Recent Labs   Lab 11/08/20 0435 11/09/20  0604   * 120*    142   K 3.2* 3.4*    105   CO2 27 26   BUN 12 8   CREATININE 0.7 0.6   CALCIUM 8.8 8.8   MG 1.9 2.0     Recent Labs   Lab 11/08/20  0436 11/09/20  0604   WBC 11.55 11.74   HGB 11.2* 11.2*   HCT 34.6* 34.8*    304     No results for input(s): LABPT, INR, APTT in the last 48 hours.  Microbiology Results (last 7 days)     Procedure Component Value Units Date/Time    Blood culture [[id number]] Collected: 11/07/20 2039    Order Status: Completed Specimen: Blood Updated: 11/08/20 2212     Blood Culture, Routine No Growth to date      No Growth to date    Influenza A & B by Molecular [[id number]] Collected: 11/08/20 0440    Order Status: Completed Specimen: Nasopharyngeal Swab Updated: 11/08/20 0523     Influenza A, Molecular Negative     Influenza B, Molecular Negative     Flu A & B Source Nasal swab        All pertinent labs from the last 24 hours have been reviewed.    Significant Diagnostics:  I have reviewed and interpreted all pertinent imaging results/findings within the past 24  hours.    Assessment/Plan:     Cerebral aneurysm without rupture, left anterior communicating 4.3  50 F with known L MILI aneurysm s/p elective L OZ craniotomy for clipping on 11/4.     POD#5. Doing well neurologically, continues to have intermittent fevers, trending down, remains asymptomatic, infectious workup negative to date.  --Empiric Vanc and Cefepime for broad spectrum coverage  --Continue to follow BCx's  --Likely de-escalate Abx if workup remains negative  --Recommend IS use for postop atelectasis ppx    Floor status  Q4h neurochecks  Post op imaging completed, reviewed - appropriate postop changes  HV drain removed 11/8  Continue Keppra x 7 days for seizure ppx  HTN: Goal SBP<160. Continue home atenolol, losartan, hctz.  HLD: atorvastatin 40 mg daily  Hypokalemia: Replace PRN  Continue diabetic diet as tolerated  PT/OT - recs for home  SQH, PPI      Dispo: Plan for discharge home once afebrile x 24 hrs and clear from infectious standpoint.        Georgina Zamora PA-C  Neurosurgery  [facility]

## 2020-11-09 NOTE — PT/OT/SLP PROGRESS
Physical Therapy      Patient Name:  Kylah Mohan   MRN:  [id number]    Attempted PT treatment multiple times today, but pt continued to decline treatment. She was nauseated today and did not want to get up. Will continue to follow.    Dennise Nova, PT, DPT  11/9/2020

## 2020-11-09 NOTE — HOSPITAL COURSE
11/4/2020 Admit to [facility] following elective aneurysm repair, CTH pending  11/5/20: remains on cardene gtt; keppra decreased to 250 mg BID; restart hydrochlorathiazide; start low calorie diabetic diet due to increased glucose levels- POCT glucose q6h and SSI; oxy and tylenol for pain  11/6/20: echo with EF of 70%; keppra x 5 days remaining; dc cardene; stepdown to neurosurgery  11/7: Stepped down to floor overnight. Patient febrile x2, Temp 102.2 F, resolved with Tylenol. Infectious workup ordered. HDS, no distress. Neuro exam stable.  11/8: Tmax 102.2 overnight, resolved this morning. Started on broad spectrum Abx. Febrile workup including chest x-ray, UA, flu test, COVID test, DVT ultrasound all negative. Blood cultures pending. Remains neuro stable.  11/9: Continues to have intermittent fevers but trending down, last fever at 0500 to 101.1. Otherwise labs and vitals stable. Infectious w/u and BCx's remain negative to date. On empiric Vanc/Cefepime. Pt denies subjective fevers, chills, sweats, n/v, and headaches. Neurologically intact.   11/10: NAEON. Febrile last night to 101.2, no fevers today. Pt feeling well without acute complaints. Continues to deny subjective fevers, chills, headache, n/v, focal weakness/numbness. Continues to have some L eye swelling, improving, able to open eye. Infectious w/u remains negative. Tolerating diet, voiding spontaneously. Likely plan for discharge home on PO Abx.

## 2020-11-09 NOTE — CARE UPDATE
"RAPID RESPONSE NURSE PROACTIVE ROUNDING NOTE     Time of Visit: 2100    Admit Date: 2020  LOS: 5  Code Status: Full Code   Date of Visit: 20   : 1970  Age: 50 y.o.  Sex: female  Race: Black or   Bed: [address] [place] [address] A:   MRN: [id number]  Was the patient discharged from an ICU this admission? yes   Was the patient discharged from a PACU within last 24 hours?  no  Did the patient receive conscious sedation/general anesthesia in last 24 hours?  no  Was the patient in the ED within the past 24 hours?  no  Was the patient started on NIPPV within the past 24 hours?  no  Attending Physician: Christi Page MD  Primary Service: Networked reference to record PCT     ASSESSMENT     Notified by Epic patient alert.  Reason for alert: Temp 103    Diagnosis: Cerebral aneurysm    Abnormal Vital Signs: BP (!) 114/56 (BP Location: Right arm, Patient Position: Lying)   Pulse 79   Temp 99.1 °F (37.3 °C) (Oral)   Resp 16   Ht 5' 2" (1.575 m)   Wt 87.9 kg (193 lb 12.6 oz)   SpO2 97%   Breastfeeding No   BMI 35.44 kg/m²      Clinical Issues: Circulatory    Patient  has a past medical history of Benign hypertension, Bladder instability, Depression, DJD (degenerative joint disease), lumbar, History of viral meningitis, Hyperlipidemia, Insomnia, Microscopic hematuria, Pulmonary nodule, and Tendonitis.      AAOx4.  Denies SOB.  Denies chills or myalgia.  Tylenol administered by bedside RN at 1943.  Temp 99.1F at time of assessment.    WBC 11.55 (20 0436), Flu negative (20 0440), US BLE negative DVT (20 2323), Blood culture NGTD (20 7562)     INTERVENTIONS/ RECOMMENDATIONS     Continue close monitoring of VS and neuro status.   Notify primary team with any changes.      Discussed plan of care with Vidya OMALLEY.    PHYSICIAN ESCALATION     Yes/No  no    Disposition: Remain in room 903.    FOLLOW-UP     Call back the Rapid Response Nurse, Selene Trejo RN at [phone] for additional questions " or concerns.

## 2020-11-09 NOTE — ASSESSMENT & PLAN NOTE
50 F with known L MILI aneurysm s/p elective L OZ craniotomy for clipping on 11/4.     POD#5. Doing well neurologically, continues to have intermittent fevers, trending down, remains asymptomatic, infectious workup negative to date.  --Empiric Vanc and Cefepime for broad spectrum coverage  --Continue to follow BCx's  --Likely de-escalate Abx if workup remains negative  --Recommend IS use for postop atelectasis ppx    Floor status  Q4h neurochecks  Post op imaging completed, reviewed - appropriate postop changes  HV drain removed 11/8  Continue Keppra x 7 days for seizure ppx  HTN: Goal SBP<160. Continue home atenolol, losartan, hctz.  HLD: atorvastatin 40 mg daily  Hypokalemia: Replace PRN  Continue diabetic diet as tolerated  PT/OT - recs for home  SQH, PPI      Dispo: Plan for discharge home once afebrile x 24 hrs and clear from infectious standpoint.

## 2020-11-09 NOTE — PLAN OF CARE
Goals remain appropriate.  JIHAN Daugherty  11/9/2020    Problem: Occupational Therapy Goal  Goal: Occupational Therapy Goal  Description: Goals set 11/5 to be addressed for 14 days with expiration date, 11/19:  Patient will increase functional independence with ADLs by performing:    Patient will demonstrate rolling to the right with modified independence.  Not met   Patient will demonstrate rolling to the left with modified independence.   Not met  Patient will demonstrate supine -sit with modified independence.   Not met  Patient will demonstrate stand pivot transfers with modified independence.   Not met  Patient will demonstrate grooming while standing with modified independence.   Not met  Patient will demonstrate upper body dressing with modified independence while seated EOB.   Not met  Patient will demonstrate lower body dressing with modified independence while seated EOB.   Not met  Patient will demonstrate toileting with modified independence.   Not met  Patient will demonstrate bathing while seated EOB with modified independence.   Not met          Outcome: Ongoing, Progressing

## 2020-11-10 VITALS
HEART RATE: 73 BPM | BODY MASS INDEX: 35.66 KG/M2 | DIASTOLIC BLOOD PRESSURE: 58 MMHG | WEIGHT: 193.81 LBS | TEMPERATURE: 99 F | SYSTOLIC BLOOD PRESSURE: 125 MMHG | OXYGEN SATURATION: 98 % | HEIGHT: 62 IN | RESPIRATION RATE: 18 BRPM

## 2020-11-10 LAB
ADENOVIRUS: NOT DETECTED
ALBUMIN SERPL BCP-MCNC: 2.5 G/DL (ref 3.5–5.2)
ALP SERPL-CCNC: 84 U/L (ref 55–135)
ALT SERPL W/O P-5'-P-CCNC: 28 U/L (ref 10–44)
ANION GAP SERPL CALC-SCNC: 9 MMOL/L (ref 8–16)
AST SERPL-CCNC: 25 U/L (ref 10–40)
BASOPHILS # BLD AUTO: 0.05 K/UL (ref 0–0.2)
BASOPHILS NFR BLD: 0.5 % (ref 0–1.9)
BILIRUB SERPL-MCNC: 0.4 MG/DL (ref 0.1–1)
BORDETELLA PARAPERTUSSIS (IS1001): NOT DETECTED
BORDETELLA PERTUSSIS (PTXP): NOT DETECTED
BUN SERPL-MCNC: 9 MG/DL (ref 6–20)
CALCIUM SERPL-MCNC: 8.1 MG/DL (ref 8.7–10.5)
CHLAMYDIA PNEUMONIAE: NOT DETECTED
CHLORIDE SERPL-SCNC: 108 MMOL/L (ref 95–110)
CO2 SERPL-SCNC: 24 MMOL/L (ref 23–29)
CORONAVIRUS 229E, COMMON COLD VIRUS: NOT DETECTED
CORONAVIRUS HKU1, COMMON COLD VIRUS: NOT DETECTED
CORONAVIRUS NL63, COMMON COLD VIRUS: NOT DETECTED
CORONAVIRUS OC43, COMMON COLD VIRUS: NOT DETECTED
CREAT SERPL-MCNC: 0.6 MG/DL (ref 0.5–1.4)
DIFFERENTIAL METHOD: ABNORMAL
EOSINOPHIL # BLD AUTO: 0.2 K/UL (ref 0–0.5)
EOSINOPHIL NFR BLD: 1.4 % (ref 0–8)
ERYTHROCYTE [DISTWIDTH] IN BLOOD BY AUTOMATED COUNT: 12.4 % (ref 11.5–14.5)
EST. GFR  (AFRICAN AMERICAN): >60 ML/MIN/1.73 M^2
EST. GFR  (NON AFRICAN AMERICAN): >60 ML/MIN/1.73 M^2
FLUBV RNA NPH QL NAA+NON-PROBE: NOT DETECTED
GLUCOSE SERPL-MCNC: 125 MG/DL (ref 70–110)
HCT VFR BLD AUTO: 31.7 % (ref 37–48.5)
HGB BLD-MCNC: 10 G/DL (ref 12–16)
HPIV1 RNA NPH QL NAA+NON-PROBE: NOT DETECTED
HPIV2 RNA NPH QL NAA+NON-PROBE: NOT DETECTED
HPIV3 RNA NPH QL NAA+NON-PROBE: NOT DETECTED
HPIV4 RNA NPH QL NAA+NON-PROBE: NOT DETECTED
HUMAN METAPNEUMOVIRUS: NOT DETECTED
IMM GRANULOCYTES # BLD AUTO: 0.03 K/UL (ref 0–0.04)
IMM GRANULOCYTES NFR BLD AUTO: 0.3 % (ref 0–0.5)
INFLUENZA A (SUBTYPES H1,H1-2009,H3): NOT DETECTED
LYMPHOCYTES # BLD AUTO: 2.9 K/UL (ref 1–4.8)
LYMPHOCYTES NFR BLD: 27.4 % (ref 18–48)
MAGNESIUM SERPL-MCNC: 2 MG/DL (ref 1.6–2.6)
MCH RBC QN AUTO: 30 PG (ref 27–31)
MCHC RBC AUTO-ENTMCNC: 31.5 G/DL (ref 32–36)
MCV RBC AUTO: 95 FL (ref 82–98)
MONOCYTES # BLD AUTO: 1 K/UL (ref 0.3–1)
MONOCYTES NFR BLD: 8.9 % (ref 4–15)
MYCOPLASMA PNEUMONIAE: NOT DETECTED
NEUTROPHILS # BLD AUTO: 6.6 K/UL (ref 1.8–7.7)
NEUTROPHILS NFR BLD: 61.5 % (ref 38–73)
NRBC BLD-RTO: 0 /100 WBC
PHOSPHATE SERPL-MCNC: 2.5 MG/DL (ref 2.7–4.5)
PLATELET # BLD AUTO: 290 K/UL (ref 150–350)
PMV BLD AUTO: 10.6 FL (ref 9.2–12.9)
POCT GLUCOSE: 101 MG/DL (ref 70–110)
POTASSIUM SERPL-SCNC: 3.7 MMOL/L (ref 3.5–5.1)
PROT SERPL-MCNC: 6.4 G/DL (ref 6–8.4)
RBC # BLD AUTO: 3.33 M/UL (ref 4–5.4)
RESPIRATORY INFECTION PANEL SOURCE: NORMAL
RSV RNA NPH QL NAA+NON-PROBE: NOT DETECTED
RV+EV RNA NPH QL NAA+NON-PROBE: NOT DETECTED
SODIUM SERPL-SCNC: 141 MMOL/L (ref 136–145)
WBC # BLD AUTO: 10.67 K/UL (ref 3.9–12.7)

## 2020-11-10 PROCEDURE — 63600175 PHARM REV CODE 636 W HCPCS: Performed by: STUDENT IN AN ORGANIZED HEALTH CARE EDUCATION/TRAINING PROGRAM

## 2020-11-10 PROCEDURE — 63600175 PHARM REV CODE 636 W HCPCS: Performed by: NEUROLOGICAL SURGERY

## 2020-11-10 PROCEDURE — 83735 ASSAY OF MAGNESIUM: CPT

## 2020-11-10 PROCEDURE — 36415 COLL VENOUS BLD VENIPUNCTURE: CPT

## 2020-11-10 PROCEDURE — A4216 STERILE WATER/SALINE, 10 ML: HCPCS | Performed by: NURSE PRACTITIONER

## 2020-11-10 PROCEDURE — 94640 AIRWAY INHALATION TREATMENT: CPT

## 2020-11-10 PROCEDURE — 99232 PR SUBSEQUENT HOSPITAL CARE,LEVL II: ICD-10-PCS | Mod: ,,, | Performed by: NEUROLOGICAL SURGERY

## 2020-11-10 PROCEDURE — 94761 N-INVAS EAR/PLS OXIMETRY MLT: CPT

## 2020-11-10 PROCEDURE — 97535 SELF CARE MNGMENT TRAINING: CPT

## 2020-11-10 PROCEDURE — 25000242 PHARM REV CODE 250 ALT 637 W/ HCPCS: Performed by: STUDENT IN AN ORGANIZED HEALTH CARE EDUCATION/TRAINING PROGRAM

## 2020-11-10 PROCEDURE — 25000003 PHARM REV CODE 250: Performed by: NEUROLOGICAL SURGERY

## 2020-11-10 PROCEDURE — 87633 RESP VIRUS 12-25 TARGETS: CPT

## 2020-11-10 PROCEDURE — 25000003 PHARM REV CODE 250: Performed by: STUDENT IN AN ORGANIZED HEALTH CARE EDUCATION/TRAINING PROGRAM

## 2020-11-10 PROCEDURE — 25000003 PHARM REV CODE 250: Performed by: NURSE PRACTITIONER

## 2020-11-10 PROCEDURE — 25000003 PHARM REV CODE 250: Performed by: PSYCHIATRY & NEUROLOGY

## 2020-11-10 PROCEDURE — 99900035 HC TECH TIME PER 15 MIN (STAT)

## 2020-11-10 PROCEDURE — 97116 GAIT TRAINING THERAPY: CPT

## 2020-11-10 PROCEDURE — 85025 COMPLETE CBC W/AUTO DIFF WBC: CPT

## 2020-11-10 PROCEDURE — 80053 COMPREHEN METABOLIC PANEL: CPT

## 2020-11-10 PROCEDURE — 99232 SBSQ HOSP IP/OBS MODERATE 35: CPT | Mod: ,,, | Performed by: NEUROLOGICAL SURGERY

## 2020-11-10 PROCEDURE — 84100 ASSAY OF PHOSPHORUS: CPT

## 2020-11-10 RX ORDER — SULFAMETHOXAZOLE AND TRIMETHOPRIM 400; 80 MG/1; MG/1
1 TABLET ORAL DAILY
Qty: 10 TABLET | Refills: 0 | Status: SHIPPED | OUTPATIENT
Start: 2020-11-10 | End: 2020-11-20

## 2020-11-10 RX ORDER — OXYCODONE HYDROCHLORIDE 5 MG/1
5 TABLET ORAL EVERY 6 HOURS PRN
Qty: 56 TABLET | Refills: 0 | Status: SHIPPED | OUTPATIENT
Start: 2020-11-10 | End: 2021-02-19 | Stop reason: ALTCHOICE

## 2020-11-10 RX ORDER — ATORVASTATIN CALCIUM 40 MG/1
40 TABLET, FILM COATED ORAL DAILY
Qty: 90 TABLET | Refills: 0 | Status: SHIPPED | OUTPATIENT
Start: 2020-11-11 | End: 2020-11-20

## 2020-11-10 RX ORDER — LEVETIRACETAM 250 MG/1
250 TABLET ORAL 2 TIMES DAILY
Qty: 4 TABLET | Refills: 0 | Status: SHIPPED | OUTPATIENT
Start: 2020-11-10 | End: 2021-02-19 | Stop reason: ALTCHOICE

## 2020-11-10 RX ADMIN — Medication 3 ML: at 05:11

## 2020-11-10 RX ADMIN — CYANOCOBALAMIN TAB 250 MCG 250 MCG: 250 TAB at 09:11

## 2020-11-10 RX ADMIN — HYDROCHLOROTHIAZIDE 25 MG: 12.5 TABLET ORAL at 09:11

## 2020-11-10 RX ADMIN — VANCOMYCIN HYDROCHLORIDE 1500 MG: 1.5 INJECTION, POWDER, LYOPHILIZED, FOR SOLUTION INTRAVENOUS at 03:11

## 2020-11-10 RX ADMIN — IPRATROPIUM BROMIDE AND ALBUTEROL SULFATE 3 ML: .5; 2.5 SOLUTION RESPIRATORY (INHALATION) at 12:11

## 2020-11-10 RX ADMIN — IPRATROPIUM BROMIDE AND ALBUTEROL SULFATE 3 ML: .5; 2.5 SOLUTION RESPIRATORY (INHALATION) at 07:11

## 2020-11-10 RX ADMIN — ATORVASTATIN CALCIUM 40 MG: 20 TABLET, FILM COATED ORAL at 09:11

## 2020-11-10 RX ADMIN — THERA TABS 1 TABLET: TAB at 09:11

## 2020-11-10 RX ADMIN — Medication 1000 MG: at 09:11

## 2020-11-10 RX ADMIN — LEVETIRACETAM 250 MG: 250 TABLET ORAL at 09:11

## 2020-11-10 RX ADMIN — ACETAMINOPHEN 650 MG: 325 TABLET ORAL at 12:11

## 2020-11-10 RX ADMIN — CEFEPIME 1 G: 1 INJECTION, POWDER, FOR SOLUTION INTRAMUSCULAR; INTRAVENOUS at 05:11

## 2020-11-10 RX ADMIN — FLUTICASONE PROPIONATE 50 MCG: 50 SPRAY, METERED NASAL at 09:11

## 2020-11-10 RX ADMIN — HEPARIN SODIUM 5000 UNITS: 5000 INJECTION INTRAVENOUS; SUBCUTANEOUS at 05:11

## 2020-11-10 RX ADMIN — LOSARTAN POTASSIUM 25 MG: 25 TABLET, FILM COATED ORAL at 09:11

## 2020-11-10 RX ADMIN — ATENOLOL 25 MG: 25 TABLET ORAL at 09:11

## 2020-11-10 NOTE — PLAN OF CARE
11/10/20 1210   Post-Acute Status   Post-Acute Authorization Other   Other Status No Post-Acute Service Needs       No SW needs noted upon D/C.  SW in contact with CM and Medical staff. Will continue to follow and offer support as needed.     Marin Christian, AMAN  [facility]   Ext. [phone]

## 2020-11-10 NOTE — PT/OT/SLP DISCHARGE
Occupational Therapy Discharge Summary    Kylah Mohan  MRN: [id number]   Principal Problem: Cerebral aneurysm      Patient Discharged from acute Occupational Therapy on 11/10.  Please refer to prior OT note dated 11/10 for functional status.    Assessment:      Patient appropriate for care in another setting.    Objective:     GOALS:   Multidisciplinary Problems     Occupational Therapy Goals        Problem: Occupational Therapy Goal    Goal Priority Disciplines Outcome Interventions   Occupational Therapy Goal     OT, PT/OT Ongoing, Progressing    Description: Goals set 11/5 to be addressed for 14 days with expiration date, 11/19:  Patient will increase functional independence with ADLs by performing:    Patient will demonstrate rolling to the right with modified independence.  Not met   Patient will demonstrate rolling to the left with modified independence.   Not met  Patient will demonstrate supine -sit with modified independence.   Not met  Patient will demonstrate stand pivot transfers with modified independence.   Not met  Patient will demonstrate grooming while standing with modified independence.   Not met  Patient will demonstrate upper body dressing with modified independence while seated EOB.   Not met  Patient will demonstrate lower body dressing with modified independence while seated EOB.   Not met  Patient will demonstrate toileting with modified independence.   Not met  Patient will demonstrate bathing while seated EOB with modified independence.   Not met                           Reasons for Discontinuation of Therapy Services  Transfer to alternate level of care.      Plan:     Patient Discharged to: Home no OT services needed    JIHAN Daugherty  11/10/2020

## 2020-11-10 NOTE — PLAN OF CARE
Problem: Physical Therapy Goal  Goal: Physical Therapy Goal  Description: PT goals to be met by: 11/15/20    Patient will perform supine <> sitting with supervision.  Patient will perform sit <> stand transitions with supervision.  Patient will perform transfers from bed <> chair or BSC with supervision.  Patient will ambulate 100 feet with supervision.  Patient will ascend/descend 1 flight of steps using B handrails with CGA.  Outcome: Ongoing, Progressing     Pt's goals remain appropriate and pt will continue to benefit from skilled PT services to work towards improved functional mobility.    Dennise oNva, PT, DPT  11/10/2020

## 2020-11-10 NOTE — ASSESSMENT & PLAN NOTE
50 F with known L MILI aneurysm s/p elective L OZ craniotomy for clipping on 11/4.     POD#6. Doing well neurologically, continues to have intermittent fevers, last fever 11/9, trending down, remains asymptomatic. Infectious w/u continues to be negative to date.  --On empiric Vanc and Cefepime for broad spectrum coverage  --Continue to follow BCx's 11/7 - remain NGTD  --Respiratory viral panel pending, will f/u  --Will de-escalate Abx, discharge on PO Bactrim x 10 days  --Recommend IS use for postop atelectasis ppx    Floor status  Q4h neurochecks  Post op imaging completed, reviewed - appropriate postop changes  HV drain removed 11/8  Continue Keppra x 7 days for seizure ppx  HTN: Goal SBP<160. Continue home atenolol, losartan, hctz.  HLD: atorvastatin 40 mg daily  Hypokalemia: Resolved. Replace PRN  Continue diabetic diet as tolerated  PT/OT - recs for home  SQH, PPI      Dispo: Plan for discharge home today as infectious workup remains negative. Discharge instructions given verbally to patient. All of her questions were answered, encouraged to call the clinic with any questions or concerns prior to follow up appt.

## 2020-11-10 NOTE — PT/OT/SLP PROGRESS
"Occupational Therapy   Treatment    Name: Kylah Mohan  MRN: [id number]  Admitting Diagnosis:  Cerebral aneurysm  6 Days Post-Op    Recommendations:     Discharge Recommendations: home  Discharge Equipment Recommendations:  none  Barriers to discharge:  None    Assessment:     Kylah Mohan is a 50 y.o. female with a medical diagnosis of Cerebral aneurysm.  She presents with performance deficits affecting function are impaired endurance, pain.     Rehab Prognosis:  Good; patient would benefit from acute skilled OT services to address these deficits and reach maximum level of function.       Plan:     Patient to be seen 2 x/week to address the above listed problems via self-care/home management, therapeutic activities, neuromuscular re-education, cognitive retraining  · Plan of Care Expires: 12/03/20  · Plan of Care Reviewed with: patient    Subjective   Patient:  "I think I may go home today."  Pain/Comfort:  Pain Rating 1: 4/10  Location:  Headache  Addressed by: notifying nurse, repositioning  Pain Rating Post-Intervention 1: 4/10    Objective:     Communicated with: Nurse prior to session.  Patient found supine with telemetry, pulse ox (continuous), peripheral IV, bed alarm upon OT entry to room.    General Precautions: Standard, fall   Orthopedic Precautions:N/A   Braces: N/A     Occupational Performance:     Bed Mobility:    · Patient completed Rolling/Turning to Left with  modified independence  · Patient completed Rolling/Turning to Right with modified independence  · Patient completed Supine to Sit with modified independence  · Patient completed Sit to Supine with modified independence     Functional Mobility/Transfers:  · Patient completed Sit <> Stand Transfer with modified independence  with  no assistive device   · Patient completed Bed <> Chair Transfer using Stand Pivot technique with modified independence with no assistive device    Activities of Daily Living:  · Grooming: modified " independence while standing  · Upper Body Dressing: modified independence while standing  · Lower Body Dressing: modified independence while seated EOB      [facility] 6 Click ADL: 24    Treatment & Education:  Patient alert and oriented x 3; able to follow 4/4 one step commands.  Patient attentive and interactive throughout the session.    Patient left supine with all lines intact and call button in reachEducation:      GOALS:   Multidisciplinary Problems     Occupational Therapy Goals        Problem: Occupational Therapy Goal    Goal Priority Disciplines Outcome Interventions   Occupational Therapy Goal     OT, PT/OT Ongoing, Progressing    Description: Goals set 11/5 to be addressed for 14 days with expiration date, 11/19:  Patient will increase functional independence with ADLs by performing:    Patient will demonstrate rolling to the right with modified independence.  Not met   Patient will demonstrate rolling to the left with modified independence.   Not met  Patient will demonstrate supine -sit with modified independence.   Not met  Patient will demonstrate stand pivot transfers with modified independence.   Not met  Patient will demonstrate grooming while standing with modified independence.   Not met  Patient will demonstrate upper body dressing with modified independence while seated EOB.   Not met  Patient will demonstrate lower body dressing with modified independence while seated EOB.   Not met  Patient will demonstrate toileting with modified independence.   Not met  Patient will demonstrate bathing while seated EOB with modified independence.   Not met                           Time Tracking:     OT Date of Treatment: 11/10/20  OT Start Time: 0710  OT Stop Time: 0735  OT Total Time (min): 25 min    Billable Minutes:Self Care/Home Management 25    JIHAN Daugherty  11/10/2020

## 2020-11-10 NOTE — PROGRESS NOTES
[facility]  Neurosurgery  Progress Note    Subjective:     History of Present Illness: 50-year-old female who had previously presented with complaints of new onset headache which woke her from sleep the middle night.  She noted these happened last Monday 09/07/2020.  She had some associated nausea, no vomiting but severe neck pain, particularly with right-to-left movement in most pronounced with trying to bend her neck anteriorly.  She notes having some visual complaints primarily on the left visual field.  Which have since resolved.  She sought treatment approximately 4 days after the onset of her headache.  She underwent a CT and MRI scan of the head, as well as the image guided lumbar puncture.  Lumbar puncture did not show any evidence of xanthochromia, no evidence of hemorrhage on MRI.  MRA of the head however revealed what appears to be a 4.3 mm anterior communicating artery aneurysm.  Patient notes that her headache has improved somewhat.  Her neck pain is also improved, she does note that she was feeling off and is returned to somewhat over baseline.  She does note she has a history of headaches and this headache was on like any of her previous headaches.  She does note the pain as the worst headache of her life and 10/10 in severity.  She also noted an episode of right hand tingling, and numbness as well as weakness in the right hand which was transient.  She also denies those symptoms ever happening previously.  Patient has a 15 year pack history of smoking, where she stopped smoking approximately 10 years ago.    Post-Op Info:  Procedure(s) (LRB):  CRANIOTOMY, WITH ANEURYSM CLIPPING RIGHT MODIFIED ORBITOZYGOMATIC CRANIOTOMY (Left)   6 Days Post-Op     Interval History: NAEON. Febrile last night to 101.2, no fevers today. Pt feeling well without acute complaints. Continues to deny subjective fevers, chills, headache, n/v, focal weakness/numbness. Continues to have some L eye swelling,  improving, able to open eye. Infectious w/u remains negative. Tolerating diet, voiding spontaneously. Likely plan for discharge home on PO Abx.    Medications:  Continuous Infusions:  Scheduled Meds:   albuterol-ipratropium  3 mL Nebulization Q6H    ascorbic acid (vitamin C)  1,000 mg Oral Daily    atenoloL  25 mg Oral Daily    atorvastatin  40 mg Oral Daily    ceFEPime (MAXIPIME) IVPB  1 g Intravenous Q8H    cyanocobalamin  250 mcg Oral Daily    fluticasone propionate  1 spray Each Nostril Daily    heparin (porcine)  5,000 Units Subcutaneous Q8H    hydroCHLOROthiazide  25 mg Oral Daily    levETIRAcetam  250 mg Oral BID    losartan  25 mg Oral Daily    multivitamin  1 tablet Oral Daily    senna-docusate 8.6-50 mg  1 tablet Oral Daily    sodium chloride 0.9%  3 mL Intravenous Q8H    vancomycin (VANCOCIN) IVPB  1,500 mg Intravenous Q12H     PRN Meds:acetaminophen, acetaminophen, dextrose 50%, dextrose 50%, glucose, glucose, insulin aspart U-100, labetaloL, ondansetron, oxyCODONE     Review of Systems  Objective:     Weight: 87.9 kg (193 lb 12.6 oz)  Body mass index is 35.44 kg/m².  Vital Signs (Most Recent):  Temp: 98.1 °F (36.7 °C) (11/10/20 0828)  Pulse: 88 (11/10/20 0828)  Resp: 18 (11/10/20 0828)  BP: 137/62 (11/10/20 0828)  SpO2: 97 % (11/10/20 0828) Vital Signs (24h Range):  Temp:  [98.1 °F (36.7 °C)-101.2 °F (38.4 °C)] 98.1 °F (36.7 °C)  Pulse:  [73-94] 88  Resp:  [18-24] 18  SpO2:  [94 %-98 %] 97 %  BP: (115-137)/(57-63) 137/62                          Neurosurgery Physical Exam    General: well developed, well nourished, no distress.   Head: normocephalic  Neck: No tracheal deviation. No palpable masses. Full ROM.   Neurologic: Alert and oriented. Thought content appropriate.  GCS: Motor: 6/Verbal: 5/Eyes: 4 GCS Total: 15  Mental Status: Awake, Alert, Oriented x 4  Language: No aphasia  Speech: No dysarthria  Cranial nerves: face symmetric, tongue midline, CN II-XII grossly intact.   Eyes:  pupils equal, round, reactive to light with accomodation, EOMI. L periorbital edema, able to open eye.   Ears: No drainage.   Pulmonary: normal respirations, no signs of respiratory distress  Abdomen: soft, non-distended, not tender to palpation     Sensory: intact to light touch throughout  Motor Strength: Moves all extremities spontaneously with good tone.  Full strength upper and lower extremities. No abnormal movements seen.      Pronator Drift: no drift noted  Finger-to-nose: Intact bilaterally  Vascular: No LE edema.   Skin: Skin is warm, dry and intact.     Cranial Incision c/d/I with skin edges well approximated with staples. No surrounding erythema or edema. No drainage from incision. No palpable hematoma or underlying fluid collection.      Significant Labs:  Recent Labs   Lab 11/09/20  0604 11/10/20  0420   * 125*    141   K 3.4* 3.7    108   CO2 26 24   BUN 8 9   CREATININE 0.6 0.6   CALCIUM 8.8 8.1*   MG 2.0 2.0     Recent Labs   Lab 11/09/20  0604 11/10/20  0420   WBC 11.74 10.67   HGB 11.2* 10.0*   HCT 34.8* 31.7*    290     No results for input(s): LABPT, INR, APTT in the last 48 hours.  Microbiology Results (last 7 days)     Procedure Component Value Units Date/Time    Respiratory Infection Panel (PCR), Nasopharyngeal [[id number]] Collected: 11/10/20 1000    Order Status: Sent Specimen: Nasopharyngeal Swab Updated: 11/10/20 1014    Blood culture [[id number]] Collected: 11/07/20 2039    Order Status: Completed Specimen: Blood Updated: 11/09/20 2212     Blood Culture, Routine No Growth to date      No Growth to date      No Growth to date    Influenza A & B by Molecular [[id number]] Collected: 11/08/20 0440    Order Status: Completed Specimen: Nasopharyngeal Swab Updated: 11/08/20 0523     Influenza A, Molecular Negative     Influenza B, Molecular Negative     Flu A & B Source Nasal swab        All pertinent labs from the last 24 hours have been reviewed.    Significant  Diagnostics:  I have reviewed and interpreted all pertinent imaging results/findings within the past 24 hours.    Assessment/Plan:     Cerebral aneurysm without rupture, left anterior communicating 4.3  50 F with known L MILI aneurysm s/p elective L OZ craniotomy for clipping on 11/4.     POD#6. Doing well neurologically, continues to have intermittent fevers, last fever 11/9, trending down, remains asymptomatic. Infectious w/u continues to be negative to date.  --On empiric Vanc and Cefepime for broad spectrum coverage  --Continue to follow BCx's 11/7 - remain NGTD  --Respiratory viral panel pending, will f/u  --Will de-escalate Abx, discharge on PO Bactrim x 10 days  --Recommend IS use for postop atelectasis ppx    Floor status  Q4h neurochecks  Post op imaging completed, reviewed - appropriate postop changes  HV drain removed 11/8  Continue Keppra x 7 days for seizure ppx  HTN: Goal SBP<160. Continue home atenolol, losartan, hctz.  HLD: atorvastatin 40 mg daily  Hypokalemia: Resolved. Replace PRN  Continue diabetic diet as tolerated  PT/OT - recs for home  SQH, PPI      Dispo: Plan for discharge home today as infectious workup remains negative. Discharge instructions given verbally to patient. All of her questions were answered, encouraged to call the clinic with any questions or concerns prior to follow up appt.           Georgina Zamora PA-C  Neurosurgery  [facility]

## 2020-11-10 NOTE — PLAN OF CARE
POC reviewed with pt. Pt verbalized understanding. Questions and concerns addressed. Pt AAOx4, moves all extremities spontaneously, and denies any numbness or tinging. Pt on room air with no s/s of distress noted. Prn acetaminophen given x1 for temp > 101F. Fall/safety precautions maintained. Bed locked and in lowest position with call light within reach. See flowsheets for full assessment and VS information.       Problem: Adult Inpatient Plan of Care  Goal: Plan of Care Review  Outcome: Ongoing, Progressing  Goal: Optimal Comfort and Wellbeing  Outcome: Ongoing, Progressing     Problem: Fall Injury Risk  Goal: Absence of Fall and Fall-Related Injury  Outcome: Ongoing, Progressing     Problem: Skin Injury Risk Increased  Goal: Skin Health and Integrity  Outcome: Ongoing, Progressing

## 2020-11-10 NOTE — PT/OT/SLP PROGRESS
Physical Therapy Treatment    Patient Name:  Kylah Mohan   MRN:  [id number]  Admitting Diagnosis: Cerebral aneurysm  Recent Surgery: Procedure(s) (LRB):  CRANIOTOMY, WITH ANEURYSM CLIPPING RIGHT MODIFIED ORBITOZYGOMATIC CRANIOTOMY (Left) 6 Days Post-Op    Recommendations:     Discharge Recommendations:  home   Discharge Equipment Recommendations: none   Barriers to discharge: None    Assessment:     Kylah Mohan is a 50 y.o. female admitted with a medical diagnosis of Cerebral aneurysm. She is now post op day 6 s/p aneurysm clipping. Patient tolerated PT treatment fairly today. She most limited today by headache.  She was able to practice bed mobility, standing, and gait training today with close supervision. Treatment was limited by pt's headache getting worse with mobility.  Focus of treatment was improving functional mobility. Pt is progressing well. See detailed treatment note below:    Problem List: impaired endurance, impaired functional mobilty, impaired balance, pain. impaired endurance, impaired functional mobilty, impaired balance, pain  Rehab Prognosis: Good     GOALS:   Multidisciplinary Problems     Physical Therapy Goals        Problem: Physical Therapy Goal    Goal Priority Disciplines Outcome Goal Variances Interventions   Physical Therapy Goal     PT, PT/OT Ongoing, Progressing     Description: PT goals to be met by: 11/15/20    Patient will perform supine <> sitting with supervision.  Patient will perform sit <> stand transitions with supervision.  Patient will perform transfers from bed <> chair or BSC with supervision.  Patient will ambulate 100 feet with supervision.  Patient will ascend/descend 1 flight of steps using B handrails with CGA.                 Plan:     During this hospitalization, patient to be seen 4 x/week to address the listed problems via gait training, therapeutic activities, therapeutic exercises, neuromuscular re-education  · Plan of Care Expires:   "12/05/20   Plan of Care Reviewed with: patient    Subjective     Communicated with RN prior to session.  Patient found resting in bed upon PT entry to room.   "My head is killing me"    Pain/Comfort:  · Pain Rating 1: 5/10  · Location - Orientation 1: generalized  · Location 1: head  · Pain Addressed 1: Reposition, Distraction  · Pain Rating Post-Intervention 1: 7/10    Objective:     Patient found with: telemetry   Mental Status: Patient is Alert and Cooperative during session.    General Precautions: Standard, fall   Orthopedic Precautions:N/A   Braces: N/A   Respiratory Status: room air  Vital Signs (Most Recent):    Temp: 98.1 °F (36.7 °C) (11/10/20 0828)  Pulse: 88 (11/10/20 0828)  Resp: 18 (11/10/20 0828)  BP: 137/62 (11/10/20 0828)  SpO2: 97 % (11/10/20 0828)    Functional Mobility:  Bed Mobility:   · Scooting to HOB via supine bridge: supervision  · Supine to Sit: supervision  · Scooting anteriorly to EOB to have both feet planted on floor: supervision  · Sit to Supine: supervision    Sitting Balance at Edge of Bed:  · Assistance Level Required: supervision    Transfers:   · Sit <> Stand Transfer: supervision with no assistive device       Gait:  · Patient ambulated: 35 feet (x2)   · Patient required: supervision  · Patient used:  No Assistive Device  · Gait Deviation(s): decreased speed, kept eyes closed    Education:  Patient was educated on the following:   Progress of PT goals and plan of care   In room safety and use of call button   Importance of continued upright mobility and exercise    Patient left HOB elevated with all lines intact, call button in reach, and RN notified.    AM-PAC 6 CLICK MOBILITY  Turning over in bed (including adjusting bedclothes, sheets and blankets)?: 4  Sitting down on and standing up from a chair with arms (e.g., wheelchair, bedside commode, etc.): 4  Moving from lying on back to sitting on the side of the bed?: 4  Moving to and from a bed to a chair (including a " wheelchair)?: 4  Need to walk in hospital room?: 3  Climbing 3-5 steps with a railing?: 3  Basic Mobility Total Score: 22       Time Tracking:     PT Received On: 11/10/20  PT Start Time: 1000     PT Stop Time: 1010  PT Total Time (min): 10 min       Billable Minutes:   · Gait Training 10    Treatment Type: Treatment  PT/PTA: PT       Dennise Nova PT, DPT  11/10/2020

## 2020-11-10 NOTE — PLAN OF CARE
Problem: Adult Inpatient Plan of Care  Goal: Plan of Care Review  Outcome: Met     AVS reviewed w/ patient. Patient verbalized understanding of education. All comments and concerns addressed. PIV & telemetry removed. Bedside pharmacy delivered prescriptions to patient. VSS at discharge. All belongings sent with pt via private vehicle. WCTM.

## 2020-11-10 NOTE — SUBJECTIVE & OBJECTIVE
Interval History: NAEON. Febrile last night to 101.2, no fevers today. Pt feeling well without acute complaints. Continues to deny subjective fevers, chills, headache, n/v, focal weakness/numbness. Continues to have some L eye swelling, improving, able to open eye. Infectious w/u remains negative. Tolerating diet, voiding spontaneously. Likely plan for discharge home on PO Abx.    Medications:  Continuous Infusions:  Scheduled Meds:   albuterol-ipratropium  3 mL Nebulization Q6H    ascorbic acid (vitamin C)  1,000 mg Oral Daily    atenoloL  25 mg Oral Daily    atorvastatin  40 mg Oral Daily    ceFEPime (MAXIPIME) IVPB  1 g Intravenous Q8H    cyanocobalamin  250 mcg Oral Daily    fluticasone propionate  1 spray Each Nostril Daily    heparin (porcine)  5,000 Units Subcutaneous Q8H    hydroCHLOROthiazide  25 mg Oral Daily    levETIRAcetam  250 mg Oral BID    losartan  25 mg Oral Daily    multivitamin  1 tablet Oral Daily    senna-docusate 8.6-50 mg  1 tablet Oral Daily    sodium chloride 0.9%  3 mL Intravenous Q8H    vancomycin (VANCOCIN) IVPB  1,500 mg Intravenous Q12H     PRN Meds:acetaminophen, acetaminophen, dextrose 50%, dextrose 50%, glucose, glucose, insulin aspart U-100, labetaloL, ondansetron, oxyCODONE     Review of Systems  Objective:     Weight: 87.9 kg (193 lb 12.6 oz)  Body mass index is 35.44 kg/m².  Vital Signs (Most Recent):  Temp: 98.1 °F (36.7 °C) (11/10/20 0828)  Pulse: 88 (11/10/20 0828)  Resp: 18 (11/10/20 0828)  BP: 137/62 (11/10/20 0828)  SpO2: 97 % (11/10/20 0828) Vital Signs (24h Range):  Temp:  [98.1 °F (36.7 °C)-101.2 °F (38.4 °C)] 98.1 °F (36.7 °C)  Pulse:  [73-94] 88  Resp:  [18-24] 18  SpO2:  [94 %-98 %] 97 %  BP: (115-137)/(57-63) 137/62                          Neurosurgery Physical Exam    General: well developed, well nourished, no distress.   Head: normocephalic  Neck: No tracheal deviation. No palpable masses. Full ROM.   Neurologic: Alert and oriented. Thought  content appropriate.  GCS: Motor: 6/Verbal: 5/Eyes: 4 GCS Total: 15  Mental Status: Awake, Alert, Oriented x 4  Language: No aphasia  Speech: No dysarthria  Cranial nerves: face symmetric, tongue midline, CN II-XII grossly intact.   Eyes: pupils equal, round, reactive to light with accomodation, EOMI. L periorbital edema, able to open eye.   Ears: No drainage.   Pulmonary: normal respirations, no signs of respiratory distress  Abdomen: soft, non-distended, not tender to palpation     Sensory: intact to light touch throughout  Motor Strength: Moves all extremities spontaneously with good tone.  Full strength upper and lower extremities. No abnormal movements seen.      Pronator Drift: no drift noted  Finger-to-nose: Intact bilaterally  Vascular: No LE edema.   Skin: Skin is warm, dry and intact.     Cranial Incision c/d/I with skin edges well approximated with staples. No surrounding erythema or edema. No drainage from incision. No palpable hematoma or underlying fluid collection.      Significant Labs:  Recent Labs   Lab 11/09/20  0604 11/10/20  0420   * 125*    141   K 3.4* 3.7    108   CO2 26 24   BUN 8 9   CREATININE 0.6 0.6   CALCIUM 8.8 8.1*   MG 2.0 2.0     Recent Labs   Lab 11/09/20  0604 11/10/20  0420   WBC 11.74 10.67   HGB 11.2* 10.0*   HCT 34.8* 31.7*    290     No results for input(s): LABPT, INR, APTT in the last 48 hours.  Microbiology Results (last 7 days)     Procedure Component Value Units Date/Time    Respiratory Infection Panel (PCR), Nasopharyngeal [[id number]] Collected: 11/10/20 1000    Order Status: Sent Specimen: Nasopharyngeal Swab Updated: 11/10/20 1014    Blood culture [[id number]] Collected: 11/07/20 2039    Order Status: Completed Specimen: Blood Updated: 11/09/20 2212     Blood Culture, Routine No Growth to date      No Growth to date      No Growth to date    Influenza A & B by Molecular [[id number]] Collected: 11/08/20 0440    Order Status: Completed Specimen:  Nasopharyngeal Swab Updated: 11/08/20 0523     Influenza A, Molecular Negative     Influenza B, Molecular Negative     Flu A & B Source Nasal swab        All pertinent labs from the last 24 hours have been reviewed.    Significant Diagnostics:  I have reviewed and interpreted all pertinent imaging results/findings within the past 24 hours.

## 2020-11-10 NOTE — PLAN OF CARE
Goals remain appropriate.  If remains at consistent level of modified independence at next session, will be ready for d/c from OT.  Problem: Occupational Therapy Goal  Goal: Occupational Therapy Goal  Description: Goals set 11/5 to be addressed for 14 days with expiration date, 11/19:  Patient will increase functional independence with ADLs by performing:    Patient will demonstrate rolling to the right with modified independence.  Not met   Patient will demonstrate rolling to the left with modified independence.   Not met  Patient will demonstrate supine -sit with modified independence.   Not met  Patient will demonstrate stand pivot transfers with modified independence.   Not met  Patient will demonstrate grooming while standing with modified independence.   Not met  Patient will demonstrate upper body dressing with modified independence while seated EOB.   Not met  Patient will demonstrate lower body dressing with modified independence while seated EOB.   Not met  Patient will demonstrate toileting with modified independence.   Not met  Patient will demonstrate bathing while seated EOB with modified independence.   Not met          Outcome: Ongoing, Progressing

## 2020-11-10 NOTE — DISCHARGE SUMMARY
[facility]  Neurosurgery  Discharge Summary      Patient Name: Kylah Mohan  MRN: [id number]  Admission Date: 11/4/2020  Hospital Length of Stay: 6 days  Discharge Date and Time:  11/10/2020 11:28 AM  Attending Physician: Christi Page MD   Discharging Provider: Georgina Zamora PA-C  Primary Care Provider: James Tanner MD    HPI:   50-year-old female who had previously presented with complaints of new onset headache which woke her from sleep the middle night.  She noted these happened last Monday 09/07/2020.  She had some associated nausea, no vomiting but severe neck pain, particularly with right-to-left movement in most pronounced with trying to bend her neck anteriorly.  She notes having some visual complaints primarily on the left visual field.  Which have since resolved.  She sought treatment approximately 4 days after the onset of her headache.  She underwent a CT and MRI scan of the head, as well as the image guided lumbar puncture.  Lumbar puncture did not show any evidence of xanthochromia, no evidence of hemorrhage on MRI.  MRA of the head however revealed what appears to be a 4.3 mm anterior communicating artery aneurysm.  Patient notes that her headache has improved somewhat.  Her neck pain is also improved, she does note that she was feeling off and is returned to somewhat over baseline.  She does note she has a history of headaches and this headache was on like any of her previous headaches.  She does note the pain as the worst headache of her life and 10/10 in severity.  She also noted an episode of right hand tingling, and numbness as well as weakness in the right hand which was transient.  She also denies those symptoms ever happening previously.  Patient has a 15 year pack history of smoking, where she stopped smoking approximately 10 years ago.    Procedure(s) (LRB):  CRANIOTOMY, WITH ANEURYSM CLIPPING RIGHT MODIFIED ORBITOZYGOMATIC CRANIOTOMY (Left)      Hospital Course: 11/4/2020 Admit to [facility] following elective aneurysm repair, CTH pending  11/5/20: remains on cardene gtt; keppra decreased to 250 mg BID; restart hydrochlorathiazide; start low calorie diabetic diet due to increased glucose levels- POCT glucose q6h and SSI; oxy and tylenol for pain  11/6/20: echo with EF of 70%; keppra x 5 days remaining; dc cardene; stepdown to neurosurgery  11/7: Stepped down to floor overnight. Patient febrile x2, Temp 102.2 F, resolved with Tylenol. Infectious workup ordered. HDS, no distress. Neuro exam stable.  11/8: Tmax 102.2 overnight, resolved this morning. Started on broad spectrum Abx. Febrile workup including chest x-ray, UA, flu test, COVID test, DVT ultrasound all negative. Blood cultures pending. Remains neuro stable.  11/9: Continues to have intermittent fevers but trending down, last fever at 0500 to 101.1. Otherwise labs and vitals stable. Infectious w/u and BCx's remain negative to date. On empiric Vanc/Cefepime. Pt denies subjective fevers, chills, sweats, n/v, and headaches. Neurologically intact.   11/10: NAEON. Febrile last night to 101.2, no fevers today. Pt feeling well without acute complaints. Continues to deny subjective fevers, chills, headache, n/v, focal weakness/numbness. Continues to have some L eye swelling, improving, able to open eye. Infectious w/u remains negative. Tolerating diet, voiding spontaneously. Likely plan for discharge home on PO Abx.    Consults:   Consults (From admission, onward)        Status Ordering Provider     [facility]  Once     Provider:  (Not yet assigned)    Completed MATHEW CASSIDY     Inpatient consult to Registered Dietitian/Nutritionist  Once     Provider:  (Not yet assigned)    Completed RSOA OTTO     Inpatient consult to Social Work/Case Management  Once     Provider:  (Not yet assigned)    Completed ROSA OTTO     Pharmacy to dose Vancomycin consult  Once      Provider:  (Not yet assigned)    Acknowledged KUSH NUÑEZ          Significant Diagnostic Studies: Labs:   BMP:   Recent Labs   Lab 11/09/20  0604 11/10/20  0420   * 125*    141   K 3.4* 3.7    108   CO2 26 24   BUN 8 9   CREATININE 0.6 0.6   CALCIUM 8.8 8.1*   MG 2.0 2.0   , CMP   Recent Labs   Lab 11/09/20  0604 11/10/20  0420    141   K 3.4* 3.7    108   CO2 26 24   * 125*   BUN 8 9   CREATININE 0.6 0.6   CALCIUM 8.8 8.1*   PROT 7.1 6.4   ALBUMIN 2.8* 2.5*   BILITOT 0.5 0.4   ALKPHOS 81 84   AST 25 25   ALT 28 28   ANIONGAP 11 9   ESTGFRAFRICA >60.0 >60.0   EGFRNONAA >60.0 >60.0   , CBC   Recent Labs   Lab 11/09/20  0604 11/10/20  0420   WBC 11.74 10.67   HGB 11.2* 10.0*   HCT 34.8* 31.7*    290   , INR   Lab Results   Component Value Date    INR 0.9 11/05/2020    INR 1.0 10/20/2020    INR 0.9 09/17/2020    and All labs within the past 24 hours have been reviewed  Microbiology:   Blood Culture   Lab Results   Component Value Date    LABBLOO No Growth to date 11/07/2020    LABBLOO No Growth to date 11/07/2020    LABBLOO No Growth to date 11/07/2020     Radiology: CT Head, CXR, BLE U/S    Angiography: IR Angiogram Carotid Cerebral Bilateral inc Arch        Pending Diagnostic Studies:     Procedure Component Value Units Date/Time    EKG, 12 - Lead [[id number]]     Order Status: Sent Lab Status: No result     IR Angiogram Carotid Cerebral Bilateral inc Arch [[id number]] Resulted: 11/04/20 1319    Order Status: Sent Lab Status: In process Updated: 11/04/20 1324    Influenza antigen Nasopharyngeal Swab [[id number]] Collected: 11/08/20 4907    Order Status: Sent Lab Status: No result         Final Active Diagnoses:    Diagnosis Date Noted POA    PRINCIPAL PROBLEM:  Cerebral aneurysm [I67.1] 11/04/2020 Yes    Aneurysm [I72.9]  Unknown    Hypokalemia [E87.6] 09/12/2020 Yes    Cerebral aneurysm without rupture, left anterior communicating 4.3 [I67.1] 09/12/2020 Yes      Chronic    Class 2 obesity in adult [E66.9] 09/12/2020 Yes     Chronic    HTN (hypertension) [I10] 08/20/2014 Yes    Hyperlipidemia [E78.5]  Yes      Problems Resolved During this Admission:      Discharged Condition: good    Disposition: Home    Follow Up:  Follow-up Information     James Tanner MD.    Specialty: Family Medicine  Why: Outpatient Services  Contact information:  [address] [facility] [address]  [phone]             Christi Page MD On 11/23/2020.    Specialty: Neurosurgery  Why: For wound re-check  Contact information:  [address]  [facility] [address]  [phone]                 Patient Instructions:   No discharge procedures on file.  Medications:  Reconciled Home Medications:      Medication List      START taking these medications    atorvastatin 40 MG tablet  Commonly known as: LIPITOR  Take 1 tablet (40 mg total) by mouth once daily.  Start taking on: November 11, 2020     levETIRAcetam 250 MG Tab  Commonly known as: KEPPRA  Take 1 tablet (250 mg total) by mouth 2 (two) times daily. for 2 days     oxyCODONE 5 MG immediate release tablet  Commonly known as: ROXICODONE  Take 1 tablet (5 mg total) by mouth every 6 (six) hours as needed for Pain.     sulfamethoxazole-trimethoprim 400-80mg 400-80 mg per tablet  Commonly known as: BACTRIM,SEPTRA  Take 1 tablet by mouth once daily. for 10 days        CONTINUE taking these medications    atenoloL 25 MG tablet  Commonly known as: TENORMIN  Take 1 tablet (25 mg total) by mouth once daily.     estradioL 2 MG tablet  Commonly known as: ESTRACE  Take 2 mg by mouth once daily.     fluticasone propionate 50 mcg/actuation nasal spray  Commonly known as: FLONASE  1 spray by Each Nare route once daily.     hydroCHLOROthiazide 25 MG tablet  Commonly known as: HYDRODIURIL  Take 1 tablet (25 mg total) by mouth once daily.     losartan 25 MG tablet  Commonly known as: COZAAR  Take 1 tablet (25 mg total) by mouth once daily.      multivitamin per tablet  Commonly known as: THERAGRAN  Take 1 tablet by mouth once daily. Every day     VITAMIN B-12 250 MCG tablet  Generic drug: cyanocobalamin  Take 250 mcg by mouth once daily.     VITAMIN C 1000 MG tablet  Generic drug: ascorbic acid (vitamin C)  Take 1,000 mg by mouth once daily.        STOP taking these medications    cyclobenzaprine 10 MG tablet  Commonly known as: LOIDA Zamora PA-C  Neurosurgery  [facility]

## 2020-11-10 NOTE — PLAN OF CARE
Patient to be discharged home.  The patient is refusing HH at this time and understands to call by tomorrow if she desires HH once she is home, otherwise, has no home needs.  Family to provide transportation home.  Neurosurgery clinic to schedule follow up appointment.    Future Appointments   Date Time Provider Department Center   11/23/2020  9:00 AM Christi Page MD [facility] NEUROS[address]   1/8/2021  3:00 PM Barrington Suarez MD [facility] ENDOCRN [place]   1/20/2021  8:20 AM James Tanner MD [facility] FAM MED [place]       11/10/20 7687   Final Note   Assessment Type Final Discharge Note   Anticipated Discharge Disposition [facility]   Hospital Follow Up  Appt(s) scheduled?   (Neurosurgery clinic to schedule follow up appointment.)   Discharge plans and expectations educations in teach back method with documentation complete? Yes   Right Care Referral Info   Post Acute Recommendation No Care   Post-Acute Status   Other Status No Post-Acute Service Needs   Discharge Delays None known at this time

## 2020-11-11 ENCOUNTER — PATIENT OUTREACH (OUTPATIENT)
Dept: ADMINISTRATIVE | Facility: CLINIC | Age: 50
End: 2020-11-11

## 2020-11-12 LAB — BACTERIA BLD CULT: NORMAL

## 2020-11-13 NOTE — OP NOTE
Date of Operation:  11/04/2020.    Preoperative Diagnosis:  Anterior communicating artery aneurysm.    Postoperative Diagnosis:  Anterior communicating artery aneurysm.    Procedure:  Left frontotemporal-orbitozygomatic craniotomy, clipping of anterior communicating artery aneurysm with microsurgery.  Intraoperative ICG angiography.  Intraoperative cerebral angiography.  Intraoperative neuro monitoring.    Surgeon:  Andrew Mix MD    Co Surgeon:  Christi Page MD    Assistant:  Drake Samano MD (Resident in Neurosurgery)    Anesthesia:  General endotracheal.    Estimated Blood Loss:  100 ml.    Drains:  Hemovac subgaleal.    Condition at end of procedure:  Satisfactory.    Brief History: This 50-year-old lady awoke with severe headache on 09/07/2020.  Headache persisted and she was seen at [facility] and subsequently [facility] on 9/11/2020 where CT and MRI showed no definite evidence of subarachnoid hemorrhage, but did demonstrate an aneurysm at the anterior communicating artery.  Lumbar puncture was unsuccessful.  She was subsequently referred to [facility] where cerebral angiography on 09/17/2020 demonstrated the anterior communicating artery aneurysm filling primarily from the left A1.  This was not amenable to endovascular coiling and she is now brought to the operating room for microsurgical clipping of the aneurysm.    Procedure in detail:  The patient was brought to the operating room and in the supine position under premedication intubated and induced general anesthesia.  A cannula was placed in the right radial artery for continuous blood pressure monitoring, a Dickerson catheter inserted, sequential compression devices applied to the legs, and various intravenous lines started.  Dr. Page placed a catheter in the right femoral artery for eventual intraoperative cerebral angiography.  The head of the bed was elevated and the head turned somewhat to the right and  immobilized with the angiographic headrest.  The hair in the left frontal temporal area was shaved and this portion of the scalp prepped with Betadine and draped in sterile fashion.  A curvilinear incision coming just below the zygoma and behind the hairline toward midline was outlined in this was injected with 1% xylocaine and epinephrine.  The incision was carried through the skin and galea, bleeding controlled on the skin margin and skin flap with Madisyn clips.  The scalp was dissected in the subgaleal plane down to the temporalis fascia.  The fascia was cut from the zygoma along the temporal line elevated with the skin flap and retracted with fishhook retractors.  Temporalis muscle was detached from the temporal line elevated with periosteal elevators and retracted inferiorly with the fishhook retractors.  Bur holes were made on the zygomatic process of the frontal bone and on the temporal squamous and a standard pterional craniotomy cut with the high-speed drill elevated and removed from the operative field.  The periosteum was dissected off of the orbital rim down to the supraorbital nerve and off of the zygoma.  The reciprocating saw was used to cut the orbital roof and zygoma and the posterior part of the orbital roof cut with an osteotome.  The orbital zygomatic bone was taken as a single piece and preserved for later reattachment.  Additional lateral orbital roof and sphenoid ridge were drilled down to the superior orbital fissure.  Drill holes were made in the bone margin the dura tacked up to these with 4-0 Nurolon sutures.  Clean towels were placed around the craniotomy opening in the operating microscope brought into the field.    The dura was opened with a curvilinear incision and retracted inferiorly with 4-0 Nurolon sutures.  With microsurgical dissection the sylvian fissure was opened down to the carotid and chiasmatic cisterns and these opened to release cerebrospinal fluid.  Dissection was then  carried out back to the left internal carotid artery bifurcation and then medially along the left anterior cerebral artery to the anterior communicating artery complex.  The interhemispheric fissure was opened and dissection also carried along the right A1 segment.  This exposed the aneurysm which was somewhat more to the left side of the anterior communicating artery.  The anterior communicating artery itself was fenestrated and careful dissection was required to separate the base of the aneurysm from the parent artery.  The A2 segments needed to be dissected along the  interhemispheric fissure to adequately free the aneurysm.  With the neck well dissected a 6 mm curved Aesculap mini clip could be placed across the aneurysm with good occlusion.  The major vessels seemed patent.  Indocyanine green angiography was then done and showed good filling of both A1's and both A2's and obliteration of the aneurysm.  Dr. Page then proceeded with cerebral angiography through the previously placed femoral catheter and this showed good occlusion of the aneurysm and patency of the rather complex anterior communicating artery itself.  After irrigation the dura was closed with interrupted 4 0 Nurolon sutures and a piece of DuraGen placed over this. The 2 portions of bone flap were replaced and joined with the [employer] microplate system.  The temporalis muscle and fascia were closed with interrupted 3 0 Vicryl sutures.  A Hemovac drain was placed in the subgaleal space and brought out through a separate stab incision posterior to the primary incision.  The galea was closed with inverted interrupted 3 0 Vicryl sutures and the skin closed with skin staples.  A Telfa bacitracin dressing was placed over this held in place with tape.  The angiocath was removed from the right femoral artery and this closed with occlusion device.  The patient's head was released 3 point skeletal fixation device, she returned to the full supine position,  allowed to awaken from anesthesia, extubated and left the operating room in satisfactory condition she received 2 g of Rocephin at the beginning of the procedure.

## 2020-11-14 ENCOUNTER — PATIENT MESSAGE (OUTPATIENT)
Dept: NEUROSURGERY | Facility: CLINIC | Age: 50
End: 2020-11-14

## 2020-11-16 DIAGNOSIS — I67.1 CEREBRAL ANEURYSM WITHOUT RUPTURE: Primary | ICD-10-CM

## 2020-11-16 DIAGNOSIS — Z98.890 S/P CRANIOTOMY: ICD-10-CM

## 2020-11-16 RX ORDER — CYCLOBENZAPRINE HCL 5 MG
5 TABLET ORAL 3 TIMES DAILY PRN
Qty: 30 TABLET | Refills: 1 | Status: SHIPPED | OUTPATIENT
Start: 2020-11-16 | End: 2020-12-06

## 2020-11-16 NOTE — PROGRESS NOTES
Spoke with patient via phone regarding headaches. States they are mostly located around incision, range from 5-8/10 in intensity. Patient states headaches are tolerable and not nearly as bad as they were in the past when aneurysm was discovered. She is taking Tylenol which helps but her Oxycodone makes her very drowsy. Rx for flexeril sent to pharmacy, instructed pt to trial this in lieu of oxycodone for incisional pain control. We discussed concerning signs and symptoms that would prompt return to clinic or seeking urgent medical attention. All questions answered.    Georgina Zamora PA-C  Neurosurgery  [facility]

## 2020-11-17 ENCOUNTER — OFFICE VISIT (OUTPATIENT)
Dept: NEUROSURGERY | Facility: CLINIC | Age: 50
End: 2020-11-17
Payer: COMMERCIAL

## 2020-11-17 VITALS — TEMPERATURE: 98 F | DIASTOLIC BLOOD PRESSURE: 75 MMHG | SYSTOLIC BLOOD PRESSURE: 114 MMHG | HEART RATE: 78 BPM

## 2020-11-17 DIAGNOSIS — Z98.890 S/P CRANIOTOMY: ICD-10-CM

## 2020-11-17 DIAGNOSIS — I67.1 CEREBRAL ANEURYSM WITHOUT RUPTURE: Primary | ICD-10-CM

## 2020-11-17 DIAGNOSIS — I67.1 CEREBRAL ANEURYSM WITHOUT RUPTURE: ICD-10-CM

## 2020-11-17 PROCEDURE — 3074F PR MOST RECENT SYSTOLIC BLOOD PRESSURE < 130 MM HG: ICD-10-PCS | Mod: CPTII,S$GLB,, | Performed by: NEUROLOGICAL SURGERY

## 2020-11-17 PROCEDURE — 1125F AMNT PAIN NOTED PAIN PRSNT: CPT | Mod: S$GLB,,, | Performed by: NEUROLOGICAL SURGERY

## 2020-11-17 PROCEDURE — 99024 POSTOP FOLLOW-UP VISIT: CPT | Mod: S$GLB,,, | Performed by: NEUROLOGICAL SURGERY

## 2020-11-17 PROCEDURE — 3078F PR MOST RECENT DIASTOLIC BLOOD PRESSURE < 80 MM HG: ICD-10-PCS | Mod: CPTII,S$GLB,, | Performed by: NEUROLOGICAL SURGERY

## 2020-11-17 PROCEDURE — 99999 PR PBB SHADOW E&M-EST. PATIENT-LVL IV: CPT | Mod: PBBFAC,,, | Performed by: NEUROLOGICAL SURGERY

## 2020-11-17 PROCEDURE — 3074F SYST BP LT 130 MM HG: CPT | Mod: CPTII,S$GLB,, | Performed by: NEUROLOGICAL SURGERY

## 2020-11-17 PROCEDURE — 99999 PR PBB SHADOW E&M-EST. PATIENT-LVL IV: ICD-10-PCS | Mod: PBBFAC,,, | Performed by: NEUROLOGICAL SURGERY

## 2020-11-17 PROCEDURE — 99024 PR POST-OP FOLLOW-UP VISIT: ICD-10-PCS | Mod: S$GLB,,, | Performed by: NEUROLOGICAL SURGERY

## 2020-11-17 PROCEDURE — 1125F PR PAIN SEVERITY QUANTIFIED, PAIN PRESENT: ICD-10-PCS | Mod: S$GLB,,, | Performed by: NEUROLOGICAL SURGERY

## 2020-11-17 PROCEDURE — 3078F DIAST BP <80 MM HG: CPT | Mod: CPTII,S$GLB,, | Performed by: NEUROLOGICAL SURGERY

## 2020-11-17 NOTE — PROGRESS NOTES
Neurosurgery  Established Patient    SUBJECTIVE:     History of Present Illness:  50-year-old female status post a left modified orbital zygomatic craniotomy for microsurgical clip ligation of anterior communicating artery aneurysm.  Patient notes that she is doing well since surgery.  She notes she denies any diplopia any significant headache.  She does note that she has pain along her incision as well as at her left temporalis muscle.  He notes she has been having some nausea and some difficulty keeping food down.  She also notes some tightness in her left aspect of her face.  She denies any visual changes, any fever, chills or any vomiting.  She also denies any gait abnormality weakness in the upper lower extremities.  She is here now headache and her staples removed from incision.    Review of patient's allergies indicates:   Allergen Reactions    Latex      Other reaction(s): Rash       Current Outpatient Medications   Medication Sig Dispense Refill    atenolol (TENORMIN) 25 MG tablet Take 1 tablet (25 mg total) by mouth once daily. 90 tablet 3    cyclobenzaprine (FLEXERIL) 5 MG tablet Take 1 tablet (5 mg total) by mouth 3 (three) times daily as needed for Muscle spasms. 30 tablet 1    hydroCHLOROthiazide (HYDRODIURIL) 25 MG tablet Take 1 tablet (25 mg total) by mouth once daily. 90 tablet 3    losartan (COZAAR) 25 MG tablet Take 1 tablet (25 mg total) by mouth once daily. 90 tablet 3    multivitamin (THERAGRAN) per tablet Take 1 tablet by mouth once daily. Every day      oxyCODONE (ROXICODONE) 5 MG immediate release tablet Take 1 tablet (5 mg total) by mouth every 6 (six) hours as needed for Pain. 56 tablet 0    sulfamethoxazole-trimethoprim 400-80mg (BACTRIM,SEPTRA) 400-80 mg per tablet Take 1 tablet by mouth once daily. for 10 days 10 tablet 0    ascorbic acid, vitamin C, (VITAMIN C) 1000 MG tablet Take 1,000 mg by mouth once daily.      atorvastatin (LIPITOR) 40 MG tablet Take 1 tablet (40 mg  total) by mouth once daily. (Patient not taking: Reported on 11/11/2020) 90 tablet 0    cyanocobalamin (VITAMIN B-12) 250 MCG tablet Take 250 mcg by mouth once daily.      estradioL (ESTRACE) 2 MG tablet Take 2 mg by mouth once daily.      fluticasone (FLONASE) 50 mcg/actuation nasal spray 1 spray by Each Nare route once daily.      levETIRAcetam (KEPPRA) 250 MG Tab Take 1 tablet (250 mg total) by mouth 2 (two) times daily. for 2 days 4 tablet 0     No current facility-administered medications for this visit.        Past Medical History:   Diagnosis Date    Benign hypertension     Bladder instability     Depression     DJD (degenerative joint disease), lumbar     History of viral meningitis 1996    Hyperlipidemia     Insomnia     Microscopic hematuria     negative work-up    Pulmonary nodule     minute    Tendonitis      Past Surgical History:   Procedure Laterality Date    ANTERIOR VAGINAL REPAIR      BREAST BIOPSY  6/5/2007    left breast benign    CEREBRAL ANGIOGRAM N/A 9/17/2020    Procedure: ANGIOGRAM-CEREBRAL;  Surgeon: [facility] Diagnostic Provider;  Location: [facility] OR [address] [place] [address];  Service: Radiology;  Laterality: N/A;   [address]    CLIP LIGATION OF INTRACRANIAL ANEURYSM BY CRANIOTOMY Left 11/4/2020    Procedure: CRANIOTOMY, WITH ANEURYSM CLIPPING RIGHT MODIFIED ORBITOZYGOMATIC CRANIOTOMY;  Surgeon: Christi Page MD;  Location: [facility] OR [address] [place] [address];  Service: Neurosurgery;  Laterality: Left;  Anesthesia: General   Blood: Type & Cross 2 units  Neuro Monitoring: SEP, MEP, EEG   Radiology: C-Arm   Bed: Regular Bed  Headrest: [place]  Position: Supine   ,  Reciprocating Saw  Length of Surgery: 4.5     ENDOMETRIAL ABLATION  5/28/2009    HYSTERECTOMY      LAPAROSCOPIC HYSTERECTOMY  2/2013    TUBAL LIGATION  2002     Family History     Problem Relation (Age of Onset)    Allergies Mother, Sister    Breast cancer Paternal Grandmother (56), Maternal Aunt (61)    Cancer Paternal Grandmother     Diabetes Father, Paternal Grandmother    Heart disease Sister    Hyperlipidemia Mother, Father    Hypertension Mother, Father    Osteoarthritis Mother    Ovarian cancer Maternal Cousin (50)        Social History     Socioeconomic History    Marital status:      Spouse name: Not on file    Number of children: Not on file    Years of education: Not on file    Highest education level: Not on file   Occupational History     Employer: [employer]   Social Needs    Financial resource strain: Somewhat hard    Food insecurity     Worry: Sometimes true     Inability: Sometimes true    Transportation needs     Medical: No     Non-medical: No   Tobacco Use    Smoking status: Former Smoker     Packs/day: 0.25     Years: 15.00     Pack years: 3.75     Types: Cigarettes     Quit date: 10/20/2013     Years since quittin.0    Smokeless tobacco: Never Used   Substance and Sexual Activity    Alcohol use: Yes     Alcohol/week: 1.0 standard drinks     Types: 1 Glasses of wine per week     Frequency: 2-3 times a week     Drinks per session: 3 or 4     Binge frequency: Less than monthly    Drug use: No    Sexual activity: Yes     Partners: Male     Birth control/protection: Surgical   Lifestyle    Physical activity     Days per week: 5 days     Minutes per session: 40 min    Stress: Very much   Relationships    Social connections     Talks on phone: More than three times a week     Gets together: Once a week     Attends Confucianism service: Not on file     Active member of club or organization: Yes     Attends meetings of clubs or organizations: More than 4 times per year     Relationship status:    Other Topics Concern    Not on file   Social History Narrative    The patient does exercise regularly (walking daily, 35-40min).Rates diet as fair.She is not satisfied with weight.       Review of Systems    OBJECTIVE:     Vital Signs  Temp: 98.4 °F (36.9 °C)  Pulse: 78  BP: 114/75  Pain  Score:   8  There is no height or weight on file to calculate BMI.    Neurosurgery Physical Exam  Patient is postsurgical the wound is clean and dry staples are in place.  Neck is supple full range of motion  No labored breathing  Abdomen is soft and nontender  No clubbing cyanosis or edema in the upper lower extremities    Speech is fluent goal directed without any noted dysarthria  Pupils equal round react to light  Extraocular muscles are intact  V1 to V3 is intact to light touch on the left V1 is light sensitive  Face is symmetric  Tongue is midline palate is normal left    Motor exam patient is 5 5 in the upper lower extremities.  Abnormalities    Attempt to remove patient's staples or able to move approximately 50% of the staples.  Patient's hair was close proximity of the staples and was causing the patient significant pain.  Recommended patient return when she is able to tolerate staple removal and recommend      ASSESSMENT/PLAN:     Foot 10-year-old female status post left over zygomatic craniotomy with clip ligation of anterior communicating artery aneurysm.    1.  Patient with no focal deficits on examination.  Wound is clean dry  2.  Was unable to fully remove staples for patient secondary to patient discomfort.  Will attempt within the next 24-48 hours.  Patient has premedicated with her current opioid prescriptions at home.  Also recommend Flexeril for her left temporalis muscle spasm.  After staples removed will follow up the patient in 6 weeks with repeat CTA of the head.    Thank you for allowing me to participate in the care of this patient.  Please feel free to call any questions, comments, concerns.    Christi Page MD,MSc  Department of Neurosurgery   [facility]    [facility] [employer]   [employer] [facility]        Note dictated with voice recognition software, please excuse  any grammatical errors.

## 2020-11-18 ENCOUNTER — OFFICE VISIT (OUTPATIENT)
Dept: NEUROSURGERY | Facility: CLINIC | Age: 50
End: 2020-11-18
Payer: COMMERCIAL

## 2020-11-18 VITALS — HEIGHT: 62 IN | WEIGHT: 176.38 LBS | BODY MASS INDEX: 32.46 KG/M2

## 2020-11-18 DIAGNOSIS — I67.1 CEREBRAL ANEURYSM WITHOUT RUPTURE: Primary | Chronic | ICD-10-CM

## 2020-11-18 PROCEDURE — 1125F AMNT PAIN NOTED PAIN PRSNT: CPT | Mod: S$GLB,,, | Performed by: NURSE PRACTITIONER

## 2020-11-18 PROCEDURE — 99024 POSTOP FOLLOW-UP VISIT: CPT | Mod: S$GLB,,, | Performed by: NURSE PRACTITIONER

## 2020-11-18 PROCEDURE — 3008F BODY MASS INDEX DOCD: CPT | Mod: CPTII,S$GLB,, | Performed by: NURSE PRACTITIONER

## 2020-11-18 PROCEDURE — 1125F PR PAIN SEVERITY QUANTIFIED, PAIN PRESENT: ICD-10-PCS | Mod: S$GLB,,, | Performed by: NURSE PRACTITIONER

## 2020-11-18 PROCEDURE — 99024 PR POST-OP FOLLOW-UP VISIT: ICD-10-PCS | Mod: S$GLB,,, | Performed by: NURSE PRACTITIONER

## 2020-11-18 PROCEDURE — 99999 PR PBB SHADOW E&M-EST. PATIENT-LVL III: CPT | Mod: PBBFAC,,, | Performed by: NURSE PRACTITIONER

## 2020-11-18 PROCEDURE — 3008F PR BODY MASS INDEX (BMI) DOCUMENTED: ICD-10-PCS | Mod: CPTII,S$GLB,, | Performed by: NURSE PRACTITIONER

## 2020-11-18 PROCEDURE — 99999 PR PBB SHADOW E&M-EST. PATIENT-LVL III: ICD-10-PCS | Mod: PBBFAC,,, | Performed by: NURSE PRACTITIONER

## 2020-11-18 NOTE — PROGRESS NOTES
Wound Care   Neurosurgery     Kylah Mohan is a 50 y.o. female  is being seen today for continued removal of the staples from her left modified orbital zygomatic craniotomy for microsurgical clip ligation of anterior communicating artery aneurysm. The surgery was performed on 11/4/20 with Dr. Page. She came yesterday to clinic for her 2 week post-op wound evaluation and staple removal. Unfortunately, the staple removal was too painful and the patient had not taken any pain medication prior to her appointment.    She returns today for the removal of the additional staples and has pre-medicated. Denies acute concerns, fevers, chills, drainage, redness, or swelling. The incision is c/d/i and well-approximated. Staples were removed without difficulty. Pt. Tolerated the removal well. Reviewed proper wound care instructions. Education provided regarding healing process.           All questions were answered. Patient will follow up with Dr. Page for her 6 week follow-up with repeat CTA of the head. Patient was encouraged to call clinic with any future concerns prior to follow up appt. If any worsening symptoms, patient should report to ED.     BUSTER Desouza  Neurosurgery  [facility].

## 2020-11-20 ENCOUNTER — OFFICE VISIT (OUTPATIENT)
Dept: FAMILY MEDICINE | Facility: CLINIC | Age: 50
End: 2020-11-20
Payer: COMMERCIAL

## 2020-11-20 ENCOUNTER — TELEPHONE (OUTPATIENT)
Dept: NEUROSURGERY | Facility: CLINIC | Age: 50
End: 2020-11-20

## 2020-11-20 VITALS
HEIGHT: 62 IN | DIASTOLIC BLOOD PRESSURE: 76 MMHG | SYSTOLIC BLOOD PRESSURE: 122 MMHG | WEIGHT: 176.13 LBS | BODY MASS INDEX: 32.41 KG/M2 | RESPIRATION RATE: 18 BRPM | OXYGEN SATURATION: 99 % | TEMPERATURE: 98 F | HEART RATE: 70 BPM

## 2020-11-20 DIAGNOSIS — I10 ESSENTIAL HYPERTENSION: ICD-10-CM

## 2020-11-20 DIAGNOSIS — Z78.0 MENOPAUSE: ICD-10-CM

## 2020-11-20 DIAGNOSIS — I67.1 CEREBRAL ANEURYSM: ICD-10-CM

## 2020-11-20 DIAGNOSIS — E66.9 OBESITY (BMI 30.0-34.9): ICD-10-CM

## 2020-11-20 DIAGNOSIS — I67.1 CEREBRAL ANEURYSM WITHOUT RUPTURE: Primary | ICD-10-CM

## 2020-11-20 DIAGNOSIS — Z09 HOSPITAL DISCHARGE FOLLOW-UP: Primary | ICD-10-CM

## 2020-11-20 PROCEDURE — 99999 PR PBB SHADOW E&M-EST. PATIENT-LVL IV: CPT | Mod: PBBFAC,,, | Performed by: STUDENT IN AN ORGANIZED HEALTH CARE EDUCATION/TRAINING PROGRAM

## 2020-11-20 PROCEDURE — 99495 TCM SERVICES (MODERATE COMPLEXITY): ICD-10-PCS | Mod: S$GLB,,, | Performed by: STUDENT IN AN ORGANIZED HEALTH CARE EDUCATION/TRAINING PROGRAM

## 2020-11-20 PROCEDURE — 1125F AMNT PAIN NOTED PAIN PRSNT: CPT | Mod: S$GLB,,, | Performed by: STUDENT IN AN ORGANIZED HEALTH CARE EDUCATION/TRAINING PROGRAM

## 2020-11-20 PROCEDURE — 99999 PR PBB SHADOW E&M-EST. PATIENT-LVL IV: ICD-10-PCS | Mod: PBBFAC,,, | Performed by: STUDENT IN AN ORGANIZED HEALTH CARE EDUCATION/TRAINING PROGRAM

## 2020-11-20 PROCEDURE — 3008F BODY MASS INDEX DOCD: CPT | Mod: CPTII,S$GLB,, | Performed by: STUDENT IN AN ORGANIZED HEALTH CARE EDUCATION/TRAINING PROGRAM

## 2020-11-20 PROCEDURE — 99495 TRANSJ CARE MGMT MOD F2F 14D: CPT | Mod: S$GLB,,, | Performed by: STUDENT IN AN ORGANIZED HEALTH CARE EDUCATION/TRAINING PROGRAM

## 2020-11-20 PROCEDURE — 3008F PR BODY MASS INDEX (BMI) DOCUMENTED: ICD-10-PCS | Mod: CPTII,S$GLB,, | Performed by: STUDENT IN AN ORGANIZED HEALTH CARE EDUCATION/TRAINING PROGRAM

## 2020-11-20 PROCEDURE — 1125F PR PAIN SEVERITY QUANTIFIED, PAIN PRESENT: ICD-10-PCS | Mod: S$GLB,,, | Performed by: STUDENT IN AN ORGANIZED HEALTH CARE EDUCATION/TRAINING PROGRAM

## 2020-11-20 NOTE — PROGRESS NOTES
[facility] MEDICINE CLINIC NOTE    Patient Name: Kylah Mohan  YOB: 1970    PRESENTING HISTORY   Chief Complaint:   Chief Complaint   Patient presents with    Hospital Follow Up        History of Present Illness:  Ms. Kylah Mohan is a 50 y.o. female here for hospital follow up.     Recent admission for aneurysm repair.   Following with NSGY post operatively.   Doing well, had stitches removed.     Having headaches which she reports are expected.   Asking about return to work which I don't know.     HTN- BP well controlled. She asks about reducing medications. Had palpitations in past, also on diuretic, Arb. I think this is a good regimen for her.    No snoring and CPAP probably is not a good idea given recent intervention.     ?HLD- started on statin in hospital for hyperlipidemia. We reviewed her levels as she was unsure of this    HRT for menopausal symptoms.     Review of Systems   Constitutional: Negative for chills, fever and weight loss.   HENT: Positive for congestion. Negative for sore throat.    Respiratory: Negative for cough and shortness of breath.    Cardiovascular: Negative for chest pain and palpitations.   Musculoskeletal: Negative for joint pain and myalgias.         PAST HISTORY:     Past Medical History:   Diagnosis Date    Benign hypertension     Bladder instability     Depression     DJD (degenerative joint disease), lumbar     History of viral meningitis 1996    Hyperlipidemia     Insomnia     Microscopic hematuria     negative work-up    Pulmonary nodule     minute    Tendonitis        Past Surgical History:   Procedure Laterality Date    ANTERIOR VAGINAL REPAIR      BREAST BIOPSY  6/5/2007    left breast benign    CEREBRAL ANGIOGRAM N/A 9/17/2020    Procedure: ANGIOGRAM-CEREBRAL;  [facility];  Location: [facility] OR [address] [place] [address];  Service: Radiology;  Laterality: N/A;   [address]    CLIP LIGATION OF INTRACRANIAL ANEURYSM BY  CRANIOTOMY Left 2020    Procedure: CRANIOTOMY, WITH ANEURYSM CLIPPING RIGHT MODIFIED ORBITOZYGOMATIC CRANIOTOMY;  Surgeon: Christi Paeg MD;  Location: [facility] OR [address] [place] [address];  Service: Neurosurgery;  Laterality: Left;  Anesthesia: General   Blood: Type & Cross 2 units  Neuro Monitoring: SEP, MEP, EEG   Radiology: C-Arm   Bed: Regular Bed  Headrest: [place]  Position: Supine   ,  Reciprocating Saw  Length of Surgery: 4.5     ENDOMETRIAL ABLATION  2009    HYSTERECTOMY      LAPAROSCOPIC HYSTERECTOMY  2013    TUBAL LIGATION         Family History   Problem Relation Age of Onset    Osteoarthritis Mother     Hyperlipidemia Mother     Hypertension Mother     Allergies Mother     Hyperlipidemia Father     Diabetes Father     Hypertension Father     Cancer Paternal Grandmother         Breast    Diabetes Paternal Grandmother     Breast cancer Paternal Grandmother 56    Allergies Sister     Heart disease Sister         Congential    Breast cancer Maternal Aunt 61    Ovarian cancer Maternal Cousin 50       Social History     Socioeconomic History    Marital status:      Spouse name: Not on file    Number of children: Not on file    Years of education: Not on file    Highest education level: Not on file   Occupational History     Employer: [employer]   Social Needs    Financial resource strain: Somewhat hard    Food insecurity     Worry: Sometimes true     Inability: Sometimes true    Transportation needs     Medical: No     Non-medical: No   Tobacco Use    Smoking status: Former Smoker     Packs/day: 0.25     Years: 15.00     Pack years: 3.75     Types: Cigarettes     Quit date: 10/20/2013     Years since quittin.0    Smokeless tobacco: Never Used   Substance and Sexual Activity    Alcohol use: Yes     Alcohol/week: 1.0 standard drinks     Types: 1 Glasses of wine per week     Frequency: 2-3 times a week     Drinks per session: 3 or 4     Binge  frequency: Less than monthly    Drug use: No    Sexual activity: Yes     Partners: Male     Birth control/protection: Surgical   Lifestyle    Physical activity     Days per week: 5 days     Minutes per session: 40 min    Stress: Very much   Relationships    Social connections     Talks on phone: More than three times a week     Gets together: Once a week     Attends Rastafari service: Not on file     Active member of club or organization: Yes     Attends meetings of clubs or organizations: More than 4 times per year     Relationship status:    Other Topics Concern    Not on file   Social History Narrative    The patient does exercise regularly (walking daily, 35-40min).Rates diet as fair.She is not satisfied with weight.       MEDICATIONS & ALLERGIES:     Current Outpatient Medications on File Prior to Visit   Medication Sig    ascorbic acid, vitamin C, (VITAMIN C) 1000 MG tablet Take 1,000 mg by mouth once daily.    atenolol (TENORMIN) 25 MG tablet Take 1 tablet (25 mg total) by mouth once daily.    cyanocobalamin (VITAMIN B-12) 250 MCG tablet Take 250 mcg by mouth once daily.    cyclobenzaprine (FLEXERIL) 5 MG tablet Take 1 tablet (5 mg total) by mouth 3 (three) times daily as needed for Muscle spasms.    estradioL (ESTRACE) 2 MG tablet Take 2 mg by mouth once daily.    fluticasone (FLONASE) 50 mcg/actuation nasal spray 1 spray by Each Nare route once daily.    hydroCHLOROthiazide (HYDRODIURIL) 25 MG tablet Take 1 tablet (25 mg total) by mouth once daily.    losartan (COZAAR) 25 MG tablet Take 1 tablet (25 mg total) by mouth once daily.    multivitamin (THERAGRAN) per tablet Take 1 tablet by mouth once daily. Every day    oxyCODONE (ROXICODONE) 5 MG immediate release tablet Take 1 tablet (5 mg total) by mouth every 6 (six) hours as needed for Pain.    sulfamethoxazole-trimethoprim 400-80mg (BACTRIM,SEPTRA) 400-80 mg per tablet Take 1 tablet by mouth once daily. for 10 days     "[DISCONTINUED] atorvastatin (LIPITOR) 40 MG tablet Take 1 tablet (40 mg total) by mouth once daily.    levETIRAcetam (KEPPRA) 250 MG Tab Take 1 tablet (250 mg total) by mouth 2 (two) times daily. for 2 days     No current facility-administered medications on file prior to visit.        Review of patient's allergies indicates:   Allergen Reactions    Latex      Other reaction(s): Rash       OBJECTIVE:   Vital Signs:  Vitals:    11/20/20 1053   BP: 122/76   Pulse: 70   Resp: 18   Temp: 98.4 °F (36.9 °C)   TempSrc: Temporal   SpO2: 99%   Weight: 79.9 kg (176 lb 2.4 oz)   Height: 5' 2" (1.575 m)       No results found for this or any previous visit (from the past 24 hour(s)).      Physical Exam   Constitutional: She is oriented to person, place, and time and well-developed, well-nourished, and in no distress. No distress.   Class 1 obesity.    HENT:   Head: Normocephalic and atraumatic.   Right Ear: External ear normal.   Left Ear: External ear normal.   Surgical incision to superior forehead overlying left. Clean, dry and intact   Eyes: Pupils are equal, round, and reactive to light. Conjunctivae and EOM are normal. No scleral icterus.   Neck: Normal range of motion. Neck supple. No thyromegaly present.   Cardiovascular: Normal rate, regular rhythm and normal heart sounds.   No murmur heard.  Pulmonary/Chest: Effort normal and breath sounds normal. No respiratory distress. She has no wheezes. She has no rales.   Musculoskeletal: Normal range of motion.         General: No tenderness, deformity or edema.   Lymphadenopathy:     She has no cervical adenopathy.   Neurological: She is alert and oriented to person, place, and time. Gait normal. GCS score is 15.   Skin: Skin is warm and dry. No rash noted. She is not diaphoretic. No erythema.   Psychiatric: Mood, memory, affect and judgment normal.   Nursing note and vitals reviewed.      ASSESSMENT & PLAN:     Hospital discharge follow-up  Doing well since " discharge.  Following with NSGY for post op course.     Cerebral aneurysm  As above    Essential hypertension  Well controlled, continue current regimen    Obesity (BMI 30.0-34.9)  Risk factor for DARIAN    Menopause  Was on hormone replacement. Holding perioperatively due to increased VTE risk.   Will consider resumption at follow up.       Of note, patient's cholesterol was reviewed and is WNL. Her ASCVD score is 3.4% and she does not require statin therapy. I cannot find any other indication for statin.   She has not been taking atorvastatin as she was unsure of the indication and I said that was okay.     James Tanner MD       Transitional Care Note    Family and/or Caretaker present at visit?  Yes.  Diagnostic tests reviewed/disposition: I have reviewed all completed as well as pending diagnostic tests at the time of discharge.  Disease/illness education: Discussed HTN, HRT  Home health/community services discussion/referrals: Patient does not have home health established from hospital visit.  They do not need home health.  If needed, we will set up home health for the patient.   Establishment or re-establishment of referral orders for community resources: No other necessary community resources.   Discussion with other health care providers: No discussion with other health care providers necessary.

## 2020-11-25 ENCOUNTER — PATIENT MESSAGE (OUTPATIENT)
Dept: NEUROSURGERY | Facility: CLINIC | Age: 50
End: 2020-11-25

## 2020-11-25 ENCOUNTER — TELEPHONE (OUTPATIENT)
Dept: NEUROSURGERY | Facility: CLINIC | Age: 50
End: 2020-11-25

## 2020-11-25 NOTE — TELEPHONE ENCOUNTER
Patient called stating she has lost her sense of smell and taste and wanted to know if she should get tested for COVID. Inform patient that she can reach out to her PCP. Patient verbalized understanding.

## 2020-11-26 ENCOUNTER — PATIENT MESSAGE (OUTPATIENT)
Dept: FAMILY MEDICINE | Facility: CLINIC | Age: 50
End: 2020-11-26

## 2020-12-04 ENCOUNTER — PATIENT MESSAGE (OUTPATIENT)
Dept: FAMILY MEDICINE | Facility: CLINIC | Age: 50
End: 2020-12-04

## 2020-12-10 ENCOUNTER — HOSPITAL ENCOUNTER (OUTPATIENT)
Dept: RADIOLOGY | Facility: HOSPITAL | Age: 50
Discharge: HOME | End: 2020-12-10
Attending: NEUROLOGICAL SURGERY
Payer: COMMERCIAL

## 2020-12-10 DIAGNOSIS — I67.1 CEREBRAL ANEURYSM WITHOUT RUPTURE: ICD-10-CM

## 2020-12-10 PROCEDURE — 70496 CT ANGIOGRAPHY HEAD: CPT | Mod: TC

## 2020-12-10 PROCEDURE — 25500020 PHARM REV CODE 255: Performed by: NEUROLOGICAL SURGERY

## 2020-12-10 RX ADMIN — IOHEXOL 100 ML: 350 INJECTION, SOLUTION INTRAVENOUS at 09:12

## 2020-12-14 ENCOUNTER — OFFICE VISIT (OUTPATIENT)
Dept: NEUROSURGERY | Facility: CLINIC | Age: 50
End: 2020-12-14
Payer: COMMERCIAL

## 2020-12-14 VITALS
HEART RATE: 77 BPM | SYSTOLIC BLOOD PRESSURE: 125 MMHG | BODY MASS INDEX: 32.46 KG/M2 | DIASTOLIC BLOOD PRESSURE: 74 MMHG | TEMPERATURE: 98 F | WEIGHT: 177.5 LBS

## 2020-12-14 DIAGNOSIS — Z98.890 S/P CRANIOTOMY: Primary | ICD-10-CM

## 2020-12-14 DIAGNOSIS — I67.1 CEREBRAL ANEURYSM WITHOUT RUPTURE: ICD-10-CM

## 2020-12-14 PROCEDURE — 99999 PR PBB SHADOW E&M-EST. PATIENT-LVL IV: CPT | Mod: PBBFAC,,, | Performed by: NEUROLOGICAL SURGERY

## 2020-12-14 PROCEDURE — 1125F PR PAIN SEVERITY QUANTIFIED, PAIN PRESENT: ICD-10-PCS | Mod: S$GLB,,, | Performed by: NEUROLOGICAL SURGERY

## 2020-12-14 PROCEDURE — 99999 PR PBB SHADOW E&M-EST. PATIENT-LVL IV: ICD-10-PCS | Mod: PBBFAC,,, | Performed by: NEUROLOGICAL SURGERY

## 2020-12-14 PROCEDURE — 3074F SYST BP LT 130 MM HG: CPT | Mod: CPTII,S$GLB,, | Performed by: NEUROLOGICAL SURGERY

## 2020-12-14 PROCEDURE — 3078F DIAST BP <80 MM HG: CPT | Mod: CPTII,S$GLB,, | Performed by: NEUROLOGICAL SURGERY

## 2020-12-14 PROCEDURE — 3008F PR BODY MASS INDEX (BMI) DOCUMENTED: ICD-10-PCS | Mod: CPTII,S$GLB,, | Performed by: NEUROLOGICAL SURGERY

## 2020-12-14 PROCEDURE — 3008F BODY MASS INDEX DOCD: CPT | Mod: CPTII,S$GLB,, | Performed by: NEUROLOGICAL SURGERY

## 2020-12-14 PROCEDURE — 3074F PR MOST RECENT SYSTOLIC BLOOD PRESSURE < 130 MM HG: ICD-10-PCS | Mod: CPTII,S$GLB,, | Performed by: NEUROLOGICAL SURGERY

## 2020-12-14 PROCEDURE — 99024 POSTOP FOLLOW-UP VISIT: CPT | Mod: S$GLB,,, | Performed by: NEUROLOGICAL SURGERY

## 2020-12-14 PROCEDURE — 1125F AMNT PAIN NOTED PAIN PRSNT: CPT | Mod: S$GLB,,, | Performed by: NEUROLOGICAL SURGERY

## 2020-12-14 PROCEDURE — 3078F PR MOST RECENT DIASTOLIC BLOOD PRESSURE < 80 MM HG: ICD-10-PCS | Mod: CPTII,S$GLB,, | Performed by: NEUROLOGICAL SURGERY

## 2020-12-14 PROCEDURE — 99024 PR POST-OP FOLLOW-UP VISIT: ICD-10-PCS | Mod: S$GLB,,, | Performed by: NEUROLOGICAL SURGERY

## 2020-12-14 RX ORDER — CYCLOBENZAPRINE HCL 5 MG
5 TABLET ORAL 3 TIMES DAILY PRN
Qty: 40 TABLET | Refills: 0 | Status: SHIPPED | OUTPATIENT
Start: 2020-12-14 | End: 2020-12-28

## 2020-12-28 ENCOUNTER — TELEPHONE (OUTPATIENT)
Dept: NEUROSURGERY | Facility: CLINIC | Age: 50
End: 2020-12-28

## 2020-12-28 DIAGNOSIS — I67.1 CEREBRAL ANEURYSM WITHOUT RUPTURE: Primary | ICD-10-CM

## 2020-12-28 DIAGNOSIS — Z98.890 S/P CRANIOTOMY: ICD-10-CM

## 2021-01-08 ENCOUNTER — OFFICE VISIT (OUTPATIENT)
Dept: ENDOCRINOLOGY | Facility: CLINIC | Age: 51
End: 2021-01-08
Payer: COMMERCIAL

## 2021-01-08 ENCOUNTER — LAB VISIT (OUTPATIENT)
Dept: LAB | Facility: HOSPITAL | Age: 51
End: 2021-01-08
Attending: INTERNAL MEDICINE
Payer: COMMERCIAL

## 2021-01-08 VITALS
OXYGEN SATURATION: 98 % | HEIGHT: 62 IN | WEIGHT: 177.5 LBS | SYSTOLIC BLOOD PRESSURE: 110 MMHG | TEMPERATURE: 98 F | BODY MASS INDEX: 32.66 KG/M2 | DIASTOLIC BLOOD PRESSURE: 84 MMHG | HEART RATE: 83 BPM

## 2021-01-08 DIAGNOSIS — Z78.0 POSTMENOPAUSAL: ICD-10-CM

## 2021-01-08 DIAGNOSIS — E88.810 DYSMETABOLIC SYNDROME: ICD-10-CM

## 2021-01-08 DIAGNOSIS — I10 ESSENTIAL HYPERTENSION: ICD-10-CM

## 2021-01-08 DIAGNOSIS — E05.90 HYPERTHYROIDISM: ICD-10-CM

## 2021-01-08 DIAGNOSIS — E05.90 THYROTOXICOSIS WITHOUT THYROID STORM, UNSPECIFIED THYROTOXICOSIS TYPE: ICD-10-CM

## 2021-01-08 DIAGNOSIS — Z82.49 FAMILY HISTORY OF BRAIN ANEURYSM: ICD-10-CM

## 2021-01-08 DIAGNOSIS — E05.90 SUBCLINICAL HYPERTHYROIDISM: ICD-10-CM

## 2021-01-08 DIAGNOSIS — F41.8 DEPRESSION WITH ANXIETY: ICD-10-CM

## 2021-01-08 DIAGNOSIS — E06.9 THYROIDITIS: ICD-10-CM

## 2021-01-08 DIAGNOSIS — E04.9 GOITER: ICD-10-CM

## 2021-01-08 DIAGNOSIS — R73.03 PREDIABETES: ICD-10-CM

## 2021-01-08 DIAGNOSIS — E04.1 NODULAR THYROID DISEASE: Primary | ICD-10-CM

## 2021-01-08 DIAGNOSIS — Z91.89 AT INCREASED RISK OF EXPOSURE TO COVID-19 VIRUS: ICD-10-CM

## 2021-01-08 DIAGNOSIS — R94.6 ABNORMAL THYROID FUNCTION TEST: ICD-10-CM

## 2021-01-08 DIAGNOSIS — E04.1 NODULAR THYROID DISEASE: ICD-10-CM

## 2021-01-08 DIAGNOSIS — E55.9 HYPOVITAMINOSIS D: ICD-10-CM

## 2021-01-08 DIAGNOSIS — E78.5 HYPERLIPIDEMIA, UNSPECIFIED HYPERLIPIDEMIA TYPE: ICD-10-CM

## 2021-01-08 DIAGNOSIS — I67.1 CEREBRAL ANEURYSM: ICD-10-CM

## 2021-01-08 LAB
ALBUMIN SERPL BCP-MCNC: 3.7 G/DL (ref 3.5–5.2)
ALP SERPL-CCNC: 88 U/L (ref 55–135)
ALT SERPL W/O P-5'-P-CCNC: 16 U/L (ref 10–44)
ANION GAP SERPL CALC-SCNC: 9 MMOL/L (ref 8–16)
AST SERPL-CCNC: 21 U/L (ref 10–40)
BASOPHILS # BLD AUTO: 0.08 K/UL (ref 0–0.2)
BASOPHILS NFR BLD: 0.7 % (ref 0–1.9)
BILIRUB SERPL-MCNC: 0.2 MG/DL (ref 0.1–1)
BUN SERPL-MCNC: 12 MG/DL (ref 6–20)
CALCIUM SERPL-MCNC: 9.8 MG/DL (ref 8.7–10.5)
CHLORIDE SERPL-SCNC: 103 MMOL/L (ref 95–110)
CO2 SERPL-SCNC: 28 MMOL/L (ref 23–29)
CREAT SERPL-MCNC: 0.7 MG/DL (ref 0.5–1.4)
DIFFERENTIAL METHOD: ABNORMAL
EOSINOPHIL # BLD AUTO: 0.2 K/UL (ref 0–0.5)
EOSINOPHIL NFR BLD: 1.5 % (ref 0–8)
ERYTHROCYTE [DISTWIDTH] IN BLOOD BY AUTOMATED COUNT: 12.5 % (ref 11.5–14.5)
EST. GFR  (AFRICAN AMERICAN): >60 ML/MIN/1.73 M^2
EST. GFR  (NON AFRICAN AMERICAN): >60 ML/MIN/1.73 M^2
GLUCOSE SERPL-MCNC: 95 MG/DL (ref 70–110)
HCT VFR BLD AUTO: 41.6 % (ref 37–48.5)
HGB BLD-MCNC: 13 G/DL (ref 12–16)
IMM GRANULOCYTES # BLD AUTO: 0.03 K/UL (ref 0–0.04)
IMM GRANULOCYTES NFR BLD AUTO: 0.3 % (ref 0–0.5)
LYMPHOCYTES # BLD AUTO: 2.6 K/UL (ref 1–4.8)
LYMPHOCYTES NFR BLD: 22.7 % (ref 18–48)
MCH RBC QN AUTO: 29.4 PG (ref 27–31)
MCHC RBC AUTO-ENTMCNC: 31.3 G/DL (ref 32–36)
MCV RBC AUTO: 94 FL (ref 82–98)
MONOCYTES # BLD AUTO: 0.9 K/UL (ref 0.3–1)
MONOCYTES NFR BLD: 8 % (ref 4–15)
NEUTROPHILS # BLD AUTO: 7.6 K/UL (ref 1.8–7.7)
NEUTROPHILS NFR BLD: 66.8 % (ref 38–73)
NRBC BLD-RTO: 0 /100 WBC
PLATELET # BLD AUTO: 388 K/UL (ref 150–350)
PMV BLD AUTO: 10.8 FL (ref 9.2–12.9)
POTASSIUM SERPL-SCNC: 3.8 MMOL/L (ref 3.5–5.1)
PROT SERPL-MCNC: 7.8 G/DL (ref 6–8.4)
RBC # BLD AUTO: 4.42 M/UL (ref 4–5.4)
SODIUM SERPL-SCNC: 140 MMOL/L (ref 136–145)
URATE SERPL-MCNC: 6.1 MG/DL (ref 2.4–5.7)
WBC # BLD AUTO: 11.3 K/UL (ref 3.9–12.7)

## 2021-01-08 PROCEDURE — 99214 PR OFFICE/OUTPT VISIT, EST, LEVL IV, 30-39 MIN: ICD-10-PCS | Mod: S$GLB,,, | Performed by: INTERNAL MEDICINE

## 2021-01-08 PROCEDURE — 3074F SYST BP LT 130 MM HG: CPT | Mod: CPTII,S$GLB,, | Performed by: INTERNAL MEDICINE

## 2021-01-08 PROCEDURE — 3079F DIAST BP 80-89 MM HG: CPT | Mod: CPTII,S$GLB,, | Performed by: INTERNAL MEDICINE

## 2021-01-08 PROCEDURE — 84480 ASSAY TRIIODOTHYRONINE (T3): CPT

## 2021-01-08 PROCEDURE — 86316 IMMUNOASSAY TUMOR OTHER: CPT

## 2021-01-08 PROCEDURE — 84550 ASSAY OF BLOOD/URIC ACID: CPT

## 2021-01-08 PROCEDURE — 82306 VITAMIN D 25 HYDROXY: CPT

## 2021-01-08 PROCEDURE — 3008F BODY MASS INDEX DOCD: CPT | Mod: CPTII,S$GLB,, | Performed by: INTERNAL MEDICINE

## 2021-01-08 PROCEDURE — 99999 PR PBB SHADOW E&M-EST. PATIENT-LVL IV: ICD-10-PCS | Mod: PBBFAC,,, | Performed by: INTERNAL MEDICINE

## 2021-01-08 PROCEDURE — 84443 ASSAY THYROID STIM HORMONE: CPT

## 2021-01-08 PROCEDURE — 84432 ASSAY OF THYROGLOBULIN: CPT

## 2021-01-08 PROCEDURE — 82308 ASSAY OF CALCITONIN: CPT

## 2021-01-08 PROCEDURE — 82330 ASSAY OF CALCIUM: CPT

## 2021-01-08 PROCEDURE — 99214 OFFICE O/P EST MOD 30 MIN: CPT | Mod: S$GLB,,, | Performed by: INTERNAL MEDICINE

## 2021-01-08 PROCEDURE — 86769 SARS-COV-2 COVID-19 ANTIBODY: CPT

## 2021-01-08 PROCEDURE — 83970 ASSAY OF PARATHORMONE: CPT

## 2021-01-08 PROCEDURE — 99999 PR PBB SHADOW E&M-EST. PATIENT-LVL IV: CPT | Mod: PBBFAC,,, | Performed by: INTERNAL MEDICINE

## 2021-01-08 PROCEDURE — 3008F PR BODY MASS INDEX (BMI) DOCUMENTED: ICD-10-PCS | Mod: CPTII,S$GLB,, | Performed by: INTERNAL MEDICINE

## 2021-01-08 PROCEDURE — 85025 COMPLETE CBC W/AUTO DIFF WBC: CPT

## 2021-01-08 PROCEDURE — 3074F PR MOST RECENT SYSTOLIC BLOOD PRESSURE < 130 MM HG: ICD-10-PCS | Mod: CPTII,S$GLB,, | Performed by: INTERNAL MEDICINE

## 2021-01-08 PROCEDURE — 83018 HEAVY METAL QUAN EACH NES: CPT

## 2021-01-08 PROCEDURE — 1125F PR PAIN SEVERITY QUANTIFIED, PAIN PRESENT: ICD-10-PCS | Mod: S$GLB,,, | Performed by: INTERNAL MEDICINE

## 2021-01-08 PROCEDURE — 1125F AMNT PAIN NOTED PAIN PRSNT: CPT | Mod: S$GLB,,, | Performed by: INTERNAL MEDICINE

## 2021-01-08 PROCEDURE — 80053 COMPREHEN METABOLIC PANEL: CPT

## 2021-01-08 PROCEDURE — 83036 HEMOGLOBIN GLYCOSYLATED A1C: CPT

## 2021-01-08 PROCEDURE — 84439 ASSAY OF FREE THYROXINE: CPT

## 2021-01-08 PROCEDURE — 3079F PR MOST RECENT DIASTOLIC BLOOD PRESSURE 80-89 MM HG: ICD-10-PCS | Mod: CPTII,S$GLB,, | Performed by: INTERNAL MEDICINE

## 2021-01-09 LAB
25(OH)D3+25(OH)D2 SERPL-MCNC: 83 NG/ML (ref 30–96)
CA-I BLDV-SCNC: 1.3 MMOL/L (ref 1.06–1.42)
ESTIMATED AVG GLUCOSE: 111 MG/DL (ref 68–131)
HBA1C MFR BLD HPLC: 5.5 % (ref 4–5.6)
PTH-INTACT SERPL-MCNC: 28 PG/ML (ref 9–77)
SARS-COV-2 IGG SERPLBLD QL IA.RAPID: NEGATIVE
T3 SERPL-MCNC: 132 NG/DL (ref 60–180)
T4 FREE SERPL-MCNC: 1.09 NG/DL (ref 0.71–1.51)
TSH SERPL DL<=0.005 MIU/L-ACNC: 0.19 UIU/ML (ref 0.4–4)

## 2021-01-11 ENCOUNTER — HOSPITAL ENCOUNTER (OUTPATIENT)
Dept: RADIOLOGY | Facility: HOSPITAL | Age: 51
Discharge: HOME | End: 2021-01-11
Attending: INTERNAL MEDICINE
Payer: COMMERCIAL

## 2021-01-11 DIAGNOSIS — Z78.0 POSTMENOPAUSAL: ICD-10-CM

## 2021-01-11 DIAGNOSIS — E04.1 NODULAR THYROID DISEASE: ICD-10-CM

## 2021-01-11 DIAGNOSIS — E04.1 NODULAR THYROID DISEASE: Primary | ICD-10-CM

## 2021-01-11 LAB
IODINE SERPL-MCNC: 90 NG/ML (ref 40–92)
THRYOGLOBULIN INTERPRETATION: ABNORMAL
THYROGLOB AB SERPL-ACNC: <1.8 IU/ML
THYROGLOB SERPL-MCNC: 46 NG/ML

## 2021-01-11 PROCEDURE — 77080 DEXA BONE DENSITY SPINE HIP: ICD-10-PCS | Mod: 26,,, | Performed by: RADIOLOGY

## 2021-01-11 PROCEDURE — 76536 US EXAM OF HEAD AND NECK: CPT | Mod: 26,,, | Performed by: RADIOLOGY

## 2021-01-11 PROCEDURE — 76536 US SOFT TISSUE HEAD NECK THYROID: ICD-10-PCS | Mod: 26,,, | Performed by: RADIOLOGY

## 2021-01-11 PROCEDURE — 77080 DXA BONE DENSITY AXIAL: CPT | Mod: TC

## 2021-01-11 PROCEDURE — 77080 DXA BONE DENSITY AXIAL: CPT | Mod: 26,,, | Performed by: RADIOLOGY

## 2021-01-11 PROCEDURE — 76536 US EXAM OF HEAD AND NECK: CPT | Mod: TC

## 2021-01-13 LAB — CALCIT SERPL-MCNC: <5 PG/ML

## 2021-01-14 DIAGNOSIS — Z12.31 OTHER SCREENING MAMMOGRAM: ICD-10-CM

## 2021-01-15 LAB — CGA SERPL-MCNC: 74 NG/ML (ref 0–103)

## 2021-01-20 ENCOUNTER — OFFICE VISIT (OUTPATIENT)
Dept: FAMILY MEDICINE | Facility: CLINIC | Age: 51
End: 2021-01-20
Payer: COMMERCIAL

## 2021-01-20 VITALS
TEMPERATURE: 98 F | BODY MASS INDEX: 32.25 KG/M2 | SYSTOLIC BLOOD PRESSURE: 118 MMHG | DIASTOLIC BLOOD PRESSURE: 82 MMHG | HEART RATE: 94 BPM | HEIGHT: 62 IN | WEIGHT: 175.25 LBS | OXYGEN SATURATION: 96 % | RESPIRATION RATE: 18 BRPM

## 2021-01-20 DIAGNOSIS — R53.83 FATIGUE, UNSPECIFIED TYPE: Primary | ICD-10-CM

## 2021-01-20 DIAGNOSIS — G89.29 CHRONIC LEFT SHOULDER PAIN: ICD-10-CM

## 2021-01-20 DIAGNOSIS — F43.23 ADJUSTMENT DISORDER WITH MIXED ANXIETY AND DEPRESSED MOOD: ICD-10-CM

## 2021-01-20 DIAGNOSIS — M25.512 CHRONIC LEFT SHOULDER PAIN: ICD-10-CM

## 2021-01-20 PROCEDURE — 3079F DIAST BP 80-89 MM HG: CPT | Mod: CPTII,S$GLB,, | Performed by: STUDENT IN AN ORGANIZED HEALTH CARE EDUCATION/TRAINING PROGRAM

## 2021-01-20 PROCEDURE — 1125F AMNT PAIN NOTED PAIN PRSNT: CPT | Mod: S$GLB,,, | Performed by: STUDENT IN AN ORGANIZED HEALTH CARE EDUCATION/TRAINING PROGRAM

## 2021-01-20 PROCEDURE — 3079F PR MOST RECENT DIASTOLIC BLOOD PRESSURE 80-89 MM HG: ICD-10-PCS | Mod: CPTII,S$GLB,, | Performed by: STUDENT IN AN ORGANIZED HEALTH CARE EDUCATION/TRAINING PROGRAM

## 2021-01-20 PROCEDURE — 3074F SYST BP LT 130 MM HG: CPT | Mod: CPTII,S$GLB,, | Performed by: STUDENT IN AN ORGANIZED HEALTH CARE EDUCATION/TRAINING PROGRAM

## 2021-01-20 PROCEDURE — 99214 OFFICE O/P EST MOD 30 MIN: CPT | Mod: S$GLB,,, | Performed by: STUDENT IN AN ORGANIZED HEALTH CARE EDUCATION/TRAINING PROGRAM

## 2021-01-20 PROCEDURE — 99214 PR OFFICE/OUTPT VISIT, EST, LEVL IV, 30-39 MIN: ICD-10-PCS | Mod: S$GLB,,, | Performed by: STUDENT IN AN ORGANIZED HEALTH CARE EDUCATION/TRAINING PROGRAM

## 2021-01-20 PROCEDURE — 3008F BODY MASS INDEX DOCD: CPT | Mod: CPTII,S$GLB,, | Performed by: STUDENT IN AN ORGANIZED HEALTH CARE EDUCATION/TRAINING PROGRAM

## 2021-01-20 PROCEDURE — 3008F PR BODY MASS INDEX (BMI) DOCUMENTED: ICD-10-PCS | Mod: CPTII,S$GLB,, | Performed by: STUDENT IN AN ORGANIZED HEALTH CARE EDUCATION/TRAINING PROGRAM

## 2021-01-20 PROCEDURE — 3074F PR MOST RECENT SYSTOLIC BLOOD PRESSURE < 130 MM HG: ICD-10-PCS | Mod: CPTII,S$GLB,, | Performed by: STUDENT IN AN ORGANIZED HEALTH CARE EDUCATION/TRAINING PROGRAM

## 2021-01-20 PROCEDURE — 99999 PR PBB SHADOW E&M-EST. PATIENT-LVL V: CPT | Mod: PBBFAC,,, | Performed by: STUDENT IN AN ORGANIZED HEALTH CARE EDUCATION/TRAINING PROGRAM

## 2021-01-20 PROCEDURE — 99999 PR PBB SHADOW E&M-EST. PATIENT-LVL V: ICD-10-PCS | Mod: PBBFAC,,, | Performed by: STUDENT IN AN ORGANIZED HEALTH CARE EDUCATION/TRAINING PROGRAM

## 2021-01-20 PROCEDURE — 1125F PR PAIN SEVERITY QUANTIFIED, PAIN PRESENT: ICD-10-PCS | Mod: S$GLB,,, | Performed by: STUDENT IN AN ORGANIZED HEALTH CARE EDUCATION/TRAINING PROGRAM

## 2021-01-20 RX ORDER — ESCITALOPRAM OXALATE 10 MG/1
10 TABLET ORAL DAILY
Qty: 30 TABLET | Refills: 11 | Status: SHIPPED | OUTPATIENT
Start: 2021-01-20 | End: 2021-02-19

## 2021-01-22 ENCOUNTER — CLINICAL SUPPORT (OUTPATIENT)
Dept: OTHER | Facility: CLINIC | Age: 51
End: 2021-01-22
Payer: COMMERCIAL

## 2021-01-22 DIAGNOSIS — Z00.8 ENCOUNTER FOR OTHER GENERAL EXAMINATION: ICD-10-CM

## 2021-01-23 VITALS — HEIGHT: 62 IN | BODY MASS INDEX: 32.06 KG/M2

## 2021-01-23 LAB
GLUCOSE SERPL-MCNC: 98 MG/DL (ref 60–140)
HDLC SERPL-MCNC: 41 MG/DL
POC CHOLESTEROL, LDL (DOCK): 153 MG/DL
POC CHOLESTEROL, TOTAL: 218 MG/DL
TRIGL SERPL-MCNC: 124 MG/DL

## 2021-01-26 ENCOUNTER — CLINICAL SUPPORT (OUTPATIENT)
Dept: REHABILITATION | Facility: HOSPITAL | Age: 51
End: 2021-01-26
Payer: COMMERCIAL

## 2021-01-26 DIAGNOSIS — R29.3 POSTURE IMBALANCE: ICD-10-CM

## 2021-01-26 DIAGNOSIS — G89.29 CHRONIC LEFT SHOULDER PAIN: ICD-10-CM

## 2021-01-26 DIAGNOSIS — M25.512 CHRONIC LEFT SHOULDER PAIN: ICD-10-CM

## 2021-01-26 DIAGNOSIS — M53.82 IMPAIRED RANGE OF MOTION OF CERVICAL SPINE: ICD-10-CM

## 2021-01-26 DIAGNOSIS — M53.82 NECK MUSCLE WEAKNESS: ICD-10-CM

## 2021-01-26 PROCEDURE — 97161 PT EVAL LOW COMPLEX 20 MIN: CPT | Mod: PN

## 2021-01-26 PROCEDURE — 97140 MANUAL THERAPY 1/> REGIONS: CPT | Mod: PN

## 2021-01-29 DIAGNOSIS — Z12.31 VISIT FOR SCREENING MAMMOGRAM: Primary | ICD-10-CM

## 2021-01-30 ENCOUNTER — PATIENT OUTREACH (OUTPATIENT)
Dept: ADMINISTRATIVE | Facility: OTHER | Age: 51
End: 2021-01-30

## 2021-02-01 ENCOUNTER — HOSPITAL ENCOUNTER (OUTPATIENT)
Dept: RADIOLOGY | Facility: HOSPITAL | Age: 51
Discharge: HOME | End: 2021-02-01
Attending: SPECIALIST
Payer: COMMERCIAL

## 2021-02-01 ENCOUNTER — OFFICE VISIT (OUTPATIENT)
Dept: OBSTETRICS AND GYNECOLOGY | Facility: CLINIC | Age: 51
End: 2021-02-01
Payer: COMMERCIAL

## 2021-02-01 VITALS
DIASTOLIC BLOOD PRESSURE: 74 MMHG | BODY MASS INDEX: 32.66 KG/M2 | SYSTOLIC BLOOD PRESSURE: 116 MMHG | WEIGHT: 177.5 LBS | HEIGHT: 62 IN

## 2021-02-01 DIAGNOSIS — Z00.00 PREVENTATIVE HEALTH CARE: Primary | ICD-10-CM

## 2021-02-01 DIAGNOSIS — Z12.31 VISIT FOR SCREENING MAMMOGRAM: ICD-10-CM

## 2021-02-01 PROCEDURE — 3074F SYST BP LT 130 MM HG: CPT | Mod: CPTII,S$GLB,, | Performed by: SPECIALIST

## 2021-02-01 PROCEDURE — 3008F PR BODY MASS INDEX (BMI) DOCUMENTED: ICD-10-PCS | Mod: CPTII,S$GLB,, | Performed by: SPECIALIST

## 2021-02-01 PROCEDURE — 3008F BODY MASS INDEX DOCD: CPT | Mod: CPTII,S$GLB,, | Performed by: SPECIALIST

## 2021-02-01 PROCEDURE — 3078F PR MOST RECENT DIASTOLIC BLOOD PRESSURE < 80 MM HG: ICD-10-PCS | Mod: CPTII,S$GLB,, | Performed by: SPECIALIST

## 2021-02-01 PROCEDURE — 99999 PR PBB SHADOW E&M-EST. PATIENT-LVL IV: CPT | Mod: PBBFAC,,, | Performed by: SPECIALIST

## 2021-02-01 PROCEDURE — 99396 PREV VISIT EST AGE 40-64: CPT | Mod: S$GLB,,, | Performed by: SPECIALIST

## 2021-02-01 PROCEDURE — 1126F PR PAIN SEVERITY QUANTIFIED, NO PAIN PRESENT: ICD-10-PCS | Mod: S$GLB,,, | Performed by: SPECIALIST

## 2021-02-01 PROCEDURE — 77063 BREAST TOMOSYNTHESIS BI: CPT | Mod: 26,,, | Performed by: RADIOLOGY

## 2021-02-01 PROCEDURE — 1126F AMNT PAIN NOTED NONE PRSNT: CPT | Mod: S$GLB,,, | Performed by: SPECIALIST

## 2021-02-01 PROCEDURE — 99396 PR PREVENTIVE VISIT,EST,40-64: ICD-10-PCS | Mod: S$GLB,,, | Performed by: SPECIALIST

## 2021-02-01 PROCEDURE — 77067 SCR MAMMO BI INCL CAD: CPT | Mod: TC,PN

## 2021-02-01 PROCEDURE — 77063 MAMMO DIGITAL SCREENING BILAT WITH TOMO: ICD-10-PCS | Mod: 26,,, | Performed by: RADIOLOGY

## 2021-02-01 PROCEDURE — 99999 PR PBB SHADOW E&M-EST. PATIENT-LVL IV: ICD-10-PCS | Mod: PBBFAC,,, | Performed by: SPECIALIST

## 2021-02-01 PROCEDURE — 77067 SCR MAMMO BI INCL CAD: CPT | Mod: 26,,, | Performed by: RADIOLOGY

## 2021-02-01 PROCEDURE — 3078F DIAST BP <80 MM HG: CPT | Mod: CPTII,S$GLB,, | Performed by: SPECIALIST

## 2021-02-01 PROCEDURE — 3074F PR MOST RECENT SYSTOLIC BLOOD PRESSURE < 130 MM HG: ICD-10-PCS | Mod: CPTII,S$GLB,, | Performed by: SPECIALIST

## 2021-02-01 PROCEDURE — 77067 MAMMO DIGITAL SCREENING BILAT WITH TOMO: ICD-10-PCS | Mod: 26,,, | Performed by: RADIOLOGY

## 2021-02-01 RX ORDER — ESTRADIOL 0.1 MG/G
1 CREAM VAGINAL
Qty: 42.5 G | Refills: 1 | Status: SHIPPED | OUTPATIENT
Start: 2021-02-01 | End: 2021-04-05

## 2021-02-10 ENCOUNTER — TELEPHONE (OUTPATIENT)
Dept: REHABILITATION | Facility: HOSPITAL | Age: 51
End: 2021-02-10

## 2021-02-18 ENCOUNTER — DOCUMENTATION ONLY (OUTPATIENT)
Dept: REHABILITATION | Facility: HOSPITAL | Age: 51
End: 2021-02-18

## 2021-02-18 ENCOUNTER — TELEPHONE (OUTPATIENT)
Dept: REHABILITATION | Facility: HOSPITAL | Age: 51
End: 2021-02-18

## 2021-02-18 PROBLEM — M53.82 NECK MUSCLE WEAKNESS: Status: RESOLVED | Noted: 2021-01-26 | Resolved: 2021-02-18

## 2021-02-18 PROBLEM — R29.3 POSTURE IMBALANCE: Status: RESOLVED | Noted: 2021-01-26 | Resolved: 2021-02-18

## 2021-02-18 PROBLEM — M53.82 IMPAIRED RANGE OF MOTION OF CERVICAL SPINE: Status: RESOLVED | Noted: 2021-01-26 | Resolved: 2021-02-18

## 2021-02-19 ENCOUNTER — PATIENT MESSAGE (OUTPATIENT)
Dept: OBSTETRICS AND GYNECOLOGY | Facility: CLINIC | Age: 51
End: 2021-02-19

## 2021-02-19 ENCOUNTER — OFFICE VISIT (OUTPATIENT)
Dept: FAMILY MEDICINE | Facility: CLINIC | Age: 51
End: 2021-02-19
Payer: COMMERCIAL

## 2021-02-19 VITALS
DIASTOLIC BLOOD PRESSURE: 82 MMHG | WEIGHT: 176.13 LBS | SYSTOLIC BLOOD PRESSURE: 132 MMHG | HEART RATE: 72 BPM | HEIGHT: 62 IN | BODY MASS INDEX: 32.41 KG/M2 | TEMPERATURE: 98 F

## 2021-02-19 DIAGNOSIS — F41.8 DEPRESSION WITH ANXIETY: Primary | ICD-10-CM

## 2021-02-19 PROCEDURE — 3079F PR MOST RECENT DIASTOLIC BLOOD PRESSURE 80-89 MM HG: ICD-10-PCS | Mod: CPTII,S$GLB,, | Performed by: NURSE PRACTITIONER

## 2021-02-19 PROCEDURE — 99214 OFFICE O/P EST MOD 30 MIN: CPT | Mod: S$GLB,,, | Performed by: NURSE PRACTITIONER

## 2021-02-19 PROCEDURE — 1126F AMNT PAIN NOTED NONE PRSNT: CPT | Mod: S$GLB,,, | Performed by: NURSE PRACTITIONER

## 2021-02-19 PROCEDURE — 3008F BODY MASS INDEX DOCD: CPT | Mod: CPTII,S$GLB,, | Performed by: NURSE PRACTITIONER

## 2021-02-19 PROCEDURE — 3075F PR MOST RECENT SYSTOLIC BLOOD PRESS GE 130-139MM HG: ICD-10-PCS | Mod: CPTII,S$GLB,, | Performed by: NURSE PRACTITIONER

## 2021-02-19 PROCEDURE — 99999 PR PBB SHADOW E&M-EST. PATIENT-LVL IV: ICD-10-PCS | Mod: PBBFAC,,, | Performed by: NURSE PRACTITIONER

## 2021-02-19 PROCEDURE — 99214 PR OFFICE/OUTPT VISIT, EST, LEVL IV, 30-39 MIN: ICD-10-PCS | Mod: S$GLB,,, | Performed by: NURSE PRACTITIONER

## 2021-02-19 PROCEDURE — 1126F PR PAIN SEVERITY QUANTIFIED, NO PAIN PRESENT: ICD-10-PCS | Mod: S$GLB,,, | Performed by: NURSE PRACTITIONER

## 2021-02-19 PROCEDURE — 3075F SYST BP GE 130 - 139MM HG: CPT | Mod: CPTII,S$GLB,, | Performed by: NURSE PRACTITIONER

## 2021-02-19 PROCEDURE — 3008F PR BODY MASS INDEX (BMI) DOCUMENTED: ICD-10-PCS | Mod: CPTII,S$GLB,, | Performed by: NURSE PRACTITIONER

## 2021-02-19 PROCEDURE — 99999 PR PBB SHADOW E&M-EST. PATIENT-LVL IV: CPT | Mod: PBBFAC,,, | Performed by: NURSE PRACTITIONER

## 2021-02-19 PROCEDURE — 3079F DIAST BP 80-89 MM HG: CPT | Mod: CPTII,S$GLB,, | Performed by: NURSE PRACTITIONER

## 2021-02-19 RX ORDER — ESCITALOPRAM OXALATE 10 MG/1
15 TABLET ORAL DAILY
Qty: 45 TABLET | Refills: 11 | Status: SHIPPED | OUTPATIENT
Start: 2021-02-19 | End: 2022-03-07

## 2021-02-19 RX ORDER — ESTRADIOL 2 MG/1
2 TABLET ORAL DAILY
Qty: 90 TABLET | Refills: 1 | Status: SHIPPED | OUTPATIENT
Start: 2021-02-19 | End: 2021-11-15

## 2021-04-22 ENCOUNTER — OFFICE VISIT (OUTPATIENT)
Dept: PULMONOLOGY | Facility: CLINIC | Age: 51
End: 2021-04-22
Payer: COMMERCIAL

## 2021-04-22 VITALS
SYSTOLIC BLOOD PRESSURE: 127 MMHG | HEIGHT: 62 IN | DIASTOLIC BLOOD PRESSURE: 75 MMHG | OXYGEN SATURATION: 98 % | WEIGHT: 177.13 LBS | BODY MASS INDEX: 32.59 KG/M2 | HEART RATE: 63 BPM

## 2021-04-22 DIAGNOSIS — R53.83 FATIGUE, UNSPECIFIED TYPE: ICD-10-CM

## 2021-04-22 DIAGNOSIS — G47.30 SLEEP APNEA, UNSPECIFIED TYPE: Primary | ICD-10-CM

## 2021-04-22 PROCEDURE — 99213 OFFICE O/P EST LOW 20 MIN: CPT | Mod: S$GLB,,, | Performed by: NURSE PRACTITIONER

## 2021-04-22 PROCEDURE — 99999 PR PBB SHADOW E&M-EST. PATIENT-LVL IV: CPT | Mod: PBBFAC,,, | Performed by: NURSE PRACTITIONER

## 2021-04-22 PROCEDURE — 1125F AMNT PAIN NOTED PAIN PRSNT: CPT | Mod: S$GLB,,, | Performed by: NURSE PRACTITIONER

## 2021-04-22 PROCEDURE — 99999 PR PBB SHADOW E&M-EST. PATIENT-LVL IV: ICD-10-PCS | Mod: PBBFAC,,, | Performed by: NURSE PRACTITIONER

## 2021-04-22 PROCEDURE — 3008F BODY MASS INDEX DOCD: CPT | Mod: CPTII,S$GLB,, | Performed by: NURSE PRACTITIONER

## 2021-04-22 PROCEDURE — 99213 PR OFFICE/OUTPT VISIT, EST, LEVL III, 20-29 MIN: ICD-10-PCS | Mod: S$GLB,,, | Performed by: NURSE PRACTITIONER

## 2021-04-22 PROCEDURE — 3008F PR BODY MASS INDEX (BMI) DOCUMENTED: ICD-10-PCS | Mod: CPTII,S$GLB,, | Performed by: NURSE PRACTITIONER

## 2021-04-22 PROCEDURE — 1125F PR PAIN SEVERITY QUANTIFIED, PAIN PRESENT: ICD-10-PCS | Mod: S$GLB,,, | Performed by: NURSE PRACTITIONER

## 2021-04-26 ENCOUNTER — PROCEDURE VISIT (OUTPATIENT)
Dept: SLEEP MEDICINE | Facility: HOSPITAL | Age: 51
End: 2021-04-26
Attending: NURSE PRACTITIONER
Payer: COMMERCIAL

## 2021-04-26 DIAGNOSIS — G47.30 SLEEP APNEA, UNSPECIFIED TYPE: ICD-10-CM

## 2021-04-26 DIAGNOSIS — R53.83 FATIGUE, UNSPECIFIED TYPE: ICD-10-CM

## 2021-04-26 PROCEDURE — 95806 SLEEP STUDY UNATT&RESP EFFT: CPT

## 2021-05-03 ENCOUNTER — TELEPHONE (OUTPATIENT)
Dept: PULMONOLOGY | Facility: CLINIC | Age: 51
End: 2021-05-03

## 2021-05-03 DIAGNOSIS — G47.33 OBSTRUCTIVE SLEEP APNEA SYNDROME: Primary | ICD-10-CM

## 2021-05-11 ENCOUNTER — TELEPHONE (OUTPATIENT)
Dept: NEUROSURGERY | Facility: CLINIC | Age: 51
End: 2021-05-11

## 2021-05-13 ENCOUNTER — TELEPHONE (OUTPATIENT)
Dept: NEUROSURGERY | Facility: CLINIC | Age: 51
End: 2021-05-13

## 2021-06-03 ENCOUNTER — HOSPITAL ENCOUNTER (OUTPATIENT)
Dept: RADIOLOGY | Facility: HOSPITAL | Age: 51
Discharge: HOME | End: 2021-06-03
Attending: NEUROLOGICAL SURGERY
Payer: COMMERCIAL

## 2021-06-03 DIAGNOSIS — I67.1 CEREBRAL ANEURYSM WITHOUT RUPTURE: ICD-10-CM

## 2021-06-03 DIAGNOSIS — Z98.890 S/P CRANIOTOMY: ICD-10-CM

## 2021-06-03 PROCEDURE — 25500020 PHARM REV CODE 255: Performed by: NEUROLOGICAL SURGERY

## 2021-06-03 PROCEDURE — 70496 CT ANGIOGRAPHY HEAD: CPT | Mod: TC

## 2021-06-03 RX ADMIN — IOHEXOL 100 ML: 350 INJECTION, SOLUTION INTRAVENOUS at 08:06

## 2021-06-07 ENCOUNTER — TELEPHONE (OUTPATIENT)
Dept: NEUROSURGERY | Facility: CLINIC | Age: 51
End: 2021-06-07

## 2021-06-07 ENCOUNTER — OFFICE VISIT (OUTPATIENT)
Dept: NEUROSURGERY | Facility: CLINIC | Age: 51
End: 2021-06-07
Payer: COMMERCIAL

## 2021-06-07 VITALS — HEART RATE: 71 BPM | DIASTOLIC BLOOD PRESSURE: 82 MMHG | TEMPERATURE: 100 F | SYSTOLIC BLOOD PRESSURE: 116 MMHG

## 2021-06-07 DIAGNOSIS — I67.1 CEREBRAL ANEURYSM: Primary | ICD-10-CM

## 2021-06-07 PROCEDURE — 1125F AMNT PAIN NOTED PAIN PRSNT: CPT | Mod: S$GLB,,, | Performed by: NEUROLOGICAL SURGERY

## 2021-06-07 PROCEDURE — 99999 PR PBB SHADOW E&M-EST. PATIENT-LVL III: ICD-10-PCS | Mod: PBBFAC,,, | Performed by: NEUROLOGICAL SURGERY

## 2021-06-07 PROCEDURE — 99214 PR OFFICE/OUTPT VISIT, EST, LEVL IV, 30-39 MIN: ICD-10-PCS | Mod: S$GLB,,, | Performed by: NEUROLOGICAL SURGERY

## 2021-06-07 PROCEDURE — 99999 PR PBB SHADOW E&M-EST. PATIENT-LVL III: CPT | Mod: PBBFAC,,, | Performed by: NEUROLOGICAL SURGERY

## 2021-06-07 PROCEDURE — 99214 OFFICE O/P EST MOD 30 MIN: CPT | Mod: S$GLB,,, | Performed by: NEUROLOGICAL SURGERY

## 2021-06-07 PROCEDURE — 1125F PR PAIN SEVERITY QUANTIFIED, PAIN PRESENT: ICD-10-PCS | Mod: S$GLB,,, | Performed by: NEUROLOGICAL SURGERY

## 2021-06-18 ENCOUNTER — TELEPHONE (OUTPATIENT)
Dept: PULMONOLOGY | Facility: CLINIC | Age: 51
End: 2021-06-18

## 2021-06-23 ENCOUNTER — PATIENT MESSAGE (OUTPATIENT)
Dept: FAMILY MEDICINE | Facility: CLINIC | Age: 51
End: 2021-06-23

## 2021-06-23 DIAGNOSIS — Z12.11 ENCOUNTER FOR SCREENING COLONOSCOPY: Primary | ICD-10-CM

## 2021-06-24 ENCOUNTER — TELEPHONE (OUTPATIENT)
Dept: GASTROENTEROLOGY | Facility: CLINIC | Age: 51
End: 2021-06-24

## 2021-06-24 DIAGNOSIS — Z12.11 SCREENING FOR COLON CANCER: Primary | ICD-10-CM

## 2021-07-20 ENCOUNTER — LAB VISIT (OUTPATIENT)
Dept: LAB | Facility: HOSPITAL | Age: 51
End: 2021-07-20
Attending: STUDENT IN AN ORGANIZED HEALTH CARE EDUCATION/TRAINING PROGRAM
Payer: COMMERCIAL

## 2021-07-20 ENCOUNTER — PATIENT OUTREACH (OUTPATIENT)
Dept: ADMINISTRATIVE | Facility: OTHER | Age: 51
End: 2021-07-20

## 2021-07-20 ENCOUNTER — OFFICE VISIT (OUTPATIENT)
Dept: FAMILY MEDICINE | Facility: CLINIC | Age: 51
End: 2021-07-20
Payer: COMMERCIAL

## 2021-07-20 VITALS
OXYGEN SATURATION: 96 % | HEIGHT: 62 IN | BODY MASS INDEX: 33.23 KG/M2 | HEART RATE: 65 BPM | SYSTOLIC BLOOD PRESSURE: 134 MMHG | WEIGHT: 180.56 LBS | DIASTOLIC BLOOD PRESSURE: 82 MMHG | RESPIRATION RATE: 18 BRPM

## 2021-07-20 DIAGNOSIS — F41.8 DEPRESSION WITH ANXIETY: ICD-10-CM

## 2021-07-20 DIAGNOSIS — I10 ESSENTIAL HYPERTENSION: ICD-10-CM

## 2021-07-20 DIAGNOSIS — Z00.00 ROUTINE GENERAL MEDICAL EXAMINATION AT A HEALTH CARE FACILITY: Primary | ICD-10-CM

## 2021-07-20 DIAGNOSIS — R43.0 ANOSMIA: ICD-10-CM

## 2021-07-20 PROCEDURE — 3079F DIAST BP 80-89 MM HG: CPT | Mod: CPTII,S$GLB,, | Performed by: STUDENT IN AN ORGANIZED HEALTH CARE EDUCATION/TRAINING PROGRAM

## 2021-07-20 PROCEDURE — 99214 OFFICE O/P EST MOD 30 MIN: CPT | Mod: S$GLB,,, | Performed by: STUDENT IN AN ORGANIZED HEALTH CARE EDUCATION/TRAINING PROGRAM

## 2021-07-20 PROCEDURE — 3008F PR BODY MASS INDEX (BMI) DOCUMENTED: ICD-10-PCS | Mod: CPTII,S$GLB,, | Performed by: STUDENT IN AN ORGANIZED HEALTH CARE EDUCATION/TRAINING PROGRAM

## 2021-07-20 PROCEDURE — 99999 PR PBB SHADOW E&M-EST. PATIENT-LVL V: ICD-10-PCS | Mod: PBBFAC,,, | Performed by: STUDENT IN AN ORGANIZED HEALTH CARE EDUCATION/TRAINING PROGRAM

## 2021-07-20 PROCEDURE — 1125F PR PAIN SEVERITY QUANTIFIED, PAIN PRESENT: ICD-10-PCS | Mod: CPTII,S$GLB,, | Performed by: STUDENT IN AN ORGANIZED HEALTH CARE EDUCATION/TRAINING PROGRAM

## 2021-07-20 PROCEDURE — 36415 COLL VENOUS BLD VENIPUNCTURE: CPT | Mod: PO | Performed by: STUDENT IN AN ORGANIZED HEALTH CARE EDUCATION/TRAINING PROGRAM

## 2021-07-20 PROCEDURE — 3079F PR MOST RECENT DIASTOLIC BLOOD PRESSURE 80-89 MM HG: ICD-10-PCS | Mod: CPTII,S$GLB,, | Performed by: STUDENT IN AN ORGANIZED HEALTH CARE EDUCATION/TRAINING PROGRAM

## 2021-07-20 PROCEDURE — 1125F AMNT PAIN NOTED PAIN PRSNT: CPT | Mod: CPTII,S$GLB,, | Performed by: STUDENT IN AN ORGANIZED HEALTH CARE EDUCATION/TRAINING PROGRAM

## 2021-07-20 PROCEDURE — 80048 BASIC METABOLIC PNL TOTAL CA: CPT | Performed by: STUDENT IN AN ORGANIZED HEALTH CARE EDUCATION/TRAINING PROGRAM

## 2021-07-20 PROCEDURE — 3075F PR MOST RECENT SYSTOLIC BLOOD PRESS GE 130-139MM HG: ICD-10-PCS | Mod: CPTII,S$GLB,, | Performed by: STUDENT IN AN ORGANIZED HEALTH CARE EDUCATION/TRAINING PROGRAM

## 2021-07-20 PROCEDURE — 3075F SYST BP GE 130 - 139MM HG: CPT | Mod: CPTII,S$GLB,, | Performed by: STUDENT IN AN ORGANIZED HEALTH CARE EDUCATION/TRAINING PROGRAM

## 2021-07-20 PROCEDURE — 99999 PR PBB SHADOW E&M-EST. PATIENT-LVL V: CPT | Mod: PBBFAC,,, | Performed by: STUDENT IN AN ORGANIZED HEALTH CARE EDUCATION/TRAINING PROGRAM

## 2021-07-20 PROCEDURE — 99214 PR OFFICE/OUTPT VISIT, EST, LEVL IV, 30-39 MIN: ICD-10-PCS | Mod: S$GLB,,, | Performed by: STUDENT IN AN ORGANIZED HEALTH CARE EDUCATION/TRAINING PROGRAM

## 2021-07-20 PROCEDURE — 3008F BODY MASS INDEX DOCD: CPT | Mod: CPTII,S$GLB,, | Performed by: STUDENT IN AN ORGANIZED HEALTH CARE EDUCATION/TRAINING PROGRAM

## 2021-07-20 RX ORDER — ASPIRIN 81 MG/1
81 TABLET ORAL NIGHTLY
COMMUNITY

## 2021-07-21 LAB
ANION GAP SERPL CALC-SCNC: 11 MMOL/L (ref 8–16)
BUN SERPL-MCNC: 12 MG/DL (ref 6–20)
CALCIUM SERPL-MCNC: 10 MG/DL (ref 8.7–10.5)
CHLORIDE SERPL-SCNC: 102 MMOL/L (ref 95–110)
CO2 SERPL-SCNC: 28 MMOL/L (ref 23–29)
CREAT SERPL-MCNC: 0.7 MG/DL (ref 0.5–1.4)
EST. GFR  (AFRICAN AMERICAN): >60 ML/MIN/1.73 M^2
EST. GFR  (NON AFRICAN AMERICAN): >60 ML/MIN/1.73 M^2
GLUCOSE SERPL-MCNC: 73 MG/DL (ref 70–110)
POTASSIUM SERPL-SCNC: 3.7 MMOL/L (ref 3.5–5.1)
SODIUM SERPL-SCNC: 141 MMOL/L (ref 136–145)

## 2021-07-22 ENCOUNTER — TELEPHONE (OUTPATIENT)
Dept: PULMONOLOGY | Facility: CLINIC | Age: 51
End: 2021-07-22

## 2021-07-22 ENCOUNTER — PATIENT MESSAGE (OUTPATIENT)
Dept: GASTROENTEROLOGY | Facility: CLINIC | Age: 51
End: 2021-07-22

## 2021-07-22 ENCOUNTER — OFFICE VISIT (OUTPATIENT)
Dept: PULMONOLOGY | Facility: CLINIC | Age: 51
End: 2021-07-22
Payer: COMMERCIAL

## 2021-07-22 VITALS
HEIGHT: 62 IN | HEART RATE: 68 BPM | WEIGHT: 181.69 LBS | DIASTOLIC BLOOD PRESSURE: 83 MMHG | OXYGEN SATURATION: 96 % | SYSTOLIC BLOOD PRESSURE: 132 MMHG | BODY MASS INDEX: 33.43 KG/M2

## 2021-07-22 DIAGNOSIS — G47.33 OBSTRUCTIVE SLEEP APNEA SYNDROME: ICD-10-CM

## 2021-07-22 PROCEDURE — 3079F PR MOST RECENT DIASTOLIC BLOOD PRESSURE 80-89 MM HG: ICD-10-PCS | Mod: CPTII,S$GLB,, | Performed by: NURSE PRACTITIONER

## 2021-07-22 PROCEDURE — 1126F AMNT PAIN NOTED NONE PRSNT: CPT | Mod: CPTII,S$GLB,, | Performed by: NURSE PRACTITIONER

## 2021-07-22 PROCEDURE — 3075F SYST BP GE 130 - 139MM HG: CPT | Mod: CPTII,S$GLB,, | Performed by: NURSE PRACTITIONER

## 2021-07-22 PROCEDURE — 1126F PR PAIN SEVERITY QUANTIFIED, NO PAIN PRESENT: ICD-10-PCS | Mod: CPTII,S$GLB,, | Performed by: NURSE PRACTITIONER

## 2021-07-22 PROCEDURE — 3008F PR BODY MASS INDEX (BMI) DOCUMENTED: ICD-10-PCS | Mod: CPTII,S$GLB,, | Performed by: NURSE PRACTITIONER

## 2021-07-22 PROCEDURE — 3075F PR MOST RECENT SYSTOLIC BLOOD PRESS GE 130-139MM HG: ICD-10-PCS | Mod: CPTII,S$GLB,, | Performed by: NURSE PRACTITIONER

## 2021-07-22 PROCEDURE — 3079F DIAST BP 80-89 MM HG: CPT | Mod: CPTII,S$GLB,, | Performed by: NURSE PRACTITIONER

## 2021-07-22 PROCEDURE — 99213 PR OFFICE/OUTPT VISIT, EST, LEVL III, 20-29 MIN: ICD-10-PCS | Mod: S$GLB,,, | Performed by: NURSE PRACTITIONER

## 2021-07-22 PROCEDURE — 3008F BODY MASS INDEX DOCD: CPT | Mod: CPTII,S$GLB,, | Performed by: NURSE PRACTITIONER

## 2021-07-22 PROCEDURE — 99999 PR PBB SHADOW E&M-EST. PATIENT-LVL IV: CPT | Mod: PBBFAC,,, | Performed by: NURSE PRACTITIONER

## 2021-07-22 PROCEDURE — 99999 PR PBB SHADOW E&M-EST. PATIENT-LVL IV: ICD-10-PCS | Mod: PBBFAC,,, | Performed by: NURSE PRACTITIONER

## 2021-07-22 PROCEDURE — 99213 OFFICE O/P EST LOW 20 MIN: CPT | Mod: S$GLB,,, | Performed by: NURSE PRACTITIONER

## 2021-07-23 ENCOUNTER — ANESTHESIA (OUTPATIENT)
Dept: ENDOSCOPY | Facility: HOSPITAL | Age: 51
End: 2021-07-23
Payer: COMMERCIAL

## 2021-07-23 ENCOUNTER — HOSPITAL ENCOUNTER (OUTPATIENT)
Facility: HOSPITAL | Age: 51
Discharge: HOME | End: 2021-07-23
Attending: INTERNAL MEDICINE | Admitting: INTERNAL MEDICINE
Payer: COMMERCIAL

## 2021-07-23 ENCOUNTER — ANESTHESIA EVENT (OUTPATIENT)
Dept: ENDOSCOPY | Facility: HOSPITAL | Age: 51
End: 2021-07-23
Payer: COMMERCIAL

## 2021-07-23 DIAGNOSIS — Z12.11 SCREEN FOR COLON CANCER: ICD-10-CM

## 2021-07-23 PROCEDURE — 45380 COLONOSCOPY AND BIOPSY: CPT | Mod: 33,,, | Performed by: INTERNAL MEDICINE

## 2021-07-23 PROCEDURE — 37000009 HC ANESTHESIA EA ADD 15 MINS: Performed by: INTERNAL MEDICINE

## 2021-07-23 PROCEDURE — 27201012 HC FORCEPS, HOT/COLD, DISP: Performed by: INTERNAL MEDICINE

## 2021-07-23 PROCEDURE — D9220A PRA ANESTHESIA: Mod: 33,CRNA,, | Performed by: NURSE ANESTHETIST, CERTIFIED REGISTERED

## 2021-07-23 PROCEDURE — 88305 TISSUE EXAM BY PATHOLOGIST: CPT | Mod: 26,,, | Performed by: PATHOLOGY

## 2021-07-23 PROCEDURE — 45380 PR COLONOSCOPY,BIOPSY: ICD-10-PCS | Mod: 33,,, | Performed by: INTERNAL MEDICINE

## 2021-07-23 PROCEDURE — 88305 TISSUE EXAM BY PATHOLOGIST: ICD-10-PCS | Mod: 26,,, | Performed by: PATHOLOGY

## 2021-07-23 PROCEDURE — D9220A PRA ANESTHESIA: ICD-10-PCS | Mod: 33,CRNA,, | Performed by: NURSE ANESTHETIST, CERTIFIED REGISTERED

## 2021-07-23 PROCEDURE — D9220A PRA ANESTHESIA: Mod: 33,ANES,, | Performed by: ANESTHESIOLOGY

## 2021-07-23 PROCEDURE — D9220A PRA ANESTHESIA: ICD-10-PCS | Mod: 33,ANES,, | Performed by: ANESTHESIOLOGY

## 2021-07-23 PROCEDURE — 25000003 PHARM REV CODE 250: Performed by: INTERNAL MEDICINE

## 2021-07-23 PROCEDURE — 63600175 PHARM REV CODE 636 W HCPCS: Performed by: NURSE ANESTHETIST, CERTIFIED REGISTERED

## 2021-07-23 PROCEDURE — 37000008 HC ANESTHESIA 1ST 15 MINUTES: Performed by: INTERNAL MEDICINE

## 2021-07-23 PROCEDURE — 45380 COLONOSCOPY AND BIOPSY: CPT | Performed by: INTERNAL MEDICINE

## 2021-07-23 PROCEDURE — 25000003 PHARM REV CODE 250: Performed by: NURSE ANESTHETIST, CERTIFIED REGISTERED

## 2021-07-23 PROCEDURE — 88305 TISSUE EXAM BY PATHOLOGIST: CPT | Performed by: PATHOLOGY

## 2021-07-23 RX ORDER — PROPOFOL 10 MG/ML
VIAL (ML) INTRAVENOUS
Status: DISCONTINUED | OUTPATIENT
Start: 2021-07-23 | End: 2021-07-23

## 2021-07-23 RX ORDER — SODIUM CHLORIDE 9 MG/ML
INJECTION, SOLUTION INTRAVENOUS CONTINUOUS
Status: DISCONTINUED | OUTPATIENT
Start: 2021-07-23 | End: 2021-07-23 | Stop reason: HOSPADM

## 2021-07-23 RX ORDER — LIDOCAINE HCL/PF 100 MG/5ML
SYRINGE (ML) INTRAVENOUS
Status: DISCONTINUED | OUTPATIENT
Start: 2021-07-23 | End: 2021-07-23

## 2021-07-23 RX ADMIN — PROPOFOL 40 MG: 10 INJECTION, EMULSION INTRAVENOUS at 12:07

## 2021-07-23 RX ADMIN — SODIUM CHLORIDE: 0.9 INJECTION, SOLUTION INTRAVENOUS at 11:07

## 2021-07-23 RX ADMIN — PROPOFOL 80 MG: 10 INJECTION, EMULSION INTRAVENOUS at 12:07

## 2021-07-23 RX ADMIN — LIDOCAINE HYDROCHLORIDE 50 MG: 20 INJECTION INTRAVENOUS at 12:07

## 2021-07-23 RX ADMIN — SODIUM CHLORIDE: 0.9 INJECTION, SOLUTION INTRAVENOUS at 12:07

## 2021-07-26 VITALS
BODY MASS INDEX: 31.46 KG/M2 | TEMPERATURE: 98 F | WEIGHT: 172 LBS | HEART RATE: 62 BPM | OXYGEN SATURATION: 100 % | SYSTOLIC BLOOD PRESSURE: 118 MMHG | RESPIRATION RATE: 17 BRPM | DIASTOLIC BLOOD PRESSURE: 62 MMHG

## 2021-07-27 LAB
FINAL PATHOLOGIC DIAGNOSIS: NORMAL
GROSS: NORMAL
Lab: NORMAL

## 2021-08-13 ENCOUNTER — PATIENT MESSAGE (OUTPATIENT)
Dept: FAMILY MEDICINE | Facility: CLINIC | Age: 51
End: 2021-08-13

## 2021-09-03 ENCOUNTER — PATIENT MESSAGE (OUTPATIENT)
Dept: NEUROSURGERY | Facility: CLINIC | Age: 51
End: 2021-09-03

## 2021-11-12 ENCOUNTER — OFFICE VISIT (OUTPATIENT)
Dept: FAMILY MEDICINE | Facility: CLINIC | Age: 51
End: 2021-11-12
Payer: COMMERCIAL

## 2021-11-12 VITALS
WEIGHT: 180.75 LBS | HEART RATE: 75 BPM | HEIGHT: 62 IN | OXYGEN SATURATION: 95 % | SYSTOLIC BLOOD PRESSURE: 150 MMHG | TEMPERATURE: 98 F | DIASTOLIC BLOOD PRESSURE: 84 MMHG | BODY MASS INDEX: 33.26 KG/M2

## 2021-11-12 DIAGNOSIS — E66.9 OBESITY (BMI 30.0-34.9): ICD-10-CM

## 2021-11-12 DIAGNOSIS — J01.40 ACUTE PANSINUSITIS, RECURRENCE NOT SPECIFIED: Primary | ICD-10-CM

## 2021-11-12 PROCEDURE — 96372 THER/PROPH/DIAG INJ SC/IM: CPT | Mod: S$GLB,,, | Performed by: NURSE PRACTITIONER

## 2021-11-12 PROCEDURE — 4010F ACE/ARB THERAPY RXD/TAKEN: CPT | Mod: CPTII,S$GLB,, | Performed by: NURSE PRACTITIONER

## 2021-11-12 PROCEDURE — 99999 PR PBB SHADOW E&M-EST. PATIENT-LVL IV: CPT | Mod: PBBFAC,,, | Performed by: NURSE PRACTITIONER

## 2021-11-12 PROCEDURE — 4010F PR ACE/ARB THEARPY RXD/TAKEN: ICD-10-PCS | Mod: CPTII,S$GLB,, | Performed by: NURSE PRACTITIONER

## 2021-11-12 PROCEDURE — 99213 OFFICE O/P EST LOW 20 MIN: CPT | Mod: 25,S$GLB,, | Performed by: NURSE PRACTITIONER

## 2021-11-12 PROCEDURE — 96372 PR INJECTION,THERAP/PROPH/DIAG2ST, IM OR SUBCUT: ICD-10-PCS | Mod: S$GLB,,, | Performed by: NURSE PRACTITIONER

## 2021-11-12 PROCEDURE — 99213 PR OFFICE/OUTPT VISIT, EST, LEVL III, 20-29 MIN: ICD-10-PCS | Mod: 25,S$GLB,, | Performed by: NURSE PRACTITIONER

## 2021-11-12 PROCEDURE — 99999 PR PBB SHADOW E&M-EST. PATIENT-LVL IV: ICD-10-PCS | Mod: PBBFAC,,, | Performed by: NURSE PRACTITIONER

## 2021-11-12 RX ORDER — CEFTRIAXONE 1 G/1
1 INJECTION, POWDER, FOR SOLUTION INTRAMUSCULAR; INTRAVENOUS
Status: COMPLETED | OUTPATIENT
Start: 2021-11-12 | End: 2021-11-12

## 2021-11-12 RX ORDER — BETAMETHASONE SODIUM PHOSPHATE AND BETAMETHASONE ACETATE 3; 3 MG/ML; MG/ML
6 INJECTION, SUSPENSION INTRA-ARTICULAR; INTRALESIONAL; INTRAMUSCULAR; SOFT TISSUE ONCE
Status: COMPLETED | OUTPATIENT
Start: 2021-11-12 | End: 2021-11-12

## 2021-11-12 RX ORDER — TOBRAMYCIN 3 MG/ML
SOLUTION/ DROPS OPHTHALMIC
COMMUNITY
Start: 2021-08-09 | End: 2022-03-21

## 2021-11-12 RX ADMIN — BETAMETHASONE SODIUM PHOSPHATE AND BETAMETHASONE ACETATE 6 MG: 3; 3 INJECTION, SUSPENSION INTRA-ARTICULAR; INTRALESIONAL; INTRAMUSCULAR; SOFT TISSUE at 05:11

## 2021-11-12 RX ADMIN — CEFTRIAXONE 1 G: 1 INJECTION, POWDER, FOR SOLUTION INTRAMUSCULAR; INTRAVENOUS at 05:11

## 2021-11-15 ENCOUNTER — IMMUNIZATION (OUTPATIENT)
Dept: PRIMARY CARE CLINIC | Facility: CLINIC | Age: 51
End: 2021-11-15
Payer: COMMERCIAL

## 2021-11-15 DIAGNOSIS — Z23 NEED FOR VACCINATION: Primary | ICD-10-CM

## 2021-11-15 PROCEDURE — 91300 COVID-19, MRNA, LNP-S, PF, 30 MCG/0.3 ML DOSE VACCINE: CPT | Mod: S$GLB,,, | Performed by: FAMILY MEDICINE

## 2021-11-15 PROCEDURE — 0003A COVID-19, MRNA, LNP-S, PF, 30 MCG/0.3 ML DOSE VACCINE: CPT | Mod: S$GLB,,, | Performed by: FAMILY MEDICINE

## 2021-11-15 PROCEDURE — 91300 COVID-19, MRNA, LNP-S, PF, 30 MCG/0.3 ML DOSE VACCINE: ICD-10-PCS | Mod: S$GLB,,, | Performed by: FAMILY MEDICINE

## 2021-11-15 PROCEDURE — 0003A COVID-19, MRNA, LNP-S, PF, 30 MCG/0.3 ML DOSE VACCINE: ICD-10-PCS | Mod: S$GLB,,, | Performed by: FAMILY MEDICINE

## 2021-12-05 ENCOUNTER — PATIENT OUTREACH (OUTPATIENT)
Dept: ADMINISTRATIVE | Facility: OTHER | Age: 51
End: 2021-12-05
Payer: COMMERCIAL

## 2021-12-06 ENCOUNTER — OFFICE VISIT (OUTPATIENT)
Dept: ENDOCRINOLOGY | Facility: CLINIC | Age: 51
End: 2021-12-06
Payer: COMMERCIAL

## 2021-12-06 ENCOUNTER — PATIENT MESSAGE (OUTPATIENT)
Dept: ENDOCRINOLOGY | Facility: CLINIC | Age: 51
End: 2021-12-06

## 2021-12-06 ENCOUNTER — LAB VISIT (OUTPATIENT)
Dept: LAB | Facility: HOSPITAL | Age: 51
End: 2021-12-06
Attending: INTERNAL MEDICINE
Payer: COMMERCIAL

## 2021-12-06 VITALS
HEIGHT: 62 IN | HEART RATE: 66 BPM | OXYGEN SATURATION: 96 % | TEMPERATURE: 98 F | SYSTOLIC BLOOD PRESSURE: 114 MMHG | WEIGHT: 179.88 LBS | BODY MASS INDEX: 33.1 KG/M2 | DIASTOLIC BLOOD PRESSURE: 80 MMHG

## 2021-12-06 DIAGNOSIS — E05.90 HYPERTHYROIDISM: Primary | ICD-10-CM

## 2021-12-06 DIAGNOSIS — I67.1 CEREBRAL ANEURYSM WITHOUT RUPTURE: Chronic | ICD-10-CM

## 2021-12-06 DIAGNOSIS — E05.90 SUBCLINICAL HYPERTHYROIDISM: ICD-10-CM

## 2021-12-06 DIAGNOSIS — E88.810 DYSMETABOLIC SYNDROME: ICD-10-CM

## 2021-12-06 DIAGNOSIS — R94.6 ABNORMAL THYROID FUNCTION TEST: ICD-10-CM

## 2021-12-06 DIAGNOSIS — E06.9 THYROIDITIS: ICD-10-CM

## 2021-12-06 DIAGNOSIS — Z78.0 POSTMENOPAUSAL: ICD-10-CM

## 2021-12-06 DIAGNOSIS — G89.29 CHRONIC NONINTRACTABLE HEADACHE, UNSPECIFIED HEADACHE TYPE: ICD-10-CM

## 2021-12-06 DIAGNOSIS — R51.9 CHRONIC NONINTRACTABLE HEADACHE, UNSPECIFIED HEADACHE TYPE: ICD-10-CM

## 2021-12-06 DIAGNOSIS — E55.9 HYPOVITAMINOSIS D: ICD-10-CM

## 2021-12-06 DIAGNOSIS — F41.8 DEPRESSION WITH ANXIETY: ICD-10-CM

## 2021-12-06 DIAGNOSIS — E05.90 HYPERTHYROIDISM: ICD-10-CM

## 2021-12-06 DIAGNOSIS — G47.33 OBSTRUCTIVE SLEEP APNEA SYNDROME: ICD-10-CM

## 2021-12-06 DIAGNOSIS — E04.1 NODULAR THYROID DISEASE: ICD-10-CM

## 2021-12-06 DIAGNOSIS — Z82.49 FAMILY HISTORY OF BRAIN ANEURYSM: ICD-10-CM

## 2021-12-06 DIAGNOSIS — I10 ESSENTIAL HYPERTENSION: ICD-10-CM

## 2021-12-06 DIAGNOSIS — E66.9 OBESITY (BMI 30-39.9): ICD-10-CM

## 2021-12-06 LAB
ALBUMIN SERPL BCP-MCNC: 3.4 G/DL (ref 3.5–5.2)
ALP SERPL-CCNC: 77 U/L (ref 55–135)
ALT SERPL W/O P-5'-P-CCNC: 13 U/L (ref 10–44)
ANION GAP SERPL CALC-SCNC: 13 MMOL/L (ref 8–16)
AST SERPL-CCNC: 21 U/L (ref 10–40)
BASOPHILS # BLD AUTO: 0.05 K/UL (ref 0–0.2)
BASOPHILS NFR BLD: 0.7 % (ref 0–1.9)
BILIRUB SERPL-MCNC: 0.6 MG/DL (ref 0.1–1)
BUN SERPL-MCNC: 12 MG/DL (ref 6–20)
CA-I BLDV-SCNC: 1.28 MMOL/L (ref 1.06–1.42)
CALCIUM SERPL-MCNC: 10 MG/DL (ref 8.7–10.5)
CHLORIDE SERPL-SCNC: 103 MMOL/L (ref 95–110)
CO2 SERPL-SCNC: 24 MMOL/L (ref 23–29)
CREAT SERPL-MCNC: 0.6 MG/DL (ref 0.5–1.4)
DIFFERENTIAL METHOD: NORMAL
EOSINOPHIL # BLD AUTO: 0.2 K/UL (ref 0–0.5)
EOSINOPHIL NFR BLD: 2 % (ref 0–8)
ERYTHROCYTE [DISTWIDTH] IN BLOOD BY AUTOMATED COUNT: 13 % (ref 11.5–14.5)
EST. GFR  (AFRICAN AMERICAN): >60 ML/MIN/1.73 M^2
EST. GFR  (NON AFRICAN AMERICAN): >60 ML/MIN/1.73 M^2
ESTRADIOL SERPL-MCNC: 117 PG/ML
FSH SERPL-ACNC: 49.36 MIU/ML
GLUCOSE SERPL-MCNC: 75 MG/DL (ref 70–110)
HCT VFR BLD AUTO: 39.4 % (ref 37–48.5)
HGB BLD-MCNC: 12.8 G/DL (ref 12–16)
IMM GRANULOCYTES # BLD AUTO: 0.01 K/UL (ref 0–0.04)
IMM GRANULOCYTES NFR BLD AUTO: 0.1 % (ref 0–0.5)
LH SERPL-ACNC: 26.6 MIU/ML
LYMPHOCYTES # BLD AUTO: 2.1 K/UL (ref 1–4.8)
LYMPHOCYTES NFR BLD: 28.1 % (ref 18–48)
MCH RBC QN AUTO: 30.2 PG (ref 27–31)
MCHC RBC AUTO-ENTMCNC: 32.5 G/DL (ref 32–36)
MCV RBC AUTO: 93 FL (ref 82–98)
MONOCYTES # BLD AUTO: 0.6 K/UL (ref 0.3–1)
MONOCYTES NFR BLD: 8 % (ref 4–15)
NEUTROPHILS # BLD AUTO: 4.5 K/UL (ref 1.8–7.7)
NEUTROPHILS NFR BLD: 61.1 % (ref 38–73)
NRBC BLD-RTO: 0 /100 WBC
PLATELET # BLD AUTO: 343 K/UL (ref 150–450)
PMV BLD AUTO: 10.4 FL (ref 9.2–12.9)
POTASSIUM SERPL-SCNC: 3.6 MMOL/L (ref 3.5–5.1)
PROGEST SERPL-MCNC: 0.2 NG/ML
PROLACTIN SERPL IA-MCNC: 18.2 NG/ML (ref 5.2–26.5)
PROT SERPL-MCNC: 7.3 G/DL (ref 6–8.4)
RBC # BLD AUTO: 4.24 M/UL (ref 4–5.4)
SODIUM SERPL-SCNC: 140 MMOL/L (ref 136–145)
T3 SERPL-MCNC: 157 NG/DL (ref 60–180)
T4 FREE SERPL-MCNC: 0.99 NG/DL (ref 0.71–1.51)
TSH SERPL DL<=0.005 MIU/L-ACNC: 0.31 UIU/ML (ref 0.4–4)
URATE SERPL-MCNC: 7.4 MG/DL (ref 2.4–5.7)
WBC # BLD AUTO: 7.34 K/UL (ref 3.9–12.7)

## 2021-12-06 PROCEDURE — 80053 COMPREHEN METABOLIC PANEL: CPT | Performed by: INTERNAL MEDICINE

## 2021-12-06 PROCEDURE — 4010F PR ACE/ARB THEARPY RXD/TAKEN: ICD-10-PCS | Mod: CPTII,S$GLB,, | Performed by: INTERNAL MEDICINE

## 2021-12-06 PROCEDURE — 99999 PR PBB SHADOW E&M-EST. PATIENT-LVL V: ICD-10-PCS | Mod: PBBFAC,,, | Performed by: INTERNAL MEDICINE

## 2021-12-06 PROCEDURE — 84403 ASSAY OF TOTAL TESTOSTERONE: CPT | Performed by: INTERNAL MEDICINE

## 2021-12-06 PROCEDURE — 4010F ACE/ARB THERAPY RXD/TAKEN: CPT | Mod: CPTII,S$GLB,, | Performed by: INTERNAL MEDICINE

## 2021-12-06 PROCEDURE — 99214 PR OFFICE/OUTPT VISIT, EST, LEVL IV, 30-39 MIN: ICD-10-PCS | Mod: S$GLB,,, | Performed by: INTERNAL MEDICINE

## 2021-12-06 PROCEDURE — 84443 ASSAY THYROID STIM HORMONE: CPT | Performed by: INTERNAL MEDICINE

## 2021-12-06 PROCEDURE — 84146 ASSAY OF PROLACTIN: CPT | Performed by: INTERNAL MEDICINE

## 2021-12-06 PROCEDURE — 99999 PR PBB SHADOW E&M-EST. PATIENT-LVL V: CPT | Mod: PBBFAC,,, | Performed by: INTERNAL MEDICINE

## 2021-12-06 PROCEDURE — 84480 ASSAY TRIIODOTHYRONINE (T3): CPT | Performed by: INTERNAL MEDICINE

## 2021-12-06 PROCEDURE — 85025 COMPLETE CBC W/AUTO DIFF WBC: CPT | Performed by: INTERNAL MEDICINE

## 2021-12-06 PROCEDURE — 82306 VITAMIN D 25 HYDROXY: CPT | Performed by: INTERNAL MEDICINE

## 2021-12-06 PROCEDURE — 82670 ASSAY OF TOTAL ESTRADIOL: CPT | Performed by: INTERNAL MEDICINE

## 2021-12-06 PROCEDURE — 84432 ASSAY OF THYROGLOBULIN: CPT | Performed by: INTERNAL MEDICINE

## 2021-12-06 PROCEDURE — 36415 COLL VENOUS BLD VENIPUNCTURE: CPT | Mod: PO | Performed by: INTERNAL MEDICINE

## 2021-12-06 PROCEDURE — 84550 ASSAY OF BLOOD/URIC ACID: CPT | Performed by: INTERNAL MEDICINE

## 2021-12-06 PROCEDURE — 83001 ASSAY OF GONADOTROPIN (FSH): CPT | Performed by: INTERNAL MEDICINE

## 2021-12-06 PROCEDURE — 99214 OFFICE O/P EST MOD 30 MIN: CPT | Mod: S$GLB,,, | Performed by: INTERNAL MEDICINE

## 2021-12-06 PROCEDURE — 83002 ASSAY OF GONADOTROPIN (LH): CPT | Performed by: INTERNAL MEDICINE

## 2021-12-06 PROCEDURE — 83970 ASSAY OF PARATHORMONE: CPT | Performed by: INTERNAL MEDICINE

## 2021-12-06 PROCEDURE — 84439 ASSAY OF FREE THYROXINE: CPT | Performed by: INTERNAL MEDICINE

## 2021-12-06 PROCEDURE — 82672 ASSAY OF ESTROGEN: CPT | Performed by: INTERNAL MEDICINE

## 2021-12-06 PROCEDURE — 84144 ASSAY OF PROGESTERONE: CPT | Performed by: INTERNAL MEDICINE

## 2021-12-06 PROCEDURE — 82330 ASSAY OF CALCIUM: CPT | Performed by: INTERNAL MEDICINE

## 2021-12-07 DIAGNOSIS — Z78.0 POSTMENOPAUSAL: Primary | ICD-10-CM

## 2021-12-07 LAB
25(OH)D3+25(OH)D2 SERPL-MCNC: 87 NG/ML (ref 30–96)
PTH-INTACT SERPL-MCNC: 57.9 PG/ML (ref 9–77)

## 2021-12-07 RX ORDER — PROGESTERONE 100 MG/1
100 CAPSULE ORAL DAILY
Qty: 90 CAPSULE | Refills: 3 | Status: SHIPPED | OUTPATIENT
Start: 2021-12-07 | End: 2022-03-21

## 2021-12-08 LAB
THRYOGLOBULIN INTERPRETATION: ABNORMAL
THYROGLOB AB SERPL-ACNC: <1.8 IU/ML
THYROGLOB SERPL-MCNC: 41 NG/ML

## 2021-12-10 LAB — ESTROGEN SERPL-MCNC: NORMAL PG/ML

## 2021-12-13 ENCOUNTER — HOSPITAL ENCOUNTER (OUTPATIENT)
Dept: RADIOLOGY | Facility: HOSPITAL | Age: 51
Discharge: HOME | End: 2021-12-13
Attending: INTERNAL MEDICINE
Payer: COMMERCIAL

## 2021-12-13 DIAGNOSIS — E04.1 NODULAR THYROID DISEASE: ICD-10-CM

## 2021-12-13 LAB
ALBUMIN SERPL-MCNC: 3.8 G/DL (ref 3.6–5.1)
SHBG SERPL-SCNC: 89 NMOL/L (ref 17–124)
TESTOST FREE SERPL-MCNC: 1.5 PG/ML (ref 0.2–5)
TESTOST SERPL-MCNC: 30 NG/DL (ref 2–45)
TESTOSTERONE.FREE+WB SERPL-MCNC: 2.7 NG/DL (ref 0.5–8.5)

## 2021-12-13 PROCEDURE — 76536 US SOFT TISSUE HEAD NECK THYROID: ICD-10-PCS | Mod: 26,,, | Performed by: RADIOLOGY

## 2021-12-13 PROCEDURE — 76536 US EXAM OF HEAD AND NECK: CPT | Mod: 26,,, | Performed by: RADIOLOGY

## 2021-12-13 PROCEDURE — 76536 US EXAM OF HEAD AND NECK: CPT | Mod: TC

## 2022-01-20 ENCOUNTER — CLINICAL SUPPORT (OUTPATIENT)
Dept: OTHER | Facility: CLINIC | Age: 52
End: 2022-01-20
Payer: COMMERCIAL

## 2022-01-20 ENCOUNTER — LAB VISIT (OUTPATIENT)
Dept: LAB | Facility: HOSPITAL | Age: 52
End: 2022-01-20
Attending: STUDENT IN AN ORGANIZED HEALTH CARE EDUCATION/TRAINING PROGRAM
Payer: COMMERCIAL

## 2022-01-20 DIAGNOSIS — I10 ESSENTIAL HYPERTENSION: ICD-10-CM

## 2022-01-20 DIAGNOSIS — Z00.8 ENCOUNTER FOR OTHER GENERAL EXAMINATION: ICD-10-CM

## 2022-01-20 LAB
ANION GAP SERPL CALC-SCNC: 10 MMOL/L (ref 8–16)
BUN SERPL-MCNC: 15 MG/DL (ref 6–20)
CALCIUM SERPL-MCNC: 9.7 MG/DL (ref 8.7–10.5)
CHLORIDE SERPL-SCNC: 101 MMOL/L (ref 95–110)
CO2 SERPL-SCNC: 28 MMOL/L (ref 23–29)
CREAT SERPL-MCNC: 0.6 MG/DL (ref 0.5–1.4)
EST. GFR  (AFRICAN AMERICAN): >60 ML/MIN/1.73 M^2
EST. GFR  (NON AFRICAN AMERICAN): >60 ML/MIN/1.73 M^2
GLUCOSE SERPL-MCNC: 96 MG/DL (ref 70–110)
POTASSIUM SERPL-SCNC: 3.7 MMOL/L (ref 3.5–5.1)
SODIUM SERPL-SCNC: 139 MMOL/L (ref 136–145)

## 2022-01-20 PROCEDURE — 80048 BASIC METABOLIC PNL TOTAL CA: CPT | Performed by: STUDENT IN AN ORGANIZED HEALTH CARE EDUCATION/TRAINING PROGRAM

## 2022-01-20 PROCEDURE — 36415 COLL VENOUS BLD VENIPUNCTURE: CPT | Mod: PO | Performed by: STUDENT IN AN ORGANIZED HEALTH CARE EDUCATION/TRAINING PROGRAM

## 2022-01-21 VITALS — HEIGHT: 62 IN | BODY MASS INDEX: 32.9 KG/M2

## 2022-01-21 LAB
GLUCOSE SERPL-MCNC: 108 MG/DL (ref 60–140)
HDLC SERPL-MCNC: 25 MG/DL
POC CHOLESTEROL, LDL (DOCK): 150 MG/DL
POC CHOLESTEROL, TOTAL: 200 MG/DL
TRIGL SERPL-MCNC: 120 MG/DL

## 2022-01-31 ENCOUNTER — TELEPHONE (OUTPATIENT)
Dept: OTHER | Facility: CLINIC | Age: 52
End: 2022-01-31
Payer: COMMERCIAL

## 2022-01-31 NOTE — TELEPHONE ENCOUNTER
Spoke to patient on behalf of [facility]. Discussed abnormal lab results from recent health screening. Discussed diet and exercise. Encouraged follow up with PCP.

## 2022-02-03 DIAGNOSIS — Z12.31 VISIT FOR SCREENING MAMMOGRAM: Primary | ICD-10-CM

## 2022-02-04 ENCOUNTER — HOSPITAL ENCOUNTER (OUTPATIENT)
Dept: RADIOLOGY | Facility: HOSPITAL | Age: 52
Discharge: HOME | End: 2022-02-04
Attending: SPECIALIST
Payer: COMMERCIAL

## 2022-02-04 ENCOUNTER — OFFICE VISIT (OUTPATIENT)
Dept: OBSTETRICS AND GYNECOLOGY | Facility: CLINIC | Age: 52
End: 2022-02-04
Payer: COMMERCIAL

## 2022-02-04 VITALS
DIASTOLIC BLOOD PRESSURE: 84 MMHG | WEIGHT: 182.75 LBS | SYSTOLIC BLOOD PRESSURE: 128 MMHG | BODY MASS INDEX: 33.63 KG/M2 | HEIGHT: 62 IN

## 2022-02-04 DIAGNOSIS — Z00.00 PREVENTATIVE HEALTH CARE: Primary | ICD-10-CM

## 2022-02-04 DIAGNOSIS — Z12.31 VISIT FOR SCREENING MAMMOGRAM: ICD-10-CM

## 2022-02-04 PROCEDURE — 3074F PR MOST RECENT SYSTOLIC BLOOD PRESSURE < 130 MM HG: ICD-10-PCS | Mod: CPTII,S$GLB,, | Performed by: SPECIALIST

## 2022-02-04 PROCEDURE — 1159F PR MEDICATION LIST DOCUMENTED IN MEDICAL RECORD: ICD-10-PCS | Mod: CPTII,S$GLB,, | Performed by: SPECIALIST

## 2022-02-04 PROCEDURE — 77067 SCR MAMMO BI INCL CAD: CPT | Mod: 26,,, | Performed by: RADIOLOGY

## 2022-02-04 PROCEDURE — 99999 PR PBB SHADOW E&M-EST. PATIENT-LVL IV: CPT | Mod: PBBFAC,,, | Performed by: SPECIALIST

## 2022-02-04 PROCEDURE — 77067 MAMMO DIGITAL SCREENING BILAT WITH TOMO: ICD-10-PCS | Mod: 26,,, | Performed by: RADIOLOGY

## 2022-02-04 PROCEDURE — 4010F ACE/ARB THERAPY RXD/TAKEN: CPT | Mod: CPTII,S$GLB,, | Performed by: SPECIALIST

## 2022-02-04 PROCEDURE — 1159F MED LIST DOCD IN RCRD: CPT | Mod: CPTII,S$GLB,, | Performed by: SPECIALIST

## 2022-02-04 PROCEDURE — 77063 MAMMO DIGITAL SCREENING BILAT WITH TOMO: ICD-10-PCS | Mod: 26,,, | Performed by: RADIOLOGY

## 2022-02-04 PROCEDURE — 77063 BREAST TOMOSYNTHESIS BI: CPT | Mod: 26,,, | Performed by: RADIOLOGY

## 2022-02-04 PROCEDURE — 3008F BODY MASS INDEX DOCD: CPT | Mod: CPTII,S$GLB,, | Performed by: SPECIALIST

## 2022-02-04 PROCEDURE — 77067 SCR MAMMO BI INCL CAD: CPT | Mod: TC,PN

## 2022-02-04 PROCEDURE — 4010F PR ACE/ARB THEARPY RXD/TAKEN: ICD-10-PCS | Mod: CPTII,S$GLB,, | Performed by: SPECIALIST

## 2022-02-04 PROCEDURE — 99999 PR PBB SHADOW E&M-EST. PATIENT-LVL IV: ICD-10-PCS | Mod: PBBFAC,,, | Performed by: SPECIALIST

## 2022-02-04 PROCEDURE — 3079F PR MOST RECENT DIASTOLIC BLOOD PRESSURE 80-89 MM HG: ICD-10-PCS | Mod: CPTII,S$GLB,, | Performed by: SPECIALIST

## 2022-02-04 PROCEDURE — 3008F PR BODY MASS INDEX (BMI) DOCUMENTED: ICD-10-PCS | Mod: CPTII,S$GLB,, | Performed by: SPECIALIST

## 2022-02-04 PROCEDURE — 3079F DIAST BP 80-89 MM HG: CPT | Mod: CPTII,S$GLB,, | Performed by: SPECIALIST

## 2022-02-04 PROCEDURE — 77063 BREAST TOMOSYNTHESIS BI: CPT | Mod: TC,PN

## 2022-02-04 PROCEDURE — 99396 PR PREVENTIVE VISIT,EST,40-64: ICD-10-PCS | Mod: S$GLB,,, | Performed by: SPECIALIST

## 2022-02-04 PROCEDURE — 3074F SYST BP LT 130 MM HG: CPT | Mod: CPTII,S$GLB,, | Performed by: SPECIALIST

## 2022-02-04 PROCEDURE — 99396 PREV VISIT EST AGE 40-64: CPT | Mod: S$GLB,,, | Performed by: SPECIALIST

## 2022-02-04 RX ORDER — AMOXICILLIN 500 MG
1 CAPSULE ORAL DAILY
COMMUNITY

## 2022-02-04 NOTE — PROGRESS NOTES
52 yo BF presents for annual gyn eval. Pt is on combined HRT  I discussed subjective as opposed to medical indications for HRT and suggest d/c of above if not symptomatic, ie VMFS, night sweats etc.  Discussed truncal weight gain and likely menopausal etiology with decrease in basal metabolic rate and test. Receptor uptake  Past Medical History:   Diagnosis Date    Benign hypertension     Bladder instability     Brain aneurysm     Depression     DJD (degenerative joint disease), lumbar     History of viral meningitis 1996    Hyperlipidemia     Insomnia     Microscopic hematuria     negative work-up    Pulmonary nodule     minute    Tendonitis        Past Surgical History:   Procedure Laterality Date    ANTERIOR VAGINAL REPAIR      BRAIN SURGERY      aneurysm    BREAST BIOPSY  6/5/2007    left breast benign    CEREBRAL ANGIOGRAM N/A 9/17/2020    Procedure: ANGIOGRAM-CEREBRAL;  Surgeon: [facility] Diagnostic Provider;  Location: [facility] OR [address] [place] [address];  Service: Radiology;  Laterality: N/A;   Mark/Camilo    CLIP LIGATION OF INTRACRANIAL ANEURYSM BY CRANIOTOMY Left 11/4/2020    Procedure: CRANIOTOMY, WITH ANEURYSM CLIPPING RIGHT MODIFIED ORBITOZYGOMATIC CRANIOTOMY;  Surgeon: Christi Page MD;  Location: [address];  Service: Neurosurgery;  Laterality: Left;  Anesthesia: General   Blood: Type & Cross 2 units  Neuro Monitoring: SEP, MEP, EEG   Radiology: C-Arm   Bed: Regular Bed  Headrest: [place]  Position: Supine   ,  Reciprocating Saw  Length of Surgery: 4.5     COLONOSCOPY N/A 7/23/2021    Procedure: COLONOSCOPY;  Surgeon: Tiffanie Reyes MD;  Location: [facility];  Service: Endoscopy;  Laterality: N/A;    ENDOMETRIAL ABLATION  5/28/2009    HYSTERECTOMY      LAPAROSCOPIC HYSTERECTOMY  2/2013    TUBAL LIGATION  2002       Family History   Problem Relation Age of Onset    Osteoarthritis Mother     Hyperlipidemia Mother     Hypertension Mother     Allergies Mother     Hyperlipidemia Father      Diabetes Father     Hypertension Father     Cancer Paternal Grandmother         Breast    Diabetes Paternal Grandmother     Breast cancer Paternal Grandmother 56    Allergies Sister     Heart disease Sister         Congential    Breast cancer Maternal Aunt 61    Ovarian cancer Maternal Cousin 50       Social History     Socioeconomic History    Marital status:    Occupational History     Employer: [employer]   Tobacco Use    Smoking status: Former Smoker     Packs/day: 0.25     Years: 15.00     Pack years: 3.75     Types: Cigarettes     Quit date: 10/20/2013     Years since quittin.2    Smokeless tobacco: Never Used   Substance and Sexual Activity    Alcohol use: Not Currently     Alcohol/week: 1.0 standard drink     Types: 1 Glasses of wine per week    Drug use: No    Sexual activity: Yes     Partners: Male     Birth control/protection: Surgical   Social History Narrative    The patient does exercise regularly (walking daily, 35-40min).Rates diet as fair.She is not satisfied with weight.     Social Determinants of Health     Financial Resource Strain: High Risk    Difficulty of Paying Living Expenses: Hard   Food Insecurity: Food Insecurity Present    Worried About Running Out of Food in the Last Year: Often true    Ran Out of Food in the Last Year: Often true   Transportation Needs: No Transportation Needs    Lack of Transportation (Medical): No    Lack of Transportation (Non-Medical): No   Physical Activity: Insufficiently Active    Days of Exercise per Week: 4 days    Minutes of Exercise per Session: 30 min   Stress: Stress Concern Present    Feeling of Stress : Very much   Social Connections: Unknown    Frequency of Communication with Friends and Family: Three times a week    Frequency of Social Gatherings with Friends and Family: Once a week    Active Member of Clubs or Organizations: Yes    Attends Club or Organization Meetings: 1 to 4 times per  year    Marital Status:    Housing Stability: High Risk    Unable to Pay for Housing in the Last Year: Yes    Number of Places Lived in the Last Year: 1    Unstable Housing in the Last Year: No       Current Outpatient Medications   Medication Sig Dispense Refill    ascorbic acid, vitamin C, (VITAMIN C) 1000 MG tablet Take 1,000 mg by mouth once daily.      aspirin (ECOTRIN) 81 MG EC tablet Take 81 mg by mouth nightly.      atenoloL (TENORMIN) 25 MG tablet TAKE 1 TABLET BY MOUTH EVERY DAY 90 tablet 3    EScitalopram oxalate (LEXAPRO) 10 MG tablet Take 1.5 tablets (15 mg total) by mouth once daily. 45 tablet 11    estradioL (ESTRACE) 2 MG tablet TAKE 1 TABLET (2 MG TOTAL) BY MOUTH ONCE DAILY. 90 tablet 1    fluticasone (FLONASE) 50 mcg/actuation nasal spray 1 spray by Each Nare route once daily.      hydroCHLOROthiazide (HYDRODIURIL) 25 MG tablet TAKE 1 TABLET BY MOUTH EVERY DAY 90 tablet 3    losartan (COZAAR) 25 MG tablet TAKE 1 TABLET BY MOUTH EVERY DAY 90 tablet 3    multivitamin (THERAGRAN) per tablet Take 1 tablet by mouth once daily. Every day      omega-3 fatty acids/fish oil (FISH OIL-OMEGA-3 FATTY ACIDS) 300-1,000 mg capsule Take 1 capsule by mouth once daily.      progesterone (PROMETRIUM) 100 MG capsule Take 1 capsule (100 mg total) by mouth once daily. 90 capsule 3    tobramycin sulfate 0.3% (TOBREX) 0.3 % ophthalmic solution        No current facility-administered medications for this visit.       Review of patient's allergies indicates:   Allergen Reactions    Latex      Other reaction(s): Rash       Review of System:   General: no chills, fever, night sweats,POS WEIGHT GAIN  Psychological: no depression or suicidal ideation  Breasts: no new or changing breast lumps, nipple discharge or masses.  Respiratory: no cough, shortness of breath, or wheezing  Cardiovascular: no chest pain or dyspnea on exertion  Gastrointestinal: no abdominal pain, change in bowel habits, or black or  bloody stools  Genito-Urinary: no incontinence, urinary frequency/urgency or vulvar/vaginal symptoms, pelvic pain or abnormal vaginal bleeding.  Musculoskeletal: no gait disturbance or muscular weakness    PE:   APPEARANCE: Well nourished, well developed, in no acute distress.  NECK: Neck symmetric without masses or thyromegaly.  NODES: No inguinal lymph node enlargement.  ABDOMEN: Soft. No tenderness or masses. No hepatosplenomegaly. No hernias.  BREASTS: Symmetrical, no skin changes or visible lesions. No palpable masses, nipple discharge or adenopathy bilaterally.  PELVIC: Normal external female genitalia without lesions. Normal hair distribution. Adequate perineal body, normal urethral meatus. Vagina moist and well rugated without lesions or discharge. No significant cystocele or rectocele. Uterus and cervix surgically absent. Bimanual exam revealed no masses, tenderness or abnormality.    NOTE  NURSING PERSONAL PRESENT FOR ENTIRE PHYSICAL EXAM      BSE monthly  Yearly mammo screening  Consider d/c of HRT  RTO 1 year/prn

## 2022-03-21 ENCOUNTER — OFFICE VISIT (OUTPATIENT)
Dept: FAMILY MEDICINE | Facility: CLINIC | Age: 52
End: 2022-03-21
Payer: COMMERCIAL

## 2022-03-21 VITALS
BODY MASS INDEX: 33.79 KG/M2 | RESPIRATION RATE: 18 BRPM | HEART RATE: 89 BPM | DIASTOLIC BLOOD PRESSURE: 78 MMHG | SYSTOLIC BLOOD PRESSURE: 136 MMHG | HEIGHT: 62 IN | WEIGHT: 183.63 LBS | OXYGEN SATURATION: 98 %

## 2022-03-21 DIAGNOSIS — R43.0 LOSS OF SMELL: Primary | ICD-10-CM

## 2022-03-21 DIAGNOSIS — F33.1 MODERATE EPISODE OF RECURRENT MAJOR DEPRESSIVE DISORDER: ICD-10-CM

## 2022-03-21 DIAGNOSIS — I10 PRIMARY HYPERTENSION: ICD-10-CM

## 2022-03-21 PROCEDURE — 3078F PR MOST RECENT DIASTOLIC BLOOD PRESSURE < 80 MM HG: ICD-10-PCS | Mod: CPTII,S$GLB,, | Performed by: STUDENT IN AN ORGANIZED HEALTH CARE EDUCATION/TRAINING PROGRAM

## 2022-03-21 PROCEDURE — 99214 OFFICE O/P EST MOD 30 MIN: CPT | Mod: S$GLB,,, | Performed by: STUDENT IN AN ORGANIZED HEALTH CARE EDUCATION/TRAINING PROGRAM

## 2022-03-21 PROCEDURE — 1159F PR MEDICATION LIST DOCUMENTED IN MEDICAL RECORD: ICD-10-PCS | Mod: CPTII,S$GLB,, | Performed by: STUDENT IN AN ORGANIZED HEALTH CARE EDUCATION/TRAINING PROGRAM

## 2022-03-21 PROCEDURE — 4010F PR ACE/ARB THEARPY RXD/TAKEN: ICD-10-PCS | Mod: CPTII,S$GLB,, | Performed by: STUDENT IN AN ORGANIZED HEALTH CARE EDUCATION/TRAINING PROGRAM

## 2022-03-21 PROCEDURE — 3008F PR BODY MASS INDEX (BMI) DOCUMENTED: ICD-10-PCS | Mod: CPTII,S$GLB,, | Performed by: STUDENT IN AN ORGANIZED HEALTH CARE EDUCATION/TRAINING PROGRAM

## 2022-03-21 PROCEDURE — 3075F SYST BP GE 130 - 139MM HG: CPT | Mod: CPTII,S$GLB,, | Performed by: STUDENT IN AN ORGANIZED HEALTH CARE EDUCATION/TRAINING PROGRAM

## 2022-03-21 PROCEDURE — 99999 PR PBB SHADOW E&M-EST. PATIENT-LVL V: CPT | Mod: PBBFAC,,, | Performed by: STUDENT IN AN ORGANIZED HEALTH CARE EDUCATION/TRAINING PROGRAM

## 2022-03-21 PROCEDURE — 1160F RVW MEDS BY RX/DR IN RCRD: CPT | Mod: CPTII,S$GLB,, | Performed by: STUDENT IN AN ORGANIZED HEALTH CARE EDUCATION/TRAINING PROGRAM

## 2022-03-21 PROCEDURE — 99214 PR OFFICE/OUTPT VISIT, EST, LEVL IV, 30-39 MIN: ICD-10-PCS | Mod: S$GLB,,, | Performed by: STUDENT IN AN ORGANIZED HEALTH CARE EDUCATION/TRAINING PROGRAM

## 2022-03-21 PROCEDURE — 3078F DIAST BP <80 MM HG: CPT | Mod: CPTII,S$GLB,, | Performed by: STUDENT IN AN ORGANIZED HEALTH CARE EDUCATION/TRAINING PROGRAM

## 2022-03-21 PROCEDURE — 99999 PR PBB SHADOW E&M-EST. PATIENT-LVL V: ICD-10-PCS | Mod: PBBFAC,,, | Performed by: STUDENT IN AN ORGANIZED HEALTH CARE EDUCATION/TRAINING PROGRAM

## 2022-03-21 PROCEDURE — 4010F ACE/ARB THERAPY RXD/TAKEN: CPT | Mod: CPTII,S$GLB,, | Performed by: STUDENT IN AN ORGANIZED HEALTH CARE EDUCATION/TRAINING PROGRAM

## 2022-03-21 PROCEDURE — 3075F PR MOST RECENT SYSTOLIC BLOOD PRESS GE 130-139MM HG: ICD-10-PCS | Mod: CPTII,S$GLB,, | Performed by: STUDENT IN AN ORGANIZED HEALTH CARE EDUCATION/TRAINING PROGRAM

## 2022-03-21 PROCEDURE — 3008F BODY MASS INDEX DOCD: CPT | Mod: CPTII,S$GLB,, | Performed by: STUDENT IN AN ORGANIZED HEALTH CARE EDUCATION/TRAINING PROGRAM

## 2022-03-21 PROCEDURE — 1160F PR REVIEW ALL MEDS BY PRESCRIBER/CLIN PHARMACIST DOCUMENTED: ICD-10-PCS | Mod: CPTII,S$GLB,, | Performed by: STUDENT IN AN ORGANIZED HEALTH CARE EDUCATION/TRAINING PROGRAM

## 2022-03-21 PROCEDURE — 1159F MED LIST DOCD IN RCRD: CPT | Mod: CPTII,S$GLB,, | Performed by: STUDENT IN AN ORGANIZED HEALTH CARE EDUCATION/TRAINING PROGRAM

## 2022-03-21 RX ORDER — INFLUENZA VIRUS VACCINE 15; 15; 15; 15 UG/.5ML; UG/.5ML; UG/.5ML; UG/.5ML
SUSPENSION INTRAMUSCULAR
COMMUNITY
Start: 2021-10-22 | End: 2022-03-21

## 2022-03-21 NOTE — PATIENT INSTRUCTIONS
Try Quercetin- maybe it will help.         If you are due for any health screening(s) below please notify me so we can arrange them to be ordered and scheduled to maintain your health.     Health Maintenance   Topic Date Due    Mammogram  02/04/2023    TETANUS VACCINE  01/11/2026    Lipid Panel  01/20/2027    Hepatitis C Screening  Completed

## 2022-03-21 NOTE — PROGRESS NOTES
[facility] MEDICINE CLINIC NOTE    Patient Name: Kylah Mohan  YOB: 1970    PRESENTING HISTORY   Chief Complaint:   Chief Complaint   Patient presents with    Follow-up        History of Present Illness:  Ms. Kylah Mohan is a 51 y.o. female     HTN is well controlled.     Her smell and taste have not normalized. She can occasionally taste excessive saltiness when she eats chips. Has developed some perception of taste in her posterior oropharynx.   Has already seen ENT. Considering septoplasty for management of longstanding sinusitis.   However, this has been present for 15 years.   Has been on nasal steroids long term.     Between above health problem, personal tragedy, life changes due to COVID she has become depressed.   Despite lexapro.   Is open to pursuing counseling/psychotherapy for symptoms.                                               Review of Systems   All other systems reviewed and are negative.        PAST HISTORY:     Past Medical History:   Diagnosis Date    Benign hypertension     Bladder instability     Brain aneurysm     Depression     DJD (degenerative joint disease), lumbar     History of viral meningitis 1996    Hyperlipidemia     Insomnia     Microscopic hematuria     negative work-up    Pulmonary nodule     minute    Tendonitis        Past Surgical History:   Procedure Laterality Date    ANTERIOR VAGINAL REPAIR      BRAIN SURGERY      aneurysm    BREAST BIOPSY  6/5/2007    left breast benign    CEREBRAL ANGIOGRAM N/A 9/17/2020    Procedure: ANGIOGRAM-CEREBRAL;  [facility];  Location: [facility] OR [address] [place] [address];  Service: Radiology;  Laterality: N/A;   190/Camilo    CLIP LIGATION OF INTRACRANIAL ANEURYSM BY CRANIOTOMY Left 11/4/2020    Procedure: CRANIOTOMY, WITH ANEURYSM CLIPPING RIGHT MODIFIED ORBITOZYGOMATIC CRANIOTOMY;  Surgeon: Christi Page MD;  Location: [facility] OR [address] [place] [address];  Service: Neurosurgery;  Laterality: Left;  Anesthesia:  General   Blood: Type & Cross 2 units  Neuro Monitoring: SEP, MEP, EEG   Radiology: C-Arm   Bed: Regular Bed  Headrest: [place]  Position: Supine   ,  Reciprocating Saw  Length of Surgery: 4.5     COLONOSCOPY N/A 2021    Procedure: COLONOSCOPY;  Surgeon: Tiffanie Reyes MD;  Location: [facility];  Service: Endoscopy;  Laterality: N/A;    ENDOMETRIAL ABLATION  2009    HYSTERECTOMY      LAPAROSCOPIC HYSTERECTOMY  2013    TUBAL LIGATION         Family History   Problem Relation Age of Onset    Osteoarthritis Mother     Hyperlipidemia Mother     Hypertension Mother     Allergies Mother     Hyperlipidemia Father     Diabetes Father     Hypertension Father     Cancer Paternal Grandmother         Breast    Diabetes Paternal Grandmother     Breast cancer Paternal Grandmother 56    Allergies Sister     Heart disease Sister         Congential    Breast cancer Maternal Aunt 61    Ovarian cancer Maternal Cousin 50       Social History     Socioeconomic History    Marital status:    Occupational History     Employer: [employer]   Tobacco Use    Smoking status: Former Smoker     Packs/day: 0.25     Years: 15.00     Pack years: 3.75     Types: Cigarettes     Quit date: 10/20/2013     Years since quittin.4    Smokeless tobacco: Never Used   Substance and Sexual Activity    Alcohol use: Not Currently     Alcohol/week: 1.0 standard drink     Types: 1 Glasses of wine per week    Drug use: No    Sexual activity: Yes     Partners: Male     Birth control/protection: Surgical   Social History Narrative    The patient does exercise regularly (walking daily, 35-40min).Rates diet as fair.She is not satisfied with weight.     Social Determinants of Health     Financial Resource Strain: Medium Risk    Difficulty of Paying Living Expenses: Somewhat hard   Food Insecurity: Food Insecurity Present    Worried About Running Out of Food in the Last Year: Sometimes true     Ran Out of Food in the Last Year: Sometimes true   Transportation Needs: No Transportation Needs    Lack of Transportation (Medical): No    Lack of Transportation (Non-Medical): No   Physical Activity: Insufficiently Active    Days of Exercise per Week: 4 days    Minutes of Exercise per Session: 30 min   Stress: Stress Concern Present    Feeling of Stress : Rather much   Social Connections: Unknown    Frequency of Communication with Friends and Family: Three times a week    Frequency of Social Gatherings with Friends and Family: Once a week    Active Member of Clubs or Organizations: Yes    Attends Club or Organization Meetings: More than 4 times per year    Marital Status:    Housing Stability: High Risk    Unable to Pay for Housing in the Last Year: Yes    Number of Places Lived in the Last Year: 1    Unstable Housing in the Last Year: No       MEDICATIONS & ALLERGIES:     Current Outpatient Medications on File Prior to Visit   Medication Sig    ascorbic acid, vitamin C, (VITAMIN C) 1000 MG tablet Take 1,000 mg by mouth once daily.    aspirin (ECOTRIN) 81 MG EC tablet Take 81 mg by mouth nightly.    atenoloL (TENORMIN) 25 MG tablet TAKE 1 TABLET BY MOUTH EVERY DAY    EScitalopram oxalate (LEXAPRO) 10 MG tablet TAKE 1 1/2 TABLETS (15 MG TOTAL) BY MOUTH ONCE DAILY.    estradioL (ESTRACE) 2 MG tablet TAKE 1 TABLET (2 MG TOTAL) BY MOUTH ONCE DAILY.    fluticasone (FLONASE) 50 mcg/actuation nasal spray 1 spray by Each Nare route once daily.    hydroCHLOROthiazide (HYDRODIURIL) 25 MG tablet TAKE 1 TABLET BY MOUTH EVERY DAY    losartan (COZAAR) 25 MG tablet TAKE 1 TABLET BY MOUTH EVERY DAY    multivitamin (THERAGRAN) per tablet Take 1 tablet by mouth once daily. Every day    omega-3 fatty acids/fish oil (FISH OIL-OMEGA-3 FATTY ACIDS) 300-1,000 mg capsule Take 1 capsule by mouth once daily.    [DISCONTINUED] FLUARIX QUAD 9932-7000, PF, 60 mcg (15 mcg x 4)/0.5 mL Syrg     [DISCONTINUED]  "progesterone (PROMETRIUM) 100 MG capsule Take 1 capsule (100 mg total) by mouth once daily.    [DISCONTINUED] tobramycin sulfate 0.3% (TOBREX) 0.3 % ophthalmic solution      No current facility-administered medications on file prior to visit.       Review of patient's allergies indicates:   Allergen Reactions    Latex      Other reaction(s): Rash       OBJECTIVE:   Vital Signs:  Vitals:    03/21/22 0820   BP: 136/78   Pulse: 89   Resp: 18   SpO2: 98%   Weight: 83.3 kg (183 lb 10.3 oz)   Height: 5' 2" (1.575 m)       No results found for this or any previous visit (from the past 24 hour(s)).      Physical Exam  Vitals and nursing note reviewed.   Constitutional:       General: She is not in acute distress.     Appearance: She is not toxic-appearing or diaphoretic.   HENT:      Head: Normocephalic and atraumatic.      Right Ear: External ear normal.      Left Ear: External ear normal.   Eyes:      General: No scleral icterus.     Conjunctiva/sclera: Conjunctivae normal.      Pupils: Pupils are equal, round, and reactive to light.   Neck:      Thyroid: No thyromegaly.      Vascular: No carotid bruit.   Cardiovascular:      Rate and Rhythm: Normal rate and regular rhythm.      Heart sounds: Normal heart sounds. No murmur heard.  Pulmonary:      Effort: Pulmonary effort is normal. No respiratory distress.      Breath sounds: Normal breath sounds. No wheezing or rales.   Musculoskeletal:         General: No tenderness or deformity. Normal range of motion.      Cervical back: Normal range of motion and neck supple.   Lymphadenopathy:      Cervical: No cervical adenopathy.   Skin:     General: Skin is warm and dry.      Findings: No erythema or rash.   Neurological:      Mental Status: She is alert and oriented to person, place, and time.      Gait: Gait is intact.   Psychiatric:         Mood and Affect: Mood and affect normal.         Cognition and Memory: Memory normal.         Judgment: Judgment normal. "         ASSESSMENT & PLAN:     Loss of smell  Try stopping nasal steroid.   If interested in another opinion could see neurologist.     Moderate episode of recurrent major depressive disorder  -     Ambulatory referral/consult to Psychiatry; Future; Expected date: 03/28/20  For psychotherapy.   Will try this prior to adding/changing current pharmacotherapy    Primary hypertension  Well controlled. Continue current medicines.       James Tanner MD   Internal Medicine    This note was created using Dragon voice recognition software that occasionally misinterprets phrases or words.

## 2022-04-05 ENCOUNTER — TELEPHONE (OUTPATIENT)
Dept: NEUROSURGERY | Facility: CLINIC | Age: 52
End: 2022-04-05
Payer: COMMERCIAL

## 2022-04-05 DIAGNOSIS — I67.1 CEREBRAL ANEURYSM: Primary | ICD-10-CM

## 2022-04-23 ENCOUNTER — PATIENT MESSAGE (OUTPATIENT)
Dept: NEUROSURGERY | Facility: CLINIC | Age: 52
End: 2022-04-23
Payer: COMMERCIAL

## 2022-04-27 ENCOUNTER — OFFICE VISIT (OUTPATIENT)
Dept: DERMATOLOGY | Facility: CLINIC | Age: 52
End: 2022-04-27
Payer: COMMERCIAL

## 2022-04-27 DIAGNOSIS — D48.5 NEOPLASM OF UNCERTAIN BEHAVIOR OF SKIN: Primary | ICD-10-CM

## 2022-04-27 DIAGNOSIS — L82.1 DERMATOSIS PAPULOSA NIGRA: ICD-10-CM

## 2022-04-27 DIAGNOSIS — I67.1 CEREBRAL ANEURYSM WITHOUT RUPTURE: Primary | ICD-10-CM

## 2022-04-27 DIAGNOSIS — R23.2 FLUSHING: ICD-10-CM

## 2022-04-27 PROCEDURE — 4010F ACE/ARB THERAPY RXD/TAKEN: CPT | Mod: CPTII,S$GLB,, | Performed by: STUDENT IN AN ORGANIZED HEALTH CARE EDUCATION/TRAINING PROGRAM

## 2022-04-27 PROCEDURE — 1160F RVW MEDS BY RX/DR IN RCRD: CPT | Mod: CPTII,S$GLB,, | Performed by: STUDENT IN AN ORGANIZED HEALTH CARE EDUCATION/TRAINING PROGRAM

## 2022-04-27 PROCEDURE — 88342 IMHCHEM/IMCYTCHM 1ST ANTB: CPT | Mod: 26,,, | Performed by: PATHOLOGY

## 2022-04-27 PROCEDURE — 99999 PR PBB SHADOW E&M-EST. PATIENT-LVL III: ICD-10-PCS | Mod: PBBFAC,,, | Performed by: STUDENT IN AN ORGANIZED HEALTH CARE EDUCATION/TRAINING PROGRAM

## 2022-04-27 PROCEDURE — 1159F PR MEDICATION LIST DOCUMENTED IN MEDICAL RECORD: ICD-10-PCS | Mod: CPTII,S$GLB,, | Performed by: STUDENT IN AN ORGANIZED HEALTH CARE EDUCATION/TRAINING PROGRAM

## 2022-04-27 PROCEDURE — 99999 PR PBB SHADOW E&M-EST. PATIENT-LVL III: CPT | Mod: PBBFAC,,, | Performed by: STUDENT IN AN ORGANIZED HEALTH CARE EDUCATION/TRAINING PROGRAM

## 2022-04-27 PROCEDURE — 1159F MED LIST DOCD IN RCRD: CPT | Mod: CPTII,S$GLB,, | Performed by: STUDENT IN AN ORGANIZED HEALTH CARE EDUCATION/TRAINING PROGRAM

## 2022-04-27 PROCEDURE — 88342 IMHCHEM/IMCYTCHM 1ST ANTB: CPT | Performed by: PATHOLOGY

## 2022-04-27 PROCEDURE — 88305 TISSUE EXAM BY PATHOLOGIST: ICD-10-PCS | Mod: 26,,, | Performed by: PATHOLOGY

## 2022-04-27 PROCEDURE — 11102 TANGNTL BX SKIN SINGLE LES: CPT | Mod: S$GLB,,, | Performed by: STUDENT IN AN ORGANIZED HEALTH CARE EDUCATION/TRAINING PROGRAM

## 2022-04-27 PROCEDURE — 88305 TISSUE EXAM BY PATHOLOGIST: CPT | Mod: 26,,, | Performed by: PATHOLOGY

## 2022-04-27 PROCEDURE — 4010F PR ACE/ARB THEARPY RXD/TAKEN: ICD-10-PCS | Mod: CPTII,S$GLB,, | Performed by: STUDENT IN AN ORGANIZED HEALTH CARE EDUCATION/TRAINING PROGRAM

## 2022-04-27 PROCEDURE — 88342 CHG IMMUNOCYTOCHEMISTRY: ICD-10-PCS | Mod: 26,,, | Performed by: PATHOLOGY

## 2022-04-27 PROCEDURE — 99203 OFFICE O/P NEW LOW 30 MIN: CPT | Mod: 25,S$GLB,, | Performed by: STUDENT IN AN ORGANIZED HEALTH CARE EDUCATION/TRAINING PROGRAM

## 2022-04-27 PROCEDURE — 99203 PR OFFICE/OUTPT VISIT, NEW, LEVL III, 30-44 MIN: ICD-10-PCS | Mod: 25,S$GLB,, | Performed by: STUDENT IN AN ORGANIZED HEALTH CARE EDUCATION/TRAINING PROGRAM

## 2022-04-27 PROCEDURE — 1160F PR REVIEW ALL MEDS BY PRESCRIBER/CLIN PHARMACIST DOCUMENTED: ICD-10-PCS | Mod: CPTII,S$GLB,, | Performed by: STUDENT IN AN ORGANIZED HEALTH CARE EDUCATION/TRAINING PROGRAM

## 2022-04-27 PROCEDURE — 11102 PR TANGENTIAL BIOPSY, SKIN, SINGLE LESION: ICD-10-PCS | Mod: S$GLB,,, | Performed by: STUDENT IN AN ORGANIZED HEALTH CARE EDUCATION/TRAINING PROGRAM

## 2022-04-27 PROCEDURE — 88305 TISSUE EXAM BY PATHOLOGIST: CPT | Performed by: PATHOLOGY

## 2022-04-27 RX ORDER — LORAZEPAM 0.5 MG/1
0.5 TABLET ORAL
Qty: 1 TABLET | Refills: 0 | Status: SHIPPED | OUTPATIENT
Start: 2022-04-27 | End: 2022-12-07

## 2022-04-27 NOTE — PROGRESS NOTES
Subjective:       Patient ID:  Kylah Mohan is a 51 y.o. female who presents for   Chief Complaint   Patient presents with    Spot     Hands and face     New patient    Patient here today for multiple spots on hands x 5 years  Patient states she has been treated for warts previously using liquid nitrogen. They will resolve and come back.  She has also tried multiple OTC treatments, no improvement   She continues to get new spots.     Also c/o spot on face x 1 year  Not bothersome but would like them checked     Complains of occasional redness over her cheeks. Comes and goes. Currently going through menopause      Current Outpatient Medications:     ascorbic acid, vitamin C, (VITAMIN C) 1000 MG tablet, Take 1,000 mg by mouth once daily., Disp: , Rfl:     aspirin (ECOTRIN) 81 MG EC tablet, Take 81 mg by mouth nightly., Disp: , Rfl:     atenoloL (TENORMIN) 25 MG tablet, TAKE 1 TABLET BY MOUTH EVERY DAY, Disp: 90 tablet, Rfl: 3    EScitalopram oxalate (LEXAPRO) 10 MG tablet, TAKE 1 1/2 TABLETS (15 MG TOTAL) BY MOUTH ONCE DAILY., Disp: 135 tablet, Rfl: 3    fluticasone (FLONASE) 50 mcg/actuation nasal spray, 1 spray by Each Nare route once daily., Disp: , Rfl:     hydroCHLOROthiazide (HYDRODIURIL) 25 MG tablet, TAKE 1 TABLET BY MOUTH EVERY DAY, Disp: 90 tablet, Rfl: 3    LORazepam (ATIVAN) 0.5 MG tablet, Take 1 tablet (0.5 mg total) by mouth On call Procedure for Anxiety (Prior to CTA scan). Take 30 minutes prior to CTA scan, Disp: 1 tablet, Rfl: 0    losartan (COZAAR) 25 MG tablet, TAKE 1 TABLET BY MOUTH EVERY DAY, Disp: 90 tablet, Rfl: 3    multivitamin (THERAGRAN) per tablet, Take 1 tablet by mouth once daily. Every day, Disp: , Rfl:     omega-3 fatty acids/fish oil (FISH OIL-OMEGA-3 FATTY ACIDS) 300-1,000 mg capsule, Take 1 capsule by mouth once daily., Disp: , Rfl:         Review of Systems   Constitutional: Negative for fever, chills and fatigue.   Respiratory: Negative for cough and shortness of breath.     Gastrointestinal: Negative for nausea and vomiting.   Skin: Positive for daily sunscreen use and activity-related sunscreen use.   Hematologic/Lymphatic: Bruises/bleeds easily.        Objective:    Physical Exam   Constitutional: She appears well-developed and well-nourished.   Neurological: She is alert and oriented to person, place, and time.   Psychiatric: She has a normal mood and affect.   Skin:   Areas Examined (abnormalities noted in diagram):   Head / Face Inspection Performed  RUE Inspected  LUE Inspection Performed                  Diagram Legend     Erythematous scaling macule/papule c/w actinic keratosis       Vascular papule c/w angioma      Pigmented verrucoid papule/plaque c/w seborrheic keratosis      Yellow umbilicated papule c/w sebaceous hyperplasia      Irregularly shaped tan macule c/w lentigo     1-2 mm smooth white papules consistent with Milia      Movable subcutaneous cyst with punctum c/w epidermal inclusion cyst      Subcutaneous movable cyst c/w pilar cyst      Firm pink to brown papule c/w dermatofibroma      Pedunculated fleshy papule(s) c/w skin tag(s)      Evenly pigmented macule c/w junctional nevus     Mildly variegated pigmented, slightly irregular-bordered macule c/w mildly atypical nevus      Flesh colored to evenly pigmented papule c/w intradermal nevus       Pink pearly papule/plaque c/w basal cell carcinoma      Erythematous hyperkeratotic cursted plaque c/w SCC      Surgical scar with no sign of skin cancer recurrence      Open and closed comedones      Inflammatory papules and pustules      Verrucoid papule consistent consistent with wart     Erythematous eczematous patches and plaques     Dystrophic onycholytic nail with subungual debris c/w onychomycosis     Umbilicated papule    Erythematous-base heme-crusted tan verrucoid plaque consistent with inflamed seborrheic keratosis     Erythematous Silvery Scaling Plaque c/w Psoriasis     See  annotation                  Assessment / Plan:      Pathology Orders:     Normal Orders This Visit    Specimen to Pathology, Dermatology     Comments:    Number of Specimens:->1  ------------------------->-------------------------  Spec 1 Procedure:->Biopsy  Spec 1 Clinical Impression:->keratotic papules over palms and  side of hand dx: verruca vs. punctate keratoderma  Spec 1 Source:->right lateral hand    Questions:    Procedure Type: Dermatology and skin neoplasms    Number of Specimens: 1    ------------------------: -------------------------    Spec 1 Procedure: Biopsy    Spec 1 Clinical Impression: keratotic papules over palms and side of hand dx: verruca vs. punctate keratoderma    Spec 1 Source: right lateral hand    Release to patient:         Neoplasm of uncertain behavior of skin  -     Specimen to Pathology, Dermatology  Shave biopsy procedure note:    Shave biopsy performed after verbal consent including risk of infection, scar, recurrence, need for additional treatment of site. Area prepped with alcohol, anesthetized with approximately 1.0cc of 1% lidocaine with epinephrine. Lesional tissue shaved with razor blade. Hemostasis achieved with application of aluminum chloride followed by hyfrecation. No complications. Dressing applied. Wound care explained.    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 Secondary to menopause  - reviewed medications, no classic triggers  - will monitor- defers topicals today  - recommend strict sun protection            No follow-ups on file.

## 2022-04-27 NOTE — PATIENT INSTRUCTIONS
Shave Biopsy Wound Care    Your doctor has performed a shave biopsy today.  A band aid and vaseline ointment has been placed over the site.  This should remain in place for 24 hours.  It is recommended that you keep the area dry for the first 24 hours.  After 24 hours, you may remove the band aid and wash the area with warm soap and water and apply Vaseline jelly.  Many patients prefer to use Neosporin or Bacitracin ointment.  This is acceptable; however, know that you can develop an allergy to this medication even if you have used it safely for years.  It is important to keep the area moist.  Letting it dry out and get air slows healing time, and will worsen the scar.  Band aid is optional after first 24 hours.      If you notice increasing redness, tenderness, pain, or yellow drainage at the biopsy site, please notify your doctor.  These are signs of an infection.    If your biopsy site is bleeding, apply firm pressure for 15 minutes straight.  Repeat for another 15 minutes, if it is still bleeding.   If the surgical site continues to bleed, then please contact your doctor.      [facility] [place] [address]/ [phone] FAX/ [email or web address]

## 2022-05-05 LAB
FINAL PATHOLOGIC DIAGNOSIS: NORMAL
GROSS: NORMAL
Lab: NORMAL
MICROSCOPIC EXAM: NORMAL

## 2022-05-18 ENCOUNTER — PATIENT MESSAGE (OUTPATIENT)
Dept: NEUROSURGERY | Facility: CLINIC | Age: 52
End: 2022-05-18
Payer: COMMERCIAL

## 2022-05-30 ENCOUNTER — OFFICE VISIT (OUTPATIENT)
Dept: DERMATOLOGY | Facility: CLINIC | Age: 52
End: 2022-05-30
Payer: COMMERCIAL

## 2022-05-30 DIAGNOSIS — L85.2 PUNCTATE KERATODERMA: Primary | ICD-10-CM

## 2022-05-30 PROCEDURE — 99999 PR PBB SHADOW E&M-EST. PATIENT-LVL III: CPT | Mod: PBBFAC,,, | Performed by: STUDENT IN AN ORGANIZED HEALTH CARE EDUCATION/TRAINING PROGRAM

## 2022-05-30 PROCEDURE — 1160F RVW MEDS BY RX/DR IN RCRD: CPT | Mod: CPTII,S$GLB,, | Performed by: STUDENT IN AN ORGANIZED HEALTH CARE EDUCATION/TRAINING PROGRAM

## 2022-05-30 PROCEDURE — 4010F PR ACE/ARB THEARPY RXD/TAKEN: ICD-10-PCS | Mod: CPTII,S$GLB,, | Performed by: STUDENT IN AN ORGANIZED HEALTH CARE EDUCATION/TRAINING PROGRAM

## 2022-05-30 PROCEDURE — 1159F PR MEDICATION LIST DOCUMENTED IN MEDICAL RECORD: ICD-10-PCS | Mod: CPTII,S$GLB,, | Performed by: STUDENT IN AN ORGANIZED HEALTH CARE EDUCATION/TRAINING PROGRAM

## 2022-05-30 PROCEDURE — 17110 DESTRUCTION B9 LES UP TO 14: CPT | Mod: S$GLB,,, | Performed by: STUDENT IN AN ORGANIZED HEALTH CARE EDUCATION/TRAINING PROGRAM

## 2022-05-30 PROCEDURE — 17110 PR DESTRUCTION BENIGN LESIONS UP TO 14: ICD-10-PCS | Mod: S$GLB,,, | Performed by: STUDENT IN AN ORGANIZED HEALTH CARE EDUCATION/TRAINING PROGRAM

## 2022-05-30 PROCEDURE — 4010F ACE/ARB THERAPY RXD/TAKEN: CPT | Mod: CPTII,S$GLB,, | Performed by: STUDENT IN AN ORGANIZED HEALTH CARE EDUCATION/TRAINING PROGRAM

## 2022-05-30 PROCEDURE — 1160F PR REVIEW ALL MEDS BY PRESCRIBER/CLIN PHARMACIST DOCUMENTED: ICD-10-PCS | Mod: CPTII,S$GLB,, | Performed by: STUDENT IN AN ORGANIZED HEALTH CARE EDUCATION/TRAINING PROGRAM

## 2022-05-30 PROCEDURE — 99214 OFFICE O/P EST MOD 30 MIN: CPT | Mod: 25,S$GLB,, | Performed by: STUDENT IN AN ORGANIZED HEALTH CARE EDUCATION/TRAINING PROGRAM

## 2022-05-30 PROCEDURE — 99999 PR PBB SHADOW E&M-EST. PATIENT-LVL III: ICD-10-PCS | Mod: PBBFAC,,, | Performed by: STUDENT IN AN ORGANIZED HEALTH CARE EDUCATION/TRAINING PROGRAM

## 2022-05-30 PROCEDURE — 99214 PR OFFICE/OUTPT VISIT, EST, LEVL IV, 30-39 MIN: ICD-10-PCS | Mod: 25,S$GLB,, | Performed by: STUDENT IN AN ORGANIZED HEALTH CARE EDUCATION/TRAINING PROGRAM

## 2022-05-30 PROCEDURE — 1159F MED LIST DOCD IN RCRD: CPT | Mod: CPTII,S$GLB,, | Performed by: STUDENT IN AN ORGANIZED HEALTH CARE EDUCATION/TRAINING PROGRAM

## 2022-05-30 RX ORDER — TAZAROTENE 1 MG/G
CREAM TOPICAL NIGHTLY
Qty: 30 G | Refills: 1 | Status: SHIPPED | OUTPATIENT
Start: 2022-05-30 | End: 2024-03-25

## 2022-05-30 RX ORDER — UREA 40 G/100G
LOTION TOPICAL DAILY
Qty: 226 G | Refills: 1 | Status: SHIPPED | OUTPATIENT
Start: 2022-05-30 | End: 2024-03-25

## 2022-05-30 RX ORDER — TAZAROTENE 0.05 MG/G
CREAM CUTANEOUS NIGHTLY
Qty: 60 G | Refills: 1 | Status: SHIPPED | OUTPATIENT
Start: 2022-05-30 | End: 2022-05-30 | Stop reason: ALTCHOICE

## 2022-05-30 NOTE — PROGRESS NOTES
Subjective:       Patient ID:  Kylah Mohan is a 51 y.o. female who presents for   Chief Complaint   Patient presents with    Follow-up     Discuss results      LOV 4/27/22     Patient here today to discuss results and treatment.  She states she noticed more spots on the bottom of her feet. They get more flat when she gets a pedicure.     Final Pathologic Diagnosis Skin, right lateral hand, shave biopsy:   -MILD ACANTHOSIS, LOW VERRUCOUS PAPILLOMATOSIS, HYPERGRANULOSIS, AND   HYPERKERATOSIS, see comment   COMMENT:  Clinical images were reviewed in epic and differential diagnosis   noted.  The interpretation of this biopsy is somewhat limited due to its   small and somewhat superficial nature.  The the histological findings (see   microscopic description) are overall nonspecific but could represent   keratoderma in the correct clinical context.  Although the epidermis is also   reminiscent of a verruca vulgaris, p16 appears negative.  Correlation is   recommended.     Patient states that her mom has bumps on her hands as well.         Review of Systems   Constitutional: Negative for fever, chills and fatigue.   Respiratory: Negative for cough and shortness of breath.    Gastrointestinal: Negative for nausea and vomiting.   Skin: Positive for daily sunscreen use and activity-related sunscreen use.   Hematologic/Lymphatic: Bruises/bleeds easily.        Objective:    Physical Exam   Constitutional: She appears well-developed and well-nourished.   Neurological: She is alert and oriented to person, place, and time.   Psychiatric: She has a normal mood and affect.   Skin:   Areas Examined (abnormalities noted in diagram):   RUE Inspected  LUE Inspection Performed  RLE Inspected  LLE Inspection Performed              Diagram Legend     Erythematous scaling macule/papule c/w actinic keratosis       Vascular papule c/w angioma      Pigmented verrucoid papule/plaque c/w seborrheic keratosis      Yellow umbilicated papule c/w  sebaceous hyperplasia      Irregularly shaped tan macule c/w lentigo     1-2 mm smooth white papules consistent with Milia      Movable subcutaneous cyst with punctum c/w epidermal inclusion cyst      Subcutaneous movable cyst c/w pilar cyst      Firm pink to brown papule c/w dermatofibroma      Pedunculated fleshy papule(s) c/w skin tag(s)      Evenly pigmented macule c/w junctional nevus     Mildly variegated pigmented, slightly irregular-bordered macule c/w mildly atypical nevus      Flesh colored to evenly pigmented papule c/w intradermal nevus       Pink pearly papule/plaque c/w basal cell carcinoma      Erythematous hyperkeratotic cursted plaque c/w SCC      Surgical scar with no sign of skin cancer recurrence      Open and closed comedones      Inflammatory papules and pustules      Verrucoid papule consistent consistent with wart     Erythematous eczematous patches and plaques     Dystrophic onycholytic nail with subungual debris c/w onychomycosis     Umbilicated papule    Erythematous-base heme-crusted tan verrucoid plaque consistent with inflamed seborrheic keratosis     Erythematous Silvery Scaling Plaque c/w Psoriasis     See annotation                          Assessment / Plan:        Punctate keratoderma  -     urea 40 % Lotn lotion; Apply topically once daily.  Dispense: 226 g; Refill: 1  -     Discontinue: tazarotene (TAZORAC) 0.05 % Crea cream; Apply topically every evening.  Dispense: 60 g; Refill: 1  -     tazarotene (TAZORAC) 0.1 % cream; Apply topically every evening.  Dispense: 30 g; Refill: 1  Cryosurgery procedure note:    Verbal consent from the patient is obtained. Liquid nitrogen cryosurgery is applied to 4 lesions, as detailed in the physical exam, to produce a freeze injury.2 consecutive freeze thaw cycles are applied to each lesion. The patient is aware that blisters (possibly blood blisters) may form.  Can use gentle pumice stone  F/u in 3 months           No follow-ups on file.   yes

## 2022-05-30 NOTE — PATIENT INSTRUCTIONS
- urea lotion over hands and feet nightly.   - tazarotene cream on spots in the AM    CRYOSURGERY      Your doctor has used a method called cryosurgery to treat your skin condition. Cryosurgery refers to the use of very cold substances to treat a variety of skin conditions such as warts, pre-skin cancers, molluscum contagiosum, sun spots, and several benign growths. The substance we use in cryosurgery is liquid nitrogen and is so cold (-195 degrees Celsius) that is burns when administered.     Following treatment in the office, the skin may immediately burn and become red. You may find the area around the lesion is affected as well. It is sometimes necessary to treat not only the lesion, but a small area of the surrounding normal skin to achieve a good response.     A blister, and even a blood filled blister, may form after treatment.   This is a normal response. If the blister is painful, it is acceptable to sterilize a needle and with rubbing alcohol and gently pop the blister. It is important that you gently wash the area with soap and warm water as the blister fluid may contain wart virus if a wart was treated. Do no remove the roof of the blister.     The area treated can take anywhere from 1-3 weeks to heal. Healing time depends on the kind skin lesion treated, the location, and how aggressively the lesion was treated. It is recommended that the areas treated are covered with Vaseline or bacitracin ointment and a band-aid. If a band-aid is not practical, just ointment applied several times per day will do. Keeping these areas moist will speed the healing time.    Treatment with liquid nitrogen can leave a scar. In dark skin, it may be a light or dark scar, in light skin it may be a white or pink scar. These will generally fade with time, but may never go away completely.     If you have any concerns after your treatment, please feel free to call the office.       [address] [facility], [place] [address]/  [phone] FAX/ [email or web address]

## 2022-06-22 ENCOUNTER — OFFICE VISIT (OUTPATIENT)
Dept: NEUROLOGY | Facility: CLINIC | Age: 52
End: 2022-06-22
Payer: COMMERCIAL

## 2022-06-22 VITALS
BODY MASS INDEX: 33.63 KG/M2 | HEART RATE: 64 BPM | WEIGHT: 182.75 LBS | DIASTOLIC BLOOD PRESSURE: 75 MMHG | HEIGHT: 62 IN | SYSTOLIC BLOOD PRESSURE: 138 MMHG

## 2022-06-22 DIAGNOSIS — I67.1 CEREBRAL ANEURYSM WITHOUT RUPTURE: Primary | Chronic | ICD-10-CM

## 2022-06-22 DIAGNOSIS — R43.0 ANOSMIA: ICD-10-CM

## 2022-06-22 PROCEDURE — 3075F SYST BP GE 130 - 139MM HG: CPT | Mod: CPTII,S$GLB,, | Performed by: PSYCHIATRY & NEUROLOGY

## 2022-06-22 PROCEDURE — 3008F PR BODY MASS INDEX (BMI) DOCUMENTED: ICD-10-PCS | Mod: CPTII,S$GLB,, | Performed by: PSYCHIATRY & NEUROLOGY

## 2022-06-22 PROCEDURE — 4010F PR ACE/ARB THEARPY RXD/TAKEN: ICD-10-PCS | Mod: CPTII,S$GLB,, | Performed by: PSYCHIATRY & NEUROLOGY

## 2022-06-22 PROCEDURE — 99213 PR OFFICE/OUTPT VISIT, EST, LEVL III, 20-29 MIN: ICD-10-PCS | Mod: S$GLB,,, | Performed by: PSYCHIATRY & NEUROLOGY

## 2022-06-22 PROCEDURE — 3075F PR MOST RECENT SYSTOLIC BLOOD PRESS GE 130-139MM HG: ICD-10-PCS | Mod: CPTII,S$GLB,, | Performed by: PSYCHIATRY & NEUROLOGY

## 2022-06-22 PROCEDURE — 4010F ACE/ARB THERAPY RXD/TAKEN: CPT | Mod: CPTII,S$GLB,, | Performed by: PSYCHIATRY & NEUROLOGY

## 2022-06-22 PROCEDURE — 3078F DIAST BP <80 MM HG: CPT | Mod: CPTII,S$GLB,, | Performed by: PSYCHIATRY & NEUROLOGY

## 2022-06-22 PROCEDURE — 99999 PR PBB SHADOW E&M-EST. PATIENT-LVL IV: CPT | Mod: PBBFAC,,, | Performed by: PSYCHIATRY & NEUROLOGY

## 2022-06-22 PROCEDURE — 3008F BODY MASS INDEX DOCD: CPT | Mod: CPTII,S$GLB,, | Performed by: PSYCHIATRY & NEUROLOGY

## 2022-06-22 PROCEDURE — 1159F MED LIST DOCD IN RCRD: CPT | Mod: CPTII,S$GLB,, | Performed by: PSYCHIATRY & NEUROLOGY

## 2022-06-22 PROCEDURE — 99999 PR PBB SHADOW E&M-EST. PATIENT-LVL IV: ICD-10-PCS | Mod: PBBFAC,,, | Performed by: PSYCHIATRY & NEUROLOGY

## 2022-06-22 PROCEDURE — 1159F PR MEDICATION LIST DOCUMENTED IN MEDICAL RECORD: ICD-10-PCS | Mod: CPTII,S$GLB,, | Performed by: PSYCHIATRY & NEUROLOGY

## 2022-06-22 PROCEDURE — 99213 OFFICE O/P EST LOW 20 MIN: CPT | Mod: S$GLB,,, | Performed by: PSYCHIATRY & NEUROLOGY

## 2022-06-22 PROCEDURE — 3078F PR MOST RECENT DIASTOLIC BLOOD PRESSURE < 80 MM HG: ICD-10-PCS | Mod: CPTII,S$GLB,, | Performed by: PSYCHIATRY & NEUROLOGY

## 2022-06-22 RX ORDER — CETIRIZINE HYDROCHLORIDE 10 MG/1
10 TABLET ORAL DAILY
COMMUNITY
End: 2024-03-25

## 2022-06-22 NOTE — PROGRESS NOTES
Pt of [facility]   Presenting symptoms of aneurysm - moderate HA -> dizziness  (later)  Nov 3, 2020 - surgery uneventful   Nov 20, 2020 - sudden loss of taste/smell upon awakening   Over 12 months some improvement in taste. Now - 20% and only from throat   Smell - 0%  Vision - ok     Used CPAP in 2021 for a few weeks     Current meds:   Vit C, MV for women, probiotics     Stop estrogen in march 2022    [place]:   Ms. Mohan, a 10-year-old female with a history of a anterior communicating artery aneurysm status post left modified orbital zygomatic craniotomy with clip ligation of aneurysm.  She had been doing well since surgery she does not have any new diplopia or headache as noted from her previous visit.  Her itchiness around incision has decreased she has to go still continues to have some mild numbness around the incision line.  She does also note that she has difficulty with widely opening her mouth.  She did note approximately 2 weeks after her craniotomy very abruptly she noted she developed a loss of smell and taste.  She notes her smell has not returned.  She does note she has returned sense of suite, sour, salty in the back of her tongue.  She has had multiple SARS-CoV-2 tests which were unremarkable, she has been vaccinated for SARS-CoV-2.  She denies any fevers, chills, nausea, vomiting.  Although today she does have an elevated temperature of 100°.  She is back to work and no other complaints otherwise.  She does have daily sinus headaches, and has been using a CPAP secondary to sleep apnea.            Past Medical History:   Diagnosis Date    Benign hypertension      Bladder instability      Depression      DJD (degenerative joint disease), lumbar      History of viral meningitis 1996    Hyperlipidemia      Insomnia      Microscopic hematuria       negative work-up    Pulmonary nodule       minute    Tendonitis              Past Surgical History:   Procedure Laterality Date    ANTERIOR VAGINAL  REPAIR        BREAST BIOPSY   2007     left breast benign    CEREBRAL ANGIOGRAM N/A 2020     Procedure: ANGIOGRAM-CEREBRAL;  Surgeon: Maged Diagnostic Provider;  Location: [facility] OR 2ND FLR;  Service: Radiology;  Laterality: N/A;  /[facility]    CLIP LIGATION OF INTRACRANIAL ANEURYSM BY CRANIOTOMY Left 2020     Procedure: CRANIOTOMY, WITH ANEURYSM CLIPPING RIGHT MODIFIED ORBITOZYGOMATIC CRANIOTOMY;  Surgeon: Christi Page MD;  Location: [facility] OR 2ND FLR;  Service: Neurosurgery;  Laterality: Left;  Anesthesia: General   Blood: Type & Cross 2 units  Neuro Monitoring: SEP, MEP, EEG   Radiology: C-Arm   Bed: Regular Bed  Headrest: Rowe  Position: Supine   ,  Reciprocating Saw  Length of Surgery: 4.5     ENDOMETRIAL ABLATION   2009    HYSTERECTOMY        LAPAROSCOPIC HYSTERECTOMY   2013    TUBAL LIGATION              Family History      Problem Relation (Age of Onset)     Allergies Mother, Sister     Breast cancer Paternal Grandmother (56), Maternal Aunt (61)     Cancer Paternal Grandmother     Diabetes Father, Paternal Grandmother     Heart disease Sister     Hyperlipidemia Mother, Father     Hypertension Mother, Father     Osteoarthritis Mother     Ovarian cancer Maternal Cousin (50)          Social History            Socioeconomic History    Marital status:        Spouse name: Not on file    Number of children: Not on file    Years of education: Not on file    Highest education level: Not on file   Occupational History       Employer: [employer]   Tobacco Use    Smoking status: Former Smoker       Packs/day: 0.25       Years: 15.00       Pack years: 3.75       Types: Cigarettes       Quit date: 10/20/2013       Years since quittin.6    Smokeless tobacco: Never Used   Substance and Sexual Activity    Alcohol use: Yes       Alcohol/week: 1.0 standard drinks       Types: 1 Glasses of wine per week    Drug use: No    Sexual activity: Yes        Partners: Male       Birth control/protection: Surgical   Other Topics Concern    Not on file   Social History Narrative     The patient does exercise regularly (walking daily, 35-40min).Rates diet as fair.She is not satisfied with weight.      Social Determinants of Health          Financial Resource Strain:     Difficulty of Paying Living Expenses:    Food Insecurity:     Worried About Running Out of Food in the Last Year:     Ran Out of Food in the Last Year:    Transportation Needs:     Lack of Transportation (Medical):     Lack of Transportation (Non-Medical):    Physical Activity:     Days of Exercise per Week:     Minutes of Exercise per Session:    Stress:     Feeling of Stress :    Social Connections:     Frequency of Communication with Friends and Family:     Frequency of Social Gatherings with Friends and Family:     Attends Caodaism Services:     Active Member of Clubs or Organizations:     Attends Club or Organization Meetings:     Marital Status:          Review of Systems   Scalp numbness  Restricted ROM jaw   Anosmia   Nasal and maxillary sinus pain       OBJECTIVE:   Physical Exam  Constitutional:       Appearance: Normal appearance.   HENT:      Head: Normocephalic and atraumatic.      Nose: Nose normal.      Mouth/Throat:      Mouth: Mucous membranes are moist.   Eyes:      Extraocular Movements: Extraocular movements intact.      Conjunctiva/sclera: Conjunctivae normal.   Pulmonary:      Effort: Pulmonary effort is normal.   Neurological:      General: No focal deficit present.      Mental Status: She is alert and oriented to person, place, and time. Mental status is at baseline.      Cranial Nerves: No cranial nerve deficit.      Sensory: No sensory deficit.      Coordination: Coordination normal.      Gait: Gait normal.   Psychiatric:         Mood and Affect: Mood normal.         Behavior: Behavior normal.         Thought Content: Thought content normal.         Judgment:  Judgment normal.         Sinus CT   FINDINGS:  Mild mucosal thickening within the bilateral maxillary and ethmoid sinuses.  Mucosal thickening measures up to 6 mm in the right maxillary sinus.  Sphenoid sinuses are clear.  Frontal sinuses are hypoplastic.  No air-fluid levels.  No osseous thickening or sclerosis.     Right maxillary ostium and infundibulum are opacified with partially aerated secretions.  There is narrowing of the left maxillary ostium and infundibulum by mucosal thickening.  The nasal septum is midline.  Nasal cavity is within normal limits, noting small bilateral middle turbinate fredrick bullosa.     Limited intracranial evaluation is unremarkable.  Extracranial soft tissue structures appear within normal limits. Mastoid air cells are clear.     Impression:1. Mild maxillary and ethmoid sinus disease, as above.   Electronically signed by: Kyle Pedraza  Date:                                            02/07/2020  Time:                                           17:07  ASSESSMENT/PLAN:    1, Anosmia with loss of taste   2, Myofacial pain in trapezium   Dry needle helped   3, Nasal+cheek pain - sinusitis?     Plan:   1, cont f/up Dr. Page   2, ENT referral - Dr. Hernandez  3, PRN tylenol

## 2022-07-07 ENCOUNTER — OFFICE VISIT (OUTPATIENT)
Dept: OTOLARYNGOLOGY | Facility: CLINIC | Age: 52
End: 2022-07-07
Payer: COMMERCIAL

## 2022-07-07 ENCOUNTER — LAB VISIT (OUTPATIENT)
Dept: LAB | Facility: HOSPITAL | Age: 52
End: 2022-07-07
Payer: COMMERCIAL

## 2022-07-07 DIAGNOSIS — J34.3 HYPERTROPHY OF INFERIOR NASAL TURBINATE: ICD-10-CM

## 2022-07-07 DIAGNOSIS — R43.0 ANOSMIA: ICD-10-CM

## 2022-07-07 DIAGNOSIS — R43.0 ANOSMIA: Primary | ICD-10-CM

## 2022-07-07 LAB
FOLATE SERPL-MCNC: 18.1 NG/ML (ref 4–24)
VIT B12 SERPL-MCNC: 575 PG/ML (ref 210–950)

## 2022-07-07 PROCEDURE — 99214 OFFICE O/P EST MOD 30 MIN: CPT | Mod: S$GLB,,, | Performed by: NURSE PRACTITIONER

## 2022-07-07 PROCEDURE — 99999 PR PBB SHADOW E&M-EST. PATIENT-LVL IV: ICD-10-PCS | Mod: PBBFAC,,, | Performed by: NURSE PRACTITIONER

## 2022-07-07 PROCEDURE — 4010F PR ACE/ARB THEARPY RXD/TAKEN: ICD-10-PCS | Mod: CPTII,S$GLB,, | Performed by: NURSE PRACTITIONER

## 2022-07-07 PROCEDURE — 99999 PR PBB SHADOW E&M-EST. PATIENT-LVL IV: CPT | Mod: PBBFAC,,, | Performed by: NURSE PRACTITIONER

## 2022-07-07 PROCEDURE — 99214 PR OFFICE/OUTPT VISIT, EST, LEVL IV, 30-39 MIN: ICD-10-PCS | Mod: S$GLB,,, | Performed by: NURSE PRACTITIONER

## 2022-07-07 PROCEDURE — 1160F PR REVIEW ALL MEDS BY PRESCRIBER/CLIN PHARMACIST DOCUMENTED: ICD-10-PCS | Mod: CPTII,S$GLB,, | Performed by: NURSE PRACTITIONER

## 2022-07-07 PROCEDURE — 1160F RVW MEDS BY RX/DR IN RCRD: CPT | Mod: CPTII,S$GLB,, | Performed by: NURSE PRACTITIONER

## 2022-07-07 PROCEDURE — 1159F PR MEDICATION LIST DOCUMENTED IN MEDICAL RECORD: ICD-10-PCS | Mod: CPTII,S$GLB,, | Performed by: NURSE PRACTITIONER

## 2022-07-07 PROCEDURE — 36415 COLL VENOUS BLD VENIPUNCTURE: CPT | Performed by: NURSE PRACTITIONER

## 2022-07-07 PROCEDURE — 1159F MED LIST DOCD IN RCRD: CPT | Mod: CPTII,S$GLB,, | Performed by: NURSE PRACTITIONER

## 2022-07-07 PROCEDURE — 82607 VITAMIN B-12: CPT | Performed by: NURSE PRACTITIONER

## 2022-07-07 PROCEDURE — 84425 ASSAY OF VITAMIN B-1: CPT | Performed by: NURSE PRACTITIONER

## 2022-07-07 PROCEDURE — 82746 ASSAY OF FOLIC ACID SERUM: CPT | Performed by: NURSE PRACTITIONER

## 2022-07-07 PROCEDURE — 4010F ACE/ARB THERAPY RXD/TAKEN: CPT | Mod: CPTII,S$GLB,, | Performed by: NURSE PRACTITIONER

## 2022-07-07 NOTE — PATIENT INSTRUCTIONS
1) Olfactory training (instruction sheet provided)  2) The Smell Identification test (patient will take at home and bring in packet at follow up).  3) Fluticasone (Flonase) - 2 sprays each nostril daily  4) Continue multivitamin daily.  5) Will schedule for nasal endoscopy at patient's convenience.

## 2022-07-07 NOTE — PROGRESS NOTES
Subjective:      Kylah Mohan is a 51 y.o. female who was referred to me by Dr. Cornelio Minor in consultation for loss of smell and taste.    Ms. Mohan reports having loss of smell and taste that she first noticed over 2 years ago after having surgery for brain aneurysm on 11/4/2020. She has tried neti-pot, smell training, and fluticasone without benefit. She takes a multivitamin daily. She does not have a history of COVID-19 infection. She reports having associated maxillary sinus pressure and pain. She has nasal congestion at night and runny nose during the day.      Current sinonasal medications as above.  She does not regularly use nasal decongestant sprays.    She recalls previously having allergy testinglatex and dust mites    She denies a history of asthma.    She denies a history of reflux symptoms.  She has not previously had an EGD.    She denies have a diagnosis of obstructive sleep apnea.     She has not had sinonasal surgery.    She does not recall a prior history of nasal trauma.      Past Medical History  She has a past medical history of Benign hypertension, Bladder instability, Brain aneurysm, Depression, DJD (degenerative joint disease), lumbar, History of viral meningitis, Hyperlipidemia, Insomnia, Microscopic hematuria, Pulmonary nodule, and Tendonitis.    Past Surgical History  She has a past surgical history that includes Breast biopsy (6/5/2007); Tubal ligation (2002); Endometrial ablation (5/28/2009); Laparoscopic hysterectomy (2/2013); Anterior vaginal repair; Hysterectomy; Cerebral angiogram (N/A, 9/17/2020); Clip ligation of intracranial aneurysm by craniotomy (Left, 11/4/2020); Brain surgery; and Colonoscopy (N/A, 7/23/2021).    Family History  Her family history includes Allergies in her mother and sister; Breast cancer (age of onset: 56) in her paternal grandmother; Breast cancer (age of onset: 61) in her maternal aunt; Cancer in her paternal grandmother; Diabetes in her father and  paternal grandmother; Heart disease in her sister; Hyperlipidemia in her father and mother; Hypertension in her father and mother; Osteoarthritis in her mother; Ovarian cancer (age of onset: 50) in her maternal cousin.    Social History  She reports that she quit smoking about 8 years ago. Her smoking use included cigarettes. She has a 3.75 pack-year smoking history. She has never used smokeless tobacco. She reports previous alcohol use of about 1.0 standard drink of alcohol per week. She reports that she does not use drugs.    Allergies  She is allergic to latex.    Medications   She has a current medication list which includes the following prescription(s): ascorbic acid (vitamin c), aspirin, atenolol, cetirizine, escitalopram oxalate, fluticasone propionate, hydrochlorothiazide, lactobacillus rhamnosus gg, losartan, multivitamin, fish oil-omega-3 fatty acids, tazarotene, urea, and lorazepam.      Review of Systems     Constitutional: Positive for appetite change and fatigue.  Negative for chills and fever.      HENT: Positive for ear pain, facial swelling, nosebleeds, postnasal drip, sinus infection, sinus pressure, trouble swallowing and voice change.  Negative for hearing loss, ringing in the ears, runny nose, sore throat and stuffy nose.      Eyes:  Positive for eye itching and photophobia. Negative for change in eyesight.     Respiratory:  Positive for shortness of breath. Negative for cough.      Cardiovascular:  Positive for chest pain. Negative for foot swelling.     Gastrointestinal:  Positive for constipation. Negative for acid reflux, heartburn and vomiting.     Genitourinary: Negative for difficulty urinating, sexual problems and frequent urination.     Musc: Positive for aching muscles, back pain and neck pain. Negative for aching joints.     Skin: Negative for rash.     Allergy: Positive for seasonal allergies. Negative for food allergies.     Endocrine: Positive for cold intolerance. Negative for  heat intolerance.      Neurological: Positive for dizziness, headaches and light-headedness.     Hematologic: Positive for bruises/bleeds easily. Negative for swollen glands.      Psychiatric: Positive for decreased concentration, depression and sleep disturbance.         Objective:     There were no vitals taken for this visit.       Constitutional:   She is oriented to person, place, and time. Vital signs are normal. She appears well-developed and well-nourished. She appears alert. Normal speech.      Head:  Normocephalic and atraumatic.     Ears:    Right Ear: No lacerations. No drainage, swelling or tenderness. No foreign bodies. No mastoid tenderness. Tympanic membrane is not injected, not scarred, not perforated, not erythematous, not retracted and not bulging. Tympanic membrane mobility is normal. No middle ear effusion. No hemotympanum.   Left Ear: No lacerations. No drainage, swelling or tenderness. No foreign bodies. No mastoid tenderness. Tympanic membrane is not injected, not scarred, not perforated, not erythematous, not retracted and not bulging. Tympanic membrane mobility is normal.  No middle ear effusion. No hemotympanum.     Nose:  No mucosal edema, rhinorrhea, nose lacerations, sinus tenderness, septal deviation, nasal septal hematoma or polyps. No epistaxis.  No foreign bodies. Turbinate hypertrophy.  Right sinus exhibits maxillary sinus tenderness. Right sinus exhibits no frontal sinus tenderness. Left sinus exhibits maxillary sinus tenderness. Left sinus exhibits no frontal sinus tenderness.     Mouth/Throat  Oropharynx clear and moist without lesions or asymmetry, normal uvula midline and lips, teeth, and gums normal. No uvula swelling, oral lesions, trismus, mucous membrane lesions or xerostomia. No oropharyngeal exudate, posterior oropharyngeal edema or posterior oropharyngeal erythema.     Neck:  Neck normal without thyromegaly masses, asymmetry, normal tracheal structure, crepitus, and  tenderness and no adenopathy.     Psychiatric:   She has a normal mood and affect. Her speech is normal and behavior is normal.     Neurological:   She is alert and oriented to person, place, and time. No cranial nerve deficit.     Skin:   No abrasions, lacerations, lesions, or rashes.       Procedure    None      Data Reviewed    WBC (K/uL)   Date Value   12/06/2021 7.34     Eosinophil % (%)   Date Value   12/06/2021 2.0     Eos # (K/uL)   Date Value   12/06/2021 0.2     Platelets (K/uL)   Date Value   12/06/2021 343     Glucose (mg/dL)   Date Value   01/20/2022 96     No results found for: IGE    No sinus imaging available.       Assessment:     1. Anosmia    2. Hypertrophy of inferior nasal turbinate         Plan:     1) Olfactory training (instruction sheet provided)  2) The Smell Identification test (patient will take at home and bring in packet at follow up).  3) Fluticasone (Flonase) - 2 sprays each nostril daily  4) Continue multivitamin daily.  5) Ordered labs (B12, B1, folate)  6) Will schedule for nasal endoscopy at patient's convenience.

## 2022-07-11 ENCOUNTER — PATIENT MESSAGE (OUTPATIENT)
Dept: NEUROLOGY | Facility: CLINIC | Age: 52
End: 2022-07-11
Payer: COMMERCIAL

## 2022-07-12 LAB — VIT B1 BLD-MCNC: 68 UG/L (ref 38–122)

## 2022-07-21 ENCOUNTER — HOSPITAL ENCOUNTER (OUTPATIENT)
Dept: RADIOLOGY | Facility: HOSPITAL | Age: 52
Discharge: HOME | End: 2022-07-21
Attending: NEUROLOGICAL SURGERY
Payer: COMMERCIAL

## 2022-07-21 DIAGNOSIS — I67.1 CEREBRAL ANEURYSM: ICD-10-CM

## 2022-07-21 PROCEDURE — 25500020 PHARM REV CODE 255: Performed by: NEUROLOGICAL SURGERY

## 2022-07-21 PROCEDURE — 70496 CT ANGIOGRAPHY HEAD: CPT | Mod: 26,,, | Performed by: RADIOLOGY

## 2022-07-21 PROCEDURE — 70496 CTA HEAD: ICD-10-PCS | Mod: 26,,, | Performed by: RADIOLOGY

## 2022-07-21 PROCEDURE — 70496 CT ANGIOGRAPHY HEAD: CPT | Mod: TC

## 2022-07-21 RX ADMIN — IOHEXOL 100 ML: 350 INJECTION, SOLUTION INTRAVENOUS at 08:07

## 2022-07-22 ENCOUNTER — OFFICE VISIT (OUTPATIENT)
Dept: OTOLARYNGOLOGY | Facility: CLINIC | Age: 52
End: 2022-07-22
Payer: COMMERCIAL

## 2022-07-22 DIAGNOSIS — R43.0 ANOSMIA: Primary | ICD-10-CM

## 2022-07-22 DIAGNOSIS — J34.3 HYPERTROPHY OF INFERIOR NASAL TURBINATE: ICD-10-CM

## 2022-07-22 PROCEDURE — 1159F PR MEDICATION LIST DOCUMENTED IN MEDICAL RECORD: ICD-10-PCS | Mod: CPTII,S$GLB,, | Performed by: NURSE PRACTITIONER

## 2022-07-22 PROCEDURE — 1160F PR REVIEW ALL MEDS BY PRESCRIBER/CLIN PHARMACIST DOCUMENTED: ICD-10-PCS | Mod: CPTII,S$GLB,, | Performed by: NURSE PRACTITIONER

## 2022-07-22 PROCEDURE — 99999 PR PBB SHADOW E&M-EST. PATIENT-LVL III: CPT | Mod: PBBFAC,,, | Performed by: NURSE PRACTITIONER

## 2022-07-22 PROCEDURE — 4010F PR ACE/ARB THEARPY RXD/TAKEN: ICD-10-PCS | Mod: CPTII,S$GLB,, | Performed by: NURSE PRACTITIONER

## 2022-07-22 PROCEDURE — 1160F RVW MEDS BY RX/DR IN RCRD: CPT | Mod: CPTII,S$GLB,, | Performed by: NURSE PRACTITIONER

## 2022-07-22 PROCEDURE — 99999 PR PBB SHADOW E&M-EST. PATIENT-LVL III: ICD-10-PCS | Mod: PBBFAC,,, | Performed by: NURSE PRACTITIONER

## 2022-07-22 PROCEDURE — 1159F MED LIST DOCD IN RCRD: CPT | Mod: CPTII,S$GLB,, | Performed by: NURSE PRACTITIONER

## 2022-07-22 PROCEDURE — 31231 NASAL ENDOSCOPY DX: CPT | Mod: S$GLB,,, | Performed by: NURSE PRACTITIONER

## 2022-07-22 PROCEDURE — 99499 NO LOS: ICD-10-PCS | Mod: S$GLB,,, | Performed by: NURSE PRACTITIONER

## 2022-07-22 PROCEDURE — 31231 NASAL/SINUS ENDOSCOPY: ICD-10-PCS | Mod: S$GLB,,, | Performed by: NURSE PRACTITIONER

## 2022-07-22 PROCEDURE — 99499 UNLISTED E&M SERVICE: CPT | Mod: S$GLB,,, | Performed by: NURSE PRACTITIONER

## 2022-07-22 PROCEDURE — 4010F ACE/ARB THERAPY RXD/TAKEN: CPT | Mod: CPTII,S$GLB,, | Performed by: NURSE PRACTITIONER

## 2022-07-22 NOTE — PROCEDURES
"Nasal/sinus endoscopy    Date/Time: 7/22/2022 1:00 PM    Time out: Immediately prior to procedure a "time out" was called to verify the correct patient, procedure, equipment, support staff and site/side marked as required.  Performed by: Nolvia Kan DNP, FNP-C  Authorized by: Nolvia Kan DNP, FNP-C     Consent Done?:  Yes (Verbal)  Anesthesia:     Local anesthetic:  Lidocaine 4% and Waqas-Synephrine 1/2%    Patient tolerance:  Patient tolerated the procedure well with no immediate complications  Nose:     Procedure Performed:  Nasal Endoscopy  External:      No external nasal deformity  Intranasal:      Mucosa no polyps     Mucosa ulcers not present     No mucosa lesions present     Turbinates not enlarged     No septum gross deformity  Nasopharynx:      No mucosa lesions     Adenoids not present     Posterior choanae patent     Eustachian tube patent     No polyps or purulence.  Olfactory cleft clear.  Normal exam findings.       "

## 2022-08-13 ENCOUNTER — OFFICE VISIT (OUTPATIENT)
Dept: ENDOCRINOLOGY | Facility: CLINIC | Age: 52
End: 2022-08-13
Payer: COMMERCIAL

## 2022-08-13 DIAGNOSIS — E05.90 HYPERTHYROIDISM: Primary | ICD-10-CM

## 2022-08-13 DIAGNOSIS — G47.33 OBSTRUCTIVE SLEEP APNEA SYNDROME: ICD-10-CM

## 2022-08-13 DIAGNOSIS — M47.816 OSTEOARTHRITIS OF LUMBAR SPINE, UNSPECIFIED SPINAL OSTEOARTHRITIS COMPLICATION STATUS: ICD-10-CM

## 2022-08-13 DIAGNOSIS — R51.9 CHRONIC NONINTRACTABLE HEADACHE, UNSPECIFIED HEADACHE TYPE: ICD-10-CM

## 2022-08-13 DIAGNOSIS — G89.29 CHRONIC NONINTRACTABLE HEADACHE, UNSPECIFIED HEADACHE TYPE: ICD-10-CM

## 2022-08-13 DIAGNOSIS — I10 ESSENTIAL HYPERTENSION: ICD-10-CM

## 2022-08-13 DIAGNOSIS — E04.1 NODULAR THYROID DISEASE: ICD-10-CM

## 2022-08-13 DIAGNOSIS — R43.0 ANOSMIA: ICD-10-CM

## 2022-08-13 DIAGNOSIS — E78.5 HYPERLIPIDEMIA, UNSPECIFIED HYPERLIPIDEMIA TYPE: ICD-10-CM

## 2022-08-13 DIAGNOSIS — Z78.0 POSTMENOPAUSAL: ICD-10-CM

## 2022-08-13 DIAGNOSIS — E66.9 OBESITY (BMI 30-39.9): ICD-10-CM

## 2022-08-13 DIAGNOSIS — Z82.49 FAMILY HISTORY OF BRAIN ANEURYSM: ICD-10-CM

## 2022-08-13 DIAGNOSIS — E55.9 HYPOVITAMINOSIS D: ICD-10-CM

## 2022-08-13 DIAGNOSIS — F41.8 DEPRESSION WITH ANXIETY: ICD-10-CM

## 2022-08-13 DIAGNOSIS — E04.9 GOITER: ICD-10-CM

## 2022-08-13 DIAGNOSIS — E05.90 THYROTOXICOSIS WITHOUT THYROID STORM, UNSPECIFIED THYROTOXICOSIS TYPE: ICD-10-CM

## 2022-08-13 DIAGNOSIS — R73.09 DYSGLYCEMIA: ICD-10-CM

## 2022-08-13 DIAGNOSIS — E06.9 THYROIDITIS: ICD-10-CM

## 2022-08-13 DIAGNOSIS — R73.03 PREDIABETES: ICD-10-CM

## 2022-08-13 DIAGNOSIS — G44.209 TENSION-TYPE HEADACHE, NOT INTRACTABLE, UNSPECIFIED CHRONICITY PATTERN: ICD-10-CM

## 2022-08-13 DIAGNOSIS — R79.89 ABNORMAL THYROID BLOOD TEST: ICD-10-CM

## 2022-08-13 DIAGNOSIS — E88.810 DYSMETABOLIC SYNDROME: ICD-10-CM

## 2022-08-13 DIAGNOSIS — I67.1 CEREBRAL ANEURYSM WITHOUT RUPTURE: Chronic | ICD-10-CM

## 2022-08-13 PROCEDURE — 4010F ACE/ARB THERAPY RXD/TAKEN: CPT | Mod: CPTII,95,, | Performed by: INTERNAL MEDICINE

## 2022-08-13 PROCEDURE — 99214 OFFICE O/P EST MOD 30 MIN: CPT | Mod: 95,,, | Performed by: INTERNAL MEDICINE

## 2022-08-13 PROCEDURE — 1159F MED LIST DOCD IN RCRD: CPT | Mod: CPTII,95,, | Performed by: INTERNAL MEDICINE

## 2022-08-13 PROCEDURE — 99214 PR OFFICE/OUTPT VISIT, EST, LEVL IV, 30-39 MIN: ICD-10-PCS | Mod: 95,,, | Performed by: INTERNAL MEDICINE

## 2022-08-13 PROCEDURE — 1160F RVW MEDS BY RX/DR IN RCRD: CPT | Mod: CPTII,95,, | Performed by: INTERNAL MEDICINE

## 2022-08-13 PROCEDURE — 1159F PR MEDICATION LIST DOCUMENTED IN MEDICAL RECORD: ICD-10-PCS | Mod: CPTII,95,, | Performed by: INTERNAL MEDICINE

## 2022-08-13 PROCEDURE — 1160F PR REVIEW ALL MEDS BY PRESCRIBER/CLIN PHARMACIST DOCUMENTED: ICD-10-PCS | Mod: CPTII,95,, | Performed by: INTERNAL MEDICINE

## 2022-08-13 PROCEDURE — 4010F PR ACE/ARB THEARPY RXD/TAKEN: ICD-10-PCS | Mod: CPTII,95,, | Performed by: INTERNAL MEDICINE

## 2022-08-13 RX ORDER — TOPIRAMATE 50 MG/1
50 TABLET, FILM COATED ORAL NIGHTLY
Qty: 90 TABLET | Refills: 3 | Status: SHIPPED | OUTPATIENT
Start: 2022-08-13 | End: 2023-06-26

## 2022-08-13 RX ORDER — PROGESTERONE 100 MG/1
100 CAPSULE ORAL DAILY
Qty: 90 CAPSULE | Refills: 3 | Status: SHIPPED | OUTPATIENT
Start: 2022-08-13 | End: 2024-03-25

## 2022-08-13 NOTE — PROGRESS NOTES
Subjective:      Patient ID: Kylah Mohan is a 52 y.o. female.    Chief Complaint:      Patient is a 52 yr old lady with subclinical hyperthyroidism, thyroid nodular disease and hypertension seen in [facility] today.    History of Present Illness    The patient, Ms Mohan is a 52 yr old lady with thyroid functional anomalies consistent with subclinical hyperthyroidism and thyroid nodular disease seen in [facility] today. She in addition has essential hypertension on treatment, prediabetes, hyperlipidemia, depression and obesity with hypermenorrhea.  Her initial work showed her TFTs then were consistent with subclinical hyperthyroidism with no evidence of Graves disease based on ab profile.  Her screening thyroid USS does show evidence of thyroid nodular disease of which there is a dominant right upper lobe complex lesion for which she had  a thyroid USS guided FNAB done (12/15) which shows features consistent with benign colloid nodule. Her next scheduled thyroid ultrasound  Was from 12/17 and showed sonogrpahic stability.   Patient has been taking low dose metformin 500mg BID but she feels she may have been having low blood sugar reactions though she has measured her values mainly in the 80s.  Her thyroid nuclear medicine scan was normal.  She has only occasional palpitations but this is not excessive. She does drink some caffienated coffeee.  Patient has stopped taking trazadone. She has been of the topamax for ~ 3 months now.  She has no new complaints today but continues to complain of weakness and fatigue.  Her weight has remained essentially static since last visit despite all her lifestyle and behaivoral changes.  Patient has been having recurrent chronic headaches. These appear to tension in nature.  Patient is s/p GENESIS and unilateral oophorectomy with uterine ablation.  She is presently of all HRT.     Patient is s/p brain surgery; s/p aneurysms clipping of left anterior communicating artery. Patient is s/p surgery   11/4/2020. Patients paternal aunt also passed away from a brain aneurysm as well which apparently ruptured.   Since then she has lost her sense of taste and smell. COVID testing thus far has been unremarkable.     Patient had a sleep study @ [facility] which was +ve for sleep apnea. Could not tolerate CPAP mask.     Patients most recent thyroid USS was from 12/21 and she had a prior USS guided FNAB from 02/20 which showed benign adenomatoid nodule.  Her next scheduled thyroid USS thus should be for ~ 12/22.    Her most recent DEXA was from 01/21 and showed normal bone density at the time. Her next ffup DEXA should be for ~ 01/23.  She has not had any falls since her last visit.   She continues to have headaches.  She is presently not on any HRT which was stopped by her GYN and since then has been struggling with mood swings and emotional lability.  She also admits that her anosmia and dysguesia has caused her to engage in more emotional eating with consequent inability to lose weight     Review of Systems   Constitutional: Positive for fatigue (chronic). Negative for appetite change (Loss of taste and smell sensation persists since November soon after her craniotomy..), diaphoresis and unexpected weight change.   HENT: Negative for facial swelling and trouble swallowing.    Eyes: Negative for visual disturbance.   Respiratory: Negative for shortness of breath.    Cardiovascular: Negative for chest pain, palpitations and leg swelling.   Gastrointestinal: Negative for diarrhea, nausea and vomiting.   Endocrine: Negative for polydipsia.   Genitourinary: Negative for frequency and menstrual problem (postmenopausal).   Musculoskeletal: Negative for neck pain.   Skin: Negative for color change, pallor and rash.   Neurological: Positive for tremors (sometimes with stress/anxiety). Negative for dizziness, light-headedness and headaches.   Psychiatric/Behavioral: Positive for sleep disturbance (falls asleep okay, trouble  staying asleep). The patient is nervous/anxious (had some anxiety, panic attacks last year).        Objective: There were no vitals taken for this visit. There is no height or weight on file to calculate BSA. Latest weight was 183lbs; this is essentially stable compared to the start of the year.         Physical Exam  Vitals reviewed.   Constitutional:       General: She is not in acute distress.     Appearance: She is obese.      Comments: obese   HENT:      Head: Normocephalic and atraumatic.        Comments: Well healed craniotomy surgical scar as shown.     Nose: Nose normal.      Mouth/Throat:      Mouth: Mucous membranes are dry.   Eyes:      General: No scleral icterus.     Extraocular Movements: Extraocular movements intact.      Conjunctiva/sclera: Conjunctivae normal.      Pupils: Pupils are equal, round, and reactive to light.   Neck:      Thyroid: No thyromegaly (right sided nodule).   Cardiovascular:      Rate and Rhythm: Normal rate and regular rhythm.      Heart sounds: Normal heart sounds.   Pulmonary:      Effort: Pulmonary effort is normal. No respiratory distress.      Breath sounds: Normal breath sounds. No stridor. No wheezing or rhonchi.   Abdominal:      General: Bowel sounds are normal.      Comments: Obese anterior abdominal wall   Musculoskeletal:         General: No swelling. Normal range of motion.      Cervical back: Normal range of motion. No rigidity or tenderness. No muscular tenderness.   Skin:     General: Skin is warm and dry.      Coloration: Skin is not jaundiced or pale.   Neurological:      General: No focal deficit present.      Mental Status: She is alert.      Cranial Nerves: No cranial nerve deficit.   Psychiatric:         Mood and Affect: Mood normal.         Behavior: Behavior normal.         Thought Content: Thought content normal.         Lab Review:      Latest Reference Range & Units 12/06/21 11:01 01/20/22 08:40 01/20/22 10:14 02/04/22 09:34 07/07/22 12:20 07/21/22  08:41   WBC 3.90 - 12.70 K/uL 7.34        RBC 4.00 - 5.40 M/uL 4.24        Hemoglobin 12.0 - 16.0 g/dL 12.8        Hematocrit 37.0 - 48.5 % 39.4        MCV 82 - 98 fL 93        MCH 27.0 - 31.0 pg 30.2        MCHC 32.0 - 36.0 g/dL 32.5        RDW 11.5 - 14.5 % 13.0        Platelets 150 - 450 K/uL 343        MPV 9.2 - 12.9 fL 10.4        Gran % 38.0 - 73.0 % 61.1        Lymph % 18.0 - 48.0 % 28.1        Mono % 4.0 - 15.0 % 8.0        Eosinophil % 0.0 - 8.0 % 2.0        Basophil % 0.0 - 1.9 % 0.7        Immature Granulocytes 0.0 - 0.5 % 0.1        Gran # (ANC) 1.8 - 7.7 K/uL 4.5        Lymph # 1.0 - 4.8 K/uL 2.1        Mono # 0.3 - 1.0 K/uL 0.6        Eos # 0.0 - 0.5 K/uL 0.2        Baso # 0.00 - 0.20 K/uL 0.05        Immature Grans (Abs) 0.00 - 0.04 K/uL 0.01        nRBC 0 /100 WBC 0        Differential Method  Automated        Folate 4.0 - 24.0 ng/mL     18.1    Vitamin B-12 210 - 950 pg/mL     575    Sodium 136 - 145 mmol/L 140 139       Potassium 3.5 - 5.1 mmol/L 3.6 3.7       Chloride 95 - 110 mmol/L 103 101       CO2 23 - 29 mmol/L 24 28       Anion Gap 8 - 16 mmol/L 13 10       BUN 6 - 20 mg/dL 12 15       Creatinine 0.5 - 1.4 mg/dL 0.6 0.6       eGFR if non African American >60 mL/min/1.73 m^2 >60.0 >60.0       eGFR if African American >60 mL/min/1.73 m^2 >60.0 >60.0       Glucose 70 - 110 mg/dL 75 96       Calcium 8.7 - 10.5 mg/dL 10.0 9.7       Ionized Calcium 1.06 - 1.42 mmol/L 1.28        Alkaline Phosphatase 55 - 135 U/L 77        PROTEIN TOTAL 6.0 - 8.4 g/dL 7.3        Albumin 3.5 - 5.2 g/dL 3.4 (L)        Uric Acid 2.4 - 5.7 mg/dL 7.4 (H)        BILIRUBIN TOTAL 0.1 - 1.0 mg/dL 0.6        AST 10 - 40 U/L 21        ALT 10 - 44 U/L 13        Thiamine 38 - 122 ug/L     68    Vit D, 25-Hydroxy 30 - 96 ng/mL 87        TSH 0.400 - 4.000 uIU/mL 0.306 (L)        T3, Total 60 - 180 ng/dL 157        Free T4 0.71 - 1.51 ng/dL 0.99        Thyroglobulin Interpretation  SEE BELOW        Thyroglobulin Antibody Screen  <1.8 IU/mL <1.8        Thyroglobulin, Tumor Marker ng/mL 41 (H)        PTH 9.0 - 77.0 pg/mL 57.9        Albumin 3.6 - 5.1 g/dL 3.8        Estradiol See Text pg/mL 117        Estrogen pg/mL 1,717        FSH See Text mIU/mL 49.36        LH See Text mIU/mL 26.6        Progesterone See Text ng/mL 0.2        Prolactin 5.2 - 26.5 ng/mL 18.2        Sex Hormone Binding Globulin 17 - 124 nmol/L 89        Testosterone 2 - 45 ng/dL 30        Testosterone, Bioavailable 0.5 - 8.5 ng/dL 2.7        Testosterone, Free 0.2 - 5.0 pg/mL 1.5        POC Glucose 60 - 140 MG/DL   108      POC Cholesterol, Total <240 MG/DL   200      POC Cholesterol, HDL MG/DL   25 (L)      POC Cholesterol, LDL <160 MG/DL   150      POC Triglycerides <160 MG/DL   120      CTA HEAD       Rpt   MAMMO DIGITAL SCREENING BILAT WITH AUDREY     Rpt     (L): Data is abnormally low  (H): Data is abnormally high  Rpt: View report in Results Review for more information      Assessment:     1. Hyperthyroidism     2. Nodular thyroid disease  US Soft Tissue Head Neck Thyroid    T4, Free    T3    TSH    Iodine, Serum   3. Thyrotoxicosis without thyroid storm, unspecified thyrotoxicosis type  T4, Free    T3    Thyroglobulin    Iodine, Serum   4. Thyroiditis  CBC Auto Differential   5. Prediabetes  Hemoglobin A1C   6. Obesity (BMI 30-39.9)  Aldosterone    Renin   7. Obstructive sleep apnea syndrome  CBC Auto Differential   8. Abnormal thyroid blood test     9. Dysmetabolic syndrome  Comprehensive Metabolic Panel    Uric Acid    Lipid Panel    Microalbumin/Creatinine Ratio, Urine    Urinalysis   10. Goiter     11. Postmenopausal  DXA Bone Density Spine And Hip    Lipid Panel   12. Hyperlipidemia, unspecified hyperlipidemia type  Comprehensive Metabolic Panel    Lipid Panel   13. Essential hypertension  Comprehensive Metabolic Panel    Uric Acid    Microalbumin/Creatinine Ratio, Urine    Urinalysis    Aldosterone    Renin   14. Depression with anxiety     15. Chronic  nonintractable headache, unspecified headache type     16. Cerebral aneurysm without rupture, left anterior communicating 4.3     17. Osteoarthritis of lumbar spine, unspecified spinal osteoarthritis complication status     18. Anosmia     19. Family history of brain aneurysm     20. Dysglycemia  Hemoglobin A1C   21. Hypovitaminosis D  Vitamin D    Calcium, Ionized    PTH, Intact   22. Tension-type headache, not intractable, unspecified chronicity pattern  topiramate (TOPAMAX) 50 MG tablet        Regarding hyperthyroidism; To continue serial surviellance. Will repeat TFTs today. To continue low dose beta blockade as before.  Regarding thyroid nodular disease; for ffup thyroid USS ~ 12/22  Regarding hypertension;Well controlled. To continue present antihypertensive regimen. To continue serial BP tracking.  Regarding prediabetes; to continue efforts at weight loss and reduced caloric intake. To recheck HBA1c today.  Regarding hypercholesterolemia; Most recent lipid profile at goal even without antilipidemic use  Regarding recent mental fogginesss; Much better now since stopping trazadone. Will also consider weaning of neurontin. To discuss with PCP in this regard.  Regarding postmenopausal state; to obtain FFup DEXA  For 01/23. Will defer to her GYN as to whether or not to resume estrogen based HRT.  Regarding brain aneurysm resection; ongoing ffup with neurosurgery service.  Regarding chronic headaches; to commence low dose topiramate 50mg QHS (she had taken this in the past with good effect). Hopefully this will also assist with weight modulation and assist with improving her sleep quality. Also encouraged to commence a trial of low dose prometrium 100mg QD as well to see if this improves both her sleep quality and mood state.  Regarding depression; ongoing management as per her PCP; remains on lexapro on this account.  Regarding OSAS; not presently on active treatment as she has been unable to tolerate CPAP trials.  May need to explore trial of recently approved implantable Inspire hypoglossal nerve stimulator device as an alternative management strategy.    Plan:     FFup in ~ 6mths

## 2022-08-30 ENCOUNTER — OFFICE VISIT (OUTPATIENT)
Dept: DERMATOLOGY | Facility: CLINIC | Age: 52
End: 2022-08-30
Payer: COMMERCIAL

## 2022-08-30 DIAGNOSIS — L82.1 DERMATOSIS PAPULOSA NIGRA: ICD-10-CM

## 2022-08-30 DIAGNOSIS — L85.2 PUNCTATE KERATODERMA: Primary | ICD-10-CM

## 2022-08-30 PROCEDURE — 1160F RVW MEDS BY RX/DR IN RCRD: CPT | Mod: CPTII,S$GLB,, | Performed by: STUDENT IN AN ORGANIZED HEALTH CARE EDUCATION/TRAINING PROGRAM

## 2022-08-30 PROCEDURE — 1160F PR REVIEW ALL MEDS BY PRESCRIBER/CLIN PHARMACIST DOCUMENTED: ICD-10-PCS | Mod: CPTII,S$GLB,, | Performed by: STUDENT IN AN ORGANIZED HEALTH CARE EDUCATION/TRAINING PROGRAM

## 2022-08-30 PROCEDURE — 1159F MED LIST DOCD IN RCRD: CPT | Mod: CPTII,S$GLB,, | Performed by: STUDENT IN AN ORGANIZED HEALTH CARE EDUCATION/TRAINING PROGRAM

## 2022-08-30 PROCEDURE — 99999 PR PBB SHADOW E&M-EST. PATIENT-LVL II: ICD-10-PCS | Mod: PBBFAC,,, | Performed by: STUDENT IN AN ORGANIZED HEALTH CARE EDUCATION/TRAINING PROGRAM

## 2022-08-30 PROCEDURE — 1159F PR MEDICATION LIST DOCUMENTED IN MEDICAL RECORD: ICD-10-PCS | Mod: CPTII,S$GLB,, | Performed by: STUDENT IN AN ORGANIZED HEALTH CARE EDUCATION/TRAINING PROGRAM

## 2022-08-30 PROCEDURE — 99999 PR PBB SHADOW E&M-EST. PATIENT-LVL II: CPT | Mod: PBBFAC,,, | Performed by: STUDENT IN AN ORGANIZED HEALTH CARE EDUCATION/TRAINING PROGRAM

## 2022-08-30 PROCEDURE — 99212 PR OFFICE/OUTPT VISIT, EST, LEVL II, 10-19 MIN: ICD-10-PCS | Mod: 25,S$GLB,, | Performed by: STUDENT IN AN ORGANIZED HEALTH CARE EDUCATION/TRAINING PROGRAM

## 2022-08-30 PROCEDURE — 4010F ACE/ARB THERAPY RXD/TAKEN: CPT | Mod: CPTII,S$GLB,, | Performed by: STUDENT IN AN ORGANIZED HEALTH CARE EDUCATION/TRAINING PROGRAM

## 2022-08-30 PROCEDURE — 17110 DESTRUCTION B9 LES UP TO 14: CPT | Mod: S$GLB,,, | Performed by: STUDENT IN AN ORGANIZED HEALTH CARE EDUCATION/TRAINING PROGRAM

## 2022-08-30 PROCEDURE — 17110 PR DESTRUCTION BENIGN LESIONS UP TO 14: ICD-10-PCS | Mod: S$GLB,,, | Performed by: STUDENT IN AN ORGANIZED HEALTH CARE EDUCATION/TRAINING PROGRAM

## 2022-08-30 PROCEDURE — 99212 OFFICE O/P EST SF 10 MIN: CPT | Mod: 25,S$GLB,, | Performed by: STUDENT IN AN ORGANIZED HEALTH CARE EDUCATION/TRAINING PROGRAM

## 2022-08-30 PROCEDURE — 4010F PR ACE/ARB THEARPY RXD/TAKEN: ICD-10-PCS | Mod: CPTII,S$GLB,, | Performed by: STUDENT IN AN ORGANIZED HEALTH CARE EDUCATION/TRAINING PROGRAM

## 2022-08-30 NOTE — PROGRESS NOTES
Subjective:       Patient ID:  Kylah Mohan is a 52 y.o. 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 States they sometimes flatten but never go away.  Treated with LN2 at LOV, states it did not help much.    Final Pathologic Diagnosis     Skin, right lateral hand, shave biopsy:   -MILD ACANTHOSIS, LOW VERRUCOUS PAPILLOMATOSIS, HYPERGRANULOSIS, AND   HYPERKERATOSIS, see comment   COMMENT:  Clinical images were reviewed in epic and differential diagnosis   noted.  The interpretation of this biopsy is somewhat limited due to its   small and somewhat superficial nature.  The the histological findings (see   microscopic description) are overall nonspecific but could represent   keratoderma in the correct clinical context.  Although the epidermis is also   reminiscent of a verruca vulgaris, p16 appears negative.  Correlation is   recommended.       Review of Systems   Constitutional:  Negative for fever, chills and fatigue.   Respiratory:  Negative for cough and shortness of breath.    Gastrointestinal:  Negative for nausea and vomiting.   Skin:  Positive for daily sunscreen use and activity-related sunscreen use.   Hematologic/Lymphatic: Bruises/bleeds easily.      Objective:    Physical Exam   Constitutional: She appears well-developed and well-nourished.   Neurological: She is alert and oriented to person, place, and time.   Psychiatric: She has a normal mood and affect.   Skin:   Areas Examined (abnormalities noted in diagram):   Head / Face Inspection Performed  RUE Inspected  LUE Inspection Performed  RLE Inspected  LLE Inspection Performed                 Diagram Legend     Erythematous scaling macule/papule c/w actinic keratosis       Vascular papule c/w angioma      Pigmented verrucoid papule/plaque c/w seborrheic  keratosis      Yellow umbilicated papule c/w sebaceous hyperplasia      Irregularly shaped tan macule c/w lentigo     1-2 mm smooth white papules consistent with Milia      Movable subcutaneous cyst with punctum c/w epidermal inclusion cyst      Subcutaneous movable cyst c/w pilar cyst      Firm pink to brown papule c/w dermatofibroma      Pedunculated fleshy papule(s) c/w skin tag(s)      Evenly pigmented macule c/w junctional nevus     Mildly variegated pigmented, slightly irregular-bordered macule c/w mildly atypical nevus      Flesh colored to evenly pigmented papule c/w intradermal nevus       Pink pearly papule/plaque c/w basal cell carcinoma      Erythematous hyperkeratotic cursted plaque c/w SCC      Surgical scar with no sign of skin cancer recurrence      Open and closed comedones      Inflammatory papules and pustules      Verrucoid papule consistent consistent with wart     Erythematous eczematous patches and plaques     Dystrophic onycholytic nail with subungual debris c/w onychomycosis     Umbilicated papule    Erythematous-base heme-crusted tan verrucoid plaque consistent with inflamed seborrheic keratosis     Erythematous Silvery Scaling Plaque c/w Psoriasis     See annotation      Assessment / Plan:        Punctate keratoderma  - vs. Warts, however lesions all pared w/ a 15 blade today and normal skin markings underneath hyperkeratosis, no black dots/ bleeding to suggest a wart  - use tazorac under occlusion and urea during the day  - discussed trial of additional topical such as vitamin D or 5FU, will hold off for now    Dermatosis papulosa nigra  - reassurance, will monitor           No follow-ups on file.

## 2022-11-15 ENCOUNTER — HOSPITAL ENCOUNTER (OUTPATIENT)
Dept: RADIOLOGY | Facility: HOSPITAL | Age: 52
Discharge: HOME | End: 2022-11-15
Attending: INTERNAL MEDICINE
Payer: COMMERCIAL

## 2022-11-15 DIAGNOSIS — E04.1 NODULAR THYROID DISEASE: ICD-10-CM

## 2022-11-15 PROCEDURE — 76536 US SOFT TISSUE HEAD NECK THYROID: ICD-10-PCS | Mod: 26,,, | Performed by: RADIOLOGY

## 2022-11-15 PROCEDURE — 76536 US EXAM OF HEAD AND NECK: CPT | Mod: TC

## 2022-11-15 PROCEDURE — 76536 US EXAM OF HEAD AND NECK: CPT | Mod: 26,,, | Performed by: RADIOLOGY

## 2022-12-07 ENCOUNTER — PATIENT MESSAGE (OUTPATIENT)
Dept: FAMILY MEDICINE | Facility: CLINIC | Age: 52
End: 2022-12-07
Payer: COMMERCIAL

## 2022-12-07 DIAGNOSIS — F43.21 GRIEF REACTION: Primary | ICD-10-CM

## 2022-12-07 RX ORDER — LORAZEPAM 0.5 MG/1
0.5 TABLET ORAL EVERY 8 HOURS PRN
Qty: 15 TABLET | Refills: 0 | Status: SHIPPED | OUTPATIENT
Start: 2022-12-07 | End: 2023-03-22

## 2023-01-12 ENCOUNTER — HOSPITAL ENCOUNTER (OUTPATIENT)
Dept: RADIOLOGY | Facility: CLINIC | Age: 53
Discharge: HOME | End: 2023-01-12
Attending: INTERNAL MEDICINE
Payer: COMMERCIAL

## 2023-01-12 DIAGNOSIS — Z78.0 POSTMENOPAUSAL: ICD-10-CM

## 2023-01-12 PROCEDURE — 77080 DXA BONE DENSITY AXIAL: CPT | Mod: TC,PO

## 2023-01-12 PROCEDURE — 77080 DEXA BONE DENSITY SPINE HIP: ICD-10-PCS | Mod: 26,,, | Performed by: RADIOLOGY

## 2023-01-12 PROCEDURE — 77080 DXA BONE DENSITY AXIAL: CPT | Mod: 26,,, | Performed by: RADIOLOGY

## 2023-01-13 ENCOUNTER — CLINICAL SUPPORT (OUTPATIENT)
Dept: OTHER | Facility: CLINIC | Age: 53
End: 2023-01-13
Payer: COMMERCIAL

## 2023-01-13 DIAGNOSIS — Z00.8 ENCOUNTER FOR OTHER GENERAL EXAMINATION: ICD-10-CM

## 2023-01-14 VITALS
DIASTOLIC BLOOD PRESSURE: 90 MMHG | BODY MASS INDEX: 33.31 KG/M2 | SYSTOLIC BLOOD PRESSURE: 130 MMHG | WEIGHT: 181 LBS | HEIGHT: 62 IN

## 2023-01-14 LAB
GLUCOSE SERPL-MCNC: 98 MG/DL (ref 60–140)
HDLC SERPL-MCNC: 43 MG/DL
POC CHOLESTEROL, LDL (DOCK): 145 MG/DL
POC CHOLESTEROL, TOTAL: 206 MG/DL
TRIGL SERPL-MCNC: 102 MG/DL

## 2023-02-15 ENCOUNTER — OFFICE VISIT (OUTPATIENT)
Dept: OBSTETRICS AND GYNECOLOGY | Facility: CLINIC | Age: 53
End: 2023-02-15
Payer: COMMERCIAL

## 2023-02-15 ENCOUNTER — HOSPITAL ENCOUNTER (OUTPATIENT)
Dept: RADIOLOGY | Facility: HOSPITAL | Age: 53
Discharge: HOME | End: 2023-02-15
Attending: SPECIALIST
Payer: COMMERCIAL

## 2023-02-15 VITALS
DIASTOLIC BLOOD PRESSURE: 84 MMHG | BODY MASS INDEX: 32.91 KG/M2 | SYSTOLIC BLOOD PRESSURE: 118 MMHG | HEIGHT: 62 IN | WEIGHT: 178.81 LBS

## 2023-02-15 DIAGNOSIS — Z12.31 ENCOUNTER FOR SCREENING MAMMOGRAM FOR MALIGNANT NEOPLASM OF BREAST: ICD-10-CM

## 2023-02-15 DIAGNOSIS — R39.9 UTI SYMPTOMS: ICD-10-CM

## 2023-02-15 DIAGNOSIS — N89.8 VAGINAL DISCHARGE: ICD-10-CM

## 2023-02-15 DIAGNOSIS — Z12.31 ENCOUNTER FOR SCREENING MAMMOGRAM FOR MALIGNANT NEOPLASM OF BREAST: Primary | ICD-10-CM

## 2023-02-15 LAB
BILIRUBIN, UA POC OHS: NEGATIVE
BLOOD, UA POC OHS: ABNORMAL
CLARITY, UA POC OHS: CLEAR
COLOR, UA POC OHS: YELLOW
GLUCOSE, UA POC OHS: NEGATIVE
KETONES, UA POC OHS: NEGATIVE
LEUKOCYTES, UA POC OHS: NEGATIVE
NITRITE, UA POC OHS: NEGATIVE
PH, UA POC OHS: 7.5
PROTEIN, UA POC OHS: NEGATIVE
SPECIFIC GRAVITY, UA POC OHS: 1.02
UROBILINOGEN, UA POC OHS: 0.2

## 2023-02-15 PROCEDURE — 87591 N.GONORRHOEAE DNA AMP PROB: CPT | Performed by: SPECIALIST

## 2023-02-15 PROCEDURE — 77063 BREAST TOMOSYNTHESIS BI: CPT | Mod: 26,,, | Performed by: RADIOLOGY

## 2023-02-15 PROCEDURE — 3061F PR NEG MICROALBUMINURIA RESULT DOCUMENTED/REVIEW: ICD-10-PCS | Mod: CPTII,S$GLB,, | Performed by: SPECIALIST

## 2023-02-15 PROCEDURE — 1159F MED LIST DOCD IN RCRD: CPT | Mod: CPTII,S$GLB,, | Performed by: SPECIALIST

## 2023-02-15 PROCEDURE — 99999 PR PBB SHADOW E&M-EST. PATIENT-LVL IV: ICD-10-PCS | Mod: PBBFAC,,, | Performed by: SPECIALIST

## 2023-02-15 PROCEDURE — 3044F PR MOST RECENT HEMOGLOBIN A1C LEVEL <7.0%: ICD-10-PCS | Mod: CPTII,S$GLB,, | Performed by: SPECIALIST

## 2023-02-15 PROCEDURE — 77067 MAMMO DIGITAL SCREENING BILAT WITH TOMO: ICD-10-PCS | Mod: 26,,, | Performed by: RADIOLOGY

## 2023-02-15 PROCEDURE — 3061F NEG MICROALBUMINURIA REV: CPT | Mod: CPTII,S$GLB,, | Performed by: SPECIALIST

## 2023-02-15 PROCEDURE — 3008F BODY MASS INDEX DOCD: CPT | Mod: CPTII,S$GLB,, | Performed by: SPECIALIST

## 2023-02-15 PROCEDURE — 3079F PR MOST RECENT DIASTOLIC BLOOD PRESSURE 80-89 MM HG: ICD-10-PCS | Mod: CPTII,S$GLB,, | Performed by: SPECIALIST

## 2023-02-15 PROCEDURE — 77067 SCR MAMMO BI INCL CAD: CPT | Mod: 26,,, | Performed by: RADIOLOGY

## 2023-02-15 PROCEDURE — 3066F NEPHROPATHY DOC TX: CPT | Mod: CPTII,S$GLB,, | Performed by: SPECIALIST

## 2023-02-15 PROCEDURE — 3079F DIAST BP 80-89 MM HG: CPT | Mod: CPTII,S$GLB,, | Performed by: SPECIALIST

## 2023-02-15 PROCEDURE — 4010F PR ACE/ARB THEARPY RXD/TAKEN: ICD-10-PCS | Mod: CPTII,S$GLB,, | Performed by: SPECIALIST

## 2023-02-15 PROCEDURE — 3074F PR MOST RECENT SYSTOLIC BLOOD PRESSURE < 130 MM HG: ICD-10-PCS | Mod: CPTII,S$GLB,, | Performed by: SPECIALIST

## 2023-02-15 PROCEDURE — 99213 OFFICE O/P EST LOW 20 MIN: CPT | Mod: S$GLB,,, | Performed by: SPECIALIST

## 2023-02-15 PROCEDURE — 81003 POCT URINALYSIS(INSTRUMENT): ICD-10-PCS | Mod: QW,S$GLB,, | Performed by: SPECIALIST

## 2023-02-15 PROCEDURE — 77067 SCR MAMMO BI INCL CAD: CPT | Mod: TC,PN

## 2023-02-15 PROCEDURE — 3074F SYST BP LT 130 MM HG: CPT | Mod: CPTII,S$GLB,, | Performed by: SPECIALIST

## 2023-02-15 PROCEDURE — 81003 URINALYSIS AUTO W/O SCOPE: CPT | Mod: QW,S$GLB,, | Performed by: SPECIALIST

## 2023-02-15 PROCEDURE — 77063 MAMMO DIGITAL SCREENING BILAT WITH TOMO: ICD-10-PCS | Mod: 26,,, | Performed by: RADIOLOGY

## 2023-02-15 PROCEDURE — 99213 PR OFFICE/OUTPT VISIT, EST, LEVL III, 20-29 MIN: ICD-10-PCS | Mod: S$GLB,,, | Performed by: SPECIALIST

## 2023-02-15 PROCEDURE — 1159F PR MEDICATION LIST DOCUMENTED IN MEDICAL RECORD: ICD-10-PCS | Mod: CPTII,S$GLB,, | Performed by: SPECIALIST

## 2023-02-15 PROCEDURE — 99999 PR PBB SHADOW E&M-EST. PATIENT-LVL IV: CPT | Mod: PBBFAC,,, | Performed by: SPECIALIST

## 2023-02-15 PROCEDURE — 4010F ACE/ARB THERAPY RXD/TAKEN: CPT | Mod: CPTII,S$GLB,, | Performed by: SPECIALIST

## 2023-02-15 PROCEDURE — 3044F HG A1C LEVEL LT 7.0%: CPT | Mod: CPTII,S$GLB,, | Performed by: SPECIALIST

## 2023-02-15 PROCEDURE — 3008F PR BODY MASS INDEX (BMI) DOCUMENTED: ICD-10-PCS | Mod: CPTII,S$GLB,, | Performed by: SPECIALIST

## 2023-02-15 PROCEDURE — 87086 URINE CULTURE/COLONY COUNT: CPT | Performed by: SPECIALIST

## 2023-02-15 PROCEDURE — 3066F PR DOCUMENTATION OF TREATMENT FOR NEPHROPATHY: ICD-10-PCS | Mod: CPTII,S$GLB,, | Performed by: SPECIALIST

## 2023-02-15 RX ORDER — FLUCONAZOLE 200 MG/1
200 TABLET ORAL ONCE
Qty: 1 TABLET | Refills: 0 | Status: SHIPPED | OUTPATIENT
Start: 2023-02-15 | End: 2023-02-15

## 2023-02-15 RX ORDER — NITROFURANTOIN 25; 75 MG/1; MG/1
100 CAPSULE ORAL 2 TIMES DAILY
Qty: 14 CAPSULE | Refills: 0 | Status: SHIPPED | OUTPATIENT
Start: 2023-02-15 | End: 2023-02-22

## 2023-02-15 NOTE — LETTER
February 15, 2023      [facility]  [address] [address] [address]  Phone: [phone]  Fax: [phone]       Patient: Kylah Mohan   YOB: 1970  Date of Visit: 02/15/2023    To Whom It May Concern:    Mukesh Mohan  was at [facility] on 02/15/2023 for her annual exam and mammogram.    Sincerely,    Eduardo Cheung MD.

## 2023-02-15 NOTE — PROGRESS NOTES
53 yo BF presents for annual gyn eval as well as screening mammogram  C/O recent dysuria  Denies PP, f/c, flank pain  In addition, discussed VMFs and discussed menopause s/s I explained subjective indications for ERT na drec trial of Phytoestrogen.  Past Medical History:   Diagnosis Date    Benign hypertension     Bladder instability     Brain aneurysm     Depression     DJD (degenerative joint disease), lumbar     History of viral meningitis 1996    Hyperlipidemia     Insomnia     Microscopic hematuria     negative work-up    Pulmonary nodule     minute    Tendonitis        Past Surgical History:   Procedure Laterality Date    ANTERIOR VAGINAL REPAIR      BRAIN SURGERY      aneurysm    BREAST BIOPSY  6/5/2007    left breast benign    CEREBRAL ANGIOGRAM N/A 9/17/2020    Procedure: ANGIOGRAM-CEREBRAL;  Surgeon: [facility] Diagnostic Provider;  Location: [facility] OR [address] [place] [address];  Service: Radiology;  Laterality: N/A;   [address]    CLIP LIGATION OF INTRACRANIAL ANEURYSM BY CRANIOTOMY Left 11/4/2020    Procedure: CRANIOTOMY, WITH ANEURYSM CLIPPING RIGHT MODIFIED ORBITOZYGOMATIC CRANIOTOMY;  Surgeon: Christi Page MD;  Location: [facility] OR [address] [place] [address];  Service: Neurosurgery;  Laterality: Left;  Anesthesia: General   Blood: Type & Cross 2 units  Neuro Monitoring: SEP, MEP, EEG   Radiology: C-Arm   Bed: Regular Bed  Headrest: [place]  Position: Supine   ,  Reciprocating Saw  Length of Surgery: 4.5     COLONOSCOPY N/A 7/23/2021    Procedure: COLONOSCOPY;  Surgeon: Tiffanie Reyes MD;  Location: [facility] ENDO;  Service: Endoscopy;  Laterality: N/A;    ENDOMETRIAL ABLATION  5/28/2009    HYSTERECTOMY      LAPAROSCOPIC HYSTERECTOMY  2/2013    TUBAL LIGATION  2002       Family History   Problem Relation Age of Onset    Osteoarthritis Mother     Hyperlipidemia Mother     Hypertension Mother     Allergies Mother     Hyperlipidemia Father     Diabetes Father     Hypertension Father     Cancer Paternal Grandmother         Breast     Diabetes Paternal Grandmother     Breast cancer Paternal Grandmother 56    Allergies Sister     Heart disease Sister         Congential    Breast cancer Maternal Aunt 61    Ovarian cancer Maternal Cousin 50       Social History     Socioeconomic History    Marital status:    Occupational History     Employer: [employer]   Tobacco Use    Smoking status: Former     Packs/day: 0.25     Years: 15.00     Pack years: 3.75     Types: Cigarettes     Quit date: 10/20/2013     Years since quittin.3    Smokeless tobacco: Never   Substance and Sexual Activity    Alcohol use: Not Currently     Alcohol/week: 1.0 standard drink     Types: 1 Glasses of wine per week    Drug use: No    Sexual activity: Yes     Partners: Male     Birth control/protection: Surgical   Social History Narrative    The patient does exercise regularly (walking daily, 35-40min).Rates diet as fair.She is not satisfied with weight.     Social Determinants of Health     Financial Resource Strain: High Risk    Difficulty of Paying Living Expenses: Hard   Food Insecurity: Food Insecurity Present    Worried About Running Out of Food in the Last Year: Sometimes true    Ran Out of Food in the Last Year: Sometimes true   Transportation Needs: No Transportation Needs    Lack of Transportation (Medical): No    Lack of Transportation (Non-Medical): No   Physical Activity: Insufficiently Active    Days of Exercise per Week: 4 days    Minutes of Exercise per Session: 30 min   Stress: Stress Concern Present    Feeling of Stress : Rather much   Social Connections: Unknown    Frequency of Communication with Friends and Family: More than three times a week    Frequency of Social Gatherings with Friends and Family: Never    Active Member of Clubs or Organizations: Yes    Attends Club or Organization Meetings: More than 4 times per year    Marital Status:    Housing Stability: High Risk    Unable to Pay for Housing in the Last Year: Yes     Number of Places Lived in the Last Year: 1    Unstable Housing in the Last Year: No       Current Outpatient Medications   Medication Sig Dispense Refill    ascorbic acid, vitamin C, (VITAMIN C) 1000 MG tablet Take 1,000 mg by mouth once daily.      aspirin (ECOTRIN) 81 MG EC tablet Take 81 mg by mouth nightly.      atenoloL (TENORMIN) 25 MG tablet TAKE 1 TABLET BY MOUTH EVERY DAY 90 tablet 3    cetirizine (ZYRTEC) 10 MG tablet Take 10 mg by mouth once daily.      EScitalopram oxalate (LEXAPRO) 10 MG tablet TAKE 1 AND 1/2 TABLETS (15 MG TOTAL) BY MOUTH ONCE DAILY. 135 tablet 0    fluticasone (FLONASE) 50 mcg/actuation nasal spray 1 spray by Each Nare route once daily.      hydroCHLOROthiazide (HYDRODIURIL) 25 MG tablet TAKE 1 TABLET BY MOUTH EVERY DAY 90 tablet 3    Lactobacillus rhamnosus GG (CULTURELLE) 10 billion cell capsule Take 1 capsule by mouth once daily.      losartan (COZAAR) 25 MG tablet TAKE 1 TABLET BY MOUTH EVERY DAY 90 tablet 3    multivitamin (THERAGRAN) per tablet Take 1 tablet by mouth once daily. Every day      omega-3 fatty acids/fish oil (FISH OIL-OMEGA-3 FATTY ACIDS) 300-1,000 mg capsule Take 1 capsule by mouth once daily.      progesterone (PROMETRIUM) 100 MG capsule Take 1 capsule (100 mg total) by mouth once daily. 90 capsule 3    tazarotene (TAZORAC) 0.1 % cream Apply topically every evening. 30 g 1    urea 40 % Lotn lotion Apply topically once daily. 226 g 1    LORazepam (ATIVAN) 0.5 MG tablet Take 1 tablet (0.5 mg total) by mouth every 8 (eight) hours as needed for Anxiety. 15 tablet 0    topiramate (TOPAMAX) 50 MG tablet Take 1 tablet (50 mg total) by mouth every evening. 90 tablet 3     No current facility-administered medications for this visit.       Review of patient's allergies indicates:   Allergen Reactions    Latex      Other reaction(s): Rash       Review of System:   General: no chills, fever, night sweats, weight gain or weight loss  Psychological: no depression or suicidal  ideation  Breasts: no new or changing breast lumps, nipple discharge or masses.  Respiratory: no cough, shortness of breath, or wheezing  Cardiovascular: no chest pain or dyspnea on exertion  Gastrointestinal: no abdominal pain, change in bowel habits, or black or bloody stools  Genito-Urinary: no incontinence, urinary frequency/urgency or vulvar/vaginal symptoms, pelvic pain or abnormal vaginal bleeding. EPISODE DYSURIA  Musculoskeletal: no gait disturbance or muscular weakness     PE:   APPEARANCE: Well nourished, well developed, in no acute distress.  NECK: Neck symmetric without masses or thyromegaly.  NODES: No inguinal lymph node enlargement.  ABDOMEN: Soft. No tenderness or masses. No hepatosplenomegaly. No hernias.  BREASTS: Symmetrical, no skin changes or visible lesions. No palpable masses, nipple discharge or adenopathy bilaterally.  PELVIC: Normal external female genitalia without lesions. Normal hair distribution. Adequate perineal body, normal urethral meatus. Vagina moist and well rugated without lesions or discharge. No significant cystocele or rectocele. Uterus and cervix surgically absent. Bimanual exam revealed no masses, tenderness or abnormality.    NOTE  NURSING PERSONAL PRESENT FOR ENTIRE PHYSICAL EXAM     U/A cloudy pos LE    I will prescribe Macrobid and submit u/a  BSE monthly  Screening mammogram today and RTO 1 year/prn  Answered all questions

## 2023-02-16 LAB
BACTERIA UR CULT: NORMAL
BACTERIA UR CULT: NORMAL
C TRACH DNA SPEC QL NAA+PROBE: NOT DETECTED
N GONORRHOEA DNA SPEC QL NAA+PROBE: NOT DETECTED

## 2023-03-22 ENCOUNTER — OFFICE VISIT (OUTPATIENT)
Dept: FAMILY MEDICINE | Facility: CLINIC | Age: 53
End: 2023-03-22
Payer: COMMERCIAL

## 2023-03-22 VITALS
HEIGHT: 62 IN | HEART RATE: 81 BPM | OXYGEN SATURATION: 95 % | RESPIRATION RATE: 18 BRPM | WEIGHT: 179.44 LBS | DIASTOLIC BLOOD PRESSURE: 80 MMHG | BODY MASS INDEX: 33.02 KG/M2 | SYSTOLIC BLOOD PRESSURE: 132 MMHG

## 2023-03-22 DIAGNOSIS — N95.1 VASOMOTOR SYMPTOMS DUE TO MENOPAUSE: ICD-10-CM

## 2023-03-22 DIAGNOSIS — F41.8 DEPRESSION WITH ANXIETY: ICD-10-CM

## 2023-03-22 DIAGNOSIS — Z00.00 ROUTINE GENERAL MEDICAL EXAMINATION AT A HEALTH CARE FACILITY: Primary | ICD-10-CM

## 2023-03-22 DIAGNOSIS — G44.229 CHRONIC TENSION-TYPE HEADACHE, NOT INTRACTABLE: ICD-10-CM

## 2023-03-22 PROCEDURE — 3079F PR MOST RECENT DIASTOLIC BLOOD PRESSURE 80-89 MM HG: ICD-10-PCS | Mod: CPTII,S$GLB,, | Performed by: STUDENT IN AN ORGANIZED HEALTH CARE EDUCATION/TRAINING PROGRAM

## 2023-03-22 PROCEDURE — 1160F PR REVIEW ALL MEDS BY PRESCRIBER/CLIN PHARMACIST DOCUMENTED: ICD-10-PCS | Mod: CPTII,S$GLB,, | Performed by: STUDENT IN AN ORGANIZED HEALTH CARE EDUCATION/TRAINING PROGRAM

## 2023-03-22 PROCEDURE — 3061F NEG MICROALBUMINURIA REV: CPT | Mod: CPTII,S$GLB,, | Performed by: STUDENT IN AN ORGANIZED HEALTH CARE EDUCATION/TRAINING PROGRAM

## 2023-03-22 PROCEDURE — 3075F PR MOST RECENT SYSTOLIC BLOOD PRESS GE 130-139MM HG: ICD-10-PCS | Mod: CPTII,S$GLB,, | Performed by: STUDENT IN AN ORGANIZED HEALTH CARE EDUCATION/TRAINING PROGRAM

## 2023-03-22 PROCEDURE — 3079F DIAST BP 80-89 MM HG: CPT | Mod: CPTII,S$GLB,, | Performed by: STUDENT IN AN ORGANIZED HEALTH CARE EDUCATION/TRAINING PROGRAM

## 2023-03-22 PROCEDURE — 99396 PR PREVENTIVE VISIT,EST,40-64: ICD-10-PCS | Mod: S$GLB,,, | Performed by: STUDENT IN AN ORGANIZED HEALTH CARE EDUCATION/TRAINING PROGRAM

## 2023-03-22 PROCEDURE — 99999 PR PBB SHADOW E&M-EST. PATIENT-LVL V: CPT | Mod: PBBFAC,,, | Performed by: STUDENT IN AN ORGANIZED HEALTH CARE EDUCATION/TRAINING PROGRAM

## 2023-03-22 PROCEDURE — 3008F BODY MASS INDEX DOCD: CPT | Mod: CPTII,S$GLB,, | Performed by: STUDENT IN AN ORGANIZED HEALTH CARE EDUCATION/TRAINING PROGRAM

## 2023-03-22 PROCEDURE — 1159F MED LIST DOCD IN RCRD: CPT | Mod: CPTII,S$GLB,, | Performed by: STUDENT IN AN ORGANIZED HEALTH CARE EDUCATION/TRAINING PROGRAM

## 2023-03-22 PROCEDURE — 4010F ACE/ARB THERAPY RXD/TAKEN: CPT | Mod: CPTII,S$GLB,, | Performed by: STUDENT IN AN ORGANIZED HEALTH CARE EDUCATION/TRAINING PROGRAM

## 2023-03-22 PROCEDURE — 1159F PR MEDICATION LIST DOCUMENTED IN MEDICAL RECORD: ICD-10-PCS | Mod: CPTII,S$GLB,, | Performed by: STUDENT IN AN ORGANIZED HEALTH CARE EDUCATION/TRAINING PROGRAM

## 2023-03-22 PROCEDURE — 99999 PR PBB SHADOW E&M-EST. PATIENT-LVL V: ICD-10-PCS | Mod: PBBFAC,,, | Performed by: STUDENT IN AN ORGANIZED HEALTH CARE EDUCATION/TRAINING PROGRAM

## 2023-03-22 PROCEDURE — 3075F SYST BP GE 130 - 139MM HG: CPT | Mod: CPTII,S$GLB,, | Performed by: STUDENT IN AN ORGANIZED HEALTH CARE EDUCATION/TRAINING PROGRAM

## 2023-03-22 PROCEDURE — 4010F PR ACE/ARB THEARPY RXD/TAKEN: ICD-10-PCS | Mod: CPTII,S$GLB,, | Performed by: STUDENT IN AN ORGANIZED HEALTH CARE EDUCATION/TRAINING PROGRAM

## 2023-03-22 PROCEDURE — 3066F PR DOCUMENTATION OF TREATMENT FOR NEPHROPATHY: ICD-10-PCS | Mod: CPTII,S$GLB,, | Performed by: STUDENT IN AN ORGANIZED HEALTH CARE EDUCATION/TRAINING PROGRAM

## 2023-03-22 PROCEDURE — 3008F PR BODY MASS INDEX (BMI) DOCUMENTED: ICD-10-PCS | Mod: CPTII,S$GLB,, | Performed by: STUDENT IN AN ORGANIZED HEALTH CARE EDUCATION/TRAINING PROGRAM

## 2023-03-22 PROCEDURE — 3061F PR NEG MICROALBUMINURIA RESULT DOCUMENTED/REVIEW: ICD-10-PCS | Mod: CPTII,S$GLB,, | Performed by: STUDENT IN AN ORGANIZED HEALTH CARE EDUCATION/TRAINING PROGRAM

## 2023-03-22 PROCEDURE — 99396 PREV VISIT EST AGE 40-64: CPT | Mod: S$GLB,,, | Performed by: STUDENT IN AN ORGANIZED HEALTH CARE EDUCATION/TRAINING PROGRAM

## 2023-03-22 PROCEDURE — 3044F HG A1C LEVEL LT 7.0%: CPT | Mod: CPTII,S$GLB,, | Performed by: STUDENT IN AN ORGANIZED HEALTH CARE EDUCATION/TRAINING PROGRAM

## 2023-03-22 PROCEDURE — 3066F NEPHROPATHY DOC TX: CPT | Mod: CPTII,S$GLB,, | Performed by: STUDENT IN AN ORGANIZED HEALTH CARE EDUCATION/TRAINING PROGRAM

## 2023-03-22 PROCEDURE — 3044F PR MOST RECENT HEMOGLOBIN A1C LEVEL <7.0%: ICD-10-PCS | Mod: CPTII,S$GLB,, | Performed by: STUDENT IN AN ORGANIZED HEALTH CARE EDUCATION/TRAINING PROGRAM

## 2023-03-22 PROCEDURE — 1160F RVW MEDS BY RX/DR IN RCRD: CPT | Mod: CPTII,S$GLB,, | Performed by: STUDENT IN AN ORGANIZED HEALTH CARE EDUCATION/TRAINING PROGRAM

## 2023-03-22 RX ORDER — VENLAFAXINE HYDROCHLORIDE 37.5 MG/1
CAPSULE, EXTENDED RELEASE ORAL
Qty: 194 CAPSULE | Refills: 0 | Status: SHIPPED | OUTPATIENT
Start: 2023-03-22 | End: 2023-06-16

## 2023-03-22 NOTE — PROGRESS NOTES
"[facility] CLINIC NOTE    Patient Name: Kylah Mohan  YOB: 1970    PRESENTING HISTORY     History of Present Illness:  Ms. Kylah Mohan is a 52 y.o. female here for annual.     Vasomotor symptoms, bloating from menopause. Already on hormonal therapy per endocrine.     HA- pressure on top of head, across eyes.    Improes when wears glasses/contact constantly.    Bitemporal.    A/w neck pain.     No photophobia/phonophobia.     No Nausea.    Occur every day. Mostly early in morning, late in evening.     Struggling to cope with loss of smell. Concerned about fire, gas risks. Also drank spoiled milk recently.   Gets short periods of counseling, wants to pursue more in depth.     On lexapro 15mg. Hard to tell if working because of so much mood swings from menopausal symptoms.     ROS      OBJECTIVE:   Vital Signs:  Vitals:    03/22/23 0815   BP: 132/80   Pulse: 81   Resp: 18   SpO2: 95%   Weight: 81.4 kg (179 lb 7.3 oz)   Height: 5' 2" (1.575 m)          Physical Exam: Normal, no change.     Physical Exam    ASSESSMENT & PLAN:     Routine general medical examination at a health care facility    Depression with anxiety  -     Ambulatory referral/consult to Psychiatry; Future; Expected date: 03/29/2023  -     venlafaxine (EFFEXOR-XR) 37.5 MG 24 hr capsule; Take 1 capsule (37.5 mg total) by mouth once daily for 14 days, THEN 2 capsules (75 mg total) once daily.  Dispense: 194 capsule; Refill: 0    Vasomotor symptoms due to menopause  -     venlafaxine (EFFEXOR-XR) 37.5 MG 24 hr capsule; Take 1 capsule (37.5 mg total) by mouth once daily for 14 days, THEN 2 capsules (75 mg total) once daily.  Dispense: 194 capsule; Refill: 0    Chronic tension-type headache, not intractable       For tension headaches, occ NSAIDs.    Stretching, heat, de stress    See therapist.     Switch lexapro to effexor for better vasomotor symptoms profile.         James Tanner MD   Internal Medicine            "

## 2023-03-22 NOTE — PATIENT INSTRUCTIONS
When you get a bad headache, take Advil or Aleve. No more than 10 days/month.       Reduce lexapro to 10mg a day for 1 week. Then reduce to 5 for 1 week, then stop.   When stopping, replace with new medicine Effexor. Take for 2 weeks, then increase dose to 2 pills.       Germain Montero,     If you are due for any health screening(s) below please notify me so we can arrange them to be ordered and scheduled to maintain your health. Most healthy patients complete it. Don't lose out on improving your health.     Tests to Keep You Healthy    Mammogram: Met on 2/15/2023  Colon Cancer Screening: Met on 7/23/2021  Last Blood Pressure <= 139/89 (3/22/2023): NO

## 2023-04-22 ENCOUNTER — OFFICE VISIT (OUTPATIENT)
Dept: ENDOCRINOLOGY | Facility: CLINIC | Age: 53
End: 2023-04-22
Payer: COMMERCIAL

## 2023-04-22 DIAGNOSIS — E05.90 HYPERTHYROIDISM: ICD-10-CM

## 2023-04-22 DIAGNOSIS — R73.03 PREDIABETES: ICD-10-CM

## 2023-04-22 DIAGNOSIS — E66.9 OBESITY (BMI 30-39.9): ICD-10-CM

## 2023-04-22 DIAGNOSIS — E78.5 HYPERLIPIDEMIA, UNSPECIFIED HYPERLIPIDEMIA TYPE: ICD-10-CM

## 2023-04-22 DIAGNOSIS — E06.9 THYROIDITIS: ICD-10-CM

## 2023-04-22 DIAGNOSIS — Z82.49 FAMILY HISTORY OF BRAIN ANEURYSM: ICD-10-CM

## 2023-04-22 DIAGNOSIS — E05.90 THYROTOXICOSIS WITHOUT THYROID STORM, UNSPECIFIED THYROTOXICOSIS TYPE: ICD-10-CM

## 2023-04-22 DIAGNOSIS — I67.1 CEREBRAL ANEURYSM WITHOUT RUPTURE: Chronic | ICD-10-CM

## 2023-04-22 DIAGNOSIS — G47.33 OBSTRUCTIVE SLEEP APNEA SYNDROME: ICD-10-CM

## 2023-04-22 DIAGNOSIS — E88.810 DYSMETABOLIC SYNDROME: ICD-10-CM

## 2023-04-22 DIAGNOSIS — E04.1 NODULAR THYROID DISEASE: Primary | ICD-10-CM

## 2023-04-22 DIAGNOSIS — F41.8 DEPRESSION WITH ANXIETY: ICD-10-CM

## 2023-04-22 DIAGNOSIS — Z78.0 POSTMENOPAUSAL: ICD-10-CM

## 2023-04-22 DIAGNOSIS — I10 PRIMARY HYPERTENSION: ICD-10-CM

## 2023-04-22 PROCEDURE — 1159F MED LIST DOCD IN RCRD: CPT | Mod: CPTII,95,, | Performed by: INTERNAL MEDICINE

## 2023-04-22 PROCEDURE — 3044F HG A1C LEVEL LT 7.0%: CPT | Mod: CPTII,95,, | Performed by: INTERNAL MEDICINE

## 2023-04-22 PROCEDURE — 3066F PR DOCUMENTATION OF TREATMENT FOR NEPHROPATHY: ICD-10-PCS | Mod: CPTII,95,, | Performed by: INTERNAL MEDICINE

## 2023-04-22 PROCEDURE — 1159F PR MEDICATION LIST DOCUMENTED IN MEDICAL RECORD: ICD-10-PCS | Mod: CPTII,95,, | Performed by: INTERNAL MEDICINE

## 2023-04-22 PROCEDURE — 3066F NEPHROPATHY DOC TX: CPT | Mod: CPTII,95,, | Performed by: INTERNAL MEDICINE

## 2023-04-22 PROCEDURE — 3061F NEG MICROALBUMINURIA REV: CPT | Mod: CPTII,95,, | Performed by: INTERNAL MEDICINE

## 2023-04-22 PROCEDURE — 99214 OFFICE O/P EST MOD 30 MIN: CPT | Mod: 95,,, | Performed by: INTERNAL MEDICINE

## 2023-04-22 PROCEDURE — 4010F PR ACE/ARB THEARPY RXD/TAKEN: ICD-10-PCS | Mod: CPTII,95,, | Performed by: INTERNAL MEDICINE

## 2023-04-22 PROCEDURE — 1160F RVW MEDS BY RX/DR IN RCRD: CPT | Mod: CPTII,95,, | Performed by: INTERNAL MEDICINE

## 2023-04-22 PROCEDURE — 3044F PR MOST RECENT HEMOGLOBIN A1C LEVEL <7.0%: ICD-10-PCS | Mod: CPTII,95,, | Performed by: INTERNAL MEDICINE

## 2023-04-22 PROCEDURE — 4010F ACE/ARB THERAPY RXD/TAKEN: CPT | Mod: CPTII,95,, | Performed by: INTERNAL MEDICINE

## 2023-04-22 PROCEDURE — 3061F PR NEG MICROALBUMINURIA RESULT DOCUMENTED/REVIEW: ICD-10-PCS | Mod: CPTII,95,, | Performed by: INTERNAL MEDICINE

## 2023-04-22 PROCEDURE — 1160F PR REVIEW ALL MEDS BY PRESCRIBER/CLIN PHARMACIST DOCUMENTED: ICD-10-PCS | Mod: CPTII,95,, | Performed by: INTERNAL MEDICINE

## 2023-04-22 PROCEDURE — 99214 PR OFFICE/OUTPT VISIT, EST, LEVL IV, 30-39 MIN: ICD-10-PCS | Mod: 95,,, | Performed by: INTERNAL MEDICINE

## 2023-04-22 NOTE — PROGRESS NOTES
Subjective:      Patient ID: Kylah Mohan is a 52 y.o. female.    Chief Complaint:      Patient is a 52 yr old lady with subclinical hyperthyroidism, thyroid nodular disease and hypertension seen in [facility] today. This is a video televisit and thus the examination aspects of the visit are limited     History of Present Illness    The patient, Ms Mohan is a 52 yr old lady with thyroid functional anomalies consistent with subclinical hyperthyroidism and thyroid nodular disease seen in [facility] today. She in addition has essential hypertension on treatment, prediabetes, hyperlipidemia, depression and obesity with hypermenorrhea.  Today's visit is a video televisit.  The patient location is: Home  The chief complaint leading to consultation is:thyroid nodular disease and subclinical hyperthyroidism    Visit type: Synchronous video and audio televisit    Face to Face time with patient: ~ 30 minutes of total time spent on the encounter, which includes face to face time and non-face to face time preparing to see the patient (eg, review of tests), Obtaining and/or reviewing separately obtained history, Documenting clinical information in the electronic or other health record, Independently interpreting results (not separately reported) and communicating results to the patient/family/caregiver, or Care coordination (not separately reported).         Each patient to whom he or she provides medical services by telemedicine is:  (1) informed of the relationship between the physician and patient and the respective role of any other health care provider with respect to management of the patient; and (2) notified that he or she may decline to receive medical services by telemedicine and may withdraw from such care at any time.    Notes:     Her initial work showed her TFTs then were consistent with subclinical hyperthyroidism with no evidence of Graves disease based on ab profile.  Her screening thyroid USS does show evidence of thyroid  nodular disease of which there is a dominant right upper lobe complex lesion for which she had  a thyroid USS guided FNAB done (12/15) which shows features consistent with benign colloid nodule. Her most recent  thyroid ultrasound  was from 11/22 and showed sonogrpahic stability.  Her next thyroid USs should be for ~ 11/23.  Patient has been taking low dose metformin 500mg BID but she feels she may have been having low blood sugar reactions though she has measured her values mainly in the 80s.  Her thyroid nuclear medicine scan was normal.  She has only occasional palpitations but this is not excessive. She does drink some caffienated coffeee.  Patient has stopped taking trazadone. She has been of the topamax for ~ 3 months now.  She has no new complaints today but continues to complain of weakness and fatigue.  Her weight has remained essentially static since last visit despite all her lifestyle and behaivoral changes.  Patient has been having recurrent chronic headaches. These appear to tension in nature.  Patient is s/p GENESIS and unilateral oophorectomy with uterine ablation.  She is presently of all HRT.     Patient is s/p brain surgery; s/p aneurysms clipping of left anterior communicating artery. Patient is s/p surgery  11/4/2020. Patients paternal aunt also passed away from a brain aneurysm as well which apparently ruptured.   Since then she has lost her sense of taste and smell. COVID testing thus far has been unremarkable.     Patient had a sleep study @ [facility] which was +ve for sleep apnea. Could not tolerate CPAP mask.     Patients most recent thyroid USS was from 11/22 and she had a prior USS guided FNAB from 02/20 which showed benign adenomatoid nodule.  Her next scheduled thyroid USS thus should be for ~ 11/23.    Her most recent DEXA was from 01/23 and showed normal bone density at the time. Her next ffup DEXA should be for ~ 01/25.  She has not had any falls since her last visit.     She has no fresh  complaints today and is in excellent spirits.      Review of Systems   Constitutional:  Positive for fatigue (chronic). Negative for appetite change (Loss of taste and smell sensation persists since November soon after her craniotomy..), diaphoresis and unexpected weight change.   HENT:  Negative for facial swelling and trouble swallowing.    Eyes:  Negative for visual disturbance.   Respiratory:  Negative for shortness of breath.    Cardiovascular:  Negative for chest pain, palpitations and leg swelling.   Gastrointestinal:  Negative for diarrhea, nausea and vomiting.   Endocrine: Negative for polydipsia.   Genitourinary:  Negative for frequency and menstrual problem (postmenopausal).   Musculoskeletal:  Negative for neck pain.   Skin:  Negative for color change, pallor and rash.   Neurological:  Positive for tremors (sometimes with stress/anxiety). Negative for dizziness, light-headedness and headaches.   Psychiatric/Behavioral:  Positive for sleep disturbance (falls asleep okay, trouble staying asleep). The patient is nervous/anxious (had some anxiety, panic attacks last year).      Objective: Nil vitals; video televisit             Lab Review:      Latest Reference Range & Units 01/20/22 08:40 01/20/22 10:14 02/04/22 09:34 04/27/22 16:04 07/07/22 12:20 07/21/22 08:41 11/15/22 16:14 01/12/23 12:06 01/12/23 12:12 01/12/23 13:17 01/13/23 10:27 02/15/23 10:24 02/15/23 10:25 02/15/23 10:28 02/15/23 11:26   WBC 3.90 - 12.70 K/uL         8.18         RBC 4.00 - 5.40 M/uL         4.22         Hemoglobin 12.0 - 16.0 g/dL         12.9         Hematocrit 37.0 - 48.5 %         39.9         MCV 82 - 98 fL         95         MCH 27.0 - 31.0 pg         30.6         MCHC 32.0 - 36.0 g/dL         32.3         RDW 11.5 - 14.5 %         13.0         Platelets 150 - 450 K/uL         377         MPV 9.2 - 12.9 fL         10.6         Gran % 38.0 - 73.0 %         59.5         Lymph % 18.0 - 48.0 %         28.4         Mono % 4.0 -  15.0 %         7.7         Eosinophil % 0.0 - 8.0 %         3.1         Basophil % 0.0 - 1.9 %         1.1         Immature Granulocytes 0.0 - 0.5 %         0.2         Gran # (ANC) 1.8 - 7.7 K/uL         4.9         Lymph # 1.0 - 4.8 K/uL         2.3         Mono # 0.3 - 1.0 K/uL         0.6         Eos # 0.0 - 0.5 K/uL         0.3         Baso # 0.00 - 0.20 K/uL         0.09         Immature Grans (Abs) 0.00 - 0.04 K/uL         0.02         nRBC 0 /100 WBC         0         Differential Method          Automated         Folate 4.0 - 24.0 ng/mL     18.1             Vitamin B-12 210 - 950 pg/mL     575             Sodium 136 - 145 mmol/L 139        140         Potassium 3.5 - 5.1 mmol/L 3.7        3.8         Chloride 95 - 110 mmol/L 101        104         CO2 23 - 29 mmol/L 28        25         Anion Gap 8 - 16 mmol/L 10        11         BUN 6 - 20 mg/dL 15        15         Creatinine 0.5 - 1.4 mg/dL 0.6        0.6         eGFR >60 mL/min/1.73 m^2         >60.0         eGFR if non African American >60 mL/min/1.73 m^2 >60.0                 eGFR if African American >60 mL/min/1.73 m^2 >60.0                 Glucose 70 - 110 mg/dL 96        84         Calcium 8.7 - 10.5 mg/dL 9.7        9.8         Ionized Calcium 1.06 - 1.42 mmol/L         1.37         Alkaline Phosphatase 55 - 135 U/L         82         PROTEIN TOTAL 6.0 - 8.4 g/dL         7.3         Albumin 3.5 - 5.2 g/dL         3.7         Uric Acid 2.4 - 5.7 mg/dL         5.9 (H)         BILIRUBIN TOTAL 0.1 - 1.0 mg/dL         0.4         AST 10 - 40 U/L         21         ALT 10 - 44 U/L         12         Cholesterol 120 - 199 mg/dL         236 (H)         HDL 40 - 75 mg/dL         43         HDL/Cholesterol Ratio 20.0 - 50.0 %         18.2 (L)         LDL Cholesterol External 63.0 - 159.0 mg/dL         162.4 (H)         Non-HDL Cholesterol mg/dL         193         Total Cholesterol/HDL Ratio 2.0 - 5.0          5.5 (H)         Triglycerides 30 - 150 mg/dL          153 (H)         Iodine, Serum 40 - 92 ng/mL         93 (H)         Thiamine 38 - 122 ug/L     68             Vit D, 25-Hydroxy 30 - 96 ng/mL         83         ALDOSTERONE ng/dL         8.7         Hemoglobin A1C External 4.0 - 5.6 %         5.5         Estimated Avg Glucose 68 - 131 mg/dL         111         TSH 0.400 - 4.000 uIU/mL         0.160 (L)         T3, Total 60 - 180 ng/dL         136         Free T4 0.71 - 1.51 ng/dL         0.95         Thyroglobulin Interpretation          SEE BELOW         Thyroglobulin Antibody Screen <1.8 IU/mL         <1.8         Thyroglobulin, Tumor Marker ng/mL         43 (H)         PTH 9.0 - 77.0 pg/mL         45.6         Chlamydia, Amplified DNA Not Detected               Not Detected    N gonorrhoeae, amplified DNA Not Detected               Not Detected    Specimen UA         Urine, Clean Catch          Color, UA Yellow, Straw, Marilyn         Yellow          Appearance, UA Clear         Clear          Specific Gravity, UA 1.005 - 1.030         1.010          pH, UA 5.0 - 8.0         5.0          Protein, UA Negative         Negative          Glucose, UA Negative         Negative          Ketones, UA Negative         Negative          Occult Blood UA Negative         Negative          NITRITE UA Negative         Negative          Bilirubin (UA) Negative         Negative          Leukocytes, UA Negative         Negative          Creatinine, Urine 15.0 - 325.0 mg/dL        31.0          Urine Microalbumin ug/mL        7.0          MICROALB/CREAT RATIO 0.0 - 30.0 ug/mg        22.6          CULTURE, URINE              Rpt     POC Glucose 60 - 140 MG/DL  108         98       POC Cholesterol, Total <240 MG/DL  200         206       POC Cholesterol, HDL MG/DL  25 (L)         43       POC Cholesterol, LDL <160 MG/DL  150         145       POC Triglycerides <160 MG/DL  120         102       Color, POC UA Yellow, Straw, Colorless             Yellow      Clarity, POC UA Clear      "        Clear      pH, POC UA 5.0 - 8.0             7.5      WBC, POC UA Negative             Negative      Nitrite, POC UA Negative             Negative      Urobilinogen, POC UA <=1.0             0.2      Protein, POC UA Negative             Negative      Blood, POC UA Negative             Trace-lysed !      Ketones, POC UA Negative             Negative      Bilirubin, POC UA Negative             Negative      Glucose, POC UA Negative             Negative      CTA HEAD       Rpt            MAMMO DIGITAL SCREENING BILAT WITH AUDREY    Rpt            Rpt   US SOFT TISSUE HEAD NECK THYROID        Rpt           DXA BONE DENSITY AXIAL SKELETON 1 OR MORE SITES           Rpt        SPECIMEN TO PATHOLOGY, DERMATOLOGY     Rpt              Final Pathologic Diagnosis     Skin, right lateral hand, shave biopsy:  -MILD ACANTHOSIS, LOW VERRUCOUS PAPILLOMATOSIS, HYPERGRANULOSIS, AND  HYPERKERATOSIS, see comment  COMMENT:  Clinical images were reviewed in epic and differential diagnosis  noted.  The interpretation of this biopsy is somewhat limited due to its  small and somewhat superficial nature.  The the histological findings (see  microscopic description) are overall nonspecific but could represent  keratoderma in the correct clinical context.  Although the epidermis is also  reminiscent of a verruca vulgaris, p16 appears negative.  Correlation is  recommended.                Gross     Container Label: Clinic Number/AP Number:  [id number], and "right lateral hand"  Received in formalin is a 5 x 4 x 1 mm shave biopsy fragment of tan-white  thickened skin.  Specimen is inked, bisected, and entirely submitted in  QLD--1-BLANCA Bolivar P.NARCISO.                Microscopic Exam     Shave biopsy sections show predominantly stratum corneum with a small  fragment of epidermis characterized by acanthosis, low verrucous  papillomatosis, and hypergranulosis.  The epithelium appears negative for  p16.  The Immunohistochemical stain was " reviewed in conjunction with adequate  positive control.  Additional H&E levels were examined.                Disclaimer     Unless the case is a 'gross only' or additional testing only, the final  diagnosis for each specimen is based on a microscopic examination of  appropriate tissue sections.                Renin Activity ng/mL/h         1.0         Spec Grav POC UA 1.005 - 1.030             1.020      (H): Data is abnormally high  (L): Data is abnormally low  !: Data is abnormal  Rpt: View report in Results Review for more information  Assessment:     1. Nodular thyroid disease  T4, Free    T3    Thyroglobulin    TSH      2. Hyperthyroidism  T4, Free    T3    TSH      3. Prediabetes        4. Thyroiditis        5. Thyrotoxicosis without thyroid storm, unspecified thyrotoxicosis type  T4, Free    T3    TSH      6. Obstructive sleep apnea syndrome        7. Dysmetabolic syndrome        8. Obesity (BMI 30-39.9)        9. Postmenopausal        10. Primary hypertension        11. Hyperlipidemia, unspecified hyperlipidemia type        12. Depression with anxiety        13. Cerebral aneurysm without rupture, left anterior communicating 4.3        14. Family history of brain aneurysm               Regarding hyperthyroidism; To continue serial surviellance. Will repeat TFTs today. To continue low dose beta blockade as before.  Regarding thyroid nodular disease; for ffup thyroid USS ~ 11/23.  Regarding hypertension;Well controlled. To continue present antihypertensive regimen. To continue serial BP tracking.  Regarding prediabetes; to continue efforts at weight loss and reduced caloric intake. To recheck HBA1c today.  Regarding hypercholesterolemia; Most recent lipid profile at goal even without antilipidemic use  Regarding recent mental fogginesss; Much better now since stopping trazadone. Will also consider weaning of neurontin. To discuss with PCP in this regard.  Regarding postmenopausal state;  Will defer to her GYN  as to whether or not to resume estrogen based HRT.  Regarding bone health; Most recent DEXA from 01/23 was normal. For ffup DEXA ~ 01/25.  Regarding brain aneurysm resection; ongoing ffup with neurosurgery service.  Regarding chronic headaches; to commence low dose topiramate 50mg QHS (she had taken this in the past with good effect). Hopefully this will also assist with weight modulation and assist with improving her sleep quality. Also encouraged to commence a trial of low dose prometrium 100mg QD as well to see if this improves both her sleep quality and mood state.  Regarding depression; ongoing management as per her PCP; remains on lexapro on this account.  Regarding OSAS; not presently on active treatment as she has been unable to tolerate CPAP trials. May need to explore trial of recently approved implantable Inspire hypoglossal nerve stimulator device as an alternative management strategy.    Plan:     FFup in ~ 1 yr

## 2023-05-17 RX ORDER — ESCITALOPRAM OXALATE 10 MG/1
TABLET ORAL
Qty: 135 TABLET | Refills: 3 | Status: SHIPPED | OUTPATIENT
Start: 2023-05-17 | End: 2023-06-16

## 2023-05-17 NOTE — TELEPHONE ENCOUNTER
Refill Decision Note   Kylah Mohan  is requesting a refill authorization.  Brief Assessment and Rationale for Refill:  Approve     Medication Therapy Plan:         Alert overridden per protocol: Yes   Pharmacist review requested: Yes   Comments:     No Care Gaps recommended.     Note composed:10:21 AM 05/17/2023

## 2023-05-17 NOTE — TELEPHONE ENCOUNTER
Refill Routing Note   Medication(s) are not appropriate for processing by [facility] for the following reason(s):      Drug-disease interaction: EScitalopram oxalate and Hypokalemia  Drug-drug interaction: venlafaxine, EScitalopram oxalate    [facility] action(s):  Defer Care Due:  None identified        Pharmacist review requested: Yes     Appointments  past 12m or future 3m with PCP    Date Provider   Last Visit   3/22/2023 James Tanner MD   Next Visit   Visit date not found James Tanner MD   ED visits in past 90 days: 0        Note composed:4:21 AM 05/17/2023

## 2023-05-17 NOTE — TELEPHONE ENCOUNTER
No care due was identified.  [facility] Embedded Care Due Messages. Reference number: [id number].   5/17/2023 1:47:16 AM CDT

## 2023-06-16 DIAGNOSIS — N95.1 VASOMOTOR SYMPTOMS DUE TO MENOPAUSE: ICD-10-CM

## 2023-06-16 DIAGNOSIS — F41.8 DEPRESSION WITH ANXIETY: ICD-10-CM

## 2023-06-16 RX ORDER — VENLAFAXINE HYDROCHLORIDE 75 MG/1
CAPSULE, EXTENDED RELEASE ORAL
Qty: 90 CAPSULE | Refills: 4 | Status: SHIPPED | OUTPATIENT
Start: 2023-06-16 | End: 2023-12-28

## 2023-06-16 NOTE — TELEPHONE ENCOUNTER
Refill Routing Note   Medication(s) are not appropriate for processing by [facility] for the following reason(s):      UPDATED DIRECTIONS TO 2 CAPSULES BY MOUTH ONCE DAILY    [facility] action(s):  Defer None identified          Appointments  past 12m or future 3m with PCP    Date Provider   Last Visit   3/22/2023 James Tanner MD   Next Visit   Visit date not found James Tanner MD   ED visits in past 90 days: 0        Note composed:10:47 AM 06/16/2023

## 2023-06-16 NOTE — TELEPHONE ENCOUNTER
No care due was identified.  [facility] Embedded Care Due Messages. Reference number: [id number].   6/16/2023 9:36:39 AM CDT

## 2023-06-25 DIAGNOSIS — G44.209 TENSION-TYPE HEADACHE, NOT INTRACTABLE, UNSPECIFIED CHRONICITY PATTERN: ICD-10-CM

## 2023-06-26 RX ORDER — TOPIRAMATE 50 MG/1
TABLET, FILM COATED ORAL
Qty: 90 TABLET | Refills: 3 | Status: SHIPPED | OUTPATIENT
Start: 2023-06-26 | End: 2024-03-25

## 2023-08-01 ENCOUNTER — PATIENT MESSAGE (OUTPATIENT)
Dept: PSYCHIATRY | Facility: CLINIC | Age: 53
End: 2023-08-01
Payer: COMMERCIAL

## 2023-08-08 ENCOUNTER — TELEPHONE (OUTPATIENT)
Dept: PSYCHIATRY | Facility: CLINIC | Age: 53
End: 2023-08-08
Payer: COMMERCIAL

## 2023-08-08 NOTE — TELEPHONE ENCOUNTER
Attempted to call patient to offer NP scheduling with Dr. Salinas. She did not answer so I LVM for return call. Will also send another [employer] message.

## 2023-11-30 ENCOUNTER — PATIENT MESSAGE (OUTPATIENT)
Dept: FAMILY MEDICINE | Facility: CLINIC | Age: 53
End: 2023-11-30
Payer: COMMERCIAL

## 2023-12-12 ENCOUNTER — PATIENT MESSAGE (OUTPATIENT)
Dept: ADMINISTRATIVE | Facility: HOSPITAL | Age: 53
End: 2023-12-12
Payer: COMMERCIAL

## 2023-12-20 ENCOUNTER — PATIENT OUTREACH (OUTPATIENT)
Dept: ADMINISTRATIVE | Facility: HOSPITAL | Age: 53
End: 2023-12-20
Payer: COMMERCIAL

## 2023-12-20 DIAGNOSIS — Z12.31 ENCOUNTER FOR SCREENING MAMMOGRAM FOR BREAST CANCER: Primary | ICD-10-CM

## 2023-12-20 NOTE — PROGRESS NOTES
Population Health Chart Review & Patient Outreach Details    Outreach Performed: YES Portal    Additional [facility] Health Notes:           Updates Requested / Reviewed:      Updated Care Coordination Note, Care Everywhere, , and Immunizations Reconciliation Completed or Queried: [place]         Health Maintenance Topics Overdue:    Health Maintenance Due   Topic Date Due    High Dose Statin  Never done    COVID-19 Vaccine (6 - 2023-24 season) 09/01/2023    Mammogram  02/15/2024         Health Maintenance Topic(s) Outreach Outcomes & Actions Taken:    Breast Cancer Screening - Outreach Outcomes & Actions Taken  : Mammogram Order Placed

## 2023-12-27 ENCOUNTER — PATIENT OUTREACH (OUTPATIENT)
Dept: ADMINISTRATIVE | Facility: HOSPITAL | Age: 53
End: 2023-12-27
Payer: COMMERCIAL

## 2023-12-27 NOTE — PROGRESS NOTES
"Population Health Chart Review & Patient Outreach Details    Outreach Performed: YES Letter    Additional [facility] Health Notes:           Updates Requested / Reviewed:      Updated Care Coordination Note, Care Everywhere, and Immunizations Reconciliation Completed or Queried: [facility] [place] [facility] Topics Overdue:    Health Maintenance Due   Topic Date Due    High Dose Statin  Never done    COVID-19 Vaccine (6 - 2023-24 season) 09/01/2023    Mammogram  02/15/2024         Health Maintenance Topic(s) Outreach Outcomes & Actions Taken:    Breast Cancer Screening - Outreach Outcomes & Actions Taken  :   Attempted to outreach patient regarding soon due  Mammogram via "[employer]". No response after a week. Now sending outreach via mail out letter.  "

## 2023-12-27 NOTE — LETTER
December 27, 2023    Kylah Mohan  [address]  [place] [address]             [facility]  [address]  [place] [address]  Phone: [phone] Hi Ms. Montero,     I hope you are well. As of 02/15/2024 you will be due for a Mammogram. A Mammogram order have been placed for you. To schedule your mammogram, please give us a call at [phone] or schedule via your [facility] portal. Please don't hesitate to reach out to us with questions or concerns as we are here to help you. Have a good day!     Steven CORDOBA LPN, Clinical Care Coordinator  [facility] [address]  [place] [id number] [phone] (phone)  [phone] (fax)

## 2023-12-28 DIAGNOSIS — F41.8 DEPRESSION WITH ANXIETY: ICD-10-CM

## 2023-12-28 DIAGNOSIS — N95.1 VASOMOTOR SYMPTOMS DUE TO MENOPAUSE: ICD-10-CM

## 2023-12-28 RX ORDER — VENLAFAXINE HYDROCHLORIDE 75 MG/1
CAPSULE, EXTENDED RELEASE ORAL
Qty: 180 CAPSULE | Refills: 0 | Status: SHIPPED | OUTPATIENT
Start: 2023-12-28 | End: 2024-03-27

## 2023-12-28 NOTE — TELEPHONE ENCOUNTER
Care Due:                  Date            Visit Type   Department     Provider  --------------------------------------------------------------------------------                                EP -                              PRIMARY      [facility] FAMILY    James Calvert  Last Visit: 03-      CARE ([facility])   MEDICINE       Ciro  Next Visit: None Scheduled  None         None Found                                                            Last  Test          Frequency    Reason                     Performed    Due Date  --------------------------------------------------------------------------------    Cr..........  12 months..  venlafaxine..............  01- 01-    [facility] Embedded Care Due Messages. Reference number: [id number].   12/28/2023 12:13:44 AM CST

## 2023-12-29 NOTE — TELEPHONE ENCOUNTER
Patient is scheduled for her annual visit with DR Tanner in March. Labs due will be ordered at that time.

## 2023-12-29 NOTE — TELEPHONE ENCOUNTER
Provider Staff:  Action required for this patient     Please see care gap opportunities below in Care Due Message: CMP due 1/8/24    Thanks!  [facility]     Appointments      Date Provider   Last Visit   3/22/2023 James Tanner MD   Next Visit   3/25/2024 James Tanner MD     Refill Decision Note   Kylah Mohan  is requesting a refill authorization.  Brief Assessment and Rationale for Refill:  Approve     Medication Therapy Plan:         Pharmacist review requested: Yes   Extended chart review required: Yes   Comments:     Note composed:6:16 PM 12/28/2023

## 2024-01-17 DIAGNOSIS — I10 HTN (HYPERTENSION): ICD-10-CM

## 2024-02-21 DIAGNOSIS — R73.03 PREDIABETES: ICD-10-CM

## 2024-03-24 DIAGNOSIS — N95.1 VASOMOTOR SYMPTOMS DUE TO MENOPAUSE: ICD-10-CM

## 2024-03-24 DIAGNOSIS — F41.8 DEPRESSION WITH ANXIETY: ICD-10-CM

## 2024-03-24 NOTE — TELEPHONE ENCOUNTER
Care Due:                  Date            Visit Type   Department     Provider  --------------------------------------------------------------------------------                                EP -                              PRIMARY      JUAN Calvert  Last Visit: 03-      CARE ([facility])   MEDICINE       Naccari                              EP -                              PRIMARY      [facility]    James Calvert  Next Visit: 03-      CARE ([facility])   MEDICINE       Naccari                                                            Last  Test          Frequency    Reason                     Performed    Due Date  --------------------------------------------------------------------------------    Cr..........  12 months..  venlafaxine..............  01- 01-    Health [facility] Embedded Care Due Messages. Reference number: [id number].   3/24/2024 12:46:48 AM CDT

## 2024-03-25 ENCOUNTER — OFFICE VISIT (OUTPATIENT)
Dept: OBSTETRICS AND GYNECOLOGY | Facility: CLINIC | Age: 54
End: 2024-03-25
Payer: COMMERCIAL

## 2024-03-25 ENCOUNTER — HOSPITAL ENCOUNTER (OUTPATIENT)
Dept: RADIOLOGY | Facility: HOSPITAL | Age: 54
Discharge: HOME | End: 2024-03-25
Attending: STUDENT IN AN ORGANIZED HEALTH CARE EDUCATION/TRAINING PROGRAM
Payer: COMMERCIAL

## 2024-03-25 ENCOUNTER — OFFICE VISIT (OUTPATIENT)
Dept: FAMILY MEDICINE | Facility: CLINIC | Age: 54
End: 2024-03-25
Payer: COMMERCIAL

## 2024-03-25 VITALS
HEART RATE: 69 BPM | OXYGEN SATURATION: 98 % | RESPIRATION RATE: 16 BRPM | WEIGHT: 173.06 LBS | SYSTOLIC BLOOD PRESSURE: 132 MMHG | HEIGHT: 62 IN | DIASTOLIC BLOOD PRESSURE: 84 MMHG | BODY MASS INDEX: 31.85 KG/M2

## 2024-03-25 VITALS
SYSTOLIC BLOOD PRESSURE: 142 MMHG | BODY MASS INDEX: 31.68 KG/M2 | DIASTOLIC BLOOD PRESSURE: 90 MMHG | HEIGHT: 62 IN | WEIGHT: 172.19 LBS

## 2024-03-25 DIAGNOSIS — E05.90 SUBCLINICAL HYPERTHYROIDISM: ICD-10-CM

## 2024-03-25 DIAGNOSIS — R73.03 PREDIABETES: ICD-10-CM

## 2024-03-25 DIAGNOSIS — Z12.31 ENCOUNTER FOR SCREENING MAMMOGRAM FOR BREAST CANCER: ICD-10-CM

## 2024-03-25 DIAGNOSIS — G44.229 CHRONIC TENSION-TYPE HEADACHE, NOT INTRACTABLE: ICD-10-CM

## 2024-03-25 DIAGNOSIS — N95.1 SYMPTOMS, SUCH AS FLUSHING, SLEEPLESSNESS, HEADACHE, LACK OF CONCENTRATION, ASSOCIATED WITH THE MENOPAUSE: Primary | ICD-10-CM

## 2024-03-25 DIAGNOSIS — I10 HTN (HYPERTENSION): ICD-10-CM

## 2024-03-25 DIAGNOSIS — Z00.00 ROUTINE GENERAL MEDICAL EXAMINATION AT A HEALTH CARE FACILITY: Primary | ICD-10-CM

## 2024-03-25 PROCEDURE — 99396 PREV VISIT EST AGE 40-64: CPT | Mod: S$GLB,,, | Performed by: STUDENT IN AN ORGANIZED HEALTH CARE EDUCATION/TRAINING PROGRAM

## 2024-03-25 PROCEDURE — 3008F BODY MASS INDEX DOCD: CPT | Mod: CPTII,S$GLB,, | Performed by: SPECIALIST

## 2024-03-25 PROCEDURE — 77063 BREAST TOMOSYNTHESIS BI: CPT | Mod: 26,,, | Performed by: RADIOLOGY

## 2024-03-25 PROCEDURE — 3079F DIAST BP 80-89 MM HG: CPT | Mod: CPTII,S$GLB,, | Performed by: STUDENT IN AN ORGANIZED HEALTH CARE EDUCATION/TRAINING PROGRAM

## 2024-03-25 PROCEDURE — 3080F DIAST BP >= 90 MM HG: CPT | Mod: CPTII,S$GLB,, | Performed by: SPECIALIST

## 2024-03-25 PROCEDURE — 77067 SCR MAMMO BI INCL CAD: CPT | Mod: 26,,, | Performed by: RADIOLOGY

## 2024-03-25 PROCEDURE — 99999 PR PBB SHADOW E&M-EST. PATIENT-LVL IV: CPT | Mod: PBBFAC,,, | Performed by: SPECIALIST

## 2024-03-25 PROCEDURE — 99999 PR PBB SHADOW E&M-EST. PATIENT-LVL IV: CPT | Mod: PBBFAC,,, | Performed by: STUDENT IN AN ORGANIZED HEALTH CARE EDUCATION/TRAINING PROGRAM

## 2024-03-25 PROCEDURE — 3008F BODY MASS INDEX DOCD: CPT | Mod: CPTII,S$GLB,, | Performed by: STUDENT IN AN ORGANIZED HEALTH CARE EDUCATION/TRAINING PROGRAM

## 2024-03-25 PROCEDURE — 1159F MED LIST DOCD IN RCRD: CPT | Mod: CPTII,S$GLB,, | Performed by: SPECIALIST

## 2024-03-25 PROCEDURE — 1160F RVW MEDS BY RX/DR IN RCRD: CPT | Mod: CPTII,S$GLB,, | Performed by: STUDENT IN AN ORGANIZED HEALTH CARE EDUCATION/TRAINING PROGRAM

## 2024-03-25 PROCEDURE — 99213 OFFICE O/P EST LOW 20 MIN: CPT | Mod: S$GLB,,, | Performed by: SPECIALIST

## 2024-03-25 PROCEDURE — 1159F MED LIST DOCD IN RCRD: CPT | Mod: CPTII,S$GLB,, | Performed by: STUDENT IN AN ORGANIZED HEALTH CARE EDUCATION/TRAINING PROGRAM

## 2024-03-25 PROCEDURE — 77067 SCR MAMMO BI INCL CAD: CPT | Mod: TC,PN

## 2024-03-25 PROCEDURE — 3077F SYST BP >= 140 MM HG: CPT | Mod: CPTII,S$GLB,, | Performed by: SPECIALIST

## 2024-03-25 PROCEDURE — 3075F SYST BP GE 130 - 139MM HG: CPT | Mod: CPTII,S$GLB,, | Performed by: STUDENT IN AN ORGANIZED HEALTH CARE EDUCATION/TRAINING PROGRAM

## 2024-03-25 RX ORDER — ONDANSETRON 4 MG/1
4 TABLET, ORALLY DISINTEGRATING ORAL
COMMUNITY
Start: 2024-02-11

## 2024-03-25 RX ORDER — TOPIRAMATE 25 MG/1
25 TABLET ORAL NIGHTLY
Qty: 30 TABLET | Refills: 11 | Status: SHIPPED | OUTPATIENT
Start: 2024-03-25 | End: 2025-03-25

## 2024-03-25 RX ORDER — ACETAMINOPHEN 500 MG
TABLET ORAL
COMMUNITY
Start: 2023-12-01

## 2024-03-25 RX ORDER — ATENOLOL 25 MG/1
25 TABLET ORAL DAILY
Qty: 90 TABLET | Refills: 3 | Status: SHIPPED | OUTPATIENT
Start: 2024-03-25

## 2024-03-25 RX ORDER — ESTRADIOL 1 MG/1
1 TABLET ORAL DAILY
Qty: 90 TABLET | Refills: 0 | Status: SHIPPED | OUTPATIENT
Start: 2024-03-25 | End: 2024-05-22 | Stop reason: SDUPTHER

## 2024-03-25 NOTE — PROGRESS NOTES
52 yo BF, history hyst presents for annual GYN eval and meniopauise s/s management Pt c/o VMFs , night sweats  Past Medical History:   Diagnosis Date    Benign hypertension     Bladder instability     Brain aneurysm     Depression     DJD (degenerative joint disease), lumbar     History of viral meningitis 1996    Hyperlipidemia     Insomnia     Microscopic hematuria     negative work-up    Pulmonary nodule     minute    Tendonitis        Past Surgical History:   Procedure Laterality Date    ANTERIOR VAGINAL REPAIR      BRAIN SURGERY      aneurysm    BREAST BIOPSY  6/5/2007    left breast benign    CEREBRAL ANGIOGRAM N/A 9/17/2020    Procedure: ANGIOGRAM-CEREBRAL;  Surgeon: [facility] Diagnostic Provider;  Location: [facility] OR [address] [place] [address];  Service: Radiology;  Laterality: N/A;   190/[address]    CLIP LIGATION OF INTRACRANIAL ANEURYSM BY CRANIOTOMY Left 11/4/2020    Procedure: CRANIOTOMY, WITH ANEURYSM CLIPPING RIGHT MODIFIED ORBITOZYGOMATIC CRANIOTOMY;  Surgeon: Christi Page MD;  Location: [facility] OR [address] [place] [address];  Service: Neurosurgery;  Laterality: Left;  Anesthesia: General   Blood: Type & Cross 2 units  Neuro Monitoring: SEP, MEP, EEG   Radiology: C-Arm   Bed: Regular Bed  Headrest: [place]ula  Position: Supine   ,  Reciprocating Saw  Length of Surgery: 4.5     COLONOSCOPY N/A 7/23/2021    Procedure: COLONOSCOPY;  Surgeon: Tiffanie Reyes MD;  Location: [facility] ENDO;  Service: Endoscopy;  Laterality: N/A;    ENDOMETRIAL ABLATION  5/28/2009    HYSTERECTOMY      LAPAROSCOPIC HYSTERECTOMY  2/2013    TUBAL LIGATION  2002       Family History   Problem Relation Age of Onset    Osteoarthritis Mother     Hyperlipidemia Mother     Hypertension Mother     Allergies Mother     Hyperlipidemia Father     Diabetes Father     Hypertension Father     Allergies Sister     Heart disease Sister         Congential    Breast cancer Maternal Aunt 61    Cancer Paternal Grandmother         Breast    Diabetes Paternal Grandmother     Breast cancer  Paternal Grandmother 56    Ovarian cancer Maternal Cousin 50       Social History     Socioeconomic History    Marital status:    Occupational History     Employer: [employer]   Tobacco Use    Smoking status: Former     Current packs/day: 0.00     Average packs/day: 0.3 packs/day for 15.0 years (3.8 ttl pk-yrs)     Types: Cigarettes     Start date: 10/20/1998     Quit date: 10/20/2013     Years since quitting: 10.4    Smokeless tobacco: Never   Substance and Sexual Activity    Alcohol use: Not Currently     Alcohol/week: 1.0 standard drink of alcohol     Types: 1 Glasses of wine per week    Drug use: No    Sexual activity: Yes     Partners: Male     Birth control/protection: Surgical   Social History Narrative    The patient does exercise regularly (walking daily, 35-40min).Rates diet as fair.She is not satisfied with weight.     Social Determinants of Health     Financial Resource Strain: Medium Risk (3/18/2024)    Overall Financial Resource Strain (CARDIA)     Difficulty of Paying Living Expenses: Somewhat hard   Food Insecurity: Food Insecurity Present (3/18/2024)    Hunger Vital Sign     Worried About Running Out of Food in the Last Year: Sometimes true     Ran Out of Food in the Last Year: Sometimes true   Transportation Needs: Unmet Transportation Needs (3/18/2024)    PRAPARE - Transportation     Lack of Transportation (Medical): Yes     Lack of Transportation (Non-Medical): Yes   Physical Activity: Insufficiently Active (3/18/2024)    Exercise Vital Sign     Days of Exercise per Week: 4 days     Minutes of Exercise per Session: 30 min   Stress: Stress Concern Present (3/18/2024)    [employer] [place] [employer] - Occupational Stress Questionnaire     Feeling of Stress : To some extent   Social Connections: Unknown (3/18/2024)    Social Connection and Isolation Panel [NHANES]     Frequency of Communication with Friends and Family: Three times a week     Frequency of Social  Gatherings with Friends and Family: Once a week     Active Member of Clubs or Organizations: Yes     Attends Club or Organization Meetings: More than 4 times per year     Marital Status:    Housing Stability: High Risk (3/18/2024)    Housing Stability Vital Sign     Unable to Pay for Housing in the Last Year: Yes     Number of Places Lived in the Last Year: 1     Unstable Housing in the Last Year: No       Current Outpatient Medications   Medication Sig Dispense Refill    ascorbic acid, vitamin C, (VITAMIN C) 1000 MG tablet Take 1,000 mg by mouth once daily.      aspirin (ECOTRIN) 81 MG EC tablet Take 81 mg by mouth nightly.      atenoloL (TENORMIN) 25 MG tablet Take 1 tablet (25 mg total) by mouth once daily. 90 tablet 3    cholecalciferol, vitamin D3, (D3-2000) 50 mcg (2,000 unit) Cap capsule       fluticasone (FLONASE) 50 mcg/actuation nasal spray 1 spray by Each Nare route once daily.      hydroCHLOROthiazide (HYDRODIURIL) 25 MG tablet TAKE 1 TABLET BY MOUTH EVERY DAY 90 tablet 3    Lactobacillus rhamnosus GG (CULTURELLE) 10 billion cell capsule Take 1 capsule by mouth once daily.      losartan (COZAAR) 25 MG tablet TAKE 1 TABLET BY MOUTH EVERY DAY 90 tablet 3    omega-3 fatty acids/fish oil (FISH OIL-OMEGA-3 FATTY ACIDS) 300-1,000 mg capsule Take 1 capsule by mouth once daily.      ondansetron (ZOFRAN-ODT) 4 MG TbDL Take 4 mg by mouth.      topiramate (TOPAMAX) 25 MG tablet Take 1 tablet (25 mg total) by mouth every evening. 30 tablet 11    venlafaxine (EFFEXOR-XR) 75 MG 24 hr capsule TAKE 2 CAPSULES BY MOUTH EVERY  capsule 0    estradioL (ESTRACE) 1 MG tablet Take 1 tablet (1 mg total) by mouth once daily. 90 tablet 0     No current facility-administered medications for this visit.       Review of patient's allergies indicates:   Allergen Reactions    Latex      Other reaction(s): Rash       Review of System:   General: as above  Psychological: no depression or suicidal ideation  Breasts: no new  or changing breast lumps, nipple discharge or masses.  Respiratory: no cough, shortness of breath, or wheezing  Cardiovascular: no chest pain or dyspnea on exertion  Gastrointestinal: no abdominal pain, change in bowel habits, or black or bloody stools  Genito-Urinary: no incontinence, urinary frequency/urgency or vulvar/vaginal symptoms, pelvic pain or abnormal vaginal bleeding.  Musculoskeletal: no gait disturbance or muscular weakness     PE:   APPEARANCE: Well nourished, well developed, in no acute distress.  NECK: Neck symmetric without masses or thyromegaly.  NODES: No inguinal lymph node enlargement.  ABDOMEN: Soft. No tenderness or masses. No hepatosplenomegaly. No hernias.  BREASTS: Symmetrical, no skin changes or visible lesions. No palpable masses, nipple discharge or adenopathy bilaterally.  PELVIC: Normal external female genitalia without lesions. Normal hair distribution. Adequate perineal body, normal urethral meatus. Vagina moist and well rugated without lesions or discharge. No significant cystocele or rectocele. Uterus and cervix surgically absent. Bimanual exam revealed no masses, tenderness or abnormality.    NOTE  NURSING PERSONAL PRESENT FOR ENTIRE PHYSICAL EXAM     Plan  I discussed the risks and benefits of ERT as well as subjective indications for use  Pt desires trial or ERT due to bothersome s/s  I will prescribe Estrace 1mg daily and follow pt response over 8 weeks  BSE monthly  Screening mammogram yearly  Answered all questions and will follow    I spent a total of 30 minutes on the day of the visit. This includes face to face time and non-face to face time preparing to see the patient (eg, review of tests), obtaining and/or reviewing separately obtained history, documenting clinical information in the electronic or other health record, independently interpreting results and communicating results to the patient/family/caregiver, or care coordinator.

## 2024-03-25 NOTE — TELEPHONE ENCOUNTER
----- Message from James Tanner MD sent at 3/25/2024 12:36 PM CDT -----  Something went wrong with her tests, CMP, A1c not drawn. Can someone look into this and see if it can be added on please?

## 2024-03-25 NOTE — TELEPHONE ENCOUNTER
Refill Routing Note   Medication(s) are not appropriate for processing by [facility] for the following reason(s):        Required labs outdated    [facility] action(s):  Defer   Requires labs : Yes             Appointments  past 12m or future 3m with PCP    Date Provider   Last Visit   3/22/2023 James Tanner MD   Next Visit   3/25/2024 James Tanner MD   ED visits in past 90 days: 0        Note composed:12:50 PM 03/25/2024

## 2024-03-25 NOTE — PROGRESS NOTES
"[facility] MEDICINE CLINIC NOTE    Patient Name: Kylah Mohan  YOB: 1970    PRESENTING HISTORY     History of Present Illness:  Ms. Kylah Mohan is a 53 y.o. female here for annual.     Subclinical hyperthyroid- was previusly seeing Dr. Hayward. Monitoring TSH, thyroid US (which has been normal).     Recently started Omata through her insurance. Doing coaching, food diary.     C/o sig fatigue.   She attributes to menopause. Has f/u with her GYN today.   Reviewed preivous sleep study. Couldn't tolerate CPAP.     Chronic HAs.   Mostly above left eye and tension in neck.   On topamax 50mg.     HTN- on losartan, thiazide.       ROS      OBJECTIVE:   Vital Signs:  Vitals:    03/25/24 0826   BP: 132/84   Pulse: 69   Resp: 16   SpO2: 98%   Weight: 78.5 kg (173 lb 1 oz)   Height: 5' 2" (1.575 m)          Physical Exam: Normal, no change.     Physical Exam    ASSESSMENT & PLAN:     Routine general medical examination at a health care facility  -     CBC W/ AUTO DIFFERENTIAL; Future; Expected date: 03/25/2024  -     LIPID PANEL; Future; Expected date: 03/25/2024    HTN (hypertension)  -     Comprehensive Metabolic Panel  Stable, continue current medications    Prediabetes  -     Hemoglobin A1C (Prediabetes)  Monitor    Subclinical hyperthyroidism  -     TSH; Future; Expected date: 03/25/2024  -     atenoloL (TENORMIN) 25 MG tablet; Take 1 tablet (25 mg total) by mouth once daily.  Dispense: 90 tablet; Refill: 3  Stable, continue current medications    Chronic tension-type headache, not intractable  -     topiramate (TOPAMAX) 25 MG tablet; Take 1 tablet (25 mg total) by mouth every evening.  Dispense: 30 tablet; Refill: 11  Try dose reduction and see if she feels any better.            James Tanner  Internal Medicine            "

## 2024-03-26 ENCOUNTER — PATIENT MESSAGE (OUTPATIENT)
Dept: ADMINISTRATIVE | Facility: HOSPITAL | Age: 54
End: 2024-03-26
Payer: COMMERCIAL

## 2024-03-27 ENCOUNTER — PATIENT OUTREACH (OUTPATIENT)
Dept: ADMINISTRATIVE | Facility: HOSPITAL | Age: 54
End: 2024-03-27
Payer: COMMERCIAL

## 2024-03-27 RX ORDER — VENLAFAXINE HYDROCHLORIDE 75 MG/1
CAPSULE, EXTENDED RELEASE ORAL
Qty: 180 CAPSULE | Refills: 0 | Status: SHIPPED | OUTPATIENT
Start: 2024-03-27 | End: 2024-06-06

## 2024-03-27 NOTE — PROGRESS NOTES
Population Health Chart Review & Patient Outreach Details      Additional Pop Health Notes:    CAMPAIGN- Preventative Care Screening           Updates Requested / Reviewed:      Updated Care Coordination Note         Health Maintenance Topics Overdue:      VBHM Score: 1     Uncontrolled BP                       Health Maintenance Topic(s) Outreach Outcomes & Actions Taken:    Breast Cancer Screening - Outreach Outcomes & Actions Taken  : Mammogram Screening Scheduled

## 2024-04-10 RX ORDER — HYDROCHLOROTHIAZIDE 25 MG/1
25 TABLET ORAL DAILY
Qty: 90 TABLET | Refills: 0 | Status: SHIPPED | OUTPATIENT
Start: 2024-04-10 | End: 2024-04-23 | Stop reason: SDUPTHER

## 2024-04-21 ENCOUNTER — PATIENT MESSAGE (OUTPATIENT)
Dept: FAMILY MEDICINE | Facility: CLINIC | Age: 54
End: 2024-04-21
Payer: COMMERCIAL

## 2024-04-21 DIAGNOSIS — I10 ESSENTIAL HYPERTENSION: ICD-10-CM

## 2024-04-22 NOTE — TELEPHONE ENCOUNTER
Patient requesting refill of Losartan.  Please advise. Thank you.     LR--1-10-23        LOV--3-25-24       FOV--3-25-25

## 2024-04-22 NOTE — TELEPHONE ENCOUNTER
No care due was identified.  Health [facility] Embedded Care Due Messages. Reference number: [id number].   4/22/2024 8:17:12 AM CDT

## 2024-04-23 RX ORDER — LOSARTAN POTASSIUM 25 MG/1
25 TABLET ORAL DAILY
Qty: 90 TABLET | Refills: 3 | Status: SHIPPED | OUTPATIENT
Start: 2024-04-23
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
## 2024-04-24 RX ORDER — HYDROCHLOROTHIAZIDE 25 MG/1
25 TABLET ORAL DAILY
Qty: 90 TABLET | Refills: 3 | Status: SHIPPED | OUTPATIENT
Start: 2024-04-24

## 2024-05-23 RX ORDER — ESTRADIOL 1 MG/1
1 TABLET ORAL DAILY
Qty: 90 TABLET | Refills: 0 | Status: SHIPPED | OUTPATIENT
Start: 2024-05-23 | End: 2025-05-23

## 2024-05-23 NOTE — TELEPHONE ENCOUNTER
"Refill Routing Note   Medication(s) are not appropriate for processing by [facility] for the following reason(s):        Required vitals abnormal  New or recently adjusted medication    [facility] action(s):  Defer        Medication Therapy Plan: Could not find a "refusal" on this; Unclear what the patient is referencing. Possible miscommunication      Appointments  past 12m or future 3m with PCP    Date Provider   Last Visit   3/25/2024 Eduardo Cheung MD   Next Visit   Visit date not found Eduardo Cheung MD   ED visits in past 90 days: 0        Note composed:7:59 AM 05/23/2024          "

## 2024-06-06 ENCOUNTER — OFFICE VISIT (OUTPATIENT)
Dept: FAMILY MEDICINE | Facility: CLINIC | Age: 54
End: 2024-06-06
Payer: COMMERCIAL

## 2024-06-06 ENCOUNTER — LAB VISIT (OUTPATIENT)
Dept: LAB | Facility: HOSPITAL | Age: 54
End: 2024-06-06
Attending: STUDENT IN AN ORGANIZED HEALTH CARE EDUCATION/TRAINING PROGRAM
Payer: COMMERCIAL

## 2024-06-06 VITALS
TEMPERATURE: 98 F | SYSTOLIC BLOOD PRESSURE: 112 MMHG | OXYGEN SATURATION: 99 % | DIASTOLIC BLOOD PRESSURE: 74 MMHG | BODY MASS INDEX: 32.41 KG/M2 | RESPIRATION RATE: 16 BRPM | HEART RATE: 71 BPM | WEIGHT: 176.13 LBS | HEIGHT: 62 IN

## 2024-06-06 DIAGNOSIS — R00.2 PALPITATIONS: ICD-10-CM

## 2024-06-06 DIAGNOSIS — G44.209 ACUTE NON INTRACTABLE TENSION-TYPE HEADACHE: ICD-10-CM

## 2024-06-06 DIAGNOSIS — F43.23 ADJUSTMENT DISORDER WITH MIXED ANXIETY AND DEPRESSED MOOD: ICD-10-CM

## 2024-06-06 DIAGNOSIS — F43.23 ADJUSTMENT DISORDER WITH MIXED ANXIETY AND DEPRESSED MOOD: Primary | ICD-10-CM

## 2024-06-06 LAB
ALBUMIN SERPL BCP-MCNC: 3.5 G/DL (ref 3.5–5.2)
ALP SERPL-CCNC: 80 U/L (ref 55–135)
ALT SERPL W/O P-5'-P-CCNC: 12 U/L (ref 10–44)
ANION GAP SERPL CALC-SCNC: 11 MMOL/L (ref 8–16)
AST SERPL-CCNC: 16 U/L (ref 10–40)
BASOPHILS # BLD AUTO: 0.08 K/UL (ref 0–0.2)
BASOPHILS NFR BLD: 0.9 % (ref 0–1.9)
BILIRUB SERPL-MCNC: 0.4 MG/DL (ref 0.1–1)
BUN SERPL-MCNC: 16 MG/DL (ref 6–20)
CALCIUM SERPL-MCNC: 9.7 MG/DL (ref 8.7–10.5)
CHLORIDE SERPL-SCNC: 103 MMOL/L (ref 95–110)
CO2 SERPL-SCNC: 29 MMOL/L (ref 23–29)
CREAT SERPL-MCNC: 0.8 MG/DL (ref 0.5–1.4)
DIFFERENTIAL METHOD BLD: NORMAL
EOSINOPHIL # BLD AUTO: 0.3 K/UL (ref 0–0.5)
EOSINOPHIL NFR BLD: 3.8 % (ref 0–8)
ERYTHROCYTE [DISTWIDTH] IN BLOOD BY AUTOMATED COUNT: 13.1 % (ref 11.5–14.5)
EST. GFR  (NO RACE VARIABLE): >60 ML/MIN/1.73 M^2
GLUCOSE SERPL-MCNC: 90 MG/DL (ref 70–110)
HCT VFR BLD AUTO: 43.3 % (ref 37–48.5)
HGB BLD-MCNC: 13.9 G/DL (ref 12–16)
IMM GRANULOCYTES # BLD AUTO: 0.01 K/UL (ref 0–0.04)
IMM GRANULOCYTES NFR BLD AUTO: 0.1 % (ref 0–0.5)
LYMPHOCYTES # BLD AUTO: 2.4 K/UL (ref 1–4.8)
LYMPHOCYTES NFR BLD: 28.2 % (ref 18–48)
MCH RBC QN AUTO: 29.7 PG (ref 27–31)
MCHC RBC AUTO-ENTMCNC: 32.1 G/DL (ref 32–36)
MCV RBC AUTO: 93 FL (ref 82–98)
MONOCYTES # BLD AUTO: 0.8 K/UL (ref 0.3–1)
MONOCYTES NFR BLD: 8.9 % (ref 4–15)
NEUTROPHILS # BLD AUTO: 5 K/UL (ref 1.8–7.7)
NEUTROPHILS NFR BLD: 58.1 % (ref 38–73)
NRBC BLD-RTO: 0 /100 WBC
OHS QRS DURATION: 92 MS
OHS QTC CALCULATION: 409 MS
PLATELET # BLD AUTO: 343 K/UL (ref 150–450)
PMV BLD AUTO: 10.6 FL (ref 9.2–12.9)
POTASSIUM SERPL-SCNC: 3.9 MMOL/L (ref 3.5–5.1)
PROT SERPL-MCNC: 7.4 G/DL (ref 6–8.4)
RBC # BLD AUTO: 4.68 M/UL (ref 4–5.4)
SODIUM SERPL-SCNC: 143 MMOL/L (ref 136–145)
TSH SERPL DL<=0.005 MIU/L-ACNC: 0.51 UIU/ML (ref 0.4–4)
VIT B12 SERPL-MCNC: 503 PG/ML (ref 210–950)
WBC # BLD AUTO: 8.52 K/UL (ref 3.9–12.7)

## 2024-06-06 PROCEDURE — 93005 ELECTROCARDIOGRAM TRACING: CPT | Mod: S$GLB,,, | Performed by: STUDENT IN AN ORGANIZED HEALTH CARE EDUCATION/TRAINING PROGRAM

## 2024-06-06 PROCEDURE — 93010 ELECTROCARDIOGRAM REPORT: CPT | Mod: S$GLB,,, | Performed by: INTERNAL MEDICINE

## 2024-06-06 PROCEDURE — G2211 COMPLEX E/M VISIT ADD ON: HCPCS | Mod: S$GLB,,, | Performed by: STUDENT IN AN ORGANIZED HEALTH CARE EDUCATION/TRAINING PROGRAM

## 2024-06-06 PROCEDURE — 99214 OFFICE O/P EST MOD 30 MIN: CPT | Mod: S$GLB,,, | Performed by: STUDENT IN AN ORGANIZED HEALTH CARE EDUCATION/TRAINING PROGRAM

## 2024-06-06 PROCEDURE — 1160F RVW MEDS BY RX/DR IN RCRD: CPT | Mod: CPTII,S$GLB,, | Performed by: STUDENT IN AN ORGANIZED HEALTH CARE EDUCATION/TRAINING PROGRAM

## 2024-06-06 PROCEDURE — 99999 PR PBB SHADOW E&M-EST. PATIENT-LVL V: CPT | Mod: PBBFAC,,, | Performed by: STUDENT IN AN ORGANIZED HEALTH CARE EDUCATION/TRAINING PROGRAM

## 2024-06-06 PROCEDURE — 96127 BRIEF EMOTIONAL/BEHAV ASSMT: CPT | Mod: S$GLB,,, | Performed by: STUDENT IN AN ORGANIZED HEALTH CARE EDUCATION/TRAINING PROGRAM

## 2024-06-06 PROCEDURE — 36415 COLL VENOUS BLD VENIPUNCTURE: CPT | Mod: PO | Performed by: STUDENT IN AN ORGANIZED HEALTH CARE EDUCATION/TRAINING PROGRAM

## 2024-06-06 PROCEDURE — 1159F MED LIST DOCD IN RCRD: CPT | Mod: CPTII,S$GLB,, | Performed by: STUDENT IN AN ORGANIZED HEALTH CARE EDUCATION/TRAINING PROGRAM

## 2024-06-06 PROCEDURE — 84443 ASSAY THYROID STIM HORMONE: CPT | Performed by: STUDENT IN AN ORGANIZED HEALTH CARE EDUCATION/TRAINING PROGRAM

## 2024-06-06 PROCEDURE — 85025 COMPLETE CBC W/AUTO DIFF WBC: CPT | Performed by: STUDENT IN AN ORGANIZED HEALTH CARE EDUCATION/TRAINING PROGRAM

## 2024-06-06 PROCEDURE — 3078F DIAST BP <80 MM HG: CPT | Mod: CPTII,S$GLB,, | Performed by: STUDENT IN AN ORGANIZED HEALTH CARE EDUCATION/TRAINING PROGRAM

## 2024-06-06 PROCEDURE — 80053 COMPREHEN METABOLIC PANEL: CPT | Performed by: STUDENT IN AN ORGANIZED HEALTH CARE EDUCATION/TRAINING PROGRAM

## 2024-06-06 PROCEDURE — 82607 VITAMIN B-12: CPT | Performed by: STUDENT IN AN ORGANIZED HEALTH CARE EDUCATION/TRAINING PROGRAM

## 2024-06-06 PROCEDURE — 3008F BODY MASS INDEX DOCD: CPT | Mod: CPTII,S$GLB,, | Performed by: STUDENT IN AN ORGANIZED HEALTH CARE EDUCATION/TRAINING PROGRAM

## 2024-06-06 PROCEDURE — 4010F ACE/ARB THERAPY RXD/TAKEN: CPT | Mod: CPTII,S$GLB,, | Performed by: STUDENT IN AN ORGANIZED HEALTH CARE EDUCATION/TRAINING PROGRAM

## 2024-06-06 PROCEDURE — 3044F HG A1C LEVEL LT 7.0%: CPT | Mod: CPTII,S$GLB,, | Performed by: STUDENT IN AN ORGANIZED HEALTH CARE EDUCATION/TRAINING PROGRAM

## 2024-06-06 PROCEDURE — 3074F SYST BP LT 130 MM HG: CPT | Mod: CPTII,S$GLB,, | Performed by: STUDENT IN AN ORGANIZED HEALTH CARE EDUCATION/TRAINING PROGRAM

## 2024-06-06 RX ORDER — VENLAFAXINE HYDROCHLORIDE 75 MG/1
225 CAPSULE, EXTENDED RELEASE ORAL DAILY
Qty: 270 CAPSULE | Refills: 2 | Status: SHIPPED | OUTPATIENT
Start: 2024-06-06

## 2024-06-06 RX ORDER — BUPROPION HYDROCHLORIDE 150 MG/1
150 TABLET ORAL DAILY
Qty: 90 TABLET | Refills: 3 | Status: SHIPPED | OUTPATIENT
Start: 2024-06-06 | End: 2025-06-06

## 2024-06-06 NOTE — PROGRESS NOTES
"[facility] MEDICINE CLINIC NOTE    Patient Name: Kylah Mohan  YOB: 1970    PRESENTING HISTORY     History of Present Illness:  Ms. Kylah Mohan is a 53 y.o. female here with stress.     Constellation of complaints:   Stressed at work    Not feeling well    Heart racing, at rest     Knots on back of neck that massage therapist found. Was concerned. Has seen PT for this in the past.     Headaches- dissimilar to anuerysm HA in 2020.    Nagging. Acorss frontal head, maxillary sinsues, neck   She attribtues to stress at work/life.     Crying for no reason.     Onset of symptoms about 1 month.   Potential triggers: taking care of mother who is ill, daughter just moved back in with her, layoffs at work/cutting pay/applying for jobs. Menopausal symptoms, c an't lose weight, bills are increasing.     Has been using EAP through insurance, talking with a therapist. Virtual/text based. Primarily weight based but also mental health.       Strengths- family support, marilyn.       Current exercise- no motivation.     Review of Systems   HENT:  Negative for hearing loss.    Eyes:  Negative for discharge.   Respiratory:  Negative for wheezing.    Cardiovascular:  Positive for chest pain and palpitations.   Gastrointestinal:  Negative for blood in stool, constipation, diarrhea and vomiting.   Genitourinary:  Negative for hematuria.   Musculoskeletal:  Positive for neck pain.   Neurological:  Positive for headaches. Negative for weakness.   Endo/Heme/Allergies:  Negative for polydipsia.         OBJECTIVE:   Vital Signs:  Vitals:    06/06/24 0723   BP: 112/74   Pulse: 71   Resp: 16   Temp: 98.3 °F (36.8 °C)   TempSrc: Oral   SpO2: 99%   Weight: 79.9 kg (176 lb 2.4 oz)   Height: 5' 2" (1.575 m)            Phq9-19 points  No active SI  Had passive SI last week.     GAD7-15 points    Physical Exam  Vitals and nursing note reviewed.   Constitutional:       Appearance: She is obese. She is not ill-appearing, toxic-appearing " or diaphoretic.   HENT:      Head: Normocephalic and atraumatic.      Right Ear: External ear normal.      Left Ear: External ear normal.   Eyes:      General: No scleral icterus.     Conjunctiva/sclera: Conjunctivae normal.      Pupils: Pupils are equal, round, and reactive to light.   Neck:      Thyroid: No thyromegaly.   Cardiovascular:      Rate and Rhythm: Normal rate and regular rhythm.      Heart sounds: Normal heart sounds. No murmur heard.  Pulmonary:      Effort: Pulmonary effort is normal. No respiratory distress.      Breath sounds: Normal breath sounds. No wheezing or rales.   Musculoskeletal:      Cervical back: Normal range of motion and neck supple.      Comments: Markedly tender over trapezius bilaterally   Lymphadenopathy:      Cervical: No cervical adenopathy.   Skin:     General: Skin is warm and dry.      Findings: No erythema or rash.   Neurological:      Mental Status: She is alert.      Gait: Gait is intact.      Comments: Deficit of sensation left face (chronic 2/2 NSGY). CN 2-12 otherwise WNL.   No localizing deficits.    Psychiatric:         Mood and Affect: Affect normal.         Cognition and Memory: Memory normal.      Comments: Mood depressed. Affect congruent.   Appropriately groomed.   Frequently tearful, appropriate.   No SI, HI, AVH.   Insight and judgement intact.          ASSESSMENT & PLAN:     Adjustment disorder with mixed anxiety and depressed mood  -     IN OFFICE EKG 12-LEAD (to Muse)  -     TSH; Future; Expected date: 06/06/2024  -     VITAMIN B12; Future; Expected date: 06/06/2024  -     CBC W/ AUTO DIFFERENTIAL; Future; Expected date: 06/06/2024  -     COMPREHENSIVE METABOLIC PANEL; Future; Expected date: 06/06/2024  -     venlafaxine (EFFEXOR-XR) 75 MG 24 hr capsule; Take 3 capsules (225 mg total) by mouth once daily. TAKE 2 CAPSULES BY MOUTH EVERY DAY  Dispense: 270 capsule; Refill: 2  -     buPROPion (WELLBUTRIN XL) 150 MG TB24 tablet; Take 1 tablet (150 mg total) by  mouth once daily.  Dispense: 90 tablet; Refill: 3    Palpitations  -     IN OFFICE EKG 12-LEAD (to Muse)  -     TSH; Future; Expected date: 06/06/2024  -     VITAMIN B12; Future; Expected date: 06/06/2024  -     CBC W/ AUTO DIFFERENTIAL; Future; Expected date: 06/06/2024  -     COMPREHENSIVE METABOLIC PANEL; Future; Expected date: 06/06/2024    Acute non intractable tension-type headache  -     IN OFFICE EKG 12-LEAD (to Muse)  -     TSH; Future; Expected date: 06/06/2024  -     VITAMIN B12; Future; Expected date: 06/06/2024  -     CBC W/ AUTO DIFFERENTIAL; Future; Expected date: 06/06/2024  -     COMPREHENSIVE METABOLIC PANEL; Future; Expected date: 06/06/2024      I believe her constellation of symptoms are all related to underlying stress reaction +- MDD/LAURA. Does not yet meet criteria based on timeframe but with high scores on testing. Rule out medical etiologies above (seem less likely).    Already on effexor for vasomotor menopausal symptoms.   Will uptitrate, add wellbutrin.   Cont remote therapy.   Add exercise, preferably outdoors with sunlight as adjunct.     I will f/u with her in 6 weeks.     Filled out FMLA forms today recommending off work until reassessment.          James Tanner  Internal Medicine      I spent a total of 34 minutes on the day of the visit.  This includes face-to-face time and non face-to-face time preparing to see the patient (e.g., review of tests) , obtaining and/or reviewing separately obtain history, documenting clinical information in the electronic or other health record, independently interpreting results and communicating results to the patient/family/caregiver, or care coordinator.    Visit complexity inherent to evaluation and management associated with medical care services that serve as the continuing focal point for all needed health care services and/or with medical care services that are part of ongoing care related to a patient's single, serious condition or a complex  condition provided today        Answers submitted by the patient for this visit:  Review of Systems Questionnaire (Submitted on 6/4/2024)  activity change: Yes  rhinorrhea: No  trouble swallowing: No  visual disturbance: No  chest tightness: Yes  polyuria: Yes  difficulty urinating: No  menstrual problem: No  joint swelling: Yes  arthralgias: Yes  confusion: No  dysphoric mood: Yes

## 2024-07-18 ENCOUNTER — OFFICE VISIT (OUTPATIENT)
Dept: FAMILY MEDICINE | Facility: CLINIC | Age: 54
End: 2024-07-18
Payer: COMMERCIAL

## 2024-07-18 DIAGNOSIS — F43.23 ADJUSTMENT DISORDER WITH MIXED ANXIETY AND DEPRESSED MOOD: Primary | ICD-10-CM

## 2024-07-18 PROCEDURE — 4010F ACE/ARB THERAPY RXD/TAKEN: CPT | Mod: CPTII,95,, | Performed by: STUDENT IN AN ORGANIZED HEALTH CARE EDUCATION/TRAINING PROGRAM

## 2024-07-18 PROCEDURE — 99213 OFFICE O/P EST LOW 20 MIN: CPT | Mod: 95,,, | Performed by: STUDENT IN AN ORGANIZED HEALTH CARE EDUCATION/TRAINING PROGRAM

## 2024-07-18 PROCEDURE — 1160F RVW MEDS BY RX/DR IN RCRD: CPT | Mod: CPTII,95,, | Performed by: STUDENT IN AN ORGANIZED HEALTH CARE EDUCATION/TRAINING PROGRAM

## 2024-07-18 PROCEDURE — 1159F MED LIST DOCD IN RCRD: CPT | Mod: CPTII,95,, | Performed by: STUDENT IN AN ORGANIZED HEALTH CARE EDUCATION/TRAINING PROGRAM

## 2024-07-18 PROCEDURE — 3044F HG A1C LEVEL LT 7.0%: CPT | Mod: CPTII,95,, | Performed by: STUDENT IN AN ORGANIZED HEALTH CARE EDUCATION/TRAINING PROGRAM

## 2024-07-18 PROCEDURE — G2211 COMPLEX E/M VISIT ADD ON: HCPCS | Mod: 95,,, | Performed by: STUDENT IN AN ORGANIZED HEALTH CARE EDUCATION/TRAINING PROGRAM

## 2024-07-18 NOTE — PROGRESS NOTES
The patient location is: [facility]  The chief complaint leading to consultation is: Mood disorder f/u    Visit type: audiovisual    Face to Face time with patient: 7 minutes  10 minutes of total time spent on the encounter, which includes face to face time and non-face to face time preparing to see the patient (eg, review of tests), Obtaining and/or reviewing separately obtained history, Documenting clinical information in the electronic or other health record, Independently interpreting results (not separately reported) and communicating results to the patient/family/caregiver, or Care coordination (not separately reported).         Each patient to whom he or she provides medical services by telemedicine is:  (1) informed of the relationship between the physician and patient and the respective role of any other health care provider with respect to management of the patient; and (2) notified that he or she may decline to receive medical services by telemedicine and may withdraw from such care at any time.    Notes:      [facility] CLINIC NOTE    Patient Name: Kylah Mohan  YOB: 1970    PRESENTING HISTORY     History of Present Illness:  Ms. Kylah Mohan is a 54 y.o. female here for short f/u.     Sig anxiety/depression features.   We uptitrated effexor and added wellbutrin.     Feeling much better.   Working, able to function well.   Feels much calmer, more relaxed.     No AEs.   Sleep is good. No blue.     ROS      OBJECTIVE:   Vital Signs:  There were no vitals filed for this visit.       Physical Exam: Mood and affect nl. Thoughts linear. Insight and judgement intact.     Physical Exam    ASSESSMENT & PLAN:     Adjustment disorder with mixed anxiety and depressed mood    Doing well, no changes. Continue for 6 months then can consider trying to stop.          James Tanner  Internal Medicine    Visit complexity inherent to evaluation and management associated with medical care  services that serve as the continuing focal point for all needed health care services and/or with medical care services that are part of ongoing care related to a patient's single, serious condition or a complex condition provided today

## 2024-09-19 RX ORDER — ESTRADIOL 1 MG/1
1 TABLET ORAL
Qty: 90 TABLET | Refills: 1 | Status: SHIPPED | OUTPATIENT
Start: 2024-09-19

## 2024-09-19 NOTE — TELEPHONE ENCOUNTER
Refill Decision Note   Kylah Kimberlee  is requesting a refill authorization.  Brief Assessment and Rationale for Refill:  Approve     Medication Therapy Plan:  LOV 3/25/24      Comments:     Note composed:1:28 PM 09/19/2024

## 2024-10-03 ENCOUNTER — PATIENT MESSAGE (OUTPATIENT)
Dept: FAMILY MEDICINE | Facility: CLINIC | Age: 54
End: 2024-10-03
Payer: COMMERCIAL

## 2024-10-08 ENCOUNTER — OFFICE VISIT (OUTPATIENT)
Dept: FAMILY MEDICINE | Facility: CLINIC | Age: 54
End: 2024-10-08
Payer: COMMERCIAL

## 2024-10-08 DIAGNOSIS — L50.0 ALLERGIC URTICARIA: Primary | ICD-10-CM

## 2024-10-08 PROCEDURE — 1159F MED LIST DOCD IN RCRD: CPT | Mod: CPTII,95,, | Performed by: STUDENT IN AN ORGANIZED HEALTH CARE EDUCATION/TRAINING PROGRAM

## 2024-10-08 PROCEDURE — 3044F HG A1C LEVEL LT 7.0%: CPT | Mod: CPTII,95,, | Performed by: STUDENT IN AN ORGANIZED HEALTH CARE EDUCATION/TRAINING PROGRAM

## 2024-10-08 PROCEDURE — 1160F RVW MEDS BY RX/DR IN RCRD: CPT | Mod: CPTII,95,, | Performed by: STUDENT IN AN ORGANIZED HEALTH CARE EDUCATION/TRAINING PROGRAM

## 2024-10-08 PROCEDURE — 99214 OFFICE O/P EST MOD 30 MIN: CPT | Mod: 95,,, | Performed by: STUDENT IN AN ORGANIZED HEALTH CARE EDUCATION/TRAINING PROGRAM

## 2024-10-08 PROCEDURE — G2211 COMPLEX E/M VISIT ADD ON: HCPCS | Mod: 95,,, | Performed by: STUDENT IN AN ORGANIZED HEALTH CARE EDUCATION/TRAINING PROGRAM

## 2024-10-08 PROCEDURE — 4010F ACE/ARB THERAPY RXD/TAKEN: CPT | Mod: CPTII,95,, | Performed by: STUDENT IN AN ORGANIZED HEALTH CARE EDUCATION/TRAINING PROGRAM

## 2024-10-08 RX ORDER — PREDNISONE 10 MG/1
TABLET ORAL
Qty: 23 TABLET | Refills: 0 | Status: SHIPPED | OUTPATIENT
Start: 2024-10-08 | End: 2024-10-19

## 2024-10-08 RX ORDER — CETIRIZINE HYDROCHLORIDE 10 MG/1
10 TABLET ORAL DAILY
Qty: 30 TABLET | Refills: 0 | Status: SHIPPED | OUTPATIENT
Start: 2024-10-08 | End: 2025-10-08

## 2024-10-08 RX ORDER — FAMOTIDINE 40 MG/1
40 TABLET, FILM COATED ORAL DAILY
Qty: 30 TABLET | Refills: 0 | Status: SHIPPED | OUTPATIENT
Start: 2024-10-08 | End: 2025-10-08

## 2024-10-08 NOTE — PROGRESS NOTES
The patient location is: [place], [place]  The chief complaint leading to consultation is: rash    Visit type: audiovisual    Face to Face time with patient: 10 minutes  12 minutes of total time spent on the encounter, which includes face to face time and non-face to face time preparing to see the patient (eg, review of tests), Obtaining and/or reviewing separately obtained history, Documenting clinical information in the electronic or other health record, Independently interpreting results (not separately reported) and communicating results to the patient/family/caregiver, or Care coordination (not separately reported).         Each patient to whom he or she provides medical services by telemedicine is:  (1) informed of the relationship between the physician and patient and the respective role of any other health care provider with respect to management of the patient; and (2) notified that he or she may decline to receive medical services by telemedicine and may withdraw from such care at any time.    Notes:      [facility] MEDICINE CLINIC NOTE    Patient Name: Kylah Mohan  YOB: 1970    PRESENTING HISTORY     History of Present Illness:  Ms. Kylah Mohan is a 54 y.o. female     Last Monday got COVID and flu shot.   Tues, wed thurs arm tender on COVID shot arm.   Thurs, Friday got driwsy, nauseated. Started breatking out with welps, rashses. Affects arms, torso, legs. Will spontaneously resolve and then recur.  Taking benadryl, excessively drowsy.   Itching subsided.   Was using hydrocortisone cream and calamine.   Never had fever.     Feels nauseated.   Little appetite.       ROS      OBJECTIVE:   Vital Signs:  There were no vitals filed for this visit.       Physical Exam: Non toxic appearing. Resolution limits rash evaluation.       Physical Exam    ASSESSMENT & PLAN:     Allergic urticaria  -     CBC W/ AUTO DIFFERENTIAL; Future; Expected date: 10/08/2024  -     COMPREHENSIVE METABOLIC  PANEL; Future; Expected date: 10/08/2024  -     cetirizine (ZYRTEC) 10 MG tablet; Take 1 tablet (10 mg total) by mouth once daily.  Dispense: 30 tablet; Refill: 0  -     famotidine (PEPCID) 40 MG tablet; Take 1 tablet (40 mg total) by mouth once daily.  Dispense: 30 tablet; Refill: 0  -     predniSONE (DELTASONE) 10 MG tablet; Take 4 tablets (40 mg total) by mouth once daily for 3 days, THEN 2 tablets (20 mg total) once daily for 3 days, THEN 1 tablet (10 mg total) once daily for 5 days.  Dispense: 23 tablet; Refill: 0    I suspect this is allergic reaction to the vaccination.   Will treat with steroid taper + zyrtec and pepcid.   If not resolving, could be allergy to another medication she takes and will have to try stopping some.     Check for organ involvement in light of nausea symptoms.          James Tanner  Internal Medicine    Visit complexity inherent to evaluation and management associated with medical care services that serve as the continuing focal point for all needed health care services and/or with medical care services that are part of ongoing care related to a patient's single, serious condition or a complex condition provided today

## 2024-10-09 ENCOUNTER — LAB VISIT (OUTPATIENT)
Dept: LAB | Facility: HOSPITAL | Age: 54
End: 2024-10-09
Attending: STUDENT IN AN ORGANIZED HEALTH CARE EDUCATION/TRAINING PROGRAM
Payer: COMMERCIAL

## 2024-10-09 DIAGNOSIS — L50.0 ALLERGIC URTICARIA: ICD-10-CM

## 2024-10-09 LAB
ALBUMIN SERPL BCP-MCNC: 3.5 G/DL (ref 3.5–5.2)
ALP SERPL-CCNC: 86 U/L (ref 55–135)
ALT SERPL W/O P-5'-P-CCNC: 7 U/L (ref 10–44)
ANION GAP SERPL CALC-SCNC: 11 MMOL/L (ref 8–16)
AST SERPL-CCNC: 15 U/L (ref 10–40)
BASOPHILS # BLD AUTO: 0.09 K/UL (ref 0–0.2)
BASOPHILS NFR BLD: 1 % (ref 0–1.9)
BILIRUB SERPL-MCNC: 0.3 MG/DL (ref 0.1–1)
BUN SERPL-MCNC: 17 MG/DL (ref 6–20)
CALCIUM SERPL-MCNC: 9.9 MG/DL (ref 8.7–10.5)
CHLORIDE SERPL-SCNC: 102 MMOL/L (ref 95–110)
CO2 SERPL-SCNC: 29 MMOL/L (ref 23–29)
CREAT SERPL-MCNC: 0.8 MG/DL (ref 0.5–1.4)
DIFFERENTIAL METHOD BLD: NORMAL
EOSINOPHIL # BLD AUTO: 0.2 K/UL (ref 0–0.5)
EOSINOPHIL NFR BLD: 2.4 % (ref 0–8)
ERYTHROCYTE [DISTWIDTH] IN BLOOD BY AUTOMATED COUNT: 12.6 % (ref 11.5–14.5)
EST. GFR  (NO RACE VARIABLE): >60 ML/MIN/1.73 M^2
GLUCOSE SERPL-MCNC: 78 MG/DL (ref 70–110)
HCT VFR BLD AUTO: 43.5 % (ref 37–48.5)
HGB BLD-MCNC: 14 G/DL (ref 12–16)
IMM GRANULOCYTES # BLD AUTO: 0.01 K/UL (ref 0–0.04)
IMM GRANULOCYTES NFR BLD AUTO: 0.1 % (ref 0–0.5)
LYMPHOCYTES # BLD AUTO: 2.3 K/UL (ref 1–4.8)
LYMPHOCYTES NFR BLD: 25.1 % (ref 18–48)
MCH RBC QN AUTO: 29.7 PG (ref 27–31)
MCHC RBC AUTO-ENTMCNC: 32.2 G/DL (ref 32–36)
MCV RBC AUTO: 92 FL (ref 82–98)
MONOCYTES # BLD AUTO: 0.8 K/UL (ref 0.3–1)
MONOCYTES NFR BLD: 9.1 % (ref 4–15)
NEUTROPHILS # BLD AUTO: 5.8 K/UL (ref 1.8–7.7)
NEUTROPHILS NFR BLD: 62.3 % (ref 38–73)
NRBC BLD-RTO: 0 /100 WBC
PLATELET # BLD AUTO: 362 K/UL (ref 150–450)
PMV BLD AUTO: 10.3 FL (ref 9.2–12.9)
POTASSIUM SERPL-SCNC: 3.1 MMOL/L (ref 3.5–5.1)
PROT SERPL-MCNC: 7.4 G/DL (ref 6–8.4)
RBC # BLD AUTO: 4.71 M/UL (ref 4–5.4)
SODIUM SERPL-SCNC: 142 MMOL/L (ref 136–145)
WBC # BLD AUTO: 9.22 K/UL (ref 3.9–12.7)

## 2024-10-09 PROCEDURE — 80053 COMPREHEN METABOLIC PANEL: CPT | Performed by: STUDENT IN AN ORGANIZED HEALTH CARE EDUCATION/TRAINING PROGRAM

## 2024-10-09 PROCEDURE — 36415 COLL VENOUS BLD VENIPUNCTURE: CPT | Mod: PO | Performed by: STUDENT IN AN ORGANIZED HEALTH CARE EDUCATION/TRAINING PROGRAM

## 2024-10-09 PROCEDURE — 85025 COMPLETE CBC W/AUTO DIFF WBC: CPT | Performed by: STUDENT IN AN ORGANIZED HEALTH CARE EDUCATION/TRAINING PROGRAM

## 2024-10-14 DIAGNOSIS — E87.6 HYPOKALEMIA: Primary | ICD-10-CM

## 2024-10-14 RX ORDER — POTASSIUM CHLORIDE 1500 MG/1
20 TABLET, EXTENDED RELEASE ORAL DAILY
Qty: 90 TABLET | Refills: 3 | Status: SHIPPED | OUTPATIENT
Start: 2024-10-14

## 2024-10-31 DIAGNOSIS — L50.0 ALLERGIC URTICARIA: ICD-10-CM

## 2024-10-31 RX ORDER — FAMOTIDINE 40 MG/1
40 TABLET, FILM COATED ORAL
Qty: 90 TABLET | Refills: 4 | Status: SHIPPED | OUTPATIENT
Start: 2024-10-31

## 2024-11-01 RX ORDER — CETIRIZINE HYDROCHLORIDE 10 MG/1
10 TABLET ORAL
Qty: 90 TABLET | Refills: 4 | Status: SHIPPED | OUTPATIENT
Start: 2024-11-01

## 2024-12-01 ENCOUNTER — E-VISIT (OUTPATIENT)
Dept: FAMILY MEDICINE | Facility: CLINIC | Age: 54
End: 2024-12-01
Payer: COMMERCIAL

## 2024-12-01 DIAGNOSIS — J01.91 ACUTE RECURRENT SINUSITIS, UNSPECIFIED LOCATION: Primary | ICD-10-CM

## 2024-12-03 RX ORDER — DOXYCYCLINE 100 MG/1
100 CAPSULE ORAL 2 TIMES DAILY
Qty: 14 CAPSULE | Refills: 0 | Status: SHIPPED | OUTPATIENT
Start: 2024-12-03 | End: 2024-12-10

## 2024-12-03 NOTE — PROGRESS NOTES
Patient ID: Kylah Mohan is a 54 y.o. female.    Chief Complaint: General Illness (Entered automatically based on patient selection in [employer].)    The patient initiated a request through [employer] on 12/1/2024 for evaluation and management with a chief complaint of General Illness (Entered automatically based on patient selection in [employer].)     I evaluated the questionnaire submission on 12/3/2024.    [facility] Peq Evisit [facility]-Cough And Cold    12/1/2024  3:12 PM CST - Filed by Patient   What do you need help with? Sinus Infection   Do you agree to participate in an E-Visit? Yes   If you have any of the following symptoms, go to your local emergency room or call 911: I acknowledge   Select all that apply: None of the above   What is the main issue you would like addressed today? The headache and pain frim the sinus infection. Ive tried nasal drops and zyrtec but nothing gas helped in 5 days, i seem to get this atvlwasr 2-3 times a year   Do you think you might have COVID or the Flu? No   Have you tested positive for COVID or Flu? No   What symptoms do you currently have?  Headache;  Nasal Congestion;  Pain around the nose and face;  Ear pain;  Neck pain   Have you ever smoked? I smoked in the past   Have you had a fever? No   When did your symptoms first appear? 10/28/2024   In the last two weeks, have you been in close contact with someone who has COVID-19 or the Flu? No   List what you have done or taken to help your symptoms. Flonase, Neti pod, Tylenol and Zyrtec   How severe are your symptoms? Severe   Have your symptoms gotten better or worse since they started?  Worse   Do you have transportation to get testing if it is needed and ordered for you at an [facility] location? Yes   Provide any additional information you feel is important. My pressure was up 3 of the 5 days ; 150/85 I was not feelibg well.   Please attach any relevant images or files    Are you able to take your vital signs? Yes   Systolic Blood  Pressure: 113   Diastolic Blood Pressure: 74   Weight: 163   Height: 60   Pulse: 70   Temperature: 98   Respiration rate:    Pulse Oxygen:          Encounter Diagnosis   Name Primary?    Acute recurrent sinusitis, unspecified location Yes        No orders of the defined types were placed in this encounter.     Medications Ordered This Encounter   Medications    doxycycline (VIBRAMYCIN) 100 MG Cap     Sig: Take 1 capsule (100 mg total) by mouth 2 (two) times daily. for 7 days     Dispense:  14 capsule     Refill:  0        No follow-ups on file.      E-Visit Time Tracking:    Day 3 Time (in minutes): 7    Total Time (in minutes): 7

## 2025-01-02 ENCOUNTER — E-VISIT (OUTPATIENT)
Dept: FAMILY MEDICINE | Facility: CLINIC | Age: 55
End: 2025-01-02
Payer: COMMERCIAL

## 2025-01-02 DIAGNOSIS — I10 HYPERTENSION, UNSPECIFIED TYPE: ICD-10-CM

## 2025-01-02 DIAGNOSIS — E66.811 CLASS 1 OBESITY WITH SERIOUS COMORBIDITY AND BODY MASS INDEX (BMI) OF 31.0 TO 31.9 IN ADULT, UNSPECIFIED OBESITY TYPE: Primary | ICD-10-CM

## 2025-01-03 NOTE — PROGRESS NOTES
Patient ID: Kylah Mohan is a 54 y.o. female.    Chief Complaint: General Illness (Entered automatically based on patient selection in [employer].)    The patient initiated a request through [employer] on 1/2/2025 for evaluation and management with a chief complaint of General Illness (Entered automatically based on patient selection in [employer].)     I evaluated the questionnaire submission on 1/3/2025.    [facility] Peq Evisit [facility]-Medication    1/2/2025  2:32 PM CST - Filed by Patient   What do you need help with? Weight Management   Do you agree to participate in an E-Visit? Yes   If you have any of the following symptoms, please present to your local emergency room or call 911: I acknowledge   Medication requests for narcotics will not be addressed via an E-Visit.  Please schedule an appointment. I acknowledge   Do you have any of the following pregnancy-related conditions? None   What best describes your appetite? Average   Do you have specific food cravings? Yes   Describe your specific food cravings: Junk food now never before   When you eat, how quickly do you feel full Quickly   Give an example of what you have eaten in a day in the past 2 weeks:    Breakfast: Coffee, boiled egg, protein shake   Lunch: Salad, water, sandwich (not much)   Dinner: Chicken, side with veggies or salad   Snacks: Cookies, Chips, Candy, junk   Drinks: Water, soda ( not normal) juice   Have you exercised in the past week? No   Do you have a personal or family history of thyroid cancer? No   Do you have a personal history of kidney stones? No   Do you have a personal history of seizures? No   Do you have a personal history of pancreatitis? No   I would like to address: Medication for weight loss   Do you want to address a new or existing medication? I would like to start a new medication that I do not already take   What is the name of the medication that you would like to start? Ozempic or what my insurance will cover   Have you taken a  similar medication in the past? No   Why are you requesting this particular medication? I see it works for others    What medical condition is the  medication intended to treat? Diabetes   Provide any additional information you feel is important. I need help to lose the last 40 pounds I would like to lose   Please attach any relevant images or files    Are you able to take your vital signs? No         Encounter Diagnoses   Name Primary?    Class 1 obesity with serious comorbidity and body mass index (BMI) of 31.0 to 31.9 in adult, unspecified obesity type Yes    Hypertension, unspecified type         No orders of the defined types were placed in this encounter.     Medications Ordered This Encounter   Medications    semaglutide (OZEMPIC) 0.25 mg or 0.5 mg (2 mg/3 mL) pen injector     Sig: Inject 0.25 mg into the skin every 7 days.     Dispense:  1.5 mL     Refill:  0    semaglutide (OZEMPIC) 0.25 mg or 0.5 mg (2 mg/3 mL) pen injector     Sig: Inject 0.5 mg into the skin every 7 days.     Dispense:  3 mL     Refill:  0    semaglutide (OZEMPIC) 1 mg/dose (2 mg/1.5 mL) PnIj     Sig: Inject 1 mg into the skin every 7 days.     Dispense:  3 mL     Refill:  0        No follow-ups on file.    BMI >30, eligible.   Discussed risks including pancreatitis, gastroparesis and obstruction, NAION.     E-Visit Time Tracking:    Day 1 Time (in minutes): 0  Day 2 Time (in minutes): 7  Day 4 Time (in minutes): 9  Day 6 Time (in minutes): 7    Total Time (in minutes): 23

## 2025-01-05 VITALS — WEIGHT: 171 LBS | BODY MASS INDEX: 31.47 KG/M2 | HEIGHT: 62 IN

## 2025-01-05 RX ORDER — SEMAGLUTIDE 1 MG/.5ML
1 INJECTION, SOLUTION SUBCUTANEOUS WEEKLY
Qty: 2 ML | Refills: 0 | Status: SHIPPED | OUTPATIENT
Start: 2025-03-08 | End: 2025-01-07

## 2025-01-05 RX ORDER — SEMAGLUTIDE 0.5 MG/.5ML
0.5 INJECTION, SOLUTION SUBCUTANEOUS WEEKLY
Qty: 2 ML | Refills: 0 | Status: SHIPPED | OUTPATIENT
Start: 2025-02-05 | End: 2025-01-07

## 2025-01-05 RX ORDER — SEMAGLUTIDE 0.25 MG/.5ML
0.25 INJECTION, SOLUTION SUBCUTANEOUS WEEKLY
Qty: 2 ML | Refills: 0 | Status: SHIPPED | OUTPATIENT
Start: 2025-01-05 | End: 2025-01-07

## 2025-01-06 ENCOUNTER — TELEPHONE (OUTPATIENT)
Dept: FAMILY MEDICINE | Facility: CLINIC | Age: 55
End: 2025-01-06
Payer: COMMERCIAL

## 2025-01-06 DIAGNOSIS — E66.811 CLASS 1 OBESITY WITH SERIOUS COMORBIDITY AND BODY MASS INDEX (BMI) OF 31.0 TO 31.9 IN ADULT, UNSPECIFIED OBESITY TYPE: ICD-10-CM

## 2025-01-06 RX ORDER — SEMAGLUTIDE 0.5 MG/.5ML
INJECTION, SOLUTION SUBCUTANEOUS
Refills: 0 | OUTPATIENT
Start: 2025-01-06

## 2025-01-06 RX ORDER — SEMAGLUTIDE 1 MG/.5ML
INJECTION, SOLUTION SUBCUTANEOUS
Refills: 0 | OUTPATIENT
Start: 2025-01-06

## 2025-01-06 NOTE — TELEPHONE ENCOUNTER
Care Due:                  Date            Visit Type   Department     Provider  --------------------------------------------------------------------------------                                ESTABLISHED                              PATIENT -    JUAN Calvert  Last Visit: 10-      VIRTUAL      MEDICINE       [facility]                              PRIMARY      JUAN Hearnon  Next Visit: 03-      CARE ([facility])   MEDICINE       [facility]                                                            Last  Test          Frequency    Reason                     Performed    Due Date  --------------------------------------------------------------------------------    HBA1C.......  6 months...  semaglutide,.............  03- 09-    [facility] Embedded Care Due Messages. Reference number: [id number].   1/06/2025 8:21:33 AM CST

## 2025-01-07 ENCOUNTER — TELEPHONE (OUTPATIENT)
Dept: FAMILY MEDICINE | Facility: CLINIC | Age: 55
End: 2025-01-07
Payer: COMMERCIAL

## 2025-01-07 RX ORDER — SEMAGLUTIDE 0.68 MG/ML
0.5 INJECTION, SOLUTION SUBCUTANEOUS
Qty: 3 ML | Refills: 0 | Status: SHIPPED | OUTPATIENT
Start: 2025-02-07 | End: 2025-03-09

## 2025-01-07 RX ORDER — SEMAGLUTIDE 1.34 MG/ML
1 INJECTION, SOLUTION SUBCUTANEOUS
Qty: 3 ML | Refills: 0 | Status: SHIPPED | OUTPATIENT
Start: 2025-03-10 | End: 2025-04-09

## 2025-01-07 RX ORDER — SEMAGLUTIDE 0.68 MG/ML
0.25 INJECTION, SOLUTION SUBCUTANEOUS
Qty: 1.5 ML | Refills: 0 | Status: SHIPPED | OUTPATIENT
Start: 2025-01-07 | End: 2025-02-06

## 2025-01-07 NOTE — TELEPHONE ENCOUNTER
Pharmacy is requesting that a PRIOR AUTHORIZATION be completed for the WEGOVY. Please forward this request to the staff member handling PAs for your clinic. Thank you.    Provider Staff:  Action required for this patient    Requires labs      Please see care gap opportunities below in Care Due Message.    Thanks!  [facility]     Appointments      Date Provider   Last Visit   1/2/2025 James Tanner MD   Next Visit   3/25/2025 James Tanner MD       Note composed:8:35 PM 01/06/2025

## 2025-01-07 NOTE — TELEPHONE ENCOUNTER
Please see follow up encounter dated today's date 01/07/2025 per JOSE Jackson regarding prior authorization denial.

## 2025-02-07 ENCOUNTER — PATIENT MESSAGE (OUTPATIENT)
Dept: FAMILY MEDICINE | Facility: CLINIC | Age: 55
End: 2025-02-07
Payer: COMMERCIAL

## 2025-02-07 ENCOUNTER — TELEPHONE (OUTPATIENT)
Dept: FAMILY MEDICINE | Facility: CLINIC | Age: 55
End: 2025-02-07
Payer: COMMERCIAL

## 2025-02-07 NOTE — TELEPHONE ENCOUNTER
Prior authorization for Ozempic denied. Reason for denial listed as follows:    The request has been denied.  ? It is considered investigational. The requested drug is not approved by the Food and Drug  Administration for the requested indication/diagnosis. Services determined to be investigational  are not covered by your member contract.      Call placed to patient for notification. Patient requesting to know if there are any other alternatives that will be covered by insurance. Writer recommended for patient to contact her insurance to confirm if there is a covered alternative for weight loss.  Will send follow up message to Dr. Tanner for notification.

## 2025-02-26 DIAGNOSIS — F43.23 ADJUSTMENT DISORDER WITH MIXED ANXIETY AND DEPRESSED MOOD: ICD-10-CM

## 2025-02-26 RX ORDER — VENLAFAXINE HYDROCHLORIDE 75 MG/1
225 CAPSULE, EXTENDED RELEASE ORAL
Qty: 270 CAPSULE | Refills: 2 | Status: SHIPPED | OUTPATIENT
Start: 2025-02-26

## 2025-02-26 NOTE — TELEPHONE ENCOUNTER
No care due was identified.  [facility] Embedded Care Due Messages. Reference number: [id number].   2/26/2025 12:13:40 AM CST

## 2025-02-26 NOTE — TELEPHONE ENCOUNTER
Refill Routing Note   Medication(s) are not appropriate for processing by [facility] for the following reason(s):        Clarification of medication (Rx) details    [facility] action(s):  Defer      Medication Therapy Plan: unclear if patient should be taking 2 or 3 capsules daily per sig: Take 3 capsules (225 mg total) by mouth once daily. TAKE 2 CAPSULES BY MOUTH EVERY DAY      Appointments  past 12m or future 3m with PCP    Date Provider   Last Visit   1/2/2025 James Tanner MD   Next Visit   3/25/2025 James Tanner MD   ED visits in past 90 days: 0        Note composed:5:10 AM 02/26/2025

## 2025-03-11 DIAGNOSIS — G44.229 CHRONIC TENSION-TYPE HEADACHE, NOT INTRACTABLE: ICD-10-CM

## 2025-03-11 DIAGNOSIS — F43.23 ADJUSTMENT DISORDER WITH MIXED ANXIETY AND DEPRESSED MOOD: ICD-10-CM

## 2025-03-11 DIAGNOSIS — E05.90 SUBCLINICAL HYPERTHYROIDISM: ICD-10-CM

## 2025-03-11 RX ORDER — ATENOLOL 25 MG/1
25 TABLET ORAL DAILY
Qty: 90 TABLET | Refills: 0 | Status: SHIPPED | OUTPATIENT
Start: 2025-03-11

## 2025-03-11 RX ORDER — TOPIRAMATE 25 MG/1
25 TABLET ORAL NIGHTLY
Qty: 90 TABLET | Refills: 3 | Status: SHIPPED | OUTPATIENT
Start: 2025-03-11

## 2025-03-11 RX ORDER — BUPROPION HYDROCHLORIDE 150 MG/1
150 TABLET ORAL DAILY
Qty: 90 TABLET | Refills: 3 | Status: SHIPPED | OUTPATIENT
Start: 2025-03-11

## 2025-03-11 NOTE — TELEPHONE ENCOUNTER
Care Due:                  Date            Visit Type   Department     Provider  --------------------------------------------------------------------------------                                ESTABLISHED                              PATIENT -    JUAN Calvert  Last Visit: 10-      VIRTUAL      MEDICINE       [facility]                               -                              PRIMARY      JUAN Hearnon  Next Visit: 03-      CARE ([facility])   MEDICINE       [facility]                                                            Last  Test          Frequency    Reason                     Performed    Due Date  --------------------------------------------------------------------------------    HBA1C.......  6 months...  semaglutide..............  03- 09-    [facility] Embedded Care Due Messages. Reference number: [id number].   3/11/2025 12:04:54 AM CDT

## 2025-03-11 NOTE — TELEPHONE ENCOUNTER
Refill Routing Note   Medication(s) are not appropriate for processing by [facility] for the following reason(s):        Outside of protocol  Allergy or intolerance    [facility] action(s):  Route  Defer  Approve   Requires labs : Yes           Pharmacist review requested: Yes     Appointments  past 12m or future 3m with PCP    Date Provider   Last Visit   1/2/2025 James Tanner MD   Next Visit   3/25/2025 James Tanner MD   ED visits in past 90 days: 0        Note composed:3:00 PM 03/11/2025

## 2025-03-11 NOTE — TELEPHONE ENCOUNTER
Refill Routing Note   Medication(s) are not appropriate for processing by [facility] for the following reason(s):        Outside of protocol: Topamax  Allergy or intolerance      [facility] action(s):  Route  Defer  Approve     Requires labs : Yes           Pharmacist review requested: Yes     Appointments  past 12m or future 3m with PCP    Date Provider   Last Visit   1/2/2025 James Tanner MD   Next Visit   3/25/2025 James Tanner MD   ED visits in past 90 days: 0        Note composed:2:04 PM 03/11/2025

## 2025-03-12 RX ORDER — ESTRADIOL 1 MG/1
1 TABLET ORAL
Qty: 90 TABLET | Refills: 1 | Status: SHIPPED | OUTPATIENT
Start: 2025-03-12

## 2025-03-25 ENCOUNTER — OFFICE VISIT (OUTPATIENT)
Dept: FAMILY MEDICINE | Facility: CLINIC | Age: 55
End: 2025-03-25
Payer: COMMERCIAL

## 2025-03-25 VITALS
HEART RATE: 71 BPM | BODY MASS INDEX: 32.54 KG/M2 | HEIGHT: 62 IN | WEIGHT: 176.81 LBS | OXYGEN SATURATION: 97 % | SYSTOLIC BLOOD PRESSURE: 126 MMHG | DIASTOLIC BLOOD PRESSURE: 74 MMHG | RESPIRATION RATE: 18 BRPM

## 2025-03-25 DIAGNOSIS — F43.23 ADJUSTMENT DISORDER WITH MIXED ANXIETY AND DEPRESSED MOOD: ICD-10-CM

## 2025-03-25 DIAGNOSIS — Z23 NEED FOR PNEUMOCOCCAL 20-VALENT CONJUGATE VACCINATION: ICD-10-CM

## 2025-03-25 DIAGNOSIS — Z00.00 ROUTINE GENERAL MEDICAL EXAMINATION AT A HEALTH CARE FACILITY: Primary | ICD-10-CM

## 2025-03-25 DIAGNOSIS — J01.01 ACUTE RECURRENT MAXILLARY SINUSITIS: ICD-10-CM

## 2025-03-25 DIAGNOSIS — J32.9 CHRONIC CONGESTION OF PARANASAL SINUS: ICD-10-CM

## 2025-03-25 PROCEDURE — 99999 PR PBB SHADOW E&M-EST. PATIENT-LVL V: CPT | Mod: PBBFAC,,, | Performed by: STUDENT IN AN ORGANIZED HEALTH CARE EDUCATION/TRAINING PROGRAM

## 2025-03-25 PROCEDURE — 90677 PCV20 VACCINE IM: CPT | Mod: S$GLB,,, | Performed by: STUDENT IN AN ORGANIZED HEALTH CARE EDUCATION/TRAINING PROGRAM

## 2025-03-25 PROCEDURE — 99214 OFFICE O/P EST MOD 30 MIN: CPT | Mod: 25,S$GLB,, | Performed by: STUDENT IN AN ORGANIZED HEALTH CARE EDUCATION/TRAINING PROGRAM

## 2025-03-25 PROCEDURE — 4010F ACE/ARB THERAPY RXD/TAKEN: CPT | Mod: CPTII,S$GLB,, | Performed by: STUDENT IN AN ORGANIZED HEALTH CARE EDUCATION/TRAINING PROGRAM

## 2025-03-25 PROCEDURE — 1160F RVW MEDS BY RX/DR IN RCRD: CPT | Mod: CPTII,S$GLB,, | Performed by: STUDENT IN AN ORGANIZED HEALTH CARE EDUCATION/TRAINING PROGRAM

## 2025-03-25 PROCEDURE — 90471 IMMUNIZATION ADMIN: CPT | Mod: S$GLB,,, | Performed by: STUDENT IN AN ORGANIZED HEALTH CARE EDUCATION/TRAINING PROGRAM

## 2025-03-25 PROCEDURE — 3008F BODY MASS INDEX DOCD: CPT | Mod: CPTII,S$GLB,, | Performed by: STUDENT IN AN ORGANIZED HEALTH CARE EDUCATION/TRAINING PROGRAM

## 2025-03-25 PROCEDURE — G2211 COMPLEX E/M VISIT ADD ON: HCPCS | Mod: S$GLB,,, | Performed by: STUDENT IN AN ORGANIZED HEALTH CARE EDUCATION/TRAINING PROGRAM

## 2025-03-25 PROCEDURE — 3074F SYST BP LT 130 MM HG: CPT | Mod: CPTII,S$GLB,, | Performed by: STUDENT IN AN ORGANIZED HEALTH CARE EDUCATION/TRAINING PROGRAM

## 2025-03-25 PROCEDURE — 1159F MED LIST DOCD IN RCRD: CPT | Mod: CPTII,S$GLB,, | Performed by: STUDENT IN AN ORGANIZED HEALTH CARE EDUCATION/TRAINING PROGRAM

## 2025-03-25 PROCEDURE — 3078F DIAST BP <80 MM HG: CPT | Mod: CPTII,S$GLB,, | Performed by: STUDENT IN AN ORGANIZED HEALTH CARE EDUCATION/TRAINING PROGRAM

## 2025-03-25 RX ORDER — DOXYCYCLINE 100 MG/1
100 CAPSULE ORAL 2 TIMES DAILY
Qty: 14 CAPSULE | Refills: 0 | Status: SHIPPED | OUTPATIENT
Start: 2025-03-25 | End: 2025-04-01

## 2025-03-25 RX ORDER — VENLAFAXINE HYDROCHLORIDE 75 MG/1
150 CAPSULE, EXTENDED RELEASE ORAL DAILY
Qty: 30 CAPSULE | Refills: 2 | Status: SHIPPED | OUTPATIENT
Start: 2025-03-25

## 2025-03-25 RX ORDER — MONTELUKAST SODIUM 10 MG/1
10 TABLET ORAL NIGHTLY
Qty: 30 TABLET | Refills: 6 | Status: SHIPPED | OUTPATIENT
Start: 2025-03-25 | End: 2025-10-21

## 2025-03-25 NOTE — PROGRESS NOTES
Patient ID: Kylah Mohan is a 54 y.o. female.    Chief Complaint: Annual Exam (Refills needed )    History of Present Illness    CHIEF COMPLAINT:  Patient presents today for follow up of multiple chronic conditions including weight management, allergies, and medication management.    ALLERGY AND ENT:  She reports experiencing allergy symptoms including headaches, eye symptoms, and sinus pressure for the past 3-4 months. She has been using a neti pot for nasal irrigation, noting dark discharge, and takes Zyrtec at nighttime. Her nasal congestion affects sleep, requiring position changes and sleeping propped up. She has had multiple consultations with both ENT specialists and allergists for these symptoms.    WEIGHT MANAGEMENT AND ENERGY:  Her weight varies between 171-176 lbs depending on scale used. She expresses frustration with weight loss plateau despite maintaining appropriate diet and portion control. She reports lack of energy interfering with ability to maintain regular exercise routine.    MEDICAL HISTORY:  She has a history of aneurysm and remains vigilant about headaches due to this history. She denies experiencing aneurysm-type headaches similar to previous episodes.    MENTAL HEALTH:  She reports improvement in depressive symptoms. Currently maintained on Wellbutrin and Effexor.    MENOPAUSE:  She reports improvement in mood swings and hot flashes with current estrogen therapy.      ROS:  General: -fever, -chills, +fatigue, -weight gain, -weight loss, +mood swings, +hot flashes  Eyes: -vision changes, -redness, -discharge, +eye irritation, +eye itchiness  ENT: -ear pain, +nasal congestion, -sore throat, +nasal discharge, +runny nose, +blockage or obstruction  Cardiovascular: -chest pain, -palpitations, -lower extremity edema  Respiratory: -cough, -shortness of breath  Gastrointestinal: -abdominal pain, -nausea, -vomiting, -diarrhea, -constipation, -blood in stool  Genitourinary: -dysuria, -hematuria,  -frequency  Musculoskeletal: -joint pain, -muscle pain  Skin: -rash, -lesion  Neurological: +headache, -dizziness, -numbness, -tingling  Psychiatric: -anxiety, -depression, -sleep difficulty  Allergic: +seasonal allergies  Head: +head pain, +sinus pressure         Physical Exam    General: No acute distress. Well-developed. Well-nourished.  Eyes: EOMI. Sclerae anicteric.  HENT: Normocephalic. Atraumatic. Nares patent. Moist oral mucosa. Tenderness in maxillary sinuses.  Cardiovascular: Regular rate. Regular rhythm. No murmurs. No rubs. No gallops. Normal S1, S2.  Respiratory: Normal respiratory effort. Clear to auscultation bilaterally. No rales. No rhonchi. No wheezing.  Abdomen: Soft. Non-tender. Non-distended. Normoactive bowel sounds.  Musculoskeletal: No  obvious deformity.  Extremities: No lower extremity edema.  Neurological: Alert & oriented x3. No slurred speech. Normal gait.  Psychiatric: Normal mood. Normal affect. Good insight. Good judgment.  Skin: Warm. Dry. No rash.         Assessment & Plan    J32.9 Chronic congestion of paranasal sinus  F43.23 Adjustment disorder with mixed anxiety and depressed mood  Z00.00 Routine general medical exam at a health care facility  J01.01 Acute recurrent maxillary sinusitis  Z23 Need for pneumococcal 20-valent conjugate vaccination  F41.8 Other specified anxiety disorders  I67.1 Cerebral aneurysm, nonruptured  I10 Essential (primary) hypertension  E87.6 Hypokalemia  E88.810 Metabolic syndrome  G47.00 Insomnia, unspecified  R51.9 Headache, unspecified  Z78.0 Asymptomatic menopausal state  R79.89 Other specified abnormal findings of blood chemistry    IMPRESSION:  - Considered and started Montelukast (Singulair) 1 daily at night for persistent allergy symptoms, weighing potential benefits against risk of depression.  - Started doxycycline 100 mg twice daily for 7 days for possible bacterial sinus infection.  - Assessed depression management and decreased Effexor from 3  pills daily to 2 pills daily while maintaining Wellbutrin at current dose.  - Evaluated current hormone replacement therapy, including positive effects on bone density and bladder health, while explaining risks of long-term estrogen use including increased heart and blood clot risk, especially in older patients.  - Discussed weight management options, including potential for Mounjaro/ZepBound, but cost prohibitive at present.    CHRONIC CONGESTION OF PARANASAL SINUS:  - Initiated Montelukast (Singulair) 1 tablet daily at night for persistent allergy symptoms, weighing potential benefits against risk of depression.  - Prescribed doxycycline 100 mg twice daily for 7 days for possible bacterial sinus infection.    ADJUSTMENT DISORDER WITH MIXED ANXIETY AND DEPRESSED MOOD:  - Decreased Effexor from 3 tablets daily to 2 tablets daily while maintaining Wellbutrin at current dose for depression management.  - Scheduled follow up in 4-6 weeks to assess response to Effexor dose reduction and consider further tapering.  - Patient reports feeling better in terms of depression.    ROUTINE GENERAL MEDICAL EXAMINATION AT A HEALTH CARE FACILITY:  - Ordered CBC, CMP, electrolytes, diabetes check, and lipid panel in approximately 1 week (fasting not required).    ACUTE RECURRENT MAXILLARY SINUSITIS:  - Prescribed doxycycline 100 mg twice daily for 7 days for possible bacterial sinus infection.    NEED FOR PNEUMOCOCCAL 20-VALENT CONJUGATE VACCINATION:  - Recommend and ordered pneumococcal vaccine to be administered in office due to recent guideline change for age 50+ (changed from age 65), noting potential benefit for recurrent sinus infections.    OTHER SPECIFIED ANXIETY DISORDERS:  - Scheduled follow-up visit in 4-6 weeks to assess medication reduction.    CEREBRAL ANEURYSM, NONRUPTURED:  - Advised the patient to continue monitoring headaches due to history of aneurysm.    ESSENTIAL (PRIMARY) HYPERTENSION:  - Instructed the patient  to continue monitoring blood pressure daily.  - Noted that blood pressure is well-controlled with current medication.  - Continued current antihypertensive medication regimen.    HYPOKALEMIA:  - Continued daily potassium supplementation.  - Emphasized the importance of maintaining normal potassium levels.  - Noted recent labs showing low potassium levels.    METABOLIC SYNDROME:  - Discussed weight loss medication options with the patient, providing information on pricing and options.  - Noted patient's weight is between 171-176 lbs.  - Patient reports difficulty with weight loss despite dietary efforts.    INSOMNIA, UNSPECIFIED:  - Noted patient reports difficulty sleeping due to congestion.    HEADACHE, UNSPECIFIED:  - Patient reports persistent headaches and sinus pressure for 3-4 months.  - Examined patient's sinuses and found tenderness.  - Suspected sinus problems as cause of headaches.           Kylah was seen today for annual exam.    Diagnoses and all orders for this visit:    Routine general medical examination at a health care facility  -     Lipid Panel; Future  -     Hemoglobin A1C; Future  -     Comprehensive Metabolic Panel; Future  -     CBC Auto Differential; Future  -     TSH; Future    Chronic congestion of paranasal sinus  -     montelukast (SINGULAIR) 10 mg tablet; Take 1 tablet (10 mg total) by mouth every evening.    Adjustment disorder with mixed anxiety and depressed mood  -     venlafaxine (EFFEXOR-XR) 75 MG 24 hr capsule; Take 2 capsules (150 mg total) by mouth once daily.    Acute recurrent maxillary sinusitis  -     doxycycline (VIBRAMYCIN) 100 MG Cap; Take 1 capsule (100 mg total) by mouth 2 (two) times daily. for 7 days    Need for pneumococcal 20-valent conjugate vaccination  -     pneumoc 20-jam conj-dip cr(PF) (PREVNAR-20 (PF)) injection Syrg 0.5 mL          No follow-ups on file.    This note was generated with the assistance of ambient listening technology. Verbal consent was  obtained by the patient and accompanying visitor(s) for the recording of patient appointment to facilitate this note. I attest to having reviewed and edited the generated note for accuracy, though some syntax or spelling errors may persist. Please contact the author of this note for any clarification.

## 2025-03-28 ENCOUNTER — OFFICE VISIT (OUTPATIENT)
Dept: OBSTETRICS AND GYNECOLOGY | Facility: CLINIC | Age: 55
End: 2025-03-28
Payer: COMMERCIAL

## 2025-03-28 ENCOUNTER — HOSPITAL ENCOUNTER (OUTPATIENT)
Dept: RADIOLOGY | Facility: HOSPITAL | Age: 55
Discharge: HOME | End: 2025-03-28
Attending: SPECIALIST
Payer: COMMERCIAL

## 2025-03-28 VITALS — WEIGHT: 174.38 LBS | BODY MASS INDEX: 31.9 KG/M2 | DIASTOLIC BLOOD PRESSURE: 84 MMHG | SYSTOLIC BLOOD PRESSURE: 126 MMHG

## 2025-03-28 DIAGNOSIS — Z12.31 VISIT FOR SCREENING MAMMOGRAM: Primary | ICD-10-CM

## 2025-03-28 DIAGNOSIS — Z12.31 VISIT FOR SCREENING MAMMOGRAM: ICD-10-CM

## 2025-03-28 PROCEDURE — 77063 BREAST TOMOSYNTHESIS BI: CPT | Mod: TC,PN

## 2025-03-28 PROCEDURE — 77067 SCR MAMMO BI INCL CAD: CPT | Mod: 26,,, | Performed by: RADIOLOGY

## 2025-03-28 PROCEDURE — 99999 PR PBB SHADOW E&M-EST. PATIENT-LVL IV: CPT | Mod: PBBFAC,,, | Performed by: SPECIALIST

## 2025-03-28 PROCEDURE — 77063 BREAST TOMOSYNTHESIS BI: CPT | Mod: 26,,, | Performed by: RADIOLOGY

## 2025-03-28 NOTE — PROGRESS NOTES
53 yo BF, history hyst presents for annual gyn matthew and screening mammogram  Past Medical History:   Diagnosis Date    Benign hypertension     Bladder instability     Brain aneurysm     Depression     DJD (degenerative joint disease), lumbar     History of viral meningitis 1996    Hyperlipidemia     Insomnia     Microscopic hematuria     negative work-up    Pulmonary nodule     minute    Tendonitis        Past Surgical History:   Procedure Laterality Date    ANTERIOR VAGINAL REPAIR      BRAIN SURGERY      aneurysm    BREAST BIOPSY  6/5/2007    left breast benign    CEREBRAL ANGIOGRAM N/A 9/17/2020    Procedure: ANGIOGRAM-CEREBRAL;  Surgeon: [facility] Diagnostic Provider;  Location: [facility] OR [address] [place] [address];  Service: Radiology;  Laterality: N/A;   [address]    CLIP LIGATION OF INTRACRANIAL ANEURYSM BY CRANIOTOMY Left 11/4/2020    Procedure: CRANIOTOMY, WITH ANEURYSM CLIPPING RIGHT MODIFIED ORBITOZYGOMATIC CRANIOTOMY;  Surgeon: Christi Page MD;  Location: [facility] OR [address] [place] [address];  Service: Neurosurgery;  Laterality: Left;  Anesthesia: General   Blood: Type & Cross 2 units  Neuro Monitoring: SEP, MEP, EEG   Radiology: C-Arm   Bed: Regular Bed  Headrest: [place]  Position: Supine   ,  Reciprocating Saw  Length of Surgery: 4.5     COLONOSCOPY N/A 7/23/2021    Procedure: COLONOSCOPY;  Surgeon: Tiffanie Reyes MD;  Location: [facility] ENDO;  Service: Endoscopy;  Laterality: N/A;    ENDOMETRIAL ABLATION  5/28/2009    HYSTERECTOMY      LAPAROSCOPIC HYSTERECTOMY  2/2013    TUBAL LIGATION  2002       Family History   Problem Relation Name Age of Onset    Cancer Paternal Grandmother          Breast    Diabetes Paternal Grandmother      Breast cancer Paternal Grandmother  56    Hyperlipidemia Father      Diabetes Father      Hypertension Father      Osteoarthritis Mother      Hyperlipidemia Mother      Hypertension Mother      Allergies Mother      Allergies Sister      Heart disease Sister          Congential    Breast cancer Maternal Aunt   61    Ovarian cancer Maternal Cousin 1st 50    Colon cancer Neg Hx      Uterine cancer Neg Hx         Social History     Socioeconomic History    Marital status:    Occupational History     Employer: [employer]   Tobacco Use    Smoking status: Former     Current packs/day: 0.00     Average packs/day: 0.3 packs/day for 15.0 years (3.8 ttl pk-yrs)     Types: Cigarettes     Start date: 10/20/1998     Quit date: 10/20/2013     Years since quittin.4    Smokeless tobacco: Never   Substance and Sexual Activity    Alcohol use: Not Currently     Alcohol/week: 1.0 standard drink of alcohol     Types: 1 Glasses of wine per week    Drug use: No    Sexual activity: Not Currently     Partners: Male     Birth control/protection: Surgical   Social History Narrative    The patient does exercise regularly (walking daily, 35-40min).Rates diet as fair.She is not satisfied with weight.     Social Drivers of Health     Financial Resource Strain: Medium Risk (7/15/2024)    Overall Financial Resource Strain (CARDIA)     Difficulty of Paying Living Expenses: Somewhat hard   Food Insecurity: Food Insecurity Present (7/15/2024)    Hunger Vital Sign     Worried About Running Out of Food in the Last Year: Sometimes true     Ran Out of Food in the Last Year: Sometimes true   Transportation Needs: Unmet Transportation Needs (3/18/2024)    PRAPARE - Transportation     Lack of Transportation (Medical): Yes     Lack of Transportation (Non-Medical): Yes   Physical Activity: Insufficiently Active (7/15/2024)    Exercise Vital Sign     Days of Exercise per Week: 3 days     Minutes of Exercise per Session: 30 min   Stress: Stress Concern Present (7/15/2024)    [employer] - Occupational Stress Questionnaire     Feeling of Stress : Very much   Housing Stability: High Risk (3/18/2024)    Housing Stability Vital Sign     Unable to Pay for Housing in the Last Year: Yes     Number of Places Lived  in the Last Year: 1     Unstable Housing in the Last Year: No       Current Medications[1]    Review of patient's allergies indicates:   Allergen Reactions    Covid-19 (sars-cov-2) vaccine, vlp Hives     Got Covid and flu vaccines together. Developed hives + systemic symptoms    Influenza virus vacc trivalent Hives    Latex      Other reaction(s): Rash       Review of System:   General: no chills, fever, night sweats, weight gain or weight loss  Psychological: no depression or suicidal ideation  Breasts: no new or changing breast lumps, nipple discharge or masses.  Respiratory: no cough, shortness of breath, or wheezing  Cardiovascular: no chest pain or dyspnea on exertion  Gastrointestinal: no abdominal pain, change in bowel habits, or black or bloody stools  Genito-Urinary: no incontinence, urinary frequency/urgency or vulvar/vaginal symptoms, pelvic pain or abnormal vaginal bleeding.  Musculoskeletal: no gait disturbance or muscular weakness     PE:   APPEARANCE: Well nourished, well developed, in no acute distress.  NECK: Neck symmetric without masses or thyromegaly.  NODES: No inguinal lymph node enlargement.  ABDOMEN: Soft. No tenderness or masses. No hepatosplenomegaly. No hernias.  BREASTS: Symmetrical, no skin changes or visible lesions. No palpable masses, nipple discharge or adenopathy bilaterally.  PELVIC: Normal external female genitalia without lesions. Normal hair distribution. Adequate perineal body, normal urethral meatus. Vagina moist and well rugated without lesions or discharge. No significant cystocele or rectocele. Uterus and cervix surgically absent. Bimanual exam revealed no masses, tenderness or abnormality.    NOTE  NURSING PERSONAL PRESENT FOR ENTIRE PHYSICAL EXAM     Plan  BSE monthly  Screening mammogram and repeat yearly  RTO 1 year/prn    I spent a total of 30 minutes on the day of the visit. This includes face to face time and non-face to face time preparing to see the patient (eg,  review of tests), obtaining and/or reviewing separately obtained history, documenting clinical information in the electronic or other health record, independently interpreting results and communicating results to the patient/family/caregiver, or care coordinator.            [1]   Current Outpatient Medications   Medication Sig Dispense Refill    ascorbic acid, vitamin C, (VITAMIN C) 1000 MG tablet Take 1,000 mg by mouth once daily.      aspirin (ECOTRIN) 81 MG EC tablet Take 81 mg by mouth nightly.      atenoloL (TENORMIN) 25 MG tablet Take 1 tablet (25 mg total) by mouth once daily. 90 tablet 0    buPROPion (WELLBUTRIN XL) 150 MG TB24 tablet Take 1 tablet (150 mg total) by mouth once daily. 90 tablet 3    cholecalciferol, vitamin D3, (D3-2000) 50 mcg (2,000 unit) Cap capsule       doxycycline (VIBRAMYCIN) 100 MG Cap Take 1 capsule (100 mg total) by mouth 2 (two) times daily. for 7 days 14 capsule 0    estradioL (ESTRACE) 1 MG tablet TAKE 1 TABLET BY MOUTH EVERY DAY 90 tablet 1    famotidine (PEPCID) 40 MG tablet TAKE 1 TABLET BY MOUTH EVERY DAY 90 tablet 4    fluticasone (FLONASE) 50 mcg/actuation nasal spray 1 spray by Each Nare route once daily.      hydroCHLOROthiazide (HYDRODIURIL) 25 MG tablet Take 1 tablet (25 mg total) by mouth once daily. 90 tablet 3    Lactobacillus rhamnosus GG (CULTURELLE) 10 billion cell capsule Take 1 capsule by mouth once daily.      losartan (COZAAR) 25 MG tablet Take 1 tablet (25 mg total) by mouth once daily. 90 tablet 3    montelukast (SINGULAIR) 10 mg tablet Take 1 tablet (10 mg total) by mouth every evening. 30 tablet 6    omega-3 fatty acids/fish oil (FISH OIL-OMEGA-3 FATTY ACIDS) 300-1,000 mg capsule Take 1 capsule by mouth once daily.      ondansetron (ZOFRAN-ODT) 4 MG TbDL Take 4 mg by mouth.      potassium chloride (K-TAB) 20 mEq Take 1 tablet (20 mEq total) by mouth once daily. 90 tablet 3    topiramate (TOPAMAX) 25 MG tablet TAKE 1 TABLET BY MOUTH EVERY EVENING 90 tablet  3    venlafaxine (EFFEXOR-XR) 75 MG 24 hr capsule Take 2 capsules (150 mg total) by mouth once daily. 30 capsule 2    cetirizine (ZYRTEC) 10 MG tablet TAKE 1 TABLET BY MOUTH EVERY DAY (Patient not taking: Reported on 3/28/2025) 90 tablet 4     No current facility-administered medications for this visit.

## 2025-04-01 ENCOUNTER — LAB VISIT (OUTPATIENT)
Dept: LAB | Facility: HOSPITAL | Age: 55
End: 2025-04-01
Attending: STUDENT IN AN ORGANIZED HEALTH CARE EDUCATION/TRAINING PROGRAM
Payer: COMMERCIAL

## 2025-04-01 ENCOUNTER — RESULTS FOLLOW-UP (OUTPATIENT)
Dept: OBSTETRICS AND GYNECOLOGY | Facility: CLINIC | Age: 55
End: 2025-04-01

## 2025-04-01 DIAGNOSIS — Z00.00 ROUTINE GENERAL MEDICAL EXAMINATION AT A HEALTH CARE FACILITY: ICD-10-CM

## 2025-04-01 LAB
ABSOLUTE EOSINOPHIL (OHS): 0.41 K/UL
ABSOLUTE MONOCYTE (OHS): 0.84 K/UL (ref 0.3–1)
ABSOLUTE NEUTROPHIL COUNT (OHS): 4.95 K/UL (ref 1.8–7.7)
ALBUMIN SERPL BCP-MCNC: 3.3 G/DL (ref 3.5–5.2)
ALP SERPL-CCNC: 74 UNIT/L (ref 40–150)
ALT SERPL W/O P-5'-P-CCNC: 10 UNIT/L (ref 10–44)
ANION GAP (OHS): 6 MMOL/L (ref 8–16)
AST SERPL-CCNC: 15 UNIT/L (ref 11–45)
BASOPHILS # BLD AUTO: 0.09 K/UL
BASOPHILS NFR BLD AUTO: 1 %
BILIRUB SERPL-MCNC: 0.4 MG/DL (ref 0.1–1)
BUN SERPL-MCNC: 16 MG/DL (ref 6–20)
CALCIUM SERPL-MCNC: 9.5 MG/DL (ref 8.7–10.5)
CHLORIDE SERPL-SCNC: 109 MMOL/L (ref 95–110)
CHOLEST SERPL-MCNC: 202 MG/DL (ref 120–199)
CHOLEST/HDLC SERPL: 4 {RATIO} (ref 2–5)
CO2 SERPL-SCNC: 30 MMOL/L (ref 23–29)
CREAT SERPL-MCNC: 0.7 MG/DL (ref 0.5–1.4)
EAG (OHS): 111 MG/DL (ref 68–131)
ERYTHROCYTE [DISTWIDTH] IN BLOOD BY AUTOMATED COUNT: 12.9 % (ref 11.5–14.5)
GFR SERPLBLD CREATININE-BSD FMLA CKD-EPI: >60 ML/MIN/1.73/M2
GLUCOSE SERPL-MCNC: 92 MG/DL (ref 70–110)
HBA1C MFR BLD: 5.5 % (ref 4–5.6)
HCT VFR BLD AUTO: 41.5 % (ref 37–48.5)
HDLC SERPL-MCNC: 51 MG/DL (ref 40–75)
HDLC SERPL: 25.2 % (ref 20–50)
HGB BLD-MCNC: 13.1 GM/DL (ref 12–16)
IMM GRANULOCYTES # BLD AUTO: 0.03 K/UL (ref 0–0.04)
IMM GRANULOCYTES NFR BLD AUTO: 0.3 % (ref 0–0.5)
LDLC SERPL CALC-MCNC: 137.6 MG/DL (ref 63–159)
LYMPHOCYTES # BLD AUTO: 2.99 K/UL (ref 1–4.8)
MCH RBC QN AUTO: 29.3 PG (ref 27–31)
MCHC RBC AUTO-ENTMCNC: 31.6 G/DL (ref 32–36)
MCV RBC AUTO: 93 FL (ref 82–98)
NONHDLC SERPL-MCNC: 151 MG/DL
NUCLEATED RBC (/100WBC) (OHS): 0 /100 WBC
PLATELET # BLD AUTO: 310 K/UL (ref 150–450)
PMV BLD AUTO: 10.4 FL (ref 9.2–12.9)
POTASSIUM SERPL-SCNC: 4.3 MMOL/L (ref 3.5–5.1)
PROT SERPL-MCNC: 6.9 GM/DL (ref 6–8.4)
RBC # BLD AUTO: 4.47 M/UL (ref 4–5.4)
RELATIVE EOSINOPHIL (OHS): 4.4 %
RELATIVE LYMPHOCYTE (OHS): 32.1 % (ref 18–48)
RELATIVE MONOCYTE (OHS): 9 % (ref 4–15)
RELATIVE NEUTROPHIL (OHS): 53.2 % (ref 38–73)
SODIUM SERPL-SCNC: 145 MMOL/L (ref 136–145)
TRIGL SERPL-MCNC: 67 MG/DL (ref 30–150)
TSH SERPL-ACNC: 0.8 UIU/ML (ref 0.4–4)
WBC # BLD AUTO: 9.31 K/UL (ref 3.9–12.7)

## 2025-04-01 PROCEDURE — 84295 ASSAY OF SERUM SODIUM: CPT

## 2025-04-01 PROCEDURE — 84443 ASSAY THYROID STIM HORMONE: CPT

## 2025-04-01 PROCEDURE — 82040 ASSAY OF SERUM ALBUMIN: CPT

## 2025-04-01 PROCEDURE — 36415 COLL VENOUS BLD VENIPUNCTURE: CPT | Mod: PO

## 2025-04-01 PROCEDURE — 83036 HEMOGLOBIN GLYCOSYLATED A1C: CPT

## 2025-04-01 PROCEDURE — 85025 COMPLETE CBC W/AUTO DIFF WBC: CPT

## 2025-04-01 PROCEDURE — 80061 LIPID PANEL: CPT

## 2025-04-02 ENCOUNTER — RESULTS FOLLOW-UP (OUTPATIENT)
Dept: FAMILY MEDICINE | Facility: CLINIC | Age: 55
End: 2025-04-02

## 2025-04-10 DIAGNOSIS — I10 ESSENTIAL HYPERTENSION: ICD-10-CM

## 2025-04-10 RX ORDER — LOSARTAN POTASSIUM 25 MG/1
25 TABLET ORAL
Qty: 90 TABLET | Refills: 3 | Status: SHIPPED | OUTPATIENT
Start: 2025-04-10

## 2025-04-10 NOTE — TELEPHONE ENCOUNTER
Refill Routing Note   Medication(s) are not appropriate for processing by [facility] for the following reason(s):        Drug-disease interaction    [facility] action(s):  Defer  Approve        Medication Therapy Plan: Drug-Disease: hydroCHLOROthiazide and Hypokalemia; DEFER    Alert overridden per protocol: Yes     Appointments  past 12m or future 3m with PCP    Date Provider   Last Visit   3/25/2025 James Tanner MD   Next Visit   Visit date not found James Tanner MD   ED visits in past 90 days: 0        Note composed:4:33 AM 04/10/2025

## 2025-04-10 NOTE — TELEPHONE ENCOUNTER
No care due was identified.  [facility] Embedded Care Due Messages. Reference number: [id number].   4/10/2025 12:10:56 AM CDT

## 2025-04-11 RX ORDER — HYDROCHLOROTHIAZIDE 25 MG/1
25 TABLET ORAL
Qty: 90 TABLET | Refills: 3 | Status: SHIPPED | OUTPATIENT
Start: 2025-04-11

## 2025-06-07 DIAGNOSIS — E05.90 SUBCLINICAL HYPERTHYROIDISM: ICD-10-CM

## 2025-06-07 RX ORDER — ATENOLOL 25 MG/1
25 TABLET ORAL
Qty: 90 TABLET | Refills: 3 | Status: SHIPPED | OUTPATIENT
Start: 2025-06-07

## 2025-06-07 NOTE — TELEPHONE ENCOUNTER
No care due was identified.  [facility] Embedded Care Due Messages. Reference number: [id number].   6/07/2025 12:20:29 AM CDT

## 2025-06-07 NOTE — TELEPHONE ENCOUNTER
Refill Decision Note   Kylah Kimberlee  is requesting a refill authorization.  Brief Assessment and Rationale for Refill:  Approve     Medication Therapy Plan:        Comments:     Note composed:2:50 PM 06/07/2025

## 2025-08-20 ENCOUNTER — HOSPITAL ENCOUNTER (OUTPATIENT)
Dept: RADIOLOGY | Facility: HOSPITAL | Age: 55
Discharge: HOME | End: 2025-08-20
Payer: COMMERCIAL

## 2025-08-20 ENCOUNTER — OFFICE VISIT (OUTPATIENT)
Dept: FAMILY MEDICINE | Facility: CLINIC | Age: 55
End: 2025-08-20
Payer: COMMERCIAL

## 2025-08-20 VITALS
TEMPERATURE: 98 F | BODY MASS INDEX: 32.05 KG/M2 | DIASTOLIC BLOOD PRESSURE: 76 MMHG | HEART RATE: 73 BPM | SYSTOLIC BLOOD PRESSURE: 122 MMHG | WEIGHT: 174.19 LBS | OXYGEN SATURATION: 99 % | HEIGHT: 62 IN | RESPIRATION RATE: 12 BRPM

## 2025-08-20 DIAGNOSIS — J32.9 SINUSITIS, UNSPECIFIED CHRONICITY, UNSPECIFIED LOCATION: ICD-10-CM

## 2025-08-20 DIAGNOSIS — R51.9 NONINTRACTABLE HEADACHE, UNSPECIFIED CHRONICITY PATTERN, UNSPECIFIED HEADACHE TYPE: ICD-10-CM

## 2025-08-20 DIAGNOSIS — R01.1 MURMUR, CARDIAC: ICD-10-CM

## 2025-08-20 DIAGNOSIS — I10 ESSENTIAL HYPERTENSION: Primary | ICD-10-CM

## 2025-08-20 LAB
OHS QRS DURATION: 140 MS
OHS QTC CALCULATION: 492 MS

## 2025-08-20 PROCEDURE — 99999 PR PBB SHADOW E&M-EST. PATIENT-LVL V: CPT | Mod: PBBFAC,,,

## 2025-08-20 PROCEDURE — 3044F HG A1C LEVEL LT 7.0%: CPT | Mod: CPTII,S$GLB,,

## 2025-08-20 PROCEDURE — 99214 OFFICE O/P EST MOD 30 MIN: CPT | Mod: S$GLB,,,

## 2025-08-20 PROCEDURE — 3008F BODY MASS INDEX DOCD: CPT | Mod: CPTII,S$GLB,,

## 2025-08-20 PROCEDURE — 93005 ELECTROCARDIOGRAM TRACING: CPT | Mod: S$GLB,,,

## 2025-08-20 PROCEDURE — 4010F ACE/ARB THERAPY RXD/TAKEN: CPT | Mod: CPTII,S$GLB,,

## 2025-08-20 PROCEDURE — 1159F MED LIST DOCD IN RCRD: CPT | Mod: CPTII,S$GLB,,

## 2025-08-20 PROCEDURE — G2211 COMPLEX E/M VISIT ADD ON: HCPCS | Mod: S$GLB,,,

## 2025-08-20 PROCEDURE — 70450 CT HEAD/BRAIN W/O DYE: CPT | Mod: 26,,, | Performed by: RADIOLOGY

## 2025-08-20 PROCEDURE — 3078F DIAST BP <80 MM HG: CPT | Mod: CPTII,S$GLB,,

## 2025-08-20 PROCEDURE — 93010 ELECTROCARDIOGRAM REPORT: CPT | Mod: S$GLB,,, | Performed by: INTERNAL MEDICINE

## 2025-08-20 PROCEDURE — 1160F RVW MEDS BY RX/DR IN RCRD: CPT | Mod: CPTII,S$GLB,,

## 2025-08-20 PROCEDURE — 70450 CT HEAD/BRAIN W/O DYE: CPT | Mod: TC

## 2025-08-20 PROCEDURE — 3074F SYST BP LT 130 MM HG: CPT | Mod: CPTII,S$GLB,,

## 2025-08-20 RX ORDER — AMOXICILLIN AND CLAVULANATE POTASSIUM 875; 125 MG/1; MG/1
1 TABLET, FILM COATED ORAL 2 TIMES DAILY
Qty: 14 TABLET | Refills: 0 | Status: SHIPPED | OUTPATIENT
Start: 2025-08-20 | End: 2025-08-27

## 2025-09-02 DIAGNOSIS — J32.9 CHRONIC CONGESTION OF PARANASAL SINUS: ICD-10-CM

## 2025-09-02 RX ORDER — ESTRADIOL 1 MG/1
1 TABLET ORAL
Qty: 90 TABLET | Refills: 1 | Status: SHIPPED | OUTPATIENT
Start: 2025-09-02

## 2025-09-02 RX ORDER — MONTELUKAST SODIUM 10 MG/1
10 TABLET ORAL NIGHTLY
Qty: 90 TABLET | Refills: 0 | Status: SHIPPED | OUTPATIENT
Start: 2025-09-02

## (undated) DEVICE — BIT DRILL WIRE PASS 1.0MM

## (undated) DEVICE — PACK SET UP CONVERTORS

## (undated) DEVICE — PAD CUSTOM COTTON 2 X 2

## (undated) DEVICE — STAPLER SKIN PROXIMATE WIDE

## (undated) DEVICE — HEMOSTAT SURGICEL 4X8IN

## (undated) DEVICE — DRESSING TRANS 4X4 TEGADERM

## (undated) DEVICE — SPONGE PATTY SURGICAL .5X3IN

## (undated) DEVICE — STOCKINET 4INX48

## (undated) DEVICE — SPONGE GAUZE 16PLY 4X4

## (undated) DEVICE — DRAPE C ARM 42 X 120 10/BX

## (undated) DEVICE — DRAPE STERI INSTRUMENT 1018

## (undated) DEVICE — DRESSING SURGICAL 1/2X1/2

## (undated) DEVICE — SEE MEDLINE ITEM 156905

## (undated) DEVICE — SPRAY MASTISOL

## (undated) DEVICE — RUBBERBAND STERILE 3X1/8IN

## (undated) DEVICE — BLADE SAW RECIPROCATING 25.5X4

## (undated) DEVICE — SYR SLIP TIP 1CC

## (undated) DEVICE — SEE MEDLINE ITEM 146313

## (undated) DEVICE — HOOK LONE STAR BLUNT 12MM

## (undated) DEVICE — TUBE FRAZIER 5MM 2FT SOFT TIP

## (undated) DEVICE — CLIPS RANEY SCALP FFS/ASSY

## (undated) DEVICE — KIT EVACUATOR 3-SPRING 1/8 DRN

## (undated) DEVICE — SPONGE NEURO 1/4X1/4

## (undated) DEVICE — MARKER SKIN STND TIP BLUE BARR

## (undated) DEVICE — TRAY FOLEY 16FR INFECTION CONT

## (undated) DEVICE — DRESSING SURGICAL 1X3

## (undated) DEVICE — ROUTER TAPERED 2.3MM

## (undated) DEVICE — SUT CTD VICRYL 2-0 CR/CT-2

## (undated) DEVICE — TAPE SURG MEDIPORE 6X72IN

## (undated) DEVICE — WARMER DRAPE STERILE LF

## (undated) DEVICE — DRESSING TELFA STRL 4X3 LF

## (undated) DEVICE — SUT 4/0 18IN NUROLON BLK B

## (undated) DEVICE — DIFFUSER

## (undated) DEVICE — DRAPE STERI-DRAPE 1000 17X11IN

## (undated) DEVICE — GAUZE SPONGE 4X4 12PLY

## (undated) DEVICE — SEE MEDLINE ITEM 146292

## (undated) DEVICE — Device

## (undated) DEVICE — CARTRIDGE OIL

## (undated) DEVICE — KIT SURGIFLO HEMOSTATIC MATRIX

## (undated) DEVICE — SEE MEDLINE ITEM 157103

## (undated) DEVICE — ELECTRODE REM PLYHSV RETURN 9

## (undated) DEVICE — SEE MEDLINE ITEM 157150

## (undated) DEVICE — CORD BIPOLAR 12 FOOT

## (undated) DEVICE — DRAPE OPMI STERILE

## (undated) DEVICE — DRAPE INCISE IOBAN 2 23X17IN

## (undated) DEVICE — DRESSING TELFA N ADH 3X8

## (undated) DEVICE — MICRO FEATHER BLADE